# Patient Record
Sex: MALE | URBAN - METROPOLITAN AREA
[De-identification: names, ages, dates, MRNs, and addresses within clinical notes are randomized per-mention and may not be internally consistent; named-entity substitution may affect disease eponyms.]

---

## 2020-08-11 ENCOUNTER — APPOINTMENT (RX ONLY)
Dept: URBAN - METROPOLITAN AREA CLINIC 28 | Facility: CLINIC | Age: 80
Setting detail: DERMATOLOGY
End: 2020-08-11

## 2020-08-11 PROBLEM — C44.311 BASAL CELL CARCINOMA OF SKIN OF NOSE: Status: ACTIVE | Noted: 2020-08-11

## 2020-08-11 PROCEDURE — ? COUNSELING

## 2020-08-11 PROCEDURE — ? CURETTAGE AND DESTRUCTION WITH PATHOLOGY

## 2020-08-11 PROCEDURE — 17282 DSTR MAL LS F/E/E/N/L/M1.1-2: CPT

## 2020-08-11 PROCEDURE — ? PHOTO-DOCUMENTATION

## 2020-08-11 NOTE — PROCEDURE: CURETTAGE AND DESTRUCTION WITH PATHOLOGY
Detail Level: Detailed
Size Of Lesion In Cm: 0.8
Size Of Lesion After Curettage: 1.2
Anesthesia Type: 1% lidocaine with epinephrine
Cautery Type: electrodesiccation
Number Of Curettages: 3
What Was Performed First?: Curettage
Additional Information: (Optional): The wound was cleaned, and a pressure dressing was applied.  The patient received detailed post-op instructions.
Lab: -[id number]
Histology Text: Following the procedure a portion of the curetted material was sent for histologic evaluation.
Biopsy Type: H and E
Render Path Notes In Note?: No
Consent was obtained from the patient. The risks, benefits and alternatives to therapy were discussed in detail. Specifically, the risks of infection, scarring, bleeding, prolonged wound healing, nerve injury, incomplete removal, allergy to anesthesia and recurrence were addressed. Alternatives to ED&C, such as: surgical removal and XRT were also discussed.  Prior to the procedure, the treatment site was clearly identified and confirmed by the patient. All components of Universal Protocol/PAUSE Rule completed.
Post-Care Instructions: I reviewed with the patient in detail post-care instructions. Patient is to keep the area dry for 48 hours, and not to engage in any swimming until the area is healed. Should the patient develop any fevers, chills, bleeding, severe pain patient will contact the office immediately.
Billing Type: Third-Party Bill
Bill As A Line Item Or As Units: Line Item

## 2020-09-15 ENCOUNTER — APPOINTMENT (RX ONLY)
Dept: URBAN - METROPOLITAN AREA CLINIC 28 | Facility: CLINIC | Age: 80
Setting detail: DERMATOLOGY
End: 2020-09-15

## 2020-09-15 DIAGNOSIS — L57.0 ACTINIC KERATOSIS: ICD-10-CM

## 2020-09-15 PROBLEM — C44.311 BASAL CELL CARCINOMA OF SKIN OF NOSE: Status: ACTIVE | Noted: 2020-09-15

## 2020-09-15 PROCEDURE — 17003 DESTRUCT PREMALG LES 2-14: CPT

## 2020-09-15 PROCEDURE — ? COUNSELING

## 2020-09-15 PROCEDURE — 99212 OFFICE O/P EST SF 10 MIN: CPT | Mod: 25

## 2020-09-15 PROCEDURE — 17000 DESTRUCT PREMALG LESION: CPT

## 2020-09-15 PROCEDURE — ? LIQUID NITROGEN

## 2020-09-15 ASSESSMENT — LOCATION SIMPLE DESCRIPTION DERM
LOCATION SIMPLE: LEFT TEMPLE
LOCATION SIMPLE: LEFT EAR
LOCATION SIMPLE: LEFT ZYGOMA

## 2020-09-15 ASSESSMENT — LOCATION DETAILED DESCRIPTION DERM
LOCATION DETAILED: LEFT SUPERIOR HELIX
LOCATION DETAILED: LEFT MEDIAL ZYGOMA
LOCATION DETAILED: LEFT CENTRAL TEMPLE

## 2020-09-15 ASSESSMENT — LOCATION ZONE DERM
LOCATION ZONE: FACE
LOCATION ZONE: EAR

## 2020-09-15 NOTE — PROCEDURE: LIQUID NITROGEN
Consent: The patient's consent was obtained including but not limited to risks of crusting, scabbing, blistering, scarring, darker or lighter pigmentary change, recurrence, incomplete removal and infection.
Duration Of Freeze Thaw-Cycle (Seconds): 0
Render Note In Bullet Format When Appropriate: No
Post-Care Instructions: I reviewed with the patient in detail post-care instructions. Patient is to wear sunprotection, and avoid picking at any of the treated lesions. Pt may apply Vaseline to crusted or scabbing areas.
Number Of Freeze-Thaw Cycles: 1 freeze-thaw cycle
Detail Level: Simple

## 2021-01-01 ENCOUNTER — APPOINTMENT (INPATIENT)
Dept: RADIOLOGY | Facility: HOSPITAL | Age: 81
DRG: 853 | End: 2021-01-01
Attending: SURGERY
Payer: COMMERCIAL

## 2021-01-01 ENCOUNTER — HOSPITAL ENCOUNTER (INPATIENT)
Facility: HOSPITAL | Age: 81
LOS: 17 days | Discharge: HOME HEALTH | DRG: 438 | End: 2021-08-07
Attending: EMERGENCY MEDICINE | Admitting: HOSPITALIST
Payer: COMMERCIAL

## 2021-01-01 ENCOUNTER — APPOINTMENT (EMERGENCY)
Dept: RADIOLOGY | Facility: HOSPITAL | Age: 81
DRG: 853 | End: 2021-01-01
Attending: EMERGENCY MEDICINE
Payer: COMMERCIAL

## 2021-01-01 ENCOUNTER — APPOINTMENT (INPATIENT)
Dept: RADIOLOGY | Facility: HOSPITAL | Age: 81
DRG: 438 | End: 2021-01-01
Attending: HOSPITALIST
Payer: COMMERCIAL

## 2021-01-01 ENCOUNTER — APPOINTMENT (EMERGENCY)
Dept: RADIOLOGY | Facility: HOSPITAL | Age: 81
DRG: 438 | End: 2021-01-01
Attending: EMERGENCY MEDICINE
Payer: COMMERCIAL

## 2021-01-01 ENCOUNTER — ANESTHESIA (INPATIENT)
Dept: OPERATING ROOM | Facility: HOSPITAL | Age: 81
DRG: 853 | End: 2021-01-01
Payer: COMMERCIAL

## 2021-01-01 ENCOUNTER — APPOINTMENT (INPATIENT)
Dept: RADIOLOGY | Facility: HOSPITAL | Age: 81
DRG: 853 | End: 2021-01-01
Attending: STUDENT IN AN ORGANIZED HEALTH CARE EDUCATION/TRAINING PROGRAM
Payer: COMMERCIAL

## 2021-01-01 ENCOUNTER — APPOINTMENT (INPATIENT)
Dept: CARDIOLOGY | Facility: HOSPITAL | Age: 81
DRG: 438 | End: 2021-01-01
Attending: HOSPITALIST
Payer: COMMERCIAL

## 2021-01-01 ENCOUNTER — APPOINTMENT (INPATIENT)
Dept: RADIOLOGY | Facility: HOSPITAL | Age: 81
DRG: 438 | End: 2021-01-01
Attending: PHYSICIAN ASSISTANT
Payer: COMMERCIAL

## 2021-01-01 ENCOUNTER — ANESTHESIA EVENT (INPATIENT)
Dept: OPERATING ROOM | Facility: HOSPITAL | Age: 81
DRG: 853 | End: 2021-01-01
Payer: COMMERCIAL

## 2021-01-01 ENCOUNTER — APPOINTMENT (INPATIENT)
Dept: RADIOLOGY | Facility: HOSPITAL | Age: 81
DRG: 438 | End: 2021-01-01
Attending: STUDENT IN AN ORGANIZED HEALTH CARE EDUCATION/TRAINING PROGRAM
Payer: COMMERCIAL

## 2021-01-01 ENCOUNTER — HOSPITAL ENCOUNTER (INPATIENT)
Facility: HOSPITAL | Age: 81
LOS: 3 days | DRG: 853 | End: 2021-08-18
Attending: EMERGENCY MEDICINE | Admitting: SURGERY
Payer: COMMERCIAL

## 2021-01-01 ENCOUNTER — APPOINTMENT (INPATIENT)
Dept: CARDIOLOGY | Facility: HOSPITAL | Age: 81
DRG: 853 | End: 2021-01-01
Payer: COMMERCIAL

## 2021-01-01 VITALS
SYSTOLIC BLOOD PRESSURE: 105 MMHG | TEMPERATURE: 97.7 F | HEIGHT: 73 IN | BODY MASS INDEX: 25.42 KG/M2 | DIASTOLIC BLOOD PRESSURE: 59 MMHG | RESPIRATION RATE: 10 BRPM | OXYGEN SATURATION: 67 % | WEIGHT: 191.8 LBS

## 2021-01-01 VITALS
TEMPERATURE: 97.5 F | HEART RATE: 57 BPM | BODY MASS INDEX: 25.45 KG/M2 | OXYGEN SATURATION: 95 % | DIASTOLIC BLOOD PRESSURE: 67 MMHG | HEIGHT: 73 IN | WEIGHT: 192 LBS | RESPIRATION RATE: 18 BRPM | SYSTOLIC BLOOD PRESSURE: 150 MMHG

## 2021-01-01 DIAGNOSIS — D72.825 BANDEMIA: ICD-10-CM

## 2021-01-01 DIAGNOSIS — K63.1 DUODENAL PERFORATION (CMS/HCC): ICD-10-CM

## 2021-01-01 DIAGNOSIS — N17.9 ACUTE KIDNEY INJURY SUPERIMPOSED ON CHRONIC KIDNEY DISEASE (CMS/HCC)  (CMS/HCC): Primary | ICD-10-CM

## 2021-01-01 DIAGNOSIS — I48.0 AF (PAROXYSMAL ATRIAL FIBRILLATION) (CMS/HCC): ICD-10-CM

## 2021-01-01 DIAGNOSIS — R79.89 ELEVATED TROPONIN: ICD-10-CM

## 2021-01-01 DIAGNOSIS — R57.9 SHOCK (CMS/HCC): ICD-10-CM

## 2021-01-01 DIAGNOSIS — N18.9 ACUTE KIDNEY INJURY SUPERIMPOSED ON CHRONIC KIDNEY DISEASE (CMS/HCC)  (CMS/HCC): Primary | ICD-10-CM

## 2021-01-01 DIAGNOSIS — K85.90 ACUTE PANCREATITIS, UNSPECIFIED COMPLICATION STATUS, UNSPECIFIED PANCREATITIS TYPE: Primary | ICD-10-CM

## 2021-01-01 DIAGNOSIS — K85.80 OTHER ACUTE PANCREATITIS, UNSPECIFIED COMPLICATION STATUS: ICD-10-CM

## 2021-01-01 DIAGNOSIS — R79.89 HIGH SERUM LACTATE: ICD-10-CM

## 2021-01-01 DIAGNOSIS — R06.02 SHORTNESS OF BREATH: ICD-10-CM

## 2021-01-01 DIAGNOSIS — R00.1 SINUS BRADYCARDIA: ICD-10-CM

## 2021-01-01 DIAGNOSIS — R22.42 LOCALIZED SWELLING OF LEFT LOWER EXTREMITY: ICD-10-CM

## 2021-01-01 DIAGNOSIS — Z01.818 PREPROCEDURAL EXAMINATION: Primary | ICD-10-CM

## 2021-01-01 DIAGNOSIS — D72.829 LEUKOCYTOSIS, UNSPECIFIED TYPE: ICD-10-CM

## 2021-01-01 DIAGNOSIS — I48.0 PAROXYSMAL ATRIAL FIBRILLATION (CMS/HCC): ICD-10-CM

## 2021-01-01 DIAGNOSIS — R10.84 GENERALIZED ABDOMINAL PAIN: ICD-10-CM

## 2021-01-01 LAB
25(OH)D3 SERPL-MCNC: 31 NG/ML (ref 30–100)
ABO + RH BLD: NORMAL
ALBUMIN SERPL-MCNC: 1.2 G/DL (ref 3.4–5)
ALBUMIN SERPL-MCNC: 1.3 G/DL (ref 3.4–5)
ALBUMIN SERPL-MCNC: 1.3 G/DL (ref 3.4–5)
ALBUMIN SERPL-MCNC: 1.4 G/DL (ref 3.4–5)
ALBUMIN SERPL-MCNC: 1.5 G/DL (ref 3.4–5)
ALBUMIN SERPL-MCNC: 1.6 G/DL (ref 3.4–5)
ALBUMIN SERPL-MCNC: 1.9 G/DL (ref 3.4–5)
ALBUMIN SERPL-MCNC: 1.9 G/DL (ref 3.4–5)
ALBUMIN SERPL-MCNC: 2 G/DL (ref 3.4–5)
ALBUMIN SERPL-MCNC: 2.1 G/DL (ref 3.4–5)
ALBUMIN SERPL-MCNC: 2.2 G/DL (ref 3.4–5)
ALBUMIN SERPL-MCNC: 2.2 G/DL (ref 3.4–5)
ALBUMIN SERPL-MCNC: 2.6 G/DL (ref 3.4–5)
ALBUMIN SERPL-MCNC: 2.9 G/DL (ref 3.4–5)
ALBUMIN SERPL-MCNC: 3.5 G/DL (ref 3.4–5)
ALBUMIN SERPL-MCNC: 3.6 G/DL (ref 3.4–5)
ALP SERPL-CCNC: 107 IU/L (ref 35–126)
ALP SERPL-CCNC: 111 IU/L (ref 35–126)
ALP SERPL-CCNC: 151 IU/L (ref 35–126)
ALP SERPL-CCNC: 47 IU/L (ref 35–126)
ALP SERPL-CCNC: 48 IU/L (ref 35–126)
ALP SERPL-CCNC: 50 IU/L (ref 35–126)
ALP SERPL-CCNC: 53 IU/L (ref 35–126)
ALP SERPL-CCNC: 53 IU/L (ref 35–126)
ALP SERPL-CCNC: 54 IU/L (ref 35–126)
ALP SERPL-CCNC: 57 IU/L (ref 35–126)
ALP SERPL-CCNC: 62 IU/L (ref 35–126)
ALP SERPL-CCNC: 62 IU/L (ref 35–126)
ALP SERPL-CCNC: 66 IU/L (ref 35–126)
ALP SERPL-CCNC: 83 IU/L (ref 35–126)
ALP SERPL-CCNC: 84 IU/L (ref 35–126)
ALP SERPL-CCNC: 87 IU/L (ref 35–126)
ALP SERPL-CCNC: 89 IU/L (ref 35–126)
ALP SERPL-CCNC: 96 IU/L (ref 35–126)
ALP SERPL-CCNC: 98 IU/L (ref 35–126)
ALT SERPL-CCNC: 14 IU/L (ref 16–63)
ALT SERPL-CCNC: 15 IU/L (ref 16–63)
ALT SERPL-CCNC: 151 IU/L (ref 16–63)
ALT SERPL-CCNC: 179 IU/L (ref 16–63)
ALT SERPL-CCNC: 220 IU/L (ref 16–63)
ALT SERPL-CCNC: 227 IU/L (ref 16–63)
ALT SERPL-CCNC: 25 IU/L (ref 16–63)
ALT SERPL-CCNC: 271 IU/L (ref 16–63)
ALT SERPL-CCNC: 28 IU/L (ref 16–63)
ALT SERPL-CCNC: 29 IU/L (ref 16–63)
ALT SERPL-CCNC: 29 IU/L (ref 16–63)
ALT SERPL-CCNC: 33 IU/L (ref 16–63)
ALT SERPL-CCNC: 37 IU/L (ref 16–63)
ALT SERPL-CCNC: 41 IU/L (ref 16–63)
ALT SERPL-CCNC: 429 IU/L (ref 16–63)
ALT SERPL-CCNC: 513 IU/L (ref 16–63)
ALT SERPL-CCNC: 542 IU/L (ref 16–63)
ALT SERPL-CCNC: 555 IU/L (ref 16–63)
ALT SERPL-CCNC: 645 IU/L (ref 16–63)
ANION GAP SERPL CALC-SCNC: 10 MEQ/L (ref 3–15)
ANION GAP SERPL CALC-SCNC: 11 MEQ/L (ref 3–15)
ANION GAP SERPL CALC-SCNC: 12 MEQ/L (ref 3–15)
ANION GAP SERPL CALC-SCNC: 13 MEQ/L (ref 3–15)
ANION GAP SERPL CALC-SCNC: 14 MEQ/L (ref 3–15)
ANION GAP SERPL CALC-SCNC: 14 MEQ/L (ref 3–15)
ANION GAP SERPL CALC-SCNC: 15 MEQ/L (ref 3–15)
ANION GAP SERPL CALC-SCNC: 16 MEQ/L (ref 3–15)
ANION GAP SERPL CALC-SCNC: 17 MEQ/L (ref 3–15)
ANION GAP SERPL CALC-SCNC: 20 MEQ/L (ref 3–15)
ANION GAP SERPL CALC-SCNC: 20 MEQ/L (ref 3–15)
ANION GAP SERPL CALC-SCNC: 22 MEQ/L (ref 3–15)
ANION GAP SERPL CALC-SCNC: 8 MEQ/L (ref 3–15)
ANION GAP SERPL CALC-SCNC: 9 MEQ/L (ref 3–15)
ANION GAP SERPL CALC-SCNC: 9 MEQ/L (ref 3–15)
ANISOCYTOSIS BLD QL SMEAR: ABNORMAL
AORTIC ROOT ANNULUS: 3.9 CM
AORTIC VALVE MEAN VELOCITY: 1.19 M/S
AORTIC VALVE VELOCITY TIME INTEGRAL: 34.1 CM
APTT PPP: 23 SEC (ref 23–35)
APTT PPP: 29 SEC (ref 23–35)
APTT PPP: 29 SEC (ref 23–35)
APTT PPP: 34 SEC (ref 23–35)
APTT PPP: 35 SEC (ref 23–35)
APTT PPP: 36 SEC (ref 23–35)
APTT PPP: 40 SEC (ref 23–35)
AST SERPL-CCNC: 1122 IU/L (ref 15–41)
AST SERPL-CCNC: 1421 IU/L (ref 15–41)
AST SERPL-CCNC: 1702 IU/L (ref 15–41)
AST SERPL-CCNC: 185 IU/L (ref 15–41)
AST SERPL-CCNC: 20 IU/L (ref 15–41)
AST SERPL-CCNC: 2027 IU/L (ref 15–41)
AST SERPL-CCNC: 21 IU/L (ref 15–41)
AST SERPL-CCNC: 248 IU/L (ref 15–41)
AST SERPL-CCNC: 26 IU/L (ref 15–41)
AST SERPL-CCNC: 27 IU/L (ref 15–41)
AST SERPL-CCNC: 29 IU/L (ref 15–41)
AST SERPL-CCNC: 31 IU/L (ref 15–41)
AST SERPL-CCNC: 339 IU/L (ref 15–41)
AST SERPL-CCNC: 38 IU/L (ref 15–41)
AST SERPL-CCNC: 40 IU/L (ref 15–41)
AST SERPL-CCNC: 400 IU/L (ref 15–41)
AST SERPL-CCNC: 60 IU/L (ref 15–41)
AST SERPL-CCNC: 688 IU/L (ref 15–41)
AST SERPL-CCNC: 735 IU/L (ref 15–41)
ATRIAL RATE: 122
ATRIAL RATE: 38
ATRIAL RATE: 62
ATRIAL RATE: 64
ATRIAL RATE: 64
ATRIAL RATE: 81
ATRIAL RATE: 91
ATRIAL RATE: 96
AV MEAN GRADIENT: 7 MMHG
AV PEAK GRADIENT: 12 MMHG
AV PEAK VELOCITY-S: 1.72 M/S
AV VALVE AREA: 3.08 CM2
BACTERIA UR CULT: NORMAL
BACTERIA UR CULT: NORMAL
BACTERIA URNS QL MICRO: ABNORMAL /HPF
BACTERIA URNS QL MICRO: ABNORMAL /HPF
BASE EXCESS BLDA CALC-SCNC: -1.1 MEQ/L
BASE EXCESS BLDA CALC-SCNC: -1.8 MEQ/L
BASE EXCESS BLDA CALC-SCNC: -10.4 MEQ/L
BASE EXCESS BLDA CALC-SCNC: -11.3 MEQ/L
BASE EXCESS BLDA CALC-SCNC: -11.5 MEQ/L
BASE EXCESS BLDA CALC-SCNC: -11.8 MEQ/L
BASE EXCESS BLDA CALC-SCNC: -12.2 MEQ/L
BASE EXCESS BLDA CALC-SCNC: -16.9 MEQ/L
BASE EXCESS BLDA CALC-SCNC: -3.3 MEQ/L
BASE EXCESS BLDA CALC-SCNC: -3.7 MEQ/L
BASE EXCESS BLDA CALC-SCNC: -5.5 MEQ/L
BASE EXCESS BLDA CALC-SCNC: -9.9 MEQ/L
BASE EXCESS BLDA CALC-SCNC: 0.8 MEQ/L
BASE EXCESS BLDA CALC-SCNC: 1.2 MEQ/L
BASE EXCESS BLDA CALC-SCNC: 2 MEQ/L
BASE EXCESS BLDA CALC-SCNC: 2 MEQ/L
BASE EXCESS BLDA CALC-SCNC: 2.1 MEQ/L
BASE EXCESS BLDA CALC-SCNC: 3.9 MEQ/L
BASE EXCESS BLDV CALC-SCNC: -14.1 MEQ/L
BASOPHILS # BLD: 0 K/UL (ref 0.01–0.1)
BASOPHILS # BLD: 0.04 K/UL (ref 0.01–0.1)
BASOPHILS NFR BLD: 0 %
BASOPHILS NFR BLD: 0.2 %
BILIRUB DIRECT SERPL-MCNC: 0.1 MG/DL
BILIRUB DIRECT SERPL-MCNC: 0.1 MG/DL
BILIRUB DIRECT SERPL-MCNC: 0.4 MG/DL
BILIRUB DIRECT SERPL-MCNC: 0.5 MG/DL
BILIRUB DIRECT SERPL-MCNC: 0.5 MG/DL
BILIRUB DIRECT SERPL-MCNC: 0.6 MG/DL
BILIRUB DIRECT SERPL-MCNC: 0.7 MG/DL
BILIRUB DIRECT SERPL-MCNC: 0.7 MG/DL
BILIRUB DIRECT SERPL-MCNC: 0.8 MG/DL
BILIRUB DIRECT SERPL-MCNC: 0.9 MG/DL
BILIRUB DIRECT SERPL-MCNC: 0.9 MG/DL
BILIRUB DIRECT SERPL-MCNC: 1 MG/DL
BILIRUB SERPL-MCNC: 0.4 MG/DL (ref 0.3–1.2)
BILIRUB SERPL-MCNC: 0.5 MG/DL (ref 0.3–1.2)
BILIRUB SERPL-MCNC: 0.5 MG/DL (ref 0.3–1.2)
BILIRUB SERPL-MCNC: 0.7 MG/DL (ref 0.3–1.2)
BILIRUB SERPL-MCNC: 0.8 MG/DL (ref 0.3–1.2)
BILIRUB SERPL-MCNC: 0.9 MG/DL (ref 0.3–1.2)
BILIRUB SERPL-MCNC: 1 MG/DL (ref 0.3–1.2)
BILIRUB SERPL-MCNC: 1.1 MG/DL (ref 0.3–1.2)
BILIRUB SERPL-MCNC: 1.2 MG/DL (ref 0.3–1.2)
BILIRUB SERPL-MCNC: 1.2 MG/DL (ref 0.3–1.2)
BILIRUB SERPL-MCNC: 1.3 MG/DL (ref 0.3–1.2)
BILIRUB SERPL-MCNC: 1.5 MG/DL (ref 0.3–1.2)
BILIRUB SERPL-MCNC: 1.6 MG/DL (ref 0.3–1.2)
BILIRUB SERPL-MCNC: 1.6 MG/DL (ref 0.3–1.2)
BILIRUB SERPL-MCNC: 1.7 MG/DL (ref 0.3–1.2)
BILIRUB UR QL STRIP.AUTO: NEGATIVE MG/DL
BILIRUB UR QL STRIP.AUTO: NEGATIVE MG/DL
BLD GP AB SCN SERPL QL: NEGATIVE
BNP SERPL-MCNC: 253 PG/ML
BSA FOR ECHO PROCEDURE: 2.12 M2
BSA FOR ECHO PROCEDURE: 2.2 M2
BUN SERPL-MCNC: 32 MG/DL (ref 8–20)
BUN SERPL-MCNC: 33 MG/DL (ref 8–20)
BUN SERPL-MCNC: 34 MG/DL (ref 8–20)
BUN SERPL-MCNC: 35 MG/DL (ref 8–20)
BUN SERPL-MCNC: 36 MG/DL (ref 8–20)
BUN SERPL-MCNC: 37 MG/DL (ref 8–20)
BUN SERPL-MCNC: 37 MG/DL (ref 8–20)
BUN SERPL-MCNC: 38 MG/DL (ref 8–20)
BUN SERPL-MCNC: 39 MG/DL (ref 8–20)
BUN SERPL-MCNC: 41 MG/DL (ref 8–20)
BUN SERPL-MCNC: 42 MG/DL (ref 8–20)
BUN SERPL-MCNC: 43 MG/DL (ref 8–20)
BUN SERPL-MCNC: 44 MG/DL (ref 8–20)
BUN SERPL-MCNC: 45 MG/DL (ref 8–20)
BUN SERPL-MCNC: 46 MG/DL (ref 8–20)
BUN SERPL-MCNC: 46 MG/DL (ref 8–20)
BUN SERPL-MCNC: 47 MG/DL (ref 8–20)
BUN SERPL-MCNC: 48 MG/DL (ref 8–20)
BUN SERPL-MCNC: 48 MG/DL (ref 8–20)
BUN SERPL-MCNC: 49 MG/DL (ref 8–20)
BUN SERPL-MCNC: 50 MG/DL (ref 8–20)
BUN SERPL-MCNC: 50 MG/DL (ref 8–20)
BUN SERPL-MCNC: 59 MG/DL (ref 8–20)
BUN SERPL-MCNC: 60 MG/DL (ref 8–20)
BUN SERPL-MCNC: 61 MG/DL (ref 8–20)
BUN SERPL-MCNC: 63 MG/DL (ref 8–20)
BUN SERPL-MCNC: 65 MG/DL (ref 8–20)
BUN SERPL-MCNC: 66 MG/DL (ref 8–20)
BUN SERPL-MCNC: 67 MG/DL (ref 8–20)
BUN SERPL-MCNC: 67 MG/DL (ref 8–20)
BUN SERPL-MCNC: 68 MG/DL (ref 8–20)
BUN SERPL-MCNC: 68 MG/DL (ref 8–20)
BURR CELLS BLD QL SMEAR: ABNORMAL
C DIFF STL QL: NEGATIVE
CA-I BLD-SCNC: 1.02 MMOL/L (ref 1.15–1.27)
CA-I BLD-SCNC: 1.04 MMOL/L (ref 1.15–1.27)
CA-I BLD-SCNC: 1.08 MMOL/L (ref 1.15–1.27)
CA-I BLD-SCNC: 1.08 MMOL/L (ref 1.15–1.27)
CA-I BLD-SCNC: 1.11 MMOL/L (ref 1.15–1.27)
CA-I BLD-SCNC: 1.11 MMOL/L (ref 1.15–1.27)
CA-I BLD-SCNC: 1.12 MMOL/L (ref 1.15–1.27)
CA-I BLD-SCNC: 1.13 MMOL/L (ref 1.15–1.27)
CA-I BLD-SCNC: 1.15 MMOL/L (ref 1.15–1.27)
CA-I BLD-SCNC: 1.17 MMOL/L (ref 1.15–1.27)
CA-I BLD-SCNC: 1.17 MMOL/L (ref 1.15–1.27)
CA-I BLD-SCNC: 1.19 MMOL/L (ref 1.15–1.27)
CA-I BLD-SCNC: 1.2 MMOL/L (ref 1.15–1.27)
CA-I BLD-SCNC: 1.21 MMOL/L (ref 1.15–1.27)
CA-I BLD-SCNC: 1.21 MMOL/L (ref 1.15–1.27)
CA-I BLD-SCNC: 1.22 MMOL/L (ref 1.15–1.27)
CA-I BLD-SCNC: 1.26 MMOL/L (ref 1.15–1.27)
CA-I BLD-SCNC: 1.28 MMOL/L (ref 1.15–1.27)
CA-I BLD-SCNC: 1.29 MMOL/L (ref 1.15–1.27)
CA-I BLD-SCNC: 1.34 MMOL/L (ref 1.15–1.27)
CALCIUM SERPL-MCNC: 7.7 MG/DL (ref 8.9–10.3)
CALCIUM SERPL-MCNC: 7.8 MG/DL (ref 8.9–10.3)
CALCIUM SERPL-MCNC: 7.9 MG/DL (ref 8.9–10.3)
CALCIUM SERPL-MCNC: 8.1 MG/DL (ref 8.9–10.3)
CALCIUM SERPL-MCNC: 8.2 MG/DL (ref 8.9–10.3)
CALCIUM SERPL-MCNC: 8.2 MG/DL (ref 8.9–10.3)
CALCIUM SERPL-MCNC: 8.4 MG/DL (ref 8.9–10.3)
CALCIUM SERPL-MCNC: 8.5 MG/DL (ref 8.9–10.3)
CALCIUM SERPL-MCNC: 8.6 MG/DL (ref 8.9–10.3)
CALCIUM SERPL-MCNC: 8.7 MG/DL (ref 8.9–10.3)
CALCIUM SERPL-MCNC: 8.8 MG/DL (ref 8.9–10.3)
CALCIUM SERPL-MCNC: 8.9 MG/DL (ref 8.9–10.3)
CALCIUM SERPL-MCNC: 8.9 MG/DL (ref 8.9–10.3)
CALCIUM SERPL-MCNC: 9 MG/DL (ref 8.9–10.3)
CALCIUM SERPL-MCNC: 9.1 MG/DL (ref 8.9–10.3)
CALCIUM SERPL-MCNC: 9.1 MG/DL (ref 8.9–10.3)
CALCIUM SERPL-MCNC: 9.7 MG/DL (ref 8.9–10.3)
CALCIUM SERPL-MCNC: 9.7 MG/DL (ref 8.9–10.3)
CALCIUM SERPL-MCNC: 9.9 MG/DL (ref 8.9–10.3)
CASE RPRT: NORMAL
CHLORIDE BLDA-SCNC: 102 MEQ/L (ref 98–109)
CHLORIDE BLDA-SCNC: 102 MEQ/L (ref 98–109)
CHLORIDE BLDA-SCNC: 103 MEQ/L (ref 98–109)
CHLORIDE BLDA-SCNC: 103 MEQ/L (ref 98–109)
CHLORIDE BLDA-SCNC: 104 MEQ/L (ref 98–109)
CHLORIDE BLDA-SCNC: 104 MEQ/L (ref 98–109)
CHLORIDE BLDA-SCNC: 105 MEQ/L (ref 98–109)
CHLORIDE BLDA-SCNC: 106 MEQ/L (ref 98–109)
CHLORIDE BLDA-SCNC: 108 MEQ/L (ref 98–109)
CHLORIDE BLDA-SCNC: 109 MEQ/L (ref 98–109)
CHLORIDE BLDA-SCNC: 109 MEQ/L (ref 98–109)
CHLORIDE BLDA-SCNC: 110 MEQ/L (ref 98–109)
CHLORIDE BLDA-SCNC: 112 MEQ/L (ref 98–109)
CHLORIDE SERPL-SCNC: 100 MEQ/L (ref 98–109)
CHLORIDE SERPL-SCNC: 101 MEQ/L (ref 98–109)
CHLORIDE SERPL-SCNC: 102 MEQ/L (ref 98–109)
CHLORIDE SERPL-SCNC: 103 MEQ/L (ref 98–109)
CHLORIDE SERPL-SCNC: 104 MEQ/L (ref 98–109)
CHLORIDE SERPL-SCNC: 104 MEQ/L (ref 98–109)
CHLORIDE SERPL-SCNC: 105 MEQ/L (ref 98–109)
CHLORIDE SERPL-SCNC: 107 MEQ/L (ref 98–109)
CHLORIDE SERPL-SCNC: 107 MEQ/L (ref 98–109)
CHLORIDE SERPL-SCNC: 108 MEQ/L (ref 98–109)
CHLORIDE SERPL-SCNC: 109 MEQ/L (ref 98–109)
CHLORIDE SERPL-SCNC: 109 MEQ/L (ref 98–109)
CHLORIDE SERPL-SCNC: 110 MEQ/L (ref 98–109)
CHLORIDE SERPL-SCNC: 111 MEQ/L (ref 98–109)
CHLORIDE SERPL-SCNC: 112 MEQ/L (ref 98–109)
CHLORIDE UR-SCNC: 22 MEQ/L
CHOLEST SERPL-MCNC: 140 MG/DL
CLARITY UR REFRACT.AUTO: ABNORMAL
CLARITY UR REFRACT.AUTO: ABNORMAL
CLINICAL INFO: NORMAL
CO2 BLDA-SCNC: 15.6 MEQ/L (ref 22–32)
CO2 BLDA-SCNC: 16.2 MEQ/L (ref 22–32)
CO2 BLDA-SCNC: 16.6 MEQ/L (ref 22–32)
CO2 BLDA-SCNC: 17 MEQ/L (ref 22–32)
CO2 BLDA-SCNC: 17.2 MEQ/L (ref 22–32)
CO2 BLDA-SCNC: 17.3 MEQ/L (ref 22–32)
CO2 BLDA-SCNC: 18.1 MEQ/L (ref 22–32)
CO2 BLDA-SCNC: 22 MEQ/L (ref 22–32)
CO2 BLDA-SCNC: 24.6 MEQ/L (ref 22–32)
CO2 BLDA-SCNC: 25 MEQ/L (ref 22–32)
CO2 BLDA-SCNC: 26.2 MEQ/L (ref 22–32)
CO2 BLDA-SCNC: 26.8 MEQ/L (ref 22–32)
CO2 BLDA-SCNC: 28 MEQ/L (ref 22–32)
CO2 BLDA-SCNC: 29.1 MEQ/L (ref 22–32)
CO2 BLDA-SCNC: 29.2 MEQ/L (ref 22–32)
CO2 BLDA-SCNC: 29.2 MEQ/L (ref 22–32)
CO2 BLDA-SCNC: 30.3 MEQ/L (ref 22–32)
CO2 BLDA-SCNC: 31.8 MEQ/L (ref 22–32)
CO2 BLDV-SCNC: 17.4 MEQ/L (ref 22–32)
CO2 SERPL-SCNC: 14 MEQ/L (ref 22–32)
CO2 SERPL-SCNC: 16 MEQ/L (ref 22–32)
CO2 SERPL-SCNC: 18 MEQ/L (ref 22–32)
CO2 SERPL-SCNC: 18 MEQ/L (ref 22–32)
CO2 SERPL-SCNC: 19 MEQ/L (ref 22–32)
CO2 SERPL-SCNC: 20 MEQ/L (ref 22–32)
CO2 SERPL-SCNC: 21 MEQ/L (ref 22–32)
CO2 SERPL-SCNC: 22 MEQ/L (ref 22–32)
CO2 SERPL-SCNC: 22 MEQ/L (ref 22–32)
CO2 SERPL-SCNC: 23 MEQ/L (ref 22–32)
CO2 SERPL-SCNC: 23 MEQ/L (ref 22–32)
CO2 SERPL-SCNC: 24 MEQ/L (ref 22–32)
CO2 SERPL-SCNC: 24 MEQ/L (ref 22–32)
CO2 SERPL-SCNC: 25 MEQ/L (ref 22–32)
CO2 SERPL-SCNC: 26 MEQ/L (ref 22–32)
CO2 SERPL-SCNC: 27 MEQ/L (ref 22–32)
CO2 SERPL-SCNC: 27 MEQ/L (ref 22–32)
COHGB MFR BLD: 0.5 %
COHGB MFR BLD: 0.7 %
COHGB MFR BLD: 0.8 %
COHGB MFR BLD: 0.9 %
COHGB MFR BLD: 0.9 %
COHGB MFR BLD: 1 %
COHGB MFR BLD: 1.1 %
COHGB MFR BLD: 1.2 %
COHGB MFR BLD: 1.3 %
COHGB MFR BLD: 1.3 %
COHGB MFR BLD: 1.7 %
COHGB MFR BLD: 1.9 %
COHGB MFR BLD: 2 %
COLOR UR AUTO: YELLOW
COLOR UR AUTO: YELLOW
CREAT SERPL-MCNC: 2.4 MG/DL (ref 0.8–1.3)
CREAT SERPL-MCNC: 2.4 MG/DL (ref 0.8–1.3)
CREAT SERPL-MCNC: 2.6 MG/DL (ref 0.8–1.3)
CREAT SERPL-MCNC: 2.6 MG/DL (ref 0.8–1.3)
CREAT SERPL-MCNC: 2.7 MG/DL (ref 0.8–1.3)
CREAT SERPL-MCNC: 2.8 MG/DL (ref 0.8–1.3)
CREAT SERPL-MCNC: 3 MG/DL (ref 0.8–1.3)
CREAT SERPL-MCNC: 3.1 MG/DL (ref 0.8–1.3)
CREAT SERPL-MCNC: 3.2 MG/DL (ref 0.8–1.3)
CREAT SERPL-MCNC: 3.5 MG/DL (ref 0.8–1.3)
CREAT SERPL-MCNC: 3.5 MG/DL (ref 0.8–1.3)
CREAT SERPL-MCNC: 3.6 MG/DL (ref 0.8–1.3)
CREAT SERPL-MCNC: 3.6 MG/DL (ref 0.8–1.3)
CREAT SERPL-MCNC: 3.7 MG/DL (ref 0.8–1.3)
CREAT SERPL-MCNC: 3.7 MG/DL (ref 0.8–1.3)
CREAT SERPL-MCNC: 3.8 MG/DL (ref 0.8–1.3)
CREAT SERPL-MCNC: 3.9 MG/DL (ref 0.8–1.3)
CREAT SERPL-MCNC: 4.2 MG/DL (ref 0.8–1.3)
CREAT SERPL-MCNC: 4.3 MG/DL (ref 0.8–1.3)
CREAT SERPL-MCNC: 4.3 MG/DL (ref 0.8–1.3)
CREAT SERPL-MCNC: 4.9 MG/DL (ref 0.8–1.3)
CREAT SERPL-MCNC: 5.1 MG/DL (ref 0.8–1.3)
CREAT SERPL-MCNC: 5.1 MG/DL (ref 0.8–1.3)
CREAT SERPL-MCNC: 5.2 MG/DL (ref 0.8–1.3)
CREAT SERPL-MCNC: 5.2 MG/DL (ref 0.8–1.3)
CREAT UR-MCNC: 126.2 MG/DL
CROSSMATCH: NORMAL
D AG BLD QL: POSITIVE
DIFFERENTIAL METHOD BLD: ABNORMAL
DOHLE BOD BLD QL SMEAR: ABNORMAL
DOP CALC LVOT STROKE VOLUME: 105.19 ML
E WAVE DECELERATION TIME: 372 MS
E/A RATIO: 0.6
E/E' RATIO: 11.2
E/LAT E' RATIO: 10.6
EDV (BP): 56.6 CM3
EF (A4C): 67.6 %
EF A2C: 68.3 %
EJECTION FRACTION: 68.9 %
EOSINOPHIL # BLD: 0 K/UL (ref 0.04–0.54)
EOSINOPHIL # BLD: 0.17 K/UL (ref 0.04–0.54)
EOSINOPHIL NFR BLD: 0 %
EOSINOPHIL NFR BLD: 1 %
EOSINOPHIL URNS QL MICRO: ABNORMAL
ERYTHROCYTE [DISTWIDTH] IN BLOOD BY AUTOMATED COUNT: 14.4 % (ref 11.6–14.4)
ERYTHROCYTE [DISTWIDTH] IN BLOOD BY AUTOMATED COUNT: 14.5 % (ref 11.6–14.4)
ERYTHROCYTE [DISTWIDTH] IN BLOOD BY AUTOMATED COUNT: 14.5 % (ref 11.6–14.4)
ERYTHROCYTE [DISTWIDTH] IN BLOOD BY AUTOMATED COUNT: 14.6 % (ref 11.6–14.4)
ERYTHROCYTE [DISTWIDTH] IN BLOOD BY AUTOMATED COUNT: 14.8 % (ref 11.6–14.4)
ERYTHROCYTE [DISTWIDTH] IN BLOOD BY AUTOMATED COUNT: 14.9 % (ref 11.6–14.4)
ERYTHROCYTE [DISTWIDTH] IN BLOOD BY AUTOMATED COUNT: 15 % (ref 11.6–14.4)
ERYTHROCYTE [DISTWIDTH] IN BLOOD BY AUTOMATED COUNT: 15.1 % (ref 11.6–14.4)
ERYTHROCYTE [DISTWIDTH] IN BLOOD BY AUTOMATED COUNT: 15.2 % (ref 11.6–14.4)
ERYTHROCYTE [DISTWIDTH] IN BLOOD BY AUTOMATED COUNT: 15.3 % (ref 11.6–14.4)
ERYTHROCYTE [DISTWIDTH] IN BLOOD BY AUTOMATED COUNT: 15.4 % (ref 11.6–14.4)
ERYTHROCYTE [DISTWIDTH] IN BLOOD BY AUTOMATED COUNT: 15.5 % (ref 11.6–14.4)
ERYTHROCYTE [DISTWIDTH] IN BLOOD BY AUTOMATED COUNT: 15.8 % (ref 11.6–14.4)
ERYTHROCYTE [DISTWIDTH] IN BLOOD BY AUTOMATED COUNT: 15.9 % (ref 11.6–14.4)
ERYTHROCYTE [DISTWIDTH] IN BLOOD BY AUTOMATED COUNT: 16.2 % (ref 11.6–14.4)
ERYTHROCYTE [DISTWIDTH] IN BLOOD BY AUTOMATED COUNT: 16.3 % (ref 11.6–14.4)
ERYTHROCYTE [DISTWIDTH] IN BLOOD BY AUTOMATED COUNT: 16.3 % (ref 11.6–14.4)
ERYTHROCYTE [DISTWIDTH] IN BLOOD BY AUTOMATED COUNT: 16.4 % (ref 11.6–14.4)
ERYTHROCYTE [DISTWIDTH] IN BLOOD BY AUTOMATED COUNT: 16.5 % (ref 11.6–14.4)
ERYTHROCYTE [DISTWIDTH] IN BLOOD BY AUTOMATED COUNT: 16.5 % (ref 11.6–14.4)
ERYTHROCYTE [DISTWIDTH] IN BLOOD BY AUTOMATED COUNT: 16.6 % (ref 11.6–14.4)
ERYTHROCYTE [DISTWIDTH] IN BLOOD BY AUTOMATED COUNT: 17 % (ref 11.6–14.4)
ESV (BP): 17.6 CM3
FERRITIN SERPL-MCNC: 678 NG/ML (ref 24–250)
FIO2 ON VENT: 100 %
FIO2 ON VENT: 100 %
FIO2 ON VENT: 40 %
FIO2 ON VENT: 50 %
FIO2 ON VENT: 60 %
FIO2 ON VENT: 65 %
FIO2 ON VENT: 65 %
FIO2 ON VENT: ABNORMAL %
FLUAV RNA SPEC QL NAA+PROBE: NEGATIVE
FLUBV RNA SPEC QL NAA+PROBE: NEGATIVE
FRACTIONAL SHORTENING: 41.8 %
GFR SERPL CREATININE-BSD FRML MDRD: 10.7 ML/MIN/1.73M*2
GFR SERPL CREATININE-BSD FRML MDRD: 10.7 ML/MIN/1.73M*2
GFR SERPL CREATININE-BSD FRML MDRD: 10.9 ML/MIN/1.73M*2
GFR SERPL CREATININE-BSD FRML MDRD: 10.9 ML/MIN/1.73M*2
GFR SERPL CREATININE-BSD FRML MDRD: 11.4 ML/MIN/1.73M*2
GFR SERPL CREATININE-BSD FRML MDRD: 13.3 ML/MIN/1.73M*2
GFR SERPL CREATININE-BSD FRML MDRD: 13.3 ML/MIN/1.73M*2
GFR SERPL CREATININE-BSD FRML MDRD: 13.7 ML/MIN/1.73M*2
GFR SERPL CREATININE-BSD FRML MDRD: 14.9 ML/MIN/1.73M*2
GFR SERPL CREATININE-BSD FRML MDRD: 15.3 ML/MIN/1.73M*2
GFR SERPL CREATININE-BSD FRML MDRD: 15.8 ML/MIN/1.73M*2
GFR SERPL CREATININE-BSD FRML MDRD: 15.8 ML/MIN/1.73M*2
GFR SERPL CREATININE-BSD FRML MDRD: 16.3 ML/MIN/1.73M*2
GFR SERPL CREATININE-BSD FRML MDRD: 16.3 ML/MIN/1.73M*2
GFR SERPL CREATININE-BSD FRML MDRD: 16.9 ML/MIN/1.73M*2
GFR SERPL CREATININE-BSD FRML MDRD: 16.9 ML/MIN/1.73M*2
GFR SERPL CREATININE-BSD FRML MDRD: 18.7 ML/MIN/1.73M*2
GFR SERPL CREATININE-BSD FRML MDRD: 19.4 ML/MIN/1.73M*2
GFR SERPL CREATININE-BSD FRML MDRD: 20.1 ML/MIN/1.73M*2
GFR SERPL CREATININE-BSD FRML MDRD: 21.8 ML/MIN/1.73M*2
GFR SERPL CREATININE-BSD FRML MDRD: 22.7 ML/MIN/1.73M*2
GFR SERPL CREATININE-BSD FRML MDRD: 23.7 ML/MIN/1.73M*2
GFR SERPL CREATININE-BSD FRML MDRD: 23.7 ML/MIN/1.73M*2
GFR SERPL CREATININE-BSD FRML MDRD: 26 ML/MIN/1.73M*2
GFR SERPL CREATININE-BSD FRML MDRD: 26 ML/MIN/1.73M*2
GIANT PLATELETS BLD QL SMEAR: ABNORMAL
GLUCOSE BLD-MCNC: 108 MG/DL (ref 70–99)
GLUCOSE BLD-MCNC: 118 MG/DL (ref 70–99)
GLUCOSE BLD-MCNC: 119 MG/DL (ref 70–99)
GLUCOSE BLD-MCNC: 122 MG/DL (ref 70–99)
GLUCOSE BLD-MCNC: 124 MG/DL (ref 70–99)
GLUCOSE BLD-MCNC: 133 MG/DL (ref 70–99)
GLUCOSE BLD-MCNC: 149 MG/DL (ref 70–99)
GLUCOSE BLD-MCNC: 152 MG/DL (ref 70–99)
GLUCOSE BLD-MCNC: 157 MG/DL (ref 70–99)
GLUCOSE BLD-MCNC: 169 MG/DL (ref 70–99)
GLUCOSE BLD-MCNC: 176 MG/DL (ref 70–99)
GLUCOSE BLD-MCNC: 188 MG/DL (ref 70–99)
GLUCOSE BLD-MCNC: 190 MG/DL (ref 70–99)
GLUCOSE BLD-MCNC: 200 MG/DL (ref 70–99)
GLUCOSE BLD-MCNC: 212 MG/DL (ref 70–99)
GLUCOSE BLD-MCNC: 222 MG/DL (ref 70–99)
GLUCOSE BLD-MCNC: 272 MG/DL (ref 70–99)
GLUCOSE BLD-MCNC: 282 MG/DL (ref 70–99)
GLUCOSE BLD-MCNC: 309 MG/DL (ref 70–99)
GLUCOSE BLD-MCNC: 312 MG/DL (ref 70–99)
GLUCOSE BLD-MCNC: 323 MG/DL (ref 70–99)
GLUCOSE BLD-MCNC: 334 MG/DL (ref 70–99)
GLUCOSE BLD-MCNC: 81 MG/DL (ref 70–99)
GLUCOSE BLDA-MCNC: 101 MG/DL (ref 70–99)
GLUCOSE BLDA-MCNC: 106 MG/DL (ref 70–99)
GLUCOSE BLDA-MCNC: 117 MG/DL (ref 70–99)
GLUCOSE BLDA-MCNC: 117 MG/DL (ref 70–99)
GLUCOSE BLDA-MCNC: 118 MG/DL (ref 70–99)
GLUCOSE BLDA-MCNC: 118 MG/DL (ref 70–99)
GLUCOSE BLDA-MCNC: 130 MG/DL (ref 70–99)
GLUCOSE BLDA-MCNC: 141 MG/DL (ref 70–99)
GLUCOSE BLDA-MCNC: 141 MG/DL (ref 70–99)
GLUCOSE BLDA-MCNC: 143 MG/DL (ref 70–99)
GLUCOSE BLDA-MCNC: 153 MG/DL (ref 70–99)
GLUCOSE BLDA-MCNC: 206 MG/DL (ref 70–99)
GLUCOSE BLDA-MCNC: 206 MG/DL (ref 70–99)
GLUCOSE BLDA-MCNC: 210 MG/DL (ref 70–99)
GLUCOSE BLDA-MCNC: 272 MG/DL (ref 70–99)
GLUCOSE BLDA-MCNC: 305 MG/DL (ref 70–99)
GLUCOSE BLDA-MCNC: 332 MG/DL (ref 70–99)
GLUCOSE BLDA-MCNC: 378 MG/DL (ref 70–99)
GLUCOSE SERPL-MCNC: 123 MG/DL (ref 70–99)
GLUCOSE SERPL-MCNC: 125 MG/DL (ref 70–99)
GLUCOSE SERPL-MCNC: 128 MG/DL (ref 70–99)
GLUCOSE SERPL-MCNC: 129 MG/DL (ref 70–99)
GLUCOSE SERPL-MCNC: 129 MG/DL (ref 70–99)
GLUCOSE SERPL-MCNC: 130 MG/DL (ref 70–99)
GLUCOSE SERPL-MCNC: 131 MG/DL (ref 70–99)
GLUCOSE SERPL-MCNC: 133 MG/DL (ref 70–99)
GLUCOSE SERPL-MCNC: 133 MG/DL (ref 70–99)
GLUCOSE SERPL-MCNC: 138 MG/DL (ref 70–99)
GLUCOSE SERPL-MCNC: 138 MG/DL (ref 70–99)
GLUCOSE SERPL-MCNC: 139 MG/DL (ref 70–99)
GLUCOSE SERPL-MCNC: 141 MG/DL (ref 70–99)
GLUCOSE SERPL-MCNC: 144 MG/DL (ref 70–99)
GLUCOSE SERPL-MCNC: 145 MG/DL (ref 70–99)
GLUCOSE SERPL-MCNC: 151 MG/DL (ref 70–99)
GLUCOSE SERPL-MCNC: 153 MG/DL (ref 70–99)
GLUCOSE SERPL-MCNC: 153 MG/DL (ref 70–99)
GLUCOSE SERPL-MCNC: 156 MG/DL (ref 70–99)
GLUCOSE SERPL-MCNC: 157 MG/DL (ref 70–99)
GLUCOSE SERPL-MCNC: 159 MG/DL (ref 70–99)
GLUCOSE SERPL-MCNC: 163 MG/DL (ref 70–99)
GLUCOSE SERPL-MCNC: 165 MG/DL (ref 70–99)
GLUCOSE SERPL-MCNC: 167 MG/DL (ref 70–99)
GLUCOSE SERPL-MCNC: 201 MG/DL (ref 70–99)
GLUCOSE SERPL-MCNC: 202 MG/DL (ref 70–99)
GLUCOSE SERPL-MCNC: 205 MG/DL (ref 70–99)
GLUCOSE SERPL-MCNC: 217 MG/DL (ref 70–99)
GLUCOSE SERPL-MCNC: 280 MG/DL (ref 70–99)
GLUCOSE SERPL-MCNC: 307 MG/DL (ref 70–99)
GLUCOSE SERPL-MCNC: 340 MG/DL (ref 70–99)
GLUCOSE SERPL-MCNC: 356 MG/DL (ref 70–99)
GLUCOSE UR STRIP.AUTO-MCNC: NEGATIVE MG/DL
GLUCOSE UR STRIP.AUTO-MCNC: NEGATIVE MG/DL
HBV SURFACE AG SER QL: NONREACTIVE
HCO3 BLDA-SCNC: 11.8 MEQ/L (ref 21–28)
HCO3 BLDA-SCNC: 15.4 MEQ/L (ref 21–28)
HCO3 BLDA-SCNC: 15.8 MEQ/L (ref 21–28)
HCO3 BLDA-SCNC: 16 MEQ/L (ref 21–28)
HCO3 BLDA-SCNC: 16.1 MEQ/L (ref 21–28)
HCO3 BLDA-SCNC: 16.8 MEQ/L (ref 21–28)
HCO3 BLDA-SCNC: 17.2 MEQ/L (ref 21–28)
HCO3 BLDA-SCNC: 20.6 MEQ/L (ref 21–28)
HCO3 BLDA-SCNC: 22.1 MEQ/L (ref 21–28)
HCO3 BLDA-SCNC: 22.4 MEQ/L (ref 21–28)
HCO3 BLDA-SCNC: 23.5 MEQ/L (ref 21–28)
HCO3 BLDA-SCNC: 24.1 MEQ/L (ref 21–28)
HCO3 BLDA-SCNC: 25.6 MEQ/L (ref 21–28)
HCO3 BLDA-SCNC: 25.9 MEQ/L (ref 21–28)
HCO3 BLDA-SCNC: 26.5 MEQ/L (ref 21–28)
HCO3 BLDA-SCNC: 26.5 MEQ/L (ref 21–28)
HCO3 BLDA-SCNC: 26.6 MEQ/L (ref 21–28)
HCO3 BLDA-SCNC: 28 MEQ/L (ref 21–28)
HCO3 BLDV-SCNC: 13.5 MEQ/L (ref 21–28)
HCT VFR BLDCO AUTO: 18.7 % (ref 40.1–51)
HCT VFR BLDCO AUTO: 22 % (ref 40.1–51)
HCT VFR BLDCO AUTO: 24.9 % (ref 40.1–51)
HCT VFR BLDCO AUTO: 25.5 % (ref 40.1–51)
HCT VFR BLDCO AUTO: 26.5 % (ref 40.1–51)
HCT VFR BLDCO AUTO: 27.1 % (ref 40.1–51)
HCT VFR BLDCO AUTO: 27.1 % (ref 40.1–51)
HCT VFR BLDCO AUTO: 27.7 % (ref 40.1–51)
HCT VFR BLDCO AUTO: 28.1 % (ref 40.1–51)
HCT VFR BLDCO AUTO: 28.1 % (ref 40.1–51)
HCT VFR BLDCO AUTO: 28.5 % (ref 40.1–51)
HCT VFR BLDCO AUTO: 28.9 % (ref 40.1–51)
HCT VFR BLDCO AUTO: 29.2 % (ref 40.1–51)
HCT VFR BLDCO AUTO: 29.5 % (ref 40.1–51)
HCT VFR BLDCO AUTO: 29.7 % (ref 40.1–51)
HCT VFR BLDCO AUTO: 30.2 % (ref 40.1–51)
HCT VFR BLDCO AUTO: 30.9 % (ref 40.1–51)
HCT VFR BLDCO AUTO: 31.3 % (ref 40.1–51)
HCT VFR BLDCO AUTO: 31.7 % (ref 40.1–51)
HCT VFR BLDCO AUTO: 31.9 % (ref 40.1–51)
HCT VFR BLDCO AUTO: 32.7 % (ref 40.1–51)
HCT VFR BLDCO AUTO: 34.3 % (ref 40.1–51)
HCT VFR BLDCO AUTO: 34.5 % (ref 40.1–51)
HCT VFR BLDCO AUTO: 35.2 % (ref 40.1–51)
HCT VFR BLDCO AUTO: 35.3 % (ref 40.1–51)
HCT VFR BLDCO AUTO: 35.6 % (ref 40.1–51)
HCT VFR BLDCO AUTO: 35.9 % (ref 40.1–51)
HCT VFR BLDCO AUTO: 36.5 % (ref 40.1–51)
HCT VFR BLDCO AUTO: 37.4 % (ref 40.1–51)
HCT VFR BLDCO AUTO: 37.7 % (ref 40.1–51)
HCT VFR BLDCO AUTO: 38.9 % (ref 40.1–51)
HCT VFR BLDCO AUTO: 39 % (ref 40.1–51)
HCT VFR BLDCO AUTO: 39.5 % (ref 40.1–51)
HCV AB SER QL: NONREACTIVE
HDLC SERPL-MCNC: 27 MG/DL
HDLC SERPL: 5.2 {RATIO}
HGB BLD-MCNC: 10 G/DL (ref 13.7–17.5)
HGB BLD-MCNC: 10.1 G/DL (ref 13.7–17.5)
HGB BLD-MCNC: 10.2 G/DL (ref 13.7–17.5)
HGB BLD-MCNC: 10.3 G/DL (ref 13.7–17.5)
HGB BLD-MCNC: 10.5 G/DL (ref 13.7–17.5)
HGB BLD-MCNC: 10.8 G/DL (ref 13.7–17.5)
HGB BLD-MCNC: 11.4 G/DL (ref 13.7–17.5)
HGB BLD-MCNC: 11.5 G/DL (ref 13.7–17.5)
HGB BLD-MCNC: 11.5 G/DL (ref 13.7–17.5)
HGB BLD-MCNC: 11.6 G/DL (ref 13.7–17.5)
HGB BLD-MCNC: 11.7 G/DL (ref 13.7–17.5)
HGB BLD-MCNC: 11.9 G/DL (ref 13.7–17.5)
HGB BLD-MCNC: 12 G/DL (ref 13.7–17.5)
HGB BLD-MCNC: 12.5 G/DL (ref 13.7–17.5)
HGB BLD-MCNC: 12.5 G/DL (ref 13.7–17.5)
HGB BLD-MCNC: 12.6 G/DL (ref 13.7–17.5)
HGB BLD-MCNC: 13.3 G/DL (ref 13.7–17.5)
HGB BLD-MCNC: 6.2 G/DL (ref 13.7–17.5)
HGB BLD-MCNC: 7.5 G/DL (ref 13.7–17.5)
HGB BLD-MCNC: 8.4 G/DL (ref 13.7–17.5)
HGB BLD-MCNC: 8.7 G/DL (ref 13.7–17.5)
HGB BLD-MCNC: 8.9 G/DL (ref 13.7–17.5)
HGB BLD-MCNC: 8.9 G/DL (ref 13.7–17.5)
HGB BLD-MCNC: 9 G/DL (ref 13.7–17.5)
HGB BLD-MCNC: 9 G/DL (ref 13.7–17.5)
HGB BLD-MCNC: 9.2 G/DL (ref 13.7–17.5)
HGB BLD-MCNC: 9.4 G/DL (ref 13.7–17.5)
HGB BLD-MCNC: 9.5 G/DL (ref 13.7–17.5)
HGB BLD-MCNC: 9.5 G/DL (ref 13.7–17.5)
HGB BLD-MCNC: 9.6 G/DL (ref 13.7–17.5)
HGB BLD-MCNC: 9.9 G/DL (ref 13.7–17.5)
HGB BLDA OXIMETRY-MCNC: 10 G/DL (ref 14–17.5)
HGB BLDA OXIMETRY-MCNC: 10.2 G/DL (ref 14–17.5)
HGB BLDA OXIMETRY-MCNC: 10.4 G/DL (ref 14–17.5)
HGB BLDA OXIMETRY-MCNC: 10.8 G/DL (ref 14–17.5)
HGB BLDA OXIMETRY-MCNC: 11.2 G/DL (ref 14–17.5)
HGB BLDA OXIMETRY-MCNC: 6.3 G/DL (ref 14–17.5)
HGB BLDA OXIMETRY-MCNC: 6.4 G/DL (ref 14–17.5)
HGB BLDA OXIMETRY-MCNC: 7.3 G/DL (ref 14–17.5)
HGB BLDA OXIMETRY-MCNC: 8.3 G/DL (ref 14–17.5)
HGB BLDA OXIMETRY-MCNC: 8.6 G/DL (ref 14–17.5)
HGB BLDA OXIMETRY-MCNC: 8.7 G/DL (ref 14–17.5)
HGB BLDA OXIMETRY-MCNC: 9 G/DL (ref 14–17.5)
HGB BLDA OXIMETRY-MCNC: 9.5 G/DL (ref 14–17.5)
HGB BLDA OXIMETRY-MCNC: 9.5 G/DL (ref 14–17.5)
HGB BLDA OXIMETRY-MCNC: 9.6 G/DL (ref 14–17.5)
HGB BLDA OXIMETRY-MCNC: 9.8 G/DL (ref 14–17.5)
HGB BLDA OXIMETRY-MCNC: 9.9 G/DL (ref 14–17.5)
HGB BLDA OXIMETRY-MCNC: 9.9 G/DL (ref 14–17.5)
HGB UR QL STRIP.AUTO: 3
HGB UR QL STRIP.AUTO: NEGATIVE
HIV 1+2 AB+HIV1 P24 AG SERPL QL IA: NONREACTIVE
HYALINE CASTS #/AREA URNS LPF: ABNORMAL /LPF
HYALINE CASTS #/AREA URNS LPF: ABNORMAL /LPF
HYPOCHROMIA BLD QL SMEAR: ABNORMAL
IMM GRANULOCYTES # BLD AUTO: 0.25 K/UL (ref 0–0.08)
IMM GRANULOCYTES NFR BLD AUTO: 1 %
INHALED O2 CONCENTRATION: ABNORMAL %
INR PPP: 1
INR PPP: 1.3
INR PPP: 1.6
INR PPP: 1.8
INR PPP: 1.8
INR PPP: 2.1
INTERVENTRICULAR SEPTUM: 1.05 CM
IRON SATN MFR SERPL: 38 % (ref 15–45)
IRON SERPL-MCNC: 63 UG/DL (ref 35–150)
ISBT CODE: 600
ISBT CODE: 600
ISBT CODE: 6200
ISBT CODE: 6200
KETONES UR STRIP.AUTO-MCNC: NEGATIVE MG/DL
KETONES UR STRIP.AUTO-MCNC: NEGATIVE MG/DL
LA ESV INDEX (A2C): 22.68 CM3/M2
LA/AORTA RATIO: 1.03
LAAS-AP2: 19.2 CM2
LAAS-AP4: 15.3 CM2
LABORATORY COMMENT REPORT: NORMAL
LACTATE BLDA-SCNC: 1.7 MMOL/L (ref 0.4–1.6)
LACTATE BLDA-SCNC: 1.9 MMOL/L (ref 0.4–1.6)
LACTATE BLDA-SCNC: 2 MMOL/L (ref 0.4–1.6)
LACTATE BLDA-SCNC: 2.5 MMOL/L (ref 0.4–1.6)
LACTATE BLDA-SCNC: 2.6 MMOL/L (ref 0.4–1.6)
LACTATE BLDA-SCNC: 2.7 MMOL/L (ref 0.4–1.6)
LACTATE BLDA-SCNC: 2.8 MMOL/L (ref 0.4–1.6)
LACTATE BLDA-SCNC: 2.9 MMOL/L (ref 0.4–1.6)
LACTATE BLDA-SCNC: 3.4 MMOL/L (ref 0.4–1.6)
LACTATE BLDA-SCNC: 3.5 MMOL/L (ref 0.4–1.6)
LACTATE BLDA-SCNC: 3.5 MMOL/L (ref 0.4–1.6)
LACTATE BLDA-SCNC: 4.9 MMOL/L (ref 0.4–1.6)
LACTATE BLDA-SCNC: 5.3 MMOL/L (ref 0.4–1.6)
LACTATE BLDA-SCNC: 5.7 MMOL/L (ref 0.4–1.6)
LACTATE BLDA-SCNC: 7.1 MMOL/L (ref 0.4–1.6)
LACTATE BLDA-SCNC: 9.3 MMOL/L (ref 0.4–1.6)
LACTATE BLDA-SCNC: 9.4 MMOL/L (ref 0.4–1.6)
LACTATE BLDA-SCNC: 9.6 MMOL/L (ref 0.4–1.6)
LACTATE SERPL-SCNC: 0.9 MMOL/L (ref 0.4–2)
LACTATE SERPL-SCNC: 2 MMOL/L (ref 0.4–2)
LACTATE SERPL-SCNC: 2.9 MMOL/L (ref 0.4–2)
LACTATE SERPL-SCNC: 2.9 MMOL/L (ref 0.4–2)
LACTATE SERPL-SCNC: 3.1 MMOL/L (ref 0.4–2)
LACTATE SERPL-SCNC: 3.4 MMOL/L (ref 0.4–2)
LACTATE SERPL-SCNC: 3.5 MMOL/L (ref 0.4–2)
LACTATE SERPL-SCNC: 4 MMOL/L (ref 0.4–2)
LACTATE SERPL-SCNC: 4.6 MMOL/L (ref 0.4–2)
LACTATE SERPL-SCNC: 4.8 MMOL/L (ref 0.4–2)
LACTATE SERPL-SCNC: 5.9 MMOL/L (ref 0.4–2)
LACTATE SERPL-SCNC: 6.3 MMOL/L (ref 0.4–2)
LACTATE SERPL-SCNC: 6.6 MMOL/L (ref 0.4–2)
LACTATE SERPL-SCNC: 7.8 MMOL/L (ref 0.4–2)
LACTATE SERPL-SCNC: 9.2 MMOL/L (ref 0.4–2)
LAD 2D: 4 CM
LALD A4C: 4.21 CM
LALD A4C: 5.91 CM
LAV-S: 49.9 CM3
LDLC SERPL CALC-MCNC: 66 MG/DL
LEFT ATRIUM VOLUME INDEX: 20.27 CM3/M2
LEFT ATRIUM VOLUME: 44.6 CM3
LEFT INTERNAL DIMENSION IN SYSTOLE: 2.99 CM (ref 3.6–5.46)
LEFT VENTRICLE DIASTOLIC VOLUME INDEX: 32.64 CM3/M2
LEFT VENTRICLE DIASTOLIC VOLUME: 71.8 CM3
LEFT VENTRICLE SYSTOLIC VOLUME INDEX: 10.55 CM3/M2
LEFT VENTRICLE SYSTOLIC VOLUME: 23.2 CM3
LEFT VENTRICULAR INTERNAL DIMENSION IN DIASTOLE: 5.14 CM (ref 6.18–8.59)
LEFT VENTRICULAR POSTERIOR WALL IN END DIASTOLE: 1.07 CM (ref 0.76–1.43)
LEUKOCYTE ESTERASE UR QL STRIP.AUTO: 2
LEUKOCYTE ESTERASE UR QL STRIP.AUTO: NEGATIVE
LIPASE SERPL-CCNC: 19 U/L (ref 20–51)
LIPASE SERPL-CCNC: 25 U/L (ref 20–51)
LIPASE SERPL-CCNC: 41 U/L (ref 20–51)
LIPASE SERPL-CCNC: 533 U/L (ref 20–51)
LIPASE SERPL-CCNC: >4000 U/L (ref 20–51)
LV DIASTOLIC VOLUME: 39.7 CM3
LV ESV (APICAL 2 CHAMBER): 12.6 CM3
LVAD-AP2: 18.3 CM2
LVAD-AP4: 26.1 CM2
LVAS-AP2: 8.22 CM2
LVAS-AP4: 11.9 CM2
LVEDVI(A2C): 18.05 CM3/M2
LVEDVI(BP): 25.73 CM3/M2
LVESVI(A2C): 5.73 CM3/M2
LVESVI(BP): 8 CM3/M2
LVLD-AP2: 6.98 CM
LVLD-AP4: 7.88 CM
LVLS-AP2: 4.68 CM
LVLS-AP4: 5.09 CM
LVOT 2D: 2 CM
LVOT A: 3.14 CM2
LVOT MG: 4 MMHG
LVOT MV: 0.92 M/S
LVOT PEAK VELOCITY: 1.43 M/S
LVOT VTI: 33.5 CM
LYMPHOCYTES # BLD: 0.48 K/UL (ref 1.2–3.5)
LYMPHOCYTES # BLD: 0.49 K/UL (ref 1.2–3.5)
LYMPHOCYTES # BLD: 0.51 K/UL (ref 1.2–3.5)
LYMPHOCYTES # BLD: 0.52 K/UL (ref 1.2–3.5)
LYMPHOCYTES # BLD: 0.63 K/UL (ref 1.2–3.5)
LYMPHOCYTES # BLD: 0.68 K/UL (ref 1.2–3.5)
LYMPHOCYTES # BLD: 0.69 K/UL (ref 1.2–3.5)
LYMPHOCYTES # BLD: 0.78 K/UL (ref 1.2–3.5)
LYMPHOCYTES # BLD: 0.84 K/UL (ref 1.2–3.5)
LYMPHOCYTES # BLD: 0.87 K/UL (ref 1.2–3.5)
LYMPHOCYTES # BLD: 0.87 K/UL (ref 1.2–3.5)
LYMPHOCYTES # BLD: 0.9 K/UL (ref 1.2–3.5)
LYMPHOCYTES # BLD: 0.92 K/UL (ref 1.2–3.5)
LYMPHOCYTES # BLD: 1.16 K/UL (ref 1.2–3.5)
LYMPHOCYTES # BLD: 1.39 K/UL (ref 1.2–3.5)
LYMPHOCYTES NFR BLD: 1 %
LYMPHOCYTES NFR BLD: 1 %
LYMPHOCYTES NFR BLD: 1.9 %
LYMPHOCYTES NFR BLD: 2 %
LYMPHOCYTES NFR BLD: 3 %
LYMPHOCYTES NFR BLD: 7 %
MAGNESIUM SERPL-MCNC: 1.6 MG/DL (ref 1.8–2.5)
MAGNESIUM SERPL-MCNC: 1.7 MG/DL (ref 1.8–2.5)
MAGNESIUM SERPL-MCNC: 1.8 MG/DL (ref 1.8–2.5)
MAGNESIUM SERPL-MCNC: 1.9 MG/DL (ref 1.8–2.5)
MAGNESIUM SERPL-MCNC: 2 MG/DL (ref 1.8–2.5)
MAGNESIUM SERPL-MCNC: 2.1 MG/DL (ref 1.8–2.5)
MAGNESIUM SERPL-MCNC: 2.1 MG/DL (ref 1.8–2.5)
MAGNESIUM SERPL-MCNC: 2.2 MG/DL (ref 1.8–2.5)
MAGNESIUM SERPL-MCNC: 2.2 MG/DL (ref 1.8–2.5)
MAGNESIUM SERPL-MCNC: 2.3 MG/DL (ref 1.8–2.5)
MAGNESIUM SERPL-MCNC: 2.3 MG/DL (ref 1.8–2.5)
MCH RBC QN AUTO: 28.6 PG (ref 28–33.2)
MCH RBC QN AUTO: 28.7 PG (ref 28–33.2)
MCH RBC QN AUTO: 28.7 PG (ref 28–33.2)
MCH RBC QN AUTO: 28.8 PG (ref 28–33.2)
MCH RBC QN AUTO: 29 PG (ref 28–33.2)
MCH RBC QN AUTO: 29.2 PG (ref 28–33.2)
MCH RBC QN AUTO: 29.3 PG (ref 28–33.2)
MCH RBC QN AUTO: 29.4 PG (ref 28–33.2)
MCH RBC QN AUTO: 29.4 PG (ref 28–33.2)
MCH RBC QN AUTO: 29.5 PG (ref 28–33.2)
MCH RBC QN AUTO: 29.6 PG (ref 28–33.2)
MCH RBC QN AUTO: 29.7 PG (ref 28–33.2)
MCH RBC QN AUTO: 29.8 PG (ref 28–33.2)
MCH RBC QN AUTO: 29.9 PG (ref 28–33.2)
MCH RBC QN AUTO: 29.9 PG (ref 28–33.2)
MCH RBC QN AUTO: 30 PG (ref 28–33.2)
MCH RBC QN AUTO: 30 PG (ref 28–33.2)
MCH RBC QN AUTO: 30.1 PG (ref 28–33.2)
MCH RBC QN AUTO: 30.2 PG (ref 28–33.2)
MCH RBC QN AUTO: 31 PG (ref 28–33.2)
MCHC RBC AUTO-ENTMCNC: 30.7 G/DL (ref 32.2–36.5)
MCHC RBC AUTO-ENTMCNC: 31 G/DL (ref 32.2–36.5)
MCHC RBC AUTO-ENTMCNC: 31.3 G/DL (ref 32.2–36.5)
MCHC RBC AUTO-ENTMCNC: 31.6 G/DL (ref 32.2–36.5)
MCHC RBC AUTO-ENTMCNC: 32.1 G/DL (ref 32.2–36.5)
MCHC RBC AUTO-ENTMCNC: 32.1 G/DL (ref 32.2–36.5)
MCHC RBC AUTO-ENTMCNC: 32.2 G/DL (ref 32.2–36.5)
MCHC RBC AUTO-ENTMCNC: 32.3 G/DL (ref 32.2–36.5)
MCHC RBC AUTO-ENTMCNC: 32.3 G/DL (ref 32.2–36.5)
MCHC RBC AUTO-ENTMCNC: 32.4 G/DL (ref 32.2–36.5)
MCHC RBC AUTO-ENTMCNC: 32.5 G/DL (ref 32.2–36.5)
MCHC RBC AUTO-ENTMCNC: 32.5 G/DL (ref 32.2–36.5)
MCHC RBC AUTO-ENTMCNC: 32.7 G/DL (ref 32.2–36.5)
MCHC RBC AUTO-ENTMCNC: 32.8 G/DL (ref 32.2–36.5)
MCHC RBC AUTO-ENTMCNC: 32.8 G/DL (ref 32.2–36.5)
MCHC RBC AUTO-ENTMCNC: 32.9 G/DL (ref 32.2–36.5)
MCHC RBC AUTO-ENTMCNC: 32.9 G/DL (ref 32.2–36.5)
MCHC RBC AUTO-ENTMCNC: 33 G/DL (ref 32.2–36.5)
MCHC RBC AUTO-ENTMCNC: 33.1 G/DL (ref 32.2–36.5)
MCHC RBC AUTO-ENTMCNC: 33.2 G/DL (ref 32.2–36.5)
MCHC RBC AUTO-ENTMCNC: 33.3 G/DL (ref 32.2–36.5)
MCHC RBC AUTO-ENTMCNC: 33.4 G/DL (ref 32.2–36.5)
MCHC RBC AUTO-ENTMCNC: 33.7 G/DL (ref 32.2–36.5)
MCHC RBC AUTO-ENTMCNC: 33.7 G/DL (ref 32.2–36.5)
MCHC RBC AUTO-ENTMCNC: 33.8 G/DL (ref 32.2–36.5)
MCHC RBC AUTO-ENTMCNC: 34 G/DL (ref 32.2–36.5)
MCHC RBC AUTO-ENTMCNC: 34.1 G/DL (ref 32.2–36.5)
MCHC RBC AUTO-ENTMCNC: 34.1 G/DL (ref 32.2–36.5)
MCHC RBC AUTO-ENTMCNC: 34.2 G/DL (ref 32.2–36.5)
MCV RBC AUTO: 85.9 FL (ref 83–98)
MCV RBC AUTO: 85.9 FL (ref 83–98)
MCV RBC AUTO: 86 FL (ref 83–98)
MCV RBC AUTO: 86.5 FL (ref 83–98)
MCV RBC AUTO: 86.5 FL (ref 83–98)
MCV RBC AUTO: 86.7 FL (ref 83–98)
MCV RBC AUTO: 87.4 FL (ref 83–98)
MCV RBC AUTO: 87.8 FL (ref 83–98)
MCV RBC AUTO: 88.2 FL (ref 83–98)
MCV RBC AUTO: 89.1 FL (ref 83–98)
MCV RBC AUTO: 89.7 FL (ref 83–98)
MCV RBC AUTO: 90 FL (ref 83–98)
MCV RBC AUTO: 90.1 FL (ref 83–98)
MCV RBC AUTO: 90.2 FL (ref 83–98)
MCV RBC AUTO: 90.4 FL (ref 83–98)
MCV RBC AUTO: 90.6 FL (ref 83–98)
MCV RBC AUTO: 90.7 FL (ref 83–98)
MCV RBC AUTO: 90.8 FL (ref 83–98)
MCV RBC AUTO: 91.1 FL (ref 83–98)
MCV RBC AUTO: 91.2 FL (ref 83–98)
MCV RBC AUTO: 91.2 FL (ref 83–98)
MCV RBC AUTO: 91.5 FL (ref 83–98)
MCV RBC AUTO: 91.9 FL (ref 83–98)
MCV RBC AUTO: 92.1 FL (ref 83–98)
MCV RBC AUTO: 92.2 FL (ref 83–98)
MCV RBC AUTO: 92.2 FL (ref 83–98)
MCV RBC AUTO: 93.6 FL (ref 83–98)
METAMYELOCYTES # BLD MANUAL: 0.21 K/UL
METAMYELOCYTES # BLD MANUAL: 0.24 K/UL
METAMYELOCYTES # BLD MANUAL: 0.39 K/UL
METAMYELOCYTES # BLD MANUAL: 0.46 K/UL
METAMYELOCYTES # BLD MANUAL: 0.51 K/UL
METAMYELOCYTES # BLD MANUAL: 0.52 K/UL
METAMYELOCYTES # BLD MANUAL: 3.05 K/UL
METAMYELOCYTES # BLD MANUAL: 4.07 K/UL
METAMYELOCYTES NFR BLD MANUAL: 1 %
METAMYELOCYTES NFR BLD MANUAL: 2 %
METAMYELOCYTES NFR BLD MANUAL: 3 %
METAMYELOCYTES NFR BLD MANUAL: 7 %
METAMYELOCYTES NFR BLD MANUAL: 9 %
METHGB BLD-SCNC: 0.1 % (ref 0.4–1.5)
METHGB BLD-SCNC: 0.2 % (ref 0.4–1.5)
METHGB BLD-SCNC: 0.3 % (ref 0.4–1.5)
METHGB BLD-SCNC: 0.3 % (ref 0.4–1.5)
METHGB BLD-SCNC: 0.4 % (ref 0.4–1.5)
METHGB BLD-SCNC: 0.4 % (ref 0.4–1.5)
METHGB BLD-SCNC: 0.5 % (ref 0.4–1.5)
METHGB BLD-SCNC: 0.6 % (ref 0.4–1.5)
METHGB BLD-SCNC: 0.6 % (ref 0.4–1.5)
METHGB BLD-SCNC: 0.8 % (ref 0.4–1.5)
METHGB BLD-SCNC: 0.8 % (ref 0.4–1.5)
METHGB BLD-SCNC: 0.9 % (ref 0.4–1.5)
MONOCYTES # BLD: 0 K/UL (ref 0.3–1)
MONOCYTES # BLD: 0 K/UL (ref 0.3–1)
MONOCYTES # BLD: 0.48 K/UL (ref 0.3–1)
MONOCYTES # BLD: 0.78 K/UL (ref 0.3–1)
MONOCYTES # BLD: 0.9 K/UL (ref 0.3–1)
MONOCYTES # BLD: 0.92 K/UL (ref 0.3–1)
MONOCYTES # BLD: 0.93 K/UL (ref 0.3–1)
MONOCYTES # BLD: 0.95 K/UL (ref 0.3–1)
MONOCYTES # BLD: 1.16 K/UL (ref 0.3–1)
MONOCYTES # BLD: 1.52 K/UL (ref 0.3–1)
MONOCYTES # BLD: 1.61 K/UL (ref 0.3–1)
MONOCYTES # BLD: 1.9 K/UL (ref 0.3–1)
MONOCYTES # BLD: 2.03 K/UL (ref 0.3–1)
MONOCYTES # BLD: 2.1 K/UL (ref 0.3–1)
MONOCYTES # BLD: 2.18 K/UL (ref 0.3–1)
MONOCYTES NFR BLD: 0 %
MONOCYTES NFR BLD: 0 %
MONOCYTES NFR BLD: 12 %
MONOCYTES NFR BLD: 2 %
MONOCYTES NFR BLD: 3 %
MONOCYTES NFR BLD: 3 %
MONOCYTES NFR BLD: 4 %
MONOCYTES NFR BLD: 5 %
MONOCYTES NFR BLD: 6.3 %
MONOCYTES NFR BLD: 7 %
MONOCYTES NFR BLD: 9 %
MRSA DNA SPEC QL NAA+PROBE: NEGATIVE
MV E'TISSUE VEL-LAT: 0.07 M/S
MV E'TISSUE VEL-MED: 0.06 M/S
MV PEAK A VEL: 1.18 M/S
MV PEAK E VEL: 0.72 M/S
MV VALVE AREA P 1/2 METHOD: 2.02 CM2
MYELOCYTES # BLD MANUAL: 0.34 K/UL
MYELOCYTES # BLD MANUAL: 0.35 K/UL
MYELOCYTES # BLD MANUAL: 0.42 K/UL
MYELOCYTES # BLD MANUAL: 0.48 K/UL
MYELOCYTES # BLD MANUAL: 0.77 K/UL
MYELOCYTES # BLD MANUAL: 1.31 K/UL
MYELOCYTES # BLD MANUAL: 1.36 K/UL
MYELOCYTES # BLD MANUAL: 1.38 K/UL
MYELOCYTES NFR BLD MANUAL: 1 %
MYELOCYTES NFR BLD MANUAL: 1 %
MYELOCYTES NFR BLD MANUAL: 2 %
MYELOCYTES NFR BLD MANUAL: 3 %
NEUTROPHILS # BLD: 23.05 K/UL (ref 1.7–7)
NEUTS BAND # BLD: 0.17 K/UL (ref 0–0.53)
NEUTS BAND # BLD: 0.21 K/UL (ref 0–0.53)
NEUTS BAND # BLD: 0.34 K/UL (ref 0–0.53)
NEUTS BAND # BLD: 0.58 K/UL (ref 0–0.53)
NEUTS BAND # BLD: 1.31 K/UL (ref 0–0.53)
NEUTS BAND # BLD: 10.18 K/UL (ref 0–0.53)
NEUTS BAND # BLD: 10.97 K/UL (ref 0–0.53)
NEUTS BAND # BLD: 11.65 K/UL (ref 0–0.53)
NEUTS BAND # BLD: 13.63 K/UL (ref 1.7–7)
NEUTS BAND # BLD: 17.72 K/UL (ref 1.7–7)
NEUTS BAND # BLD: 25.46 K/UL (ref 1.7–7)
NEUTS BAND # BLD: 3.1 K/UL (ref 0–0.53)
NEUTS BAND # BLD: 3.62 K/UL (ref 0–0.53)
NEUTS BAND # BLD: 3.92 K/UL (ref 0–0.53)
NEUTS BAND # BLD: 30.15 K/UL (ref 1.7–7)
NEUTS BAND # BLD: 32.26 K/UL (ref 1.7–7)
NEUTS BAND # BLD: 32.38 K/UL (ref 1.7–7)
NEUTS BAND # BLD: 32.41 K/UL (ref 1.7–7)
NEUTS BAND # BLD: 33.64 K/UL (ref 1.7–7)
NEUTS BAND # BLD: 34.03 K/UL (ref 1.7–7)
NEUTS BAND # BLD: 34.13 K/UL (ref 1.7–7)
NEUTS BAND # BLD: 34.36 K/UL (ref 1.7–7)
NEUTS BAND # BLD: 34.81 K/UL (ref 1.7–7)
NEUTS BAND # BLD: 36.46 K/UL (ref 1.7–7)
NEUTS BAND # BLD: 5.03 K/UL (ref 0–0.53)
NEUTS BAND # BLD: 8.74 K/UL (ref 0–0.53)
NEUTS BAND # BLD: 9.08 K/UL (ref 1.7–7)
NEUTS BAND NFR BLD: 1 %
NEUTS BAND NFR BLD: 11 %
NEUTS BAND NFR BLD: 13 %
NEUTS BAND NFR BLD: 19 %
NEUTS BAND NFR BLD: 2 %
NEUTS BAND NFR BLD: 22 %
NEUTS BAND NFR BLD: 23 %
NEUTS BAND NFR BLD: 23 %
NEUTS BAND NFR BLD: 8 %
NEUTS BAND NFR BLD: 8 %
NEUTS BAND NFR BLD: 9 %
NEUTS SEG NFR BLD: 70 %
NEUTS SEG NFR BLD: 72 %
NEUTS SEG NFR BLD: 73 %
NEUTS SEG NFR BLD: 74 %
NEUTS SEG NFR BLD: 74 %
NEUTS SEG NFR BLD: 76 %
NEUTS SEG NFR BLD: 76 %
NEUTS SEG NFR BLD: 78 %
NEUTS SEG NFR BLD: 78 %
NEUTS SEG NFR BLD: 84 %
NEUTS SEG NFR BLD: 88 %
NEUTS SEG NFR BLD: 88 %
NEUTS SEG NFR BLD: 90.6 %
NEUTS SEG NFR BLD: 96 %
NEUTS SEG NFR BLD: 98 %
NEUTS VAC BLD QL SMEAR: ABNORMAL
NITRITE UR QL STRIP.AUTO: NEGATIVE
NITRITE UR QL STRIP.AUTO: NEGATIVE
NONHDLC SERPL-MCNC: 113 MG/DL
NRBC BLD MANUAL-RTO: 1 /100 WBC
NRBC BLD MANUAL-RTO: 1 /100 WBC
NRBC BLD MANUAL-RTO: 2 /100 WBC
NRBC BLD MANUAL-RTO: 2 /100 WBC
NRBC BLD MANUAL-RTO: 3 /100 WBC
NRBC BLD-RTO: 0 %
OVALOCYTES BLD QL SMEAR: ABNORMAL
OVALOCYTES BLD QL SMEAR: ABNORMAL
P AXIS: 15
P AXIS: 25
P AXIS: 29
P AXIS: 33
P AXIS: 34
P AXIS: 47
PATH REPORT.FINAL DX SPEC: NORMAL
PATH REPORT.FINAL DX SPEC: NORMAL
PATH REPORT.GROSS SPEC: NORMAL
PATH REV BLD -IMP: NORMAL
PCO2 BLDA: 35 MM HG (ref 35–48)
PCO2 BLDA: 37 MM HG (ref 35–48)
PCO2 BLDA: 37 MM HG (ref 35–48)
PCO2 BLDA: 41 MM HG (ref 35–48)
PCO2 BLDA: 41 MM HG (ref 35–48)
PCO2 BLDA: 42 MM HG (ref 35–48)
PCO2 BLDA: 42 MM HG (ref 35–48)
PCO2 BLDA: 46 MM HG (ref 35–48)
PCO2 BLDA: 46 MM HG (ref 35–48)
PCO2 BLDA: 47 MM HG (ref 35–48)
PCO2 BLDA: 47 MM HG (ref 35–48)
PCO2 BLDA: 48 MM HG (ref 35–48)
PCO2 BLDA: 48 MM HG (ref 35–48)
PCO2 BLDA: 50 MM HG (ref 35–48)
PCO2 BLDA: 50 MM HG (ref 35–48)
PCO2 BLDA: 51 MM HG (ref 35–48)
PCO2 BLDA: 52 MM HG (ref 35–48)
PCO2 BLDA: 53 MM HG (ref 35–48)
PCO2 BLDV: 52 MM HG (ref 41–51)
PDW BLD AUTO: 10.4 FL (ref 9.4–12.4)
PDW BLD AUTO: 10.6 FL (ref 9.4–12.4)
PDW BLD AUTO: 10.7 FL (ref 9.4–12.4)
PDW BLD AUTO: 10.8 FL (ref 9.4–12.4)
PDW BLD AUTO: 11 FL (ref 9.4–12.4)
PDW BLD AUTO: 11.1 FL (ref 9.4–12.4)
PDW BLD AUTO: 11.3 FL (ref 9.4–12.4)
PDW BLD AUTO: 11.4 FL (ref 9.4–12.4)
PDW BLD AUTO: 11.4 FL (ref 9.4–12.4)
PDW BLD AUTO: 11.5 FL (ref 9.4–12.4)
PDW BLD AUTO: 11.6 FL (ref 9.4–12.4)
PDW BLD AUTO: 11.7 FL (ref 9.4–12.4)
PDW BLD AUTO: 11.8 FL (ref 9.4–12.4)
PDW BLD AUTO: 11.9 FL (ref 9.4–12.4)
PDW BLD AUTO: 12.1 FL (ref 9.4–12.4)
PH BLDA: 7.03 [PH] (ref 7.35–7.45)
PH BLDA: 7.18 [PH] (ref 7.35–7.45)
PH BLDA: 7.2 [PH] (ref 7.35–7.45)
PH BLDA: 7.22 [PH] (ref 7.35–7.45)
PH BLDA: 7.22 [PH] (ref 7.35–7.45)
PH BLDA: 7.23 [PH] (ref 7.35–7.45)
PH BLDA: 7.27 [PH] (ref 7.35–7.45)
PH BLDA: 7.28 [PH] (ref 7.35–7.45)
PH BLDA: 7.3 [PH] (ref 7.35–7.45)
PH BLDA: 7.31 [PH] (ref 7.35–7.45)
PH BLDA: 7.32 [PH] (ref 7.35–7.45)
PH BLDA: 7.33 [PH] (ref 7.35–7.45)
PH BLDA: 7.35 [PH] (ref 7.35–7.45)
PH BLDA: 7.35 [PH] (ref 7.35–7.45)
PH BLDA: 7.37 [PH] (ref 7.35–7.45)
PH BLDA: 7.38 [PH] (ref 7.35–7.45)
PH BLDV: 7.09 [PH] (ref 7.32–7.42)
PH UR STRIP.AUTO: 6 [PH]
PH UR STRIP.AUTO: 6 [PH]
PHOSPHATE SERPL-MCNC: 2.6 MG/DL (ref 2.4–4.7)
PHOSPHATE SERPL-MCNC: 3 MG/DL (ref 2.4–4.7)
PHOSPHATE SERPL-MCNC: 3.6 MG/DL (ref 2.4–4.7)
PHOSPHATE SERPL-MCNC: 4.9 MG/DL (ref 2.4–4.7)
PHOSPHATE SERPL-MCNC: 5.6 MG/DL (ref 2.4–4.7)
PHOSPHATE SERPL-MCNC: 6.2 MG/DL (ref 2.4–4.7)
PHOSPHATE SERPL-MCNC: 6.4 MG/DL (ref 2.4–4.7)
PHOSPHATE SERPL-MCNC: 7.2 MG/DL (ref 2.4–4.7)
PHOSPHATE SERPL-MCNC: 7.6 MG/DL (ref 2.4–4.7)
PHOSPHATE SERPL-MCNC: 8.2 MG/DL (ref 2.4–4.7)
PHOSPHATE SERPL-MCNC: 8.5 MG/DL (ref 2.4–4.7)
PHOSPHATE SERPL-MCNC: 8.5 MG/DL (ref 2.4–4.7)
PHOSPHATE SERPL-MCNC: 8.7 MG/DL (ref 2.4–4.7)
PHOSPHATE SERPL-MCNC: 8.9 MG/DL (ref 2.4–4.7)
PLAT MORPH BLD: NORMAL
PLATELET # BLD AUTO: 143 K/UL (ref 150–350)
PLATELET # BLD AUTO: 154 K/UL (ref 150–350)
PLATELET # BLD AUTO: 169 K/UL (ref 150–350)
PLATELET # BLD AUTO: 170 K/UL (ref 150–350)
PLATELET # BLD AUTO: 171 K/UL (ref 150–350)
PLATELET # BLD AUTO: 200 K/UL (ref 150–350)
PLATELET # BLD AUTO: 206 K/UL (ref 150–350)
PLATELET # BLD AUTO: 223 K/UL (ref 150–350)
PLATELET # BLD AUTO: 230 K/UL (ref 150–350)
PLATELET # BLD AUTO: 238 K/UL (ref 150–350)
PLATELET # BLD AUTO: 240 K/UL (ref 150–350)
PLATELET # BLD AUTO: 246 K/UL (ref 150–350)
PLATELET # BLD AUTO: 254 K/UL (ref 150–350)
PLATELET # BLD AUTO: 286 K/UL (ref 150–350)
PLATELET # BLD AUTO: 286 K/UL (ref 150–350)
PLATELET # BLD AUTO: 321 K/UL (ref 150–350)
PLATELET # BLD AUTO: 322 K/UL (ref 150–350)
PLATELET # BLD AUTO: 338 K/UL (ref 150–350)
PLATELET # BLD AUTO: 39 K/UL (ref 150–350)
PLATELET # BLD AUTO: 402 K/UL (ref 150–350)
PLATELET # BLD AUTO: 413 K/UL (ref 150–350)
PLATELET # BLD AUTO: 422 K/UL (ref 150–350)
PLATELET # BLD AUTO: 434 K/UL (ref 150–350)
PLATELET # BLD AUTO: 448 K/UL (ref 150–350)
PLATELET # BLD AUTO: 455 K/UL (ref 150–350)
PLATELET # BLD AUTO: 47 K/UL (ref 150–350)
PLATELET # BLD AUTO: 49 K/UL (ref 150–350)
PLATELET # BLD AUTO: 501 K/UL (ref 150–350)
PLATELET # BLD AUTO: 517 K/UL (ref 150–350)
PLATELET # BLD AUTO: 57 K/UL (ref 150–350)
PLATELET # BLD AUTO: 74 K/UL (ref 150–350)
PLATELET # BLD AUTO: 90 K/UL (ref 150–350)
PLATELET # BLD EST: ABNORMAL 10*3/UL
PLATELET CLUMP BLD QL SMEAR: PRESENT
PLATELET CLUMP BLD QL SMEAR: PRESENT
PO2 BLDA: 101 MM HG (ref 83–100)
PO2 BLDA: 110 MM HG (ref 83–100)
PO2 BLDA: 117 MM HG (ref 83–100)
PO2 BLDA: 117 MM HG (ref 83–100)
PO2 BLDA: 131 MM HG (ref 83–100)
PO2 BLDA: 137 MM HG (ref 83–100)
PO2 BLDA: 145 MM HG (ref 83–100)
PO2 BLDA: 146 MM HG (ref 83–100)
PO2 BLDA: 148 MM HG (ref 83–100)
PO2 BLDA: 155 MM HG (ref 83–100)
PO2 BLDA: 158 MM HG (ref 83–100)
PO2 BLDA: 159 MM HG (ref 83–100)
PO2 BLDA: 207 MM HG (ref 83–100)
PO2 BLDA: 213 MM HG (ref 83–100)
PO2 BLDA: 214 MM HG (ref 83–100)
PO2 BLDA: 295 MM HG (ref 83–100)
PO2 BLDA: 70 MM HG (ref 83–100)
PO2 BLDA: 92 MM HG (ref 83–100)
PO2 BLDV: 49 MM HG (ref 25–40)
POCT TEST: ABNORMAL
POCT TEST: NORMAL
POIKILOCYTOSIS BLD QL SMEAR: ABNORMAL
POLYCHROMASIA BLD QL SMEAR: ABNORMAL
POSTERIOR WALL: 1.07 CM
POTASSIUM BLDA-SCNC: 3.5 MEQ/L (ref 3.4–4.5)
POTASSIUM BLDA-SCNC: 3.6 MEQ/L (ref 3.4–4.5)
POTASSIUM BLDA-SCNC: 3.9 MEQ/L (ref 3.4–4.5)
POTASSIUM BLDA-SCNC: 4 MEQ/L (ref 3.4–4.5)
POTASSIUM BLDA-SCNC: 4.1 MEQ/L (ref 3.4–4.5)
POTASSIUM BLDA-SCNC: 4.2 MEQ/L (ref 3.4–4.5)
POTASSIUM BLDA-SCNC: 4.3 MEQ/L (ref 3.4–4.5)
POTASSIUM BLDA-SCNC: 4.5 MEQ/L (ref 3.4–4.5)
POTASSIUM BLDA-SCNC: 4.6 MEQ/L (ref 3.4–4.5)
POTASSIUM BLDA-SCNC: 4.8 MEQ/L (ref 3.4–4.5)
POTASSIUM BLDA-SCNC: 5.2 MEQ/L (ref 3.4–4.5)
POTASSIUM SERPL-SCNC: 3.1 MEQ/L (ref 3.6–5.1)
POTASSIUM SERPL-SCNC: 3.3 MEQ/L (ref 3.6–5.1)
POTASSIUM SERPL-SCNC: 3.5 MEQ/L (ref 3.6–5.1)
POTASSIUM SERPL-SCNC: 3.6 MEQ/L (ref 3.6–5.1)
POTASSIUM SERPL-SCNC: 3.7 MEQ/L (ref 3.6–5.1)
POTASSIUM SERPL-SCNC: 3.8 MEQ/L (ref 3.6–5.1)
POTASSIUM SERPL-SCNC: 3.8 MEQ/L (ref 3.6–5.1)
POTASSIUM SERPL-SCNC: 3.9 MEQ/L (ref 3.6–5.1)
POTASSIUM SERPL-SCNC: 4 MEQ/L (ref 3.6–5.1)
POTASSIUM SERPL-SCNC: 4 MEQ/L (ref 3.6–5.1)
POTASSIUM SERPL-SCNC: 4.1 MEQ/L (ref 3.6–5.1)
POTASSIUM SERPL-SCNC: 4.1 MEQ/L (ref 3.6–5.1)
POTASSIUM SERPL-SCNC: 4.2 MEQ/L (ref 3.6–5.1)
POTASSIUM SERPL-SCNC: 4.3 MEQ/L (ref 3.6–5.1)
POTASSIUM SERPL-SCNC: 4.4 MEQ/L (ref 3.6–5.1)
POTASSIUM SERPL-SCNC: 4.4 MEQ/L (ref 3.6–5.1)
POTASSIUM SERPL-SCNC: 4.6 MEQ/L (ref 3.6–5.1)
POTASSIUM SERPL-SCNC: 4.6 MEQ/L (ref 3.6–5.1)
POTASSIUM SERPL-SCNC: 4.7 MEQ/L (ref 3.6–5.1)
POTASSIUM SERPL-SCNC: 4.8 MEQ/L (ref 3.6–5.1)
POTASSIUM SERPL-SCNC: 4.8 MEQ/L (ref 3.6–5.1)
POTASSIUM SERPL-SCNC: 5.6 MEQ/L (ref 3.6–5.1)
POTASSIUM UR-SCNC: 25.4 MEQ/L
PR INTERVAL: 136
PR INTERVAL: 138
PR INTERVAL: 142
PR INTERVAL: 166
PREALB SERPL-MCNC: <7 MG/DL (ref 18–38)
PRODUCT CODE: NORMAL
PRODUCT STATUS: NORMAL
PROT SERPL-MCNC: 3 G/DL (ref 6–8.2)
PROT SERPL-MCNC: 3 G/DL (ref 6–8.2)
PROT SERPL-MCNC: 3.1 G/DL (ref 6–8.2)
PROT SERPL-MCNC: 3.3 G/DL (ref 6–8.2)
PROT SERPL-MCNC: 3.5 G/DL (ref 6–8.2)
PROT SERPL-MCNC: 3.5 G/DL (ref 6–8.2)
PROT SERPL-MCNC: 3.7 G/DL (ref 6–8.2)
PROT SERPL-MCNC: 3.7 G/DL (ref 6–8.2)
PROT SERPL-MCNC: 4.5 G/DL (ref 6–8.2)
PROT SERPL-MCNC: 4.5 G/DL (ref 6–8.2)
PROT SERPL-MCNC: 4.7 G/DL (ref 6–8.2)
PROT SERPL-MCNC: 4.9 G/DL (ref 6–8.2)
PROT SERPL-MCNC: 5.4 G/DL (ref 6–8.2)
PROT SERPL-MCNC: 5.5 G/DL (ref 6–8.2)
PROT SERPL-MCNC: 5.8 G/DL (ref 6–8.2)
PROT SERPL-MCNC: 6.5 G/DL (ref 6–8.2)
PROT SERPL-MCNC: 6.6 G/DL (ref 6–8.2)
PROT UR QL STRIP.AUTO: 1
PROT UR QL STRIP.AUTO: NEGATIVE
PROTHROMBIN TIME: 13.2 SEC (ref 12.2–14.5)
PROTHROMBIN TIME: 15.6 SEC (ref 12.2–14.5)
PROTHROMBIN TIME: 18.4 SEC (ref 12.2–14.5)
PROTHROMBIN TIME: 19.6 SEC (ref 12.2–14.5)
PROTHROMBIN TIME: 19.8 SEC (ref 12.2–14.5)
PROTHROMBIN TIME: 22.2 SEC (ref 12.2–14.5)
QRS DURATION: 86
QRS DURATION: 86
QRS DURATION: 88
QRS DURATION: 90
QRS DURATION: 94
QRS DURATION: 94
QRS DURATION: 96
QT INTERVAL: 324
QT INTERVAL: 344
QT INTERVAL: 348
QT INTERVAL: 354
QT INTERVAL: 374
QT INTERVAL: 412
QT INTERVAL: 438
QT INTERVAL: 472
QT INTERVAL: 558
QTC CALCULATION(BAZETT): 423
QTC CALCULATION(BAZETT): 425
QTC CALCULATION(BAZETT): 434
QTC CALCULATION(BAZETT): 442
QTC CALCULATION(BAZETT): 443
QTC CALCULATION(BAZETT): 451
QTC CALCULATION(BAZETT): 469
QTC CALCULATION(BAZETT): 479
QTC CALCULATION(BAZETT): 483
R AXIS: -12
R AXIS: -20
R AXIS: -24
R AXIS: -28
R AXIS: -32
R AXIS: -35
R AXIS: -39
R AXIS: -48
R AXIS: -52
RBC # BLD AUTO: 2.14 M/UL (ref 4.5–5.8)
RBC # BLD AUTO: 2.56 M/UL (ref 4.5–5.8)
RBC # BLD AUTO: 2.9 M/UL (ref 4.5–5.8)
RBC # BLD AUTO: 2.94 M/UL (ref 4.5–5.8)
RBC # BLD AUTO: 2.97 M/UL (ref 4.5–5.8)
RBC # BLD AUTO: 3.02 M/UL (ref 4.5–5.8)
RBC # BLD AUTO: 3.04 M/UL (ref 4.5–5.8)
RBC # BLD AUTO: 3.08 M/UL (ref 4.5–5.8)
RBC # BLD AUTO: 3.13 M/UL (ref 4.5–5.8)
RBC # BLD AUTO: 3.22 M/UL (ref 4.5–5.8)
RBC # BLD AUTO: 3.23 M/UL (ref 4.5–5.8)
RBC # BLD AUTO: 3.25 M/UL (ref 4.5–5.8)
RBC # BLD AUTO: 3.25 M/UL (ref 4.5–5.8)
RBC # BLD AUTO: 3.27 M/UL (ref 4.5–5.8)
RBC # BLD AUTO: 3.29 M/UL (ref 4.5–5.8)
RBC # BLD AUTO: 3.35 M/UL (ref 4.5–5.8)
RBC # BLD AUTO: 3.41 M/UL (ref 4.5–5.8)
RBC # BLD AUTO: 3.47 M/UL (ref 4.5–5.8)
RBC # BLD AUTO: 3.48 M/UL (ref 4.5–5.8)
RBC # BLD AUTO: 3.55 M/UL (ref 4.5–5.8)
RBC # BLD AUTO: 3.59 M/UL (ref 4.5–5.8)
RBC # BLD AUTO: 3.76 M/UL (ref 4.5–5.8)
RBC # BLD AUTO: 3.81 M/UL (ref 4.5–5.8)
RBC # BLD AUTO: 3.87 M/UL (ref 4.5–5.8)
RBC # BLD AUTO: 3.87 M/UL (ref 4.5–5.8)
RBC # BLD AUTO: 3.89 M/UL (ref 4.5–5.8)
RBC # BLD AUTO: 3.96 M/UL (ref 4.5–5.8)
RBC # BLD AUTO: 4.05 M/UL (ref 4.5–5.8)
RBC # BLD AUTO: 4.15 M/UL (ref 4.5–5.8)
RBC # BLD AUTO: 4.15 M/UL (ref 4.5–5.8)
RBC # BLD AUTO: 4.22 M/UL (ref 4.5–5.8)
RBC # BLD AUTO: 4.29 M/UL (ref 4.5–5.8)
RBC #/AREA URNS HPF: ABNORMAL /HPF
RBC #/AREA URNS HPF: ABNORMAL /HPF
RBC MORPH BLD: NORMAL
RENAL EPI CELLS URNS QL MICRO: ABNORMAL /HPF
RSV RNA SPEC QL NAA+PROBE: NORMAL
SAO2 % BLDA: 95 % (ref 93–98)
SAO2 % BLDA: 97 % (ref 93–98)
SAO2 % BLDA: 98 % (ref 93–98)
SAO2 % BLDA: 99 % (ref 93–98)
SAO2 % BLDV: 76 % (ref 30–60)
SAO2 % BLDV: 77 % (ref 30–60)
SAO2 % BLDV: 77 % (ref 30–60)
SAO2 % BLDV: 78 % (ref 30–60)
SAO2 % BLDV: 79 % (ref 30–60)
SAO2 % BLDV: 81 % (ref 30–60)
SAO2 % BLDV: 81 % (ref 30–60)
SAO2 % BLDV: 82 % (ref 30–60)
SAO2 % BLDV: 82 % (ref 30–60)
SAO2 % BLDV: 86 % (ref 30–60)
SAO2 % BLDV: 91 % (ref 30–60)
SARS-COV-2 RNA RESP QL NAA+PROBE: NEGATIVE
SARS-COV-2 RNA RESP QL NAA+PROBE: NEGATIVE
SCHISTOCYTES BLD QL SMEAR: ABNORMAL
SCHISTOCYTES BLD QL SMEAR: ABNORMAL
SODIUM BLDA-SCNC: 135 MEQ/L (ref 136–145)
SODIUM BLDA-SCNC: 136 MEQ/L (ref 136–145)
SODIUM BLDA-SCNC: 136 MEQ/L (ref 136–145)
SODIUM BLDA-SCNC: 137 MEQ/L (ref 136–145)
SODIUM BLDA-SCNC: 138 MEQ/L (ref 136–145)
SODIUM BLDA-SCNC: 139 MEQ/L (ref 136–145)
SODIUM BLDA-SCNC: 139 MEQ/L (ref 136–145)
SODIUM BLDA-SCNC: 140 MEQ/L (ref 136–145)
SODIUM BLDA-SCNC: 141 MEQ/L (ref 136–145)
SODIUM SERPL-SCNC: 136 MEQ/L (ref 136–144)
SODIUM SERPL-SCNC: 137 MEQ/L (ref 136–144)
SODIUM SERPL-SCNC: 138 MEQ/L (ref 136–144)
SODIUM SERPL-SCNC: 139 MEQ/L (ref 136–144)
SODIUM SERPL-SCNC: 140 MEQ/L (ref 136–144)
SODIUM SERPL-SCNC: 141 MEQ/L (ref 136–144)
SODIUM SERPL-SCNC: 142 MEQ/L (ref 136–144)
SODIUM SERPL-SCNC: 142 MEQ/L (ref 136–144)
SODIUM SERPL-SCNC: 143 MEQ/L (ref 136–144)
SODIUM SERPL-SCNC: 144 MEQ/L (ref 136–144)
SODIUM SERPL-SCNC: 144 MEQ/L (ref 136–144)
SODIUM SERPL-SCNC: 146 MEQ/L (ref 136–144)
SODIUM UR-SCNC: 17 MEQ/L
SP GR UR REFRACT.AUTO: 1.02
SP GR UR REFRACT.AUTO: <=1.005
SPECIMEN EXP DATE BLD: NORMAL
SQUAMOUS URNS QL MICRO: 1 /HPF
SQUAMOUS URNS QL MICRO: 1 /HPF
T WAVE AXIS: 19
T WAVE AXIS: 31
T WAVE AXIS: 34
T WAVE AXIS: 49
T WAVE AXIS: 52
T WAVE AXIS: 53
T WAVE AXIS: 56
T WAVE AXIS: 70
T WAVE AXIS: 88
TAPSE: 2.09 CM
TARGETS BLD QL SMEAR: ABNORMAL
TIBC SERPL-MCNC: 167 UG/DL (ref 270–460)
TOXIC GRANULES BLD QL SMEAR: ABNORMAL
TOXIC GRANULES BLD QL SMEAR: SLIGHT
TRANS CELLS URNS QL MICRO: ABNORMAL /HPF
TRIGL SERPL-MCNC: 219 MG/DL (ref 30–149)
TRIGL SERPL-MCNC: 236 MG/DL (ref 30–149)
TROPONIN I SERPL-MCNC: 0.05 NG/ML
TROPONIN I SERPL-MCNC: 0.05 NG/ML
TROPONIN I SERPL-MCNC: 0.07 NG/ML
TROPONIN I SERPL-MCNC: 0.09 NG/ML
TROPONIN I SERPL-MCNC: 0.09 NG/ML
TROPONIN I SERPL-MCNC: 0.11 NG/ML
TROPONIN I SERPL-MCNC: 0.15 NG/ML
TROPONIN I SERPL-MCNC: 0.15 NG/ML
TROPONIN I SERPL-MCNC: 0.17 NG/ML
TROPONIN I SERPL-MCNC: 0.17 NG/ML
TROPONIN I SERPL-MCNC: 0.18 NG/ML
TROPONIN I SERPL-MCNC: 0.18 NG/ML
TROPONIN I SERPL-MCNC: 0.21 NG/ML
TROPONIN I SERPL-MCNC: 0.22 NG/ML
TROPONIN I SERPL-MCNC: 0.6 NG/ML
TROPONIN I SERPL-MCNC: 0.61 NG/ML
TROPONIN I SERPL-MCNC: 0.8 NG/ML
UIBC SERPL-MCNC: 104 UG/DL (ref 180–360)
UNIT ABO: NORMAL
UNIT ID: NORMAL
UNIT RH: NEGATIVE
UNIT RH: NEGATIVE
UNIT RH: POSITIVE
UNIT RH: POSITIVE
UROBILINOGEN UR STRIP-ACNC: 0.2 EU/DL
UROBILINOGEN UR STRIP-ACNC: 0.2 EU/DL
VARIANT LYMPHS # BLD MANUAL: 0.93 K/UL
VARIANT LYMPHS NFR BLD: 2 %
VENTRICULAR RATE: 116
VENTRICULAR RATE: 126
VENTRICULAR RATE: 38
VENTRICULAR RATE: 62
VENTRICULAR RATE: 64
VENTRICULAR RATE: 64
VENTRICULAR RATE: 81
VENTRICULAR RATE: 91
VENTRICULAR RATE: 94
WBC # BLD AUTO: 11.95 K/UL (ref 3.8–10.5)
WBC # BLD AUTO: 14.47 K/UL (ref 3.8–10.5)
WBC # BLD AUTO: 15.89 K/UL (ref 3.8–10.5)
WBC # BLD AUTO: 17.48 K/UL (ref 3.8–10.5)
WBC # BLD AUTO: 18.32 K/UL (ref 3.8–10.5)
WBC # BLD AUTO: 18.68 K/UL (ref 3.8–10.5)
WBC # BLD AUTO: 18.78 K/UL (ref 3.8–10.5)
WBC # BLD AUTO: 18.86 K/UL (ref 3.8–10.5)
WBC # BLD AUTO: 19.53 K/UL (ref 3.8–10.5)
WBC # BLD AUTO: 19.76 K/UL (ref 3.8–10.5)
WBC # BLD AUTO: 20.06 K/UL (ref 3.8–10.5)
WBC # BLD AUTO: 20.11 K/UL (ref 3.8–10.5)
WBC # BLD AUTO: 20.33 K/UL (ref 3.8–10.5)
WBC # BLD AUTO: 20.97 K/UL (ref 3.8–10.5)
WBC # BLD AUTO: 21.1 K/UL (ref 3.8–10.5)
WBC # BLD AUTO: 22.09 K/UL (ref 3.8–10.5)
WBC # BLD AUTO: 24.34 K/UL (ref 3.8–10.5)
WBC # BLD AUTO: 25.44 K/UL (ref 3.8–10.5)
WBC # BLD AUTO: 28.4 K/UL (ref 3.8–10.5)
WBC # BLD AUTO: 28.93 K/UL (ref 3.8–10.5)
WBC # BLD AUTO: 33.76 K/UL (ref 3.8–10.5)
WBC # BLD AUTO: 34.33 K/UL (ref 3.8–10.5)
WBC # BLD AUTO: 36.03 K/UL (ref 3.8–10.5)
WBC # BLD AUTO: 38.66 K/UL (ref 3.8–10.5)
WBC # BLD AUTO: 38.78 K/UL (ref 3.8–10.5)
WBC # BLD AUTO: 43.59 K/UL (ref 3.8–10.5)
WBC # BLD AUTO: 45.21 K/UL (ref 3.8–10.5)
WBC # BLD AUTO: 45.99 K/UL (ref 3.8–10.5)
WBC # BLD AUTO: 46.25 K/UL (ref 3.8–10.5)
WBC # BLD AUTO: 47.39 K/UL (ref 3.8–10.5)
WBC # BLD AUTO: 47.69 K/UL (ref 3.8–10.5)
WBC # BLD AUTO: 50.64 K/UL (ref 3.8–10.5)
WBC #/AREA URNS HPF: ABNORMAL /HPF
WBC #/AREA URNS HPF: ABNORMAL /HPF
Z-SCORE OF LEFT VENTRICULAR DIMENSION IN END DIASTOLE: -3.49
Z-SCORE OF LEFT VENTRICULAR DIMENSION IN END SYSTOLE: -3.11
Z-SCORE OF LEFT VENTRICULAR POSTERIOR WALL IN END DIASTOLE: 0.13

## 2021-01-01 PROCEDURE — 83880 ASSAY OF NATRIURETIC PEPTIDE: CPT | Performed by: HOSPITALIST

## 2021-01-01 PROCEDURE — 63600000 HC DRUGS/DETAIL CODE: Performed by: STUDENT IN AN ORGANIZED HEALTH CARE EDUCATION/TRAINING PROGRAM

## 2021-01-01 PROCEDURE — 63600000 HC DRUGS/DETAIL CODE: Performed by: FAMILY MEDICINE

## 2021-01-01 PROCEDURE — 87389 HIV-1 AG W/HIV-1&-2 AB AG IA: CPT | Performed by: STUDENT IN AN ORGANIZED HEALTH CARE EDUCATION/TRAINING PROGRAM

## 2021-01-01 PROCEDURE — 63700000 HC SELF-ADMINISTRABLE DRUG: Performed by: HOSPITALIST

## 2021-01-01 PROCEDURE — 99233 SBSQ HOSP IP/OBS HIGH 50: CPT | Performed by: HOSPITALIST

## 2021-01-01 PROCEDURE — 63600000 HC DRUGS/DETAIL CODE: Performed by: HOSPITALIST

## 2021-01-01 PROCEDURE — 25000000 HC PHARMACY GENERAL: Performed by: PHYSICIAN ASSISTANT

## 2021-01-01 PROCEDURE — P9045 ALBUMIN (HUMAN), 5%, 250 ML: HCPCS | Performed by: STUDENT IN AN ORGANIZED HEALTH CARE EDUCATION/TRAINING PROGRAM

## 2021-01-01 PROCEDURE — 71045 X-RAY EXAM CHEST 1 VIEW: CPT

## 2021-01-01 PROCEDURE — 36430 TRANSFUSION BLD/BLD COMPNT: CPT

## 2021-01-01 PROCEDURE — 63700000 HC SELF-ADMINISTRABLE DRUG: Performed by: PHYSICIAN ASSISTANT

## 2021-01-01 PROCEDURE — 81003 URINALYSIS AUTO W/O SCOPE: CPT | Performed by: HOSPITALIST

## 2021-01-01 PROCEDURE — 97161 PT EVAL LOW COMPLEX 20 MIN: CPT | Mod: GP | Performed by: PHYSICAL THERAPIST

## 2021-01-01 PROCEDURE — 99233 SBSQ HOSP IP/OBS HIGH 50: CPT | Performed by: STUDENT IN AN ORGANIZED HEALTH CARE EDUCATION/TRAINING PROGRAM

## 2021-01-01 PROCEDURE — 85014 HEMATOCRIT: CPT | Performed by: HOSPITALIST

## 2021-01-01 PROCEDURE — 74018 RADEX ABDOMEN 1 VIEW: CPT

## 2021-01-01 PROCEDURE — 84100 ASSAY OF PHOSPHORUS: CPT | Performed by: STUDENT IN AN ORGANIZED HEALTH CARE EDUCATION/TRAINING PROGRAM

## 2021-01-01 PROCEDURE — 85018 HEMOGLOBIN: CPT | Performed by: STUDENT IN AN ORGANIZED HEALTH CARE EDUCATION/TRAINING PROGRAM

## 2021-01-01 PROCEDURE — 63700000 HC SELF-ADMINISTRABLE DRUG: Performed by: INTERNAL MEDICINE

## 2021-01-01 PROCEDURE — 83050 HGB METHEMOGLOBIN QUAN: CPT | Performed by: SURGERY

## 2021-01-01 PROCEDURE — 85025 COMPLETE CBC W/AUTO DIFF WBC: CPT | Performed by: STUDENT IN AN ORGANIZED HEALTH CARE EDUCATION/TRAINING PROGRAM

## 2021-01-01 PROCEDURE — 93005 ELECTROCARDIOGRAM TRACING: CPT | Performed by: STUDENT IN AN ORGANIZED HEALTH CARE EDUCATION/TRAINING PROGRAM

## 2021-01-01 PROCEDURE — 99223 1ST HOSP IP/OBS HIGH 75: CPT | Performed by: HOSPITALIST

## 2021-01-01 PROCEDURE — 84100 ASSAY OF PHOSPHORUS: CPT | Performed by: HOSPITALIST

## 2021-01-01 PROCEDURE — 83605 ASSAY OF LACTIC ACID: CPT | Performed by: STUDENT IN AN ORGANIZED HEALTH CARE EDUCATION/TRAINING PROGRAM

## 2021-01-01 PROCEDURE — 27200000 HC STERILE SUPPLY: Performed by: SURGERY

## 2021-01-01 PROCEDURE — 80053 COMPREHEN METABOLIC PANEL: CPT | Performed by: EMERGENCY MEDICINE

## 2021-01-01 PROCEDURE — 63700000 HC SELF-ADMINISTRABLE DRUG: Performed by: STUDENT IN AN ORGANIZED HEALTH CARE EDUCATION/TRAINING PROGRAM

## 2021-01-01 PROCEDURE — 37000001 HC ANESTHESIA GENERAL: Performed by: SURGERY

## 2021-01-01 PROCEDURE — 80076 HEPATIC FUNCTION PANEL: CPT | Performed by: STUDENT IN AN ORGANIZED HEALTH CARE EDUCATION/TRAINING PROGRAM

## 2021-01-01 PROCEDURE — 74176 CT ABD & PELVIS W/O CONTRAST: CPT

## 2021-01-01 PROCEDURE — 83605 ASSAY OF LACTIC ACID: CPT | Performed by: SURGERY

## 2021-01-01 PROCEDURE — 47600 CHOLECYSTECTOMY: CPT | Performed by: SURGERY

## 2021-01-01 PROCEDURE — 81001 URINALYSIS AUTO W/SCOPE: CPT | Performed by: SURGERY

## 2021-01-01 PROCEDURE — 85027 COMPLETE CBC AUTOMATED: CPT | Performed by: PHYSICIAN ASSISTANT

## 2021-01-01 PROCEDURE — 63600000 HC DRUGS/DETAIL CODE: Performed by: SURGERY

## 2021-01-01 PROCEDURE — 84484 ASSAY OF TROPONIN QUANT: CPT | Performed by: STUDENT IN AN ORGANIZED HEALTH CARE EDUCATION/TRAINING PROGRAM

## 2021-01-01 PROCEDURE — P9016 RBC LEUKOCYTES REDUCED: HCPCS

## 2021-01-01 PROCEDURE — 36415 COLL VENOUS BLD VENIPUNCTURE: CPT | Performed by: HOSPITALIST

## 2021-01-01 PROCEDURE — 87340 HEPATITIS B SURFACE AG IA: CPT | Performed by: STUDENT IN AN ORGANIZED HEALTH CARE EDUCATION/TRAINING PROGRAM

## 2021-01-01 PROCEDURE — 83605 ASSAY OF LACTIC ACID: CPT | Performed by: HOSPITALIST

## 2021-01-01 PROCEDURE — 25000000 HC PHARMACY GENERAL: Performed by: SURGERY

## 2021-01-01 PROCEDURE — 25800000 HC PHARMACY IV SOLUTIONS: Performed by: HOSPITALIST

## 2021-01-01 PROCEDURE — 93010 ELECTROCARDIOGRAM REPORT: CPT | Mod: 76,XU | Performed by: INTERNAL MEDICINE

## 2021-01-01 PROCEDURE — 36415 COLL VENOUS BLD VENIPUNCTURE: CPT | Performed by: STUDENT IN AN ORGANIZED HEALTH CARE EDUCATION/TRAINING PROGRAM

## 2021-01-01 PROCEDURE — 20000000 HC ROOM AND CARE ICU

## 2021-01-01 PROCEDURE — 20600000 HC ROOM AND CARE INTERMEDIATE/TELEMETRY

## 2021-01-01 PROCEDURE — 86920 COMPATIBILITY TEST SPIN: CPT

## 2021-01-01 PROCEDURE — 36415 COLL VENOUS BLD VENIPUNCTURE: CPT

## 2021-01-01 PROCEDURE — 84478 ASSAY OF TRIGLYCERIDES: CPT | Performed by: PHYSICIAN ASSISTANT

## 2021-01-01 PROCEDURE — 25800000 HC PHARMACY IV SOLUTIONS: Performed by: SURGERY

## 2021-01-01 PROCEDURE — 93010 ELECTROCARDIOGRAM REPORT: CPT | Mod: 77,XU | Performed by: INTERNAL MEDICINE

## 2021-01-01 PROCEDURE — 96374 THER/PROPH/DIAG INJ IV PUSH: CPT | Mod: 59

## 2021-01-01 PROCEDURE — 83690 ASSAY OF LIPASE: CPT | Performed by: EMERGENCY MEDICINE

## 2021-01-01 PROCEDURE — 96361 HYDRATE IV INFUSION ADD-ON: CPT

## 2021-01-01 PROCEDURE — 93010 ELECTROCARDIOGRAM REPORT: CPT | Performed by: INTERNAL MEDICINE

## 2021-01-01 PROCEDURE — 80053 COMPREHEN METABOLIC PANEL: CPT | Performed by: HOSPITALIST

## 2021-01-01 PROCEDURE — 85610 PROTHROMBIN TIME: CPT | Performed by: STUDENT IN AN ORGANIZED HEALTH CARE EDUCATION/TRAINING PROGRAM

## 2021-01-01 PROCEDURE — 36000003 HC OR LEVEL 3 INITIAL 30MIN: Performed by: SURGERY

## 2021-01-01 PROCEDURE — 25800000 HC PHARMACY IV SOLUTIONS: Performed by: NURSE ANESTHETIST, CERTIFIED REGISTERED

## 2021-01-01 PROCEDURE — 25800000 HC PHARMACY IV SOLUTIONS: Performed by: STUDENT IN AN ORGANIZED HEALTH CARE EDUCATION/TRAINING PROGRAM

## 2021-01-01 PROCEDURE — 83735 ASSAY OF MAGNESIUM: CPT | Performed by: INTERNAL MEDICINE

## 2021-01-01 PROCEDURE — 25800000 HC PHARMACY IV SOLUTIONS: Performed by: PHYSICIAN ASSISTANT

## 2021-01-01 PROCEDURE — 88309 TISSUE EXAM BY PATHOLOGIST: CPT | Performed by: SURGERY

## 2021-01-01 PROCEDURE — G1004 CDSM NDSC: HCPCS

## 2021-01-01 PROCEDURE — 94003 VENT MGMT INPAT SUBQ DAY: CPT

## 2021-01-01 PROCEDURE — 99291 CRITICAL CARE FIRST HOUR: CPT | Mod: 25

## 2021-01-01 PROCEDURE — 80048 BASIC METABOLIC PNL TOTAL CA: CPT | Performed by: STUDENT IN AN ORGANIZED HEALTH CARE EDUCATION/TRAINING PROGRAM

## 2021-01-01 PROCEDURE — 0FBG0ZZ EXCISION OF PANCREAS, OPEN APPROACH: ICD-10-PCS | Performed by: SURGERY

## 2021-01-01 PROCEDURE — 82375 ASSAY CARBOXYHB QUANT: CPT | Performed by: STUDENT IN AN ORGANIZED HEALTH CARE EDUCATION/TRAINING PROGRAM

## 2021-01-01 PROCEDURE — 82330 ASSAY OF CALCIUM: CPT | Performed by: SURGERY

## 2021-01-01 PROCEDURE — 93308 TTE F-UP OR LMTD: CPT | Mod: 26 | Performed by: INTERNAL MEDICINE

## 2021-01-01 PROCEDURE — 83050 HGB METHEMOGLOBIN QUAN: CPT | Performed by: STUDENT IN AN ORGANIZED HEALTH CARE EDUCATION/TRAINING PROGRAM

## 2021-01-01 PROCEDURE — 85730 THROMBOPLASTIN TIME PARTIAL: CPT | Performed by: EMERGENCY MEDICINE

## 2021-01-01 PROCEDURE — 85027 COMPLETE CBC AUTOMATED: CPT | Performed by: SURGERY

## 2021-01-01 PROCEDURE — 25000000 HC PHARMACY GENERAL: Performed by: STUDENT IN AN ORGANIZED HEALTH CARE EDUCATION/TRAINING PROGRAM

## 2021-01-01 PROCEDURE — 85027 COMPLETE CBC AUTOMATED: CPT | Performed by: HOSPITALIST

## 2021-01-01 PROCEDURE — 83735 ASSAY OF MAGNESIUM: CPT | Performed by: HOSPITALIST

## 2021-01-01 PROCEDURE — 82330 ASSAY OF CALCIUM: CPT | Performed by: STUDENT IN AN ORGANIZED HEALTH CARE EDUCATION/TRAINING PROGRAM

## 2021-01-01 PROCEDURE — 82805 BLOOD GASES W/O2 SATURATION: CPT | Performed by: STUDENT IN AN ORGANIZED HEALTH CARE EDUCATION/TRAINING PROGRAM

## 2021-01-01 PROCEDURE — 85730 THROMBOPLASTIN TIME PARTIAL: CPT | Performed by: STUDENT IN AN ORGANIZED HEALTH CARE EDUCATION/TRAINING PROGRAM

## 2021-01-01 PROCEDURE — 80100002 HC HEMODIALYSIS CONT VENO-VENOUS

## 2021-01-01 PROCEDURE — 96365 THER/PROPH/DIAG IV INF INIT: CPT

## 2021-01-01 PROCEDURE — 93321 DOPPLER ECHO F-UP/LMTD STD: CPT | Mod: 26 | Performed by: INTERNAL MEDICINE

## 2021-01-01 PROCEDURE — 36430 TRANSFUSION BLD/BLD COMPNT: CPT | Performed by: ANESTHESIOLOGY

## 2021-01-01 PROCEDURE — 63600000 HC DRUGS/DETAIL CODE: Performed by: PHYSICIAN ASSISTANT

## 2021-01-01 PROCEDURE — 83735 ASSAY OF MAGNESIUM: CPT | Performed by: STUDENT IN AN ORGANIZED HEALTH CARE EDUCATION/TRAINING PROGRAM

## 2021-01-01 PROCEDURE — 84295 ASSAY OF SERUM SODIUM: CPT | Performed by: SURGERY

## 2021-01-01 PROCEDURE — 85018 HEMOGLOBIN: CPT | Performed by: HOSPITALIST

## 2021-01-01 PROCEDURE — 83690 ASSAY OF LIPASE: CPT | Performed by: HOSPITALIST

## 2021-01-01 PROCEDURE — 93971 EXTREMITY STUDY: CPT | Mod: LT

## 2021-01-01 PROCEDURE — 0FT40ZZ RESECTION OF GALLBLADDER, OPEN APPROACH: ICD-10-PCS | Performed by: SURGERY

## 2021-01-01 PROCEDURE — 84484 ASSAY OF TROPONIN QUANT: CPT | Performed by: PHYSICIAN ASSISTANT

## 2021-01-01 PROCEDURE — 99233 SBSQ HOSP IP/OBS HIGH 50: CPT | Performed by: INTERNAL MEDICINE

## 2021-01-01 PROCEDURE — 25000000 HC PHARMACY GENERAL: Performed by: HOSPITALIST

## 2021-01-01 PROCEDURE — 0DTF0ZZ RESECTION OF RIGHT LARGE INTESTINE, OPEN APPROACH: ICD-10-PCS | Performed by: SURGERY

## 2021-01-01 PROCEDURE — 25800000 HC PHARMACY IV SOLUTIONS: Performed by: ANESTHESIOLOGY

## 2021-01-01 PROCEDURE — 80048 BASIC METABOLIC PNL TOTAL CA: CPT | Performed by: INTERNAL MEDICINE

## 2021-01-01 PROCEDURE — 93005 ELECTROCARDIOGRAM TRACING: CPT | Performed by: EMERGENCY MEDICINE

## 2021-01-01 PROCEDURE — 82810 BLOOD GASES O2 SAT ONLY: CPT | Performed by: STUDENT IN AN ORGANIZED HEALTH CARE EDUCATION/TRAINING PROGRAM

## 2021-01-01 PROCEDURE — 85610 PROTHROMBIN TIME: CPT | Performed by: EMERGENCY MEDICINE

## 2021-01-01 PROCEDURE — 86900 BLOOD TYPING SEROLOGIC ABO: CPT

## 2021-01-01 PROCEDURE — 85025 COMPLETE CBC W/AUTO DIFF WBC: CPT | Performed by: EMERGENCY MEDICINE

## 2021-01-01 PROCEDURE — 96361 HYDRATE IV INFUSION ADD-ON: CPT | Mod: 59

## 2021-01-01 PROCEDURE — 25500000 HC DRUGS/INCIDENT RAD: Performed by: EMERGENCY MEDICINE

## 2021-01-01 PROCEDURE — 63600000 HC DRUGS/DETAIL CODE: Performed by: INTERNAL MEDICINE

## 2021-01-01 PROCEDURE — 84484 ASSAY OF TROPONIN QUANT: CPT

## 2021-01-01 PROCEDURE — 83605 ASSAY OF LACTIC ACID: CPT | Performed by: EMERGENCY MEDICINE

## 2021-01-01 PROCEDURE — 80048 BASIC METABOLIC PNL TOTAL CA: CPT | Performed by: HOSPITALIST

## 2021-01-01 PROCEDURE — 85025 COMPLETE CBC W/AUTO DIFF WBC: CPT | Performed by: HOSPITALIST

## 2021-01-01 PROCEDURE — 93325 DOPPLER ECHO COLOR FLOW MAPG: CPT | Mod: 26 | Performed by: INTERNAL MEDICINE

## 2021-01-01 PROCEDURE — 85027 COMPLETE CBC AUTOMATED: CPT | Performed by: STUDENT IN AN ORGANIZED HEALTH CARE EDUCATION/TRAINING PROGRAM

## 2021-01-01 PROCEDURE — 93321 DOPPLER ECHO F-UP/LMTD STD: CPT

## 2021-01-01 PROCEDURE — 36430 TRANSFUSION BLD/BLD COMPNT: CPT | Performed by: STUDENT IN AN ORGANIZED HEALTH CARE EDUCATION/TRAINING PROGRAM

## 2021-01-01 PROCEDURE — 87449 NOS EACH ORGANISM AG IA: CPT | Performed by: HOSPITALIST

## 2021-01-01 PROCEDURE — 92960 CARDIOVERSION ELECTRIC EXT: CPT | Performed by: INTERNAL MEDICINE

## 2021-01-01 PROCEDURE — 83605 ASSAY OF LACTIC ACID: CPT

## 2021-01-01 PROCEDURE — 36415 COLL VENOUS BLD VENIPUNCTURE: CPT | Performed by: INTERNAL MEDICINE

## 2021-01-01 PROCEDURE — 87075 CULTR BACTERIA EXCEPT BLOOD: CPT | Performed by: SURGERY

## 2021-01-01 PROCEDURE — 74176 CT ABD & PELVIS W/O CONTRAST: CPT | Mod: MG

## 2021-01-01 PROCEDURE — 25000000 HC PHARMACY GENERAL

## 2021-01-01 PROCEDURE — 06Q70ZZ REPAIR COLIC VEIN, OPEN APPROACH: ICD-10-PCS | Performed by: SURGERY

## 2021-01-01 PROCEDURE — 93005 ELECTROCARDIOGRAM TRACING: CPT | Performed by: HOSPITALIST

## 2021-01-01 PROCEDURE — 92526 ORAL FUNCTION THERAPY: CPT | Mod: GN

## 2021-01-01 PROCEDURE — 87324 CLOSTRIDIUM AG IA: CPT | Performed by: HOSPITALIST

## 2021-01-01 PROCEDURE — 85610 PROTHROMBIN TIME: CPT | Performed by: SURGERY

## 2021-01-01 PROCEDURE — 80076 HEPATIC FUNCTION PANEL: CPT | Performed by: HOSPITALIST

## 2021-01-01 PROCEDURE — 74181 MRI ABDOMEN W/O CONTRAST: CPT | Mod: ME

## 2021-01-01 PROCEDURE — P9059 PLASMA, FRZ BETWEEN 8-24HOUR: HCPCS

## 2021-01-01 PROCEDURE — 63600000 HC DRUGS/DETAIL CODE: Performed by: NURSE ANESTHETIST, CERTIFIED REGISTERED

## 2021-01-01 PROCEDURE — 63600000 HC DRUGS/DETAIL CODE: Performed by: EMERGENCY MEDICINE

## 2021-01-01 PROCEDURE — 85018 HEMOGLOBIN: CPT | Performed by: SURGERY

## 2021-01-01 PROCEDURE — 82040 ASSAY OF SERUM ALBUMIN: CPT | Performed by: HOSPITALIST

## 2021-01-01 PROCEDURE — 80048 BASIC METABOLIC PNL TOTAL CA: CPT | Performed by: PHYSICIAN ASSISTANT

## 2021-01-01 PROCEDURE — 0T9B80Z DRAINAGE OF BLADDER WITH DRAINAGE DEVICE, VIA NATURAL OR ARTIFICIAL OPENING ENDOSCOPIC: ICD-10-PCS | Performed by: UROLOGY

## 2021-01-01 PROCEDURE — 5A2204Z RESTORATION OF CARDIAC RHYTHM, SINGLE: ICD-10-PCS | Performed by: INTERNAL MEDICINE

## 2021-01-01 PROCEDURE — 84484 ASSAY OF TROPONIN QUANT: CPT | Performed by: HOSPITALIST

## 2021-01-01 PROCEDURE — 87077 CULTURE AEROBIC IDENTIFY: CPT | Performed by: SURGERY

## 2021-01-01 PROCEDURE — 36415 COLL VENOUS BLD VENIPUNCTURE: CPT | Performed by: PHYSICIAN ASSISTANT

## 2021-01-01 PROCEDURE — 25000000 HC PHARMACY GENERAL: Performed by: ANESTHESIOLOGY

## 2021-01-01 PROCEDURE — 93005 ELECTROCARDIOGRAM TRACING: CPT | Performed by: PHYSICIAN ASSISTANT

## 2021-01-01 PROCEDURE — 97530 THERAPEUTIC ACTIVITIES: CPT | Mod: GP | Performed by: PHYSICAL THERAPIST

## 2021-01-01 PROCEDURE — 87641 MR-STAPH DNA AMP PROBE: CPT | Performed by: SURGERY

## 2021-01-01 PROCEDURE — 85610 PROTHROMBIN TIME: CPT | Performed by: PHYSICIAN ASSISTANT

## 2021-01-01 PROCEDURE — 82803 BLOOD GASES ANY COMBINATION: CPT | Performed by: SURGERY

## 2021-01-01 PROCEDURE — 63600000 HC DRUGS/DETAIL CODE: Performed by: ANESTHESIOLOGY

## 2021-01-01 PROCEDURE — 04HY32Z INSERTION OF MONITORING DEVICE INTO LOWER ARTERY, PERCUTANEOUS APPROACH: ICD-10-PCS | Performed by: SURGERY

## 2021-01-01 PROCEDURE — 84484 ASSAY OF TROPONIN QUANT: CPT | Performed by: EMERGENCY MEDICINE

## 2021-01-01 PROCEDURE — A9698 NON-RAD CONTRAST MATERIALNOC: HCPCS | Performed by: EMERGENCY MEDICINE

## 2021-01-01 PROCEDURE — 74176 CT ABD & PELVIS W/O CONTRAST: CPT | Mod: ME

## 2021-01-01 PROCEDURE — 63700000 HC SELF-ADMINISTRABLE DRUG: Performed by: SURGERY

## 2021-01-01 PROCEDURE — 83690 ASSAY OF LIPASE: CPT

## 2021-01-01 PROCEDURE — 93306 TTE W/DOPPLER COMPLETE: CPT

## 2021-01-01 PROCEDURE — 36000013 HC OR LEVEL 3 EA ADDL MIN: Performed by: SURGERY

## 2021-01-01 PROCEDURE — 25800000 HC PHARMACY IV SOLUTIONS: Performed by: INTERNAL MEDICINE

## 2021-01-01 PROCEDURE — P9045 ALBUMIN (HUMAN), 5%, 250 ML: HCPCS | Mod: JW | Performed by: ANESTHESIOLOGY

## 2021-01-01 PROCEDURE — 88305 TISSUE EXAM BY PATHOLOGIST: CPT | Performed by: SURGERY

## 2021-01-01 PROCEDURE — 84100 ASSAY OF PHOSPHORUS: CPT | Performed by: SURGERY

## 2021-01-01 PROCEDURE — 92610 EVALUATE SWALLOWING FUNCTION: CPT | Mod: GN

## 2021-01-01 PROCEDURE — 82728 ASSAY OF FERRITIN: CPT | Performed by: INTERNAL MEDICINE

## 2021-01-01 PROCEDURE — 84132 ASSAY OF SERUM POTASSIUM: CPT | Performed by: SURGERY

## 2021-01-01 PROCEDURE — 93010 ELECTROCARDIOGRAM REPORT: CPT | Mod: XU | Performed by: INTERNAL MEDICINE

## 2021-01-01 PROCEDURE — 99223 1ST HOSP IP/OBS HIGH 75: CPT | Mod: 25 | Performed by: INTERNAL MEDICINE

## 2021-01-01 PROCEDURE — 83540 ASSAY OF IRON: CPT | Performed by: INTERNAL MEDICINE

## 2021-01-01 PROCEDURE — 97116 GAIT TRAINING THERAPY: CPT | Mod: GP

## 2021-01-01 PROCEDURE — 97116 GAIT TRAINING THERAPY: CPT | Mod: GP,CQ

## 2021-01-01 PROCEDURE — 87205 SMEAR GRAM STAIN: CPT | Performed by: INTERNAL MEDICINE

## 2021-01-01 PROCEDURE — 06HY33Z INSERTION OF INFUSION DEVICE INTO LOWER VEIN, PERCUTANEOUS APPROACH: ICD-10-PCS | Performed by: SURGERY

## 2021-01-01 PROCEDURE — 82306 VITAMIN D 25 HYDROXY: CPT | Performed by: HOSPITALIST

## 2021-01-01 PROCEDURE — 87637 SARSCOV2&INF A&B&RSV AMP PRB: CPT | Performed by: EMERGENCY MEDICINE

## 2021-01-01 PROCEDURE — 93005 ELECTROCARDIOGRAM TRACING: CPT | Performed by: SURGERY

## 2021-01-01 PROCEDURE — 97605 NEG PRS WND THER DME<=50SQCM: CPT | Performed by: SURGERY

## 2021-01-01 PROCEDURE — 63600000 HC DRUGS/DETAIL CODE: Mod: JG | Performed by: STUDENT IN AN ORGANIZED HEALTH CARE EDUCATION/TRAINING PROGRAM

## 2021-01-01 PROCEDURE — 36415 COLL VENOUS BLD VENIPUNCTURE: CPT | Performed by: EMERGENCY MEDICINE

## 2021-01-01 PROCEDURE — A4648 IMPLANTABLE TISSUE MARKER: HCPCS | Performed by: SURGERY

## 2021-01-01 PROCEDURE — 86901 BLOOD TYPING SEROLOGIC RH(D): CPT

## 2021-01-01 PROCEDURE — 44160 REMOVAL OF COLON: CPT | Mod: 52 | Performed by: SURGERY

## 2021-01-01 PROCEDURE — 81001 URINALYSIS AUTO W/SCOPE: CPT | Performed by: HOSPITALIST

## 2021-01-01 PROCEDURE — 0DJ08ZZ INSPECTION OF UPPER INTESTINAL TRACT, VIA NATURAL OR ARTIFICIAL OPENING ENDOSCOPIC: ICD-10-PCS | Performed by: SURGERY

## 2021-01-01 PROCEDURE — 82330 ASSAY OF CALCIUM: CPT | Performed by: HOSPITALIST

## 2021-01-01 PROCEDURE — 63600000 HC DRUGS/DETAIL CODE: Mod: TB | Performed by: INTERNAL MEDICINE

## 2021-01-01 PROCEDURE — 80053 COMPREHEN METABOLIC PANEL: CPT | Performed by: PHYSICIAN ASSISTANT

## 2021-01-01 PROCEDURE — U0002 COVID-19 LAB TEST NON-CDC: HCPCS | Performed by: EMERGENCY MEDICINE

## 2021-01-01 PROCEDURE — 93931 UPPER EXTREMITY STUDY: CPT

## 2021-01-01 PROCEDURE — 25000000 HC PHARMACY GENERAL: Performed by: EMERGENCY MEDICINE

## 2021-01-01 PROCEDURE — 99232 SBSQ HOSP IP/OBS MODERATE 35: CPT | Performed by: STUDENT IN AN ORGANIZED HEALTH CARE EDUCATION/TRAINING PROGRAM

## 2021-01-01 PROCEDURE — 85730 THROMBOPLASTIN TIME PARTIAL: CPT | Performed by: SURGERY

## 2021-01-01 PROCEDURE — 99285 EMERGENCY DEPT VISIT HI MDM: CPT | Mod: 25

## 2021-01-01 PROCEDURE — 25000000 HC PHARMACY GENERAL: Performed by: NURSE ANESTHETIST, CERTIFIED REGISTERED

## 2021-01-01 PROCEDURE — 99024 POSTOP FOLLOW-UP VISIT: CPT | Performed by: SURGERY

## 2021-01-01 PROCEDURE — 80053 COMPREHEN METABOLIC PANEL: CPT

## 2021-01-01 PROCEDURE — 5A1D90Z PERFORMANCE OF URINARY FILTRATION, CONTINUOUS, GREATER THAN 18 HOURS PER DAY: ICD-10-PCS | Performed by: STUDENT IN AN ORGANIZED HEALTH CARE EDUCATION/TRAINING PROGRAM

## 2021-01-01 PROCEDURE — 83690 ASSAY OF LIPASE: CPT | Performed by: STUDENT IN AN ORGANIZED HEALTH CARE EDUCATION/TRAINING PROGRAM

## 2021-01-01 PROCEDURE — 25800000 HC PHARMACY IV SOLUTIONS: Performed by: FAMILY MEDICINE

## 2021-01-01 PROCEDURE — 84484 ASSAY OF TROPONIN QUANT: CPT | Performed by: SURGERY

## 2021-01-01 PROCEDURE — 36415 COLL VENOUS BLD VENIPUNCTURE: CPT | Performed by: SURGERY

## 2021-01-01 PROCEDURE — 63600105 HC IODINE BASED CONTRAST: Performed by: EMERGENCY MEDICINE

## 2021-01-01 PROCEDURE — 87205 SMEAR GRAM STAIN: CPT | Performed by: SURGERY

## 2021-01-01 PROCEDURE — 85027 COMPLETE CBC AUTOMATED: CPT | Performed by: EMERGENCY MEDICINE

## 2021-01-01 PROCEDURE — 85730 THROMBOPLASTIN TIME PARTIAL: CPT | Performed by: PHYSICIAN ASSISTANT

## 2021-01-01 PROCEDURE — 80061 LIPID PANEL: CPT | Performed by: HOSPITALIST

## 2021-01-01 PROCEDURE — 36556 INSERT NON-TUNNEL CV CATH: CPT | Performed by: SURGERY

## 2021-01-01 PROCEDURE — 76775 US EXAM ABDO BACK WALL LIM: CPT

## 2021-01-01 PROCEDURE — 85025 COMPLETE CBC W/AUTO DIFF WBC: CPT

## 2021-01-01 PROCEDURE — 82438 ASSAY OTHER FLUID CHLORIDES: CPT | Performed by: HOSPITALIST

## 2021-01-01 PROCEDURE — 82810 BLOOD GASES O2 SAT ONLY: CPT | Performed by: SURGERY

## 2021-01-01 PROCEDURE — 82570 ASSAY OF URINE CREATININE: CPT | Performed by: HOSPITALIST

## 2021-01-01 PROCEDURE — 02HV33Z INSERTION OF INFUSION DEVICE INTO SUPERIOR VENA CAVA, PERCUTANEOUS APPROACH: ICD-10-PCS | Performed by: SURGERY

## 2021-01-01 PROCEDURE — 87040 BLOOD CULTURE FOR BACTERIA: CPT | Performed by: PHYSICIAN ASSISTANT

## 2021-01-01 PROCEDURE — 99222 1ST HOSP IP/OBS MODERATE 55: CPT | Performed by: SURGERY

## 2021-01-01 PROCEDURE — 94002 VENT MGMT INPAT INIT DAY: CPT

## 2021-01-01 PROCEDURE — 84134 ASSAY OF PREALBUMIN: CPT | Performed by: STUDENT IN AN ORGANIZED HEALTH CARE EDUCATION/TRAINING PROGRAM

## 2021-01-01 PROCEDURE — 96375 TX/PRO/DX INJ NEW DRUG ADDON: CPT

## 2021-01-01 PROCEDURE — P9073 PLATELETS PHERESIS PATH REDU: HCPCS

## 2021-01-01 PROCEDURE — P9045 ALBUMIN (HUMAN), 5%, 250 ML: HCPCS | Performed by: NURSE ANESTHETIST, CERTIFIED REGISTERED

## 2021-01-01 PROCEDURE — 63600000 HC DRUGS/DETAIL CODE

## 2021-01-01 PROCEDURE — 5A1955Z RESPIRATORY VENTILATION, GREATER THAN 96 CONSECUTIVE HOURS: ICD-10-PCS | Performed by: SURGERY

## 2021-01-01 PROCEDURE — 25800000 HC PHARMACY IV SOLUTIONS: Performed by: EMERGENCY MEDICINE

## 2021-01-01 PROCEDURE — 74174 CTA ABD&PLVS W/CONTRAST: CPT | Mod: MG

## 2021-01-01 PROCEDURE — 96375 TX/PRO/DX INJ NEW DRUG ADDON: CPT | Mod: 59

## 2021-01-01 PROCEDURE — 99239 HOSP IP/OBS DSCHRG MGMT >30: CPT | Performed by: HOSPITALIST

## 2021-01-01 PROCEDURE — 83735 ASSAY OF MAGNESIUM: CPT | Performed by: SURGERY

## 2021-01-01 PROCEDURE — 80048 BASIC METABOLIC PNL TOTAL CA: CPT | Performed by: SURGERY

## 2021-01-01 PROCEDURE — 86803 HEPATITIS C AB TEST: CPT | Performed by: STUDENT IN AN ORGANIZED HEALTH CARE EDUCATION/TRAINING PROGRAM

## 2021-01-01 PROCEDURE — 99232 SBSQ HOSP IP/OBS MODERATE 35: CPT | Mod: 25 | Performed by: SURGERY

## 2021-01-01 PROCEDURE — 87086 URINE CULTURE/COLONY COUNT: CPT | Performed by: HOSPITALIST

## 2021-01-01 PROCEDURE — 71046 X-RAY EXAM CHEST 2 VIEWS: CPT

## 2021-01-01 RX ORDER — SODIUM CITRATE AND CITRIC ACID MONOHYDRATE 334; 500 MG/5ML; MG/5ML
15 SOLUTION ORAL 2 TIMES DAILY
Status: DISCONTINUED | OUTPATIENT
Start: 2021-01-01 | End: 2021-01-01

## 2021-01-01 RX ORDER — ALBUMIN HUMAN 50 G/1000ML
SOLUTION INTRAVENOUS AS NEEDED
Status: DISCONTINUED | OUTPATIENT
Start: 2021-01-01 | End: 2021-01-01 | Stop reason: SURG

## 2021-01-01 RX ORDER — NIFEDIPINE 30 MG/1
30 TABLET, FILM COATED, EXTENDED RELEASE ORAL NIGHTLY
Status: DISCONTINUED | OUTPATIENT
Start: 2021-01-01 | End: 2021-01-01

## 2021-01-01 RX ORDER — FUROSEMIDE 10 MG/ML
40 INJECTION INTRAMUSCULAR; INTRAVENOUS ONCE
Status: COMPLETED | OUTPATIENT
Start: 2021-01-01 | End: 2021-01-01

## 2021-01-01 RX ORDER — LOSARTAN POTASSIUM 50 MG/1
TABLET ORAL
COMMUNITY
End: 2021-01-01 | Stop reason: ENTERED-IN-ERROR

## 2021-01-01 RX ORDER — AMLODIPINE BESYLATE 10 MG/1
10 TABLET ORAL DAILY
Status: DISCONTINUED | OUTPATIENT
Start: 2021-01-01 | End: 2021-01-01

## 2021-01-01 RX ORDER — ATROPINE SULFATE 0.1 MG/ML
0.5 INJECTION INTRAVENOUS EVERY 5 MIN PRN
Status: DISCONTINUED | OUTPATIENT
Start: 2021-01-01 | End: 2021-01-01 | Stop reason: HOSPADM

## 2021-01-01 RX ORDER — SODIUM CHLORIDE 9 MG/ML
INJECTION, SOLUTION INTRAVENOUS CONTINUOUS PRN
Status: DISCONTINUED | OUTPATIENT
Start: 2021-01-01 | End: 2021-01-01 | Stop reason: SURG

## 2021-01-01 RX ORDER — DEXTROSE 50 % IN WATER (D50W) INTRAVENOUS SYRINGE
25 AS NEEDED
Status: DISCONTINUED | OUTPATIENT
Start: 2021-01-01 | End: 2021-01-01

## 2021-01-01 RX ORDER — LIDOCAINE 560 MG/1
1 PATCH PERCUTANEOUS; TOPICAL; TRANSDERMAL DAILY
Status: DISCONTINUED | OUTPATIENT
Start: 2021-01-01 | End: 2021-01-01 | Stop reason: HOSPADM

## 2021-01-01 RX ORDER — SODIUM CHLORIDE 9 MG/ML
5 INJECTION, SOLUTION INTRAVENOUS AS NEEDED
Status: DISCONTINUED | OUTPATIENT
Start: 2021-01-01 | End: 2021-01-01

## 2021-01-01 RX ORDER — ALBUTEROL SULFATE 0.83 MG/ML
2.5 SOLUTION RESPIRATORY (INHALATION) EVERY 6 HOURS PRN
Status: DISCONTINUED | OUTPATIENT
Start: 2021-01-01 | End: 2021-01-01 | Stop reason: HOSPADM

## 2021-01-01 RX ORDER — SODIUM CHLORIDE 9 MG/ML
5 INJECTION, SOLUTION INTRAVENOUS AS NEEDED
Status: ACTIVE | OUTPATIENT
Start: 2021-01-01 | End: 2021-01-01

## 2021-01-01 RX ORDER — PREDNISONE 20 MG/1
40 TABLET ORAL DAILY
Status: DISCONTINUED | OUTPATIENT
Start: 2021-01-01 | End: 2021-01-01

## 2021-01-01 RX ORDER — ONDANSETRON HYDROCHLORIDE 2 MG/ML
INJECTION, SOLUTION INTRAVENOUS AS NEEDED
Status: DISCONTINUED | OUTPATIENT
Start: 2021-01-01 | End: 2021-01-01 | Stop reason: SURG

## 2021-01-01 RX ORDER — ROCURONIUM BROMIDE 10 MG/ML
INJECTION, SOLUTION INTRAVENOUS AS NEEDED
Status: DISCONTINUED | OUTPATIENT
Start: 2021-01-01 | End: 2021-01-01 | Stop reason: SURG

## 2021-01-01 RX ORDER — LEUPROLIDE ACETATE 22.5 MG
22.5 KIT INTRAMUSCULAR
COMMUNITY

## 2021-01-01 RX ORDER — UBIDECARENONE 100 MG
100 CAPSULE ORAL DAILY
COMMUNITY

## 2021-01-01 RX ORDER — HEPARIN SODIUM 5000 [USP'U]/ML
5000 INJECTION, SOLUTION INTRAVENOUS; SUBCUTANEOUS EVERY 8 HOURS
Status: DISCONTINUED | OUTPATIENT
Start: 2021-01-01 | End: 2021-01-01 | Stop reason: HOSPADM

## 2021-01-01 RX ORDER — SERTRALINE HYDROCHLORIDE 25 MG/1
25 TABLET, FILM COATED ORAL EVERY EVENING
Status: DISCONTINUED | OUTPATIENT
Start: 2021-01-01 | End: 2021-01-01 | Stop reason: HOSPADM

## 2021-01-01 RX ORDER — IBUPROFEN 200 MG
16-32 TABLET ORAL AS NEEDED
Status: DISCONTINUED | OUTPATIENT
Start: 2021-01-01 | End: 2021-01-01

## 2021-01-01 RX ORDER — CHOLECALCIFEROL (VITAMIN D3) 25 MCG
2000 TABLET ORAL DAILY
Status: DISCONTINUED | OUTPATIENT
Start: 2021-01-01 | End: 2021-01-01 | Stop reason: HOSPADM

## 2021-01-01 RX ORDER — FAMOTIDINE 20 MG/1
20 TABLET, FILM COATED ORAL DAILY
Status: DISCONTINUED | OUTPATIENT
Start: 2021-01-01 | End: 2021-01-01

## 2021-01-01 RX ORDER — HYDROMORPHONE HYDROCHLORIDE 1 MG/ML
0.5 INJECTION, SOLUTION INTRAMUSCULAR; INTRAVENOUS; SUBCUTANEOUS ONCE
Status: COMPLETED | OUTPATIENT
Start: 2021-01-01 | End: 2021-01-01

## 2021-01-01 RX ORDER — HYDROMORPHONE HYDROCHLORIDE 1 MG/ML
INJECTION, SOLUTION INTRAMUSCULAR; INTRAVENOUS; SUBCUTANEOUS AS NEEDED
Status: DISCONTINUED | OUTPATIENT
Start: 2021-01-01 | End: 2021-01-01 | Stop reason: SURG

## 2021-01-01 RX ORDER — HYDROMORPHONE HYDROCHLORIDE 1 MG/ML
0.5 INJECTION, SOLUTION INTRAMUSCULAR; INTRAVENOUS; SUBCUTANEOUS
Status: DISCONTINUED | OUTPATIENT
Start: 2021-01-01 | End: 2021-01-01

## 2021-01-01 RX ORDER — GLYCOPYRROLATE 0.6MG/3ML
0.2 SYRINGE (ML) INTRAVENOUS EVERY 4 HOURS PRN
Status: DISCONTINUED | OUTPATIENT
Start: 2021-01-01 | End: 2021-08-19 | Stop reason: HOSPADM

## 2021-01-01 RX ORDER — SODIUM CHLORIDE 9 MG/ML
5 INJECTION, SOLUTION INTRAVENOUS AS NEEDED
Status: DISCONTINUED | OUTPATIENT
Start: 2021-01-01 | End: 2021-08-19 | Stop reason: HOSPADM

## 2021-01-01 RX ORDER — LANOLIN ALCOHOL/MO/W.PET/CERES
400 CREAM (GRAM) TOPICAL ONCE
Status: COMPLETED | OUTPATIENT
Start: 2021-01-01 | End: 2021-01-01

## 2021-01-01 RX ORDER — POTASSIUM CHLORIDE 750 MG/1
40 TABLET, FILM COATED, EXTENDED RELEASE ORAL AS NEEDED
Status: DISCONTINUED | OUTPATIENT
Start: 2021-01-01 | End: 2021-01-01

## 2021-01-01 RX ORDER — PROPOFOL 10 MG/ML
INJECTION, EMULSION INTRAVENOUS AS NEEDED
Status: DISCONTINUED | OUTPATIENT
Start: 2021-01-01 | End: 2021-01-01

## 2021-01-01 RX ORDER — ALBUMIN HUMAN 50 G/1000ML
25 SOLUTION INTRAVENOUS ONCE
Status: COMPLETED | OUTPATIENT
Start: 2021-01-01 | End: 2021-01-01

## 2021-01-01 RX ORDER — FLUCONAZOLE 2 MG/ML
200 INJECTION, SOLUTION INTRAVENOUS
Status: DISCONTINUED | OUTPATIENT
Start: 2021-01-01 | End: 2021-01-01

## 2021-01-01 RX ORDER — HYDRALAZINE HYDROCHLORIDE 25 MG/1
25 TABLET, FILM COATED ORAL EVERY 8 HOURS PRN
Status: DISCONTINUED | OUTPATIENT
Start: 2021-01-01 | End: 2021-01-01

## 2021-01-01 RX ORDER — ONDANSETRON 4 MG/1
4 TABLET, FILM COATED ORAL EVERY 6 HOURS PRN
COMMUNITY
Start: 2021-01-01

## 2021-01-01 RX ORDER — HYDROMORPHONE HYDROCHLORIDE 1 MG/ML
0.5 INJECTION, SOLUTION INTRAMUSCULAR; INTRAVENOUS; SUBCUTANEOUS ONCE
Status: DISCONTINUED | OUTPATIENT
Start: 2021-01-01 | End: 2021-01-01

## 2021-01-01 RX ORDER — DEXTROSE 40 %
15-30 GEL (GRAM) ORAL AS NEEDED
Status: DISCONTINUED | OUTPATIENT
Start: 2021-01-01 | End: 2021-01-01

## 2021-01-01 RX ORDER — SODIUM CHLORIDE, SODIUM GLUCONATE, SODIUM ACETATE, POTASSIUM CHLORIDE AND MAGNESIUM CHLORIDE 30; 37; 368; 526; 502 MG/100ML; MG/100ML; MG/100ML; MG/100ML; MG/100ML
INJECTION, SOLUTION INTRAVENOUS CONTINUOUS PRN
Status: DISCONTINUED | OUTPATIENT
Start: 2021-01-01 | End: 2021-01-01 | Stop reason: SURG

## 2021-01-01 RX ORDER — MIDAZOLAM HYDROCHLORIDE 2 MG/2ML
2 INJECTION, SOLUTION INTRAMUSCULAR; INTRAVENOUS EVERY 4 HOURS PRN
Status: DISCONTINUED | OUTPATIENT
Start: 2021-01-01 | End: 2021-08-19 | Stop reason: HOSPADM

## 2021-01-01 RX ORDER — VANCOMYCIN HYDROCHLORIDE
15 ONCE
Status: COMPLETED | OUTPATIENT
Start: 2021-01-01 | End: 2021-01-01

## 2021-01-01 RX ORDER — LIDOCAINE HYDROCHLORIDE 10 MG/ML
INJECTION, SOLUTION INFILTRATION; PERINEURAL AS NEEDED
Status: DISCONTINUED | OUTPATIENT
Start: 2021-01-01 | End: 2021-01-01 | Stop reason: SURG

## 2021-01-01 RX ORDER — VASOPRESSIN 20 [USP'U]/ML
INJECTION, SOLUTION INTRAVENOUS AS NEEDED
Status: DISCONTINUED | OUTPATIENT
Start: 2021-01-01 | End: 2021-01-01 | Stop reason: SURG

## 2021-01-01 RX ORDER — FENTANYL CITRATE 50 UG/ML
50 INJECTION, SOLUTION INTRAMUSCULAR; INTRAVENOUS
Status: DISCONTINUED | OUTPATIENT
Start: 2021-01-01 | End: 2021-01-01

## 2021-01-01 RX ORDER — DEXTROSE 40 %
15-30 GEL (GRAM) ORAL AS NEEDED
Status: DISCONTINUED | OUTPATIENT
Start: 2021-01-01 | End: 2021-01-01 | Stop reason: HOSPADM

## 2021-01-01 RX ORDER — MIDAZOLAM HYDROCHLORIDE 2 MG/2ML
INJECTION, SOLUTION INTRAMUSCULAR; INTRAVENOUS AS NEEDED
Status: DISCONTINUED | OUTPATIENT
Start: 2021-01-01 | End: 2021-01-01 | Stop reason: SURG

## 2021-01-01 RX ORDER — LIDOCAINE 560 MG/1
1 PATCH PERCUTANEOUS; TOPICAL; TRANSDERMAL DAILY
Qty: 30 PATCH | Refills: 0 | Status: CANCELLED | OUTPATIENT
Start: 2021-01-01 | End: 2021-09-03

## 2021-01-01 RX ORDER — HYDROMORPHONE HYDROCHLORIDE 1 MG/ML
0.4 INJECTION, SOLUTION INTRAMUSCULAR; INTRAVENOUS; SUBCUTANEOUS EVERY 4 HOURS PRN
Status: DISCONTINUED | OUTPATIENT
Start: 2021-01-01 | End: 2021-01-01 | Stop reason: HOSPADM

## 2021-01-01 RX ORDER — POTASSIUM CHLORIDE 750 MG/1
40 TABLET, FILM COATED, EXTENDED RELEASE ORAL ONCE
Status: COMPLETED | OUTPATIENT
Start: 2021-01-01 | End: 2021-01-01

## 2021-01-01 RX ORDER — ERGOCALCIFEROL 1.25 MG/1
50000 CAPSULE ORAL ONCE
Status: COMPLETED | OUTPATIENT
Start: 2021-01-01 | End: 2021-01-01

## 2021-01-01 RX ORDER — NITROGLYCERIN 0.4 MG/1
0.4 TABLET SUBLINGUAL EVERY 5 MIN PRN
Status: DISCONTINUED | OUTPATIENT
Start: 2021-01-01 | End: 2021-01-01 | Stop reason: HOSPADM

## 2021-01-01 RX ORDER — PANTOPRAZOLE SODIUM 40 MG/10ML
40 INJECTION, POWDER, LYOPHILIZED, FOR SOLUTION INTRAVENOUS ONCE
Status: COMPLETED | OUTPATIENT
Start: 2021-01-01 | End: 2021-01-01

## 2021-01-01 RX ORDER — GLUCOSAMINE/CHONDRO SU A 500-400 MG
1 TABLET ORAL DAILY
COMMUNITY

## 2021-01-01 RX ORDER — PANTOPRAZOLE SODIUM 40 MG/10ML
40 INJECTION, POWDER, LYOPHILIZED, FOR SOLUTION INTRAVENOUS EVERY 12 HOURS
Status: DISCONTINUED | OUTPATIENT
Start: 2021-01-01 | End: 2021-01-01 | Stop reason: HOSPADM

## 2021-01-01 RX ORDER — ONDANSETRON HYDROCHLORIDE 2 MG/ML
INJECTION, SOLUTION INTRAVENOUS
Status: DISPENSED
Start: 2021-01-01 | End: 2021-01-01

## 2021-01-01 RX ORDER — ACETAMINOPHEN 325 MG/1
975 TABLET ORAL EVERY 8 HOURS PRN
Status: DISCONTINUED | OUTPATIENT
Start: 2021-01-01 | End: 2021-01-01 | Stop reason: HOSPADM

## 2021-01-01 RX ORDER — SODIUM CHLORIDE, SODIUM LACTATE, POTASSIUM CHLORIDE, CALCIUM CHLORIDE 600; 310; 30; 20 MG/100ML; MG/100ML; MG/100ML; MG/100ML
INJECTION, SOLUTION INTRAVENOUS CONTINUOUS
Status: DISCONTINUED | OUTPATIENT
Start: 2021-01-01 | End: 2021-01-01

## 2021-01-01 RX ORDER — SODIUM CHLORIDE 9 MG/ML
INJECTION, SOLUTION INTRAVENOUS ONCE
Status: COMPLETED | OUTPATIENT
Start: 2021-01-01 | End: 2021-01-01

## 2021-01-01 RX ORDER — SODIUM BICARBONATE 650 MG/1
650 TABLET ORAL 3 TIMES DAILY
Status: DISCONTINUED | OUTPATIENT
Start: 2021-01-01 | End: 2021-01-01

## 2021-01-01 RX ORDER — HYDROMORPHONE HYDROCHLORIDE 1 MG/ML
0.25 INJECTION, SOLUTION INTRAMUSCULAR; INTRAVENOUS; SUBCUTANEOUS
Status: DISCONTINUED | OUTPATIENT
Start: 2021-01-01 | End: 2021-01-01

## 2021-01-01 RX ORDER — SODIUM CHLORIDE 9 MG/ML
INJECTION, SOLUTION INTRAVENOUS CONTINUOUS
Status: DISCONTINUED | OUTPATIENT
Start: 2021-01-01 | End: 2021-01-01

## 2021-01-01 RX ORDER — POTASSIUM CHLORIDE 14.9 MG/ML
20 INJECTION INTRAVENOUS ONCE
Status: COMPLETED | OUTPATIENT
Start: 2021-01-01 | End: 2021-01-01

## 2021-01-01 RX ORDER — LORAZEPAM 2 MG/ML
1 INJECTION INTRAMUSCULAR
Status: DISCONTINUED | OUTPATIENT
Start: 2021-01-01 | End: 2021-08-19 | Stop reason: HOSPADM

## 2021-01-01 RX ORDER — SODIUM BICARBONATE 650 MG/1
650 TABLET ORAL DAILY
Qty: 30 TABLET | Refills: 0 | Status: SHIPPED | OUTPATIENT
Start: 2021-01-01 | End: 2021-09-06

## 2021-01-01 RX ORDER — AMIODARONE HYDROCHLORIDE 150 MG/3ML
INJECTION, SOLUTION INTRAVENOUS
Status: DISPENSED
Start: 2021-01-01 | End: 2021-01-01

## 2021-01-01 RX ORDER — DEXTROSE 50 % IN WATER (D50W) INTRAVENOUS SYRINGE
25 AS NEEDED
Status: DISCONTINUED | OUTPATIENT
Start: 2021-01-01 | End: 2021-01-01 | Stop reason: HOSPADM

## 2021-01-01 RX ORDER — PANTOPRAZOLE SODIUM 40 MG/10ML
40 INJECTION, POWDER, LYOPHILIZED, FOR SOLUTION INTRAVENOUS EVERY 12 HOURS
Status: DISCONTINUED | OUTPATIENT
Start: 2021-01-01 | End: 2021-01-01

## 2021-01-01 RX ORDER — SODIUM CHLORIDE 9 MG/ML
INJECTION, SOLUTION INTRAVENOUS CONTINUOUS
Status: ACTIVE | OUTPATIENT
Start: 2021-01-01 | End: 2021-01-01

## 2021-01-01 RX ORDER — ONDANSETRON 4 MG/1
4 TABLET, ORALLY DISINTEGRATING ORAL EVERY 8 HOURS PRN
Status: DISCONTINUED | OUTPATIENT
Start: 2021-01-01 | End: 2021-08-19 | Stop reason: HOSPADM

## 2021-01-01 RX ORDER — CHOLECALCIFEROL (VITAMIN D3) 25 MCG
1000 TABLET ORAL DAILY
Qty: 30 TABLET | Refills: 0
Start: 2021-01-01 | End: 2021-09-07

## 2021-01-01 RX ORDER — POTASSIUM CHLORIDE 14.9 MG/ML
20 INJECTION INTRAVENOUS AS NEEDED
Status: DISCONTINUED | OUTPATIENT
Start: 2021-01-01 | End: 2021-01-01

## 2021-01-01 RX ORDER — PHENYLEPHRINE HCL IN 0.9% NACL 50MG/250ML
PLASTIC BAG, INJECTION (ML) INTRAVENOUS CONTINUOUS PRN
Status: DISCONTINUED | OUTPATIENT
Start: 2021-01-01 | End: 2021-01-01 | Stop reason: SURG

## 2021-01-01 RX ORDER — LIDOCAINE 560 MG/1
1 PATCH PERCUTANEOUS; TOPICAL; TRANSDERMAL DAILY
Qty: 30 PATCH | Refills: 0 | Status: SHIPPED | OUTPATIENT
Start: 2021-01-01 | End: 2021-09-07

## 2021-01-01 RX ORDER — ETOMIDATE 2 MG/ML
INJECTION INTRAVENOUS AS NEEDED
Status: DISCONTINUED | OUTPATIENT
Start: 2021-01-01 | End: 2021-01-01 | Stop reason: SURG

## 2021-01-01 RX ORDER — ATORVASTATIN CALCIUM 40 MG/1
40 TABLET, FILM COATED ORAL
Qty: 30 TABLET | Refills: 0 | Status: CANCELLED | OUTPATIENT
Start: 2021-01-01 | End: 2021-09-02

## 2021-01-01 RX ORDER — ATROPINE SULFATE 0.1 MG/ML
0.5 INJECTION INTRAVENOUS ONCE
Status: COMPLETED | OUTPATIENT
Start: 2021-01-01 | End: 2021-01-01

## 2021-01-01 RX ORDER — ALUMINUM HYDROXIDE, MAGNESIUM HYDROXIDE, AND SIMETHICONE 1200; 120; 1200 MG/30ML; MG/30ML; MG/30ML
30 SUSPENSION ORAL EVERY 4 HOURS PRN
Status: DISCONTINUED | OUTPATIENT
Start: 2021-01-01 | End: 2021-01-01 | Stop reason: HOSPADM

## 2021-01-01 RX ORDER — FENTANYL CITRATE 50 UG/ML
25 INJECTION, SOLUTION INTRAMUSCULAR; INTRAVENOUS ONCE
Status: COMPLETED | OUTPATIENT
Start: 2021-01-01 | End: 2021-01-01

## 2021-01-01 RX ORDER — FAMOTIDINE 10 MG/ML
20 INJECTION INTRAVENOUS ONCE
Status: COMPLETED | OUTPATIENT
Start: 2021-01-01 | End: 2021-01-01

## 2021-01-01 RX ORDER — SODIUM BICARBONATE 1 MEQ/ML
100 SYRINGE (ML) INTRAVENOUS ONCE
Status: COMPLETED | OUTPATIENT
Start: 2021-01-01 | End: 2021-01-01

## 2021-01-01 RX ORDER — ONDANSETRON HYDROCHLORIDE 2 MG/ML
4 INJECTION, SOLUTION INTRAVENOUS ONCE
Status: COMPLETED | OUTPATIENT
Start: 2021-01-01 | End: 2021-01-01

## 2021-01-01 RX ORDER — SODIUM BICARBONATE 650 MG/1
650 TABLET ORAL DAILY
Status: DISCONTINUED | OUTPATIENT
Start: 2021-01-01 | End: 2021-01-01 | Stop reason: HOSPADM

## 2021-01-01 RX ORDER — MIDAZOLAM HYDROCHLORIDE 2 MG/2ML
2 INJECTION, SOLUTION INTRAMUSCULAR; INTRAVENOUS ONCE
Status: COMPLETED | OUTPATIENT
Start: 2021-01-01 | End: 2021-01-01

## 2021-01-01 RX ORDER — HEPARIN SODIUM 10000 [USP'U]/100ML
500 INJECTION, SOLUTION INTRAVENOUS
Status: DISCONTINUED | OUTPATIENT
Start: 2021-01-01 | End: 2021-01-01

## 2021-01-01 RX ORDER — ACETAMINOPHEN 325 MG/1
975 TABLET ORAL EVERY 8 HOURS
Status: DISCONTINUED | OUTPATIENT
Start: 2021-01-01 | End: 2021-01-01

## 2021-01-01 RX ORDER — PHENYLEPHRINE HYDROCHLORIDE 10 MG/ML
INJECTION INTRAVENOUS AS NEEDED
Status: DISCONTINUED | OUTPATIENT
Start: 2021-01-01 | End: 2021-01-01 | Stop reason: SURG

## 2021-01-01 RX ORDER — FLUCONAZOLE 2 MG/ML
200 INJECTION, SOLUTION INTRAVENOUS
Status: DISCONTINUED | OUTPATIENT
Start: 2021-01-01 | End: 2021-08-19 | Stop reason: HOSPADM

## 2021-01-01 RX ORDER — ATORVASTATIN CALCIUM 40 MG/1
40 TABLET, FILM COATED ORAL
Status: DISCONTINUED | OUTPATIENT
Start: 2021-01-01 | End: 2021-01-01 | Stop reason: HOSPADM

## 2021-01-01 RX ORDER — PANTOPRAZOLE SODIUM 40 MG/10ML
40 INJECTION, POWDER, LYOPHILIZED, FOR SOLUTION INTRAVENOUS EVERY 24 HOURS
Status: DISCONTINUED | OUTPATIENT
Start: 2021-01-01 | End: 2021-01-01

## 2021-01-01 RX ORDER — FLAXSEED OIL 1000 MG
10 CAPSULE ORAL DAILY
COMMUNITY

## 2021-01-01 RX ORDER — IBUPROFEN 200 MG
16-32 TABLET ORAL AS NEEDED
Status: DISCONTINUED | OUTPATIENT
Start: 2021-01-01 | End: 2021-01-01 | Stop reason: HOSPADM

## 2021-01-01 RX ORDER — FENTANYL CITRATE 50 UG/ML
50 INJECTION, SOLUTION INTRAMUSCULAR; INTRAVENOUS ONCE
Status: COMPLETED | OUTPATIENT
Start: 2021-01-01 | End: 2021-01-01

## 2021-01-01 RX ORDER — NIFEDIPINE 30 MG/1
30 TABLET, FILM COATED, EXTENDED RELEASE ORAL DAILY
Status: DISCONTINUED | OUTPATIENT
Start: 2021-01-01 | End: 2021-01-01

## 2021-01-01 RX ORDER — CHOLECALCIFEROL (VITAMIN D3) 25 MCG
2000 TABLET ORAL DAILY
Qty: 60 TABLET | Refills: 0 | Status: CANCELLED | OUTPATIENT
Start: 2021-01-01 | End: 2021-09-03

## 2021-01-01 RX ORDER — DIPHENHYDRAMINE HCL 50 MG/ML
25 VIAL (ML) INJECTION EVERY 6 HOURS PRN
Status: DISCONTINUED | OUTPATIENT
Start: 2021-01-01 | End: 2021-08-19 | Stop reason: HOSPADM

## 2021-01-01 RX ORDER — DEXTROSE 50 % IN WATER (D50W) INTRAVENOUS SYRINGE
10-30 AS NEEDED
Status: DISCONTINUED | OUTPATIENT
Start: 2021-01-01 | End: 2021-08-19 | Stop reason: HOSPADM

## 2021-01-01 RX ORDER — ATROPINE SULFATE 0.1 MG/ML
INJECTION INTRAVENOUS
Status: COMPLETED
Start: 2021-01-01 | End: 2021-01-01

## 2021-01-01 RX ORDER — ALBUTEROL SULFATE 90 UG/1
2 INHALANT RESPIRATORY (INHALATION) EVERY 6 HOURS PRN
Status: DISCONTINUED | OUTPATIENT
Start: 2021-01-01 | End: 2021-01-01

## 2021-01-01 RX ORDER — AMIODARONE HYDROCHLORIDE 150 MG/3ML
150 INJECTION, SOLUTION INTRAVENOUS ONCE
Status: DISCONTINUED | OUTPATIENT
Start: 2021-01-01 | End: 2021-01-01 | Stop reason: CLARIF

## 2021-01-01 RX ORDER — NIFEDIPINE 30 MG/1
30 TABLET, FILM COATED, EXTENDED RELEASE ORAL 2 TIMES DAILY
Status: DISCONTINUED | OUTPATIENT
Start: 2021-01-01 | End: 2021-01-01 | Stop reason: HOSPADM

## 2021-01-01 RX ORDER — SODIUM CHLORIDE 0.9 % (FLUSH) 0.9 %
3 SYRINGE (ML) INJECTION AS NEEDED
Status: DISCONTINUED | OUTPATIENT
Start: 2021-01-01 | End: 2021-08-19 | Stop reason: HOSPADM

## 2021-01-01 RX ORDER — SODIUM BICARBONATE 1 MEQ/ML
50 SYRINGE (ML) INTRAVENOUS ONCE
Status: COMPLETED | OUTPATIENT
Start: 2021-01-01 | End: 2021-01-01

## 2021-01-01 RX ORDER — OXYCODONE HYDROCHLORIDE 5 MG/1
TABLET ORAL
COMMUNITY
Start: 2021-01-01

## 2021-01-01 RX ORDER — PROCHLORPERAZINE EDISYLATE 5 MG/ML
10 INJECTION INTRAMUSCULAR; INTRAVENOUS EVERY 6 HOURS PRN
Status: DISCONTINUED | OUTPATIENT
Start: 2021-01-01 | End: 2021-01-01

## 2021-01-01 RX ORDER — MORPHINE SULFATE 2 MG/ML
4 INJECTION, SOLUTION INTRAMUSCULAR; INTRAVENOUS EVERY 30 MIN PRN
Status: DISCONTINUED | OUTPATIENT
Start: 2021-01-01 | End: 2021-01-01

## 2021-01-01 RX ORDER — FENTANYL CITRATE 50 UG/ML
INJECTION, SOLUTION INTRAMUSCULAR; INTRAVENOUS AS NEEDED
Status: DISCONTINUED | OUTPATIENT
Start: 2021-01-01 | End: 2021-01-01 | Stop reason: SURG

## 2021-01-01 RX ORDER — NIFEDIPINE 30 MG/1
30 TABLET, EXTENDED RELEASE ORAL DAILY
Qty: 30 TABLET | Refills: 0 | Status: CANCELLED | OUTPATIENT
Start: 2021-01-01 | End: 2021-09-06

## 2021-01-01 RX ORDER — FLAXSEED OIL 1000 MG
1000 CAPSULE ORAL DAILY
COMMUNITY
End: 2021-01-01 | Stop reason: ENTERED-IN-ERROR

## 2021-01-01 RX ORDER — PANTOPRAZOLE SODIUM 40 MG/1
40 TABLET, DELAYED RELEASE ORAL DAILY
Qty: 30 TABLET | Refills: 0 | Status: SHIPPED | OUTPATIENT
Start: 2021-01-01 | End: 2021-09-06

## 2021-01-01 RX ORDER — SODIUM BICARBONATE 1 MEQ/ML
SYRINGE (ML) INTRAVENOUS AS NEEDED
Status: DISCONTINUED | OUTPATIENT
Start: 2021-01-01 | End: 2021-01-01 | Stop reason: SURG

## 2021-01-01 RX ORDER — SODIUM BICARBONATE IN D5W 150/1000ML
PLASTIC BAG, INJECTION (ML) INTRAVENOUS CONTINUOUS
Status: DISCONTINUED | OUTPATIENT
Start: 2021-01-01 | End: 2021-01-01

## 2021-01-01 RX ORDER — SODIUM BICARBONATE 1 MEQ/ML
SYRINGE (ML) INTRAVENOUS
Status: COMPLETED
Start: 2021-01-01 | End: 2021-01-01

## 2021-01-01 RX ORDER — PANTOPRAZOLE SODIUM 40 MG/1
40 TABLET, DELAYED RELEASE ORAL DAILY
Status: DISCONTINUED | OUTPATIENT
Start: 2021-01-01 | End: 2021-01-01

## 2021-01-01 RX ORDER — NOREPINEPHRINE BITARTRATE 0.02 MG/ML
.5-3 INJECTION, SOLUTION INTRAVENOUS
Status: DISCONTINUED | OUTPATIENT
Start: 2021-01-01 | End: 2021-01-01

## 2021-01-01 RX ORDER — POTASSIUM CHLORIDE 29.8 MG/ML
40 INJECTION INTRAVENOUS ONCE
Status: COMPLETED | OUTPATIENT
Start: 2021-01-01 | End: 2021-01-01

## 2021-01-01 RX ORDER — PANTOPRAZOLE SODIUM 40 MG/10ML
40 INJECTION, POWDER, LYOPHILIZED, FOR SOLUTION INTRAVENOUS DAILY
Status: DISCONTINUED | OUTPATIENT
Start: 2021-01-01 | End: 2021-01-01

## 2021-01-01 RX ORDER — NIFEDIPINE 30 MG/1
60 TABLET, FILM COATED, EXTENDED RELEASE ORAL DAILY
Status: DISCONTINUED | OUTPATIENT
Start: 2021-01-01 | End: 2021-01-01

## 2021-01-01 RX ORDER — CHLORHEXIDINE GLUCONATE ORAL RINSE 1.2 MG/ML
15 SOLUTION DENTAL
Status: DISCONTINUED | OUTPATIENT
Start: 2021-01-01 | End: 2021-01-01

## 2021-01-01 RX ORDER — FAMOTIDINE 20 MG/1
20 TABLET, FILM COATED ORAL DAILY
Qty: 30 TABLET | Refills: 0 | Status: CANCELLED | OUTPATIENT
Start: 2021-01-01 | End: 2021-09-02

## 2021-01-01 RX ORDER — COD LIVER OIL
1 OIL (ML) ORAL DAILY
COMMUNITY

## 2021-01-01 RX ORDER — SERTRALINE HYDROCHLORIDE 50 MG/1
25 TABLET, FILM COATED ORAL EVERY EVENING
COMMUNITY
Start: 2021-01-01

## 2021-01-01 RX ORDER — ONDANSETRON HYDROCHLORIDE 2 MG/ML
4 INJECTION, SOLUTION INTRAVENOUS EVERY 8 HOURS PRN
Status: DISCONTINUED | OUTPATIENT
Start: 2021-01-01 | End: 2021-08-19 | Stop reason: HOSPADM

## 2021-01-01 RX ORDER — MORPHINE SULFATE 2 MG/ML
2-4 INJECTION, SOLUTION INTRAMUSCULAR; INTRAVENOUS EVERY 2 HOUR PRN
Status: DISCONTINUED | OUTPATIENT
Start: 2021-01-01 | End: 2021-08-19 | Stop reason: HOSPADM

## 2021-01-01 RX ORDER — FENTANYL CITRATE/PF 100MCG/2ML
0-200 SYRINGE (ML) INTRAVENOUS
Status: DISCONTINUED | OUTPATIENT
Start: 2021-01-01 | End: 2021-08-19 | Stop reason: HOSPADM

## 2021-01-01 RX ORDER — HEPARIN SODIUM 5000 [USP'U]/ML
5000 INJECTION, SOLUTION INTRAVENOUS; SUBCUTANEOUS EVERY 8 HOURS
Status: DISCONTINUED | OUTPATIENT
Start: 2021-01-01 | End: 2021-01-01 | Stop reason: ALTCHOICE

## 2021-01-01 RX ORDER — AMLODIPINE BESYLATE 10 MG/1
10 TABLET ORAL DAILY
COMMUNITY

## 2021-01-01 RX ORDER — POTASSIUM CHLORIDE 750 MG/1
20 TABLET, FILM COATED, EXTENDED RELEASE ORAL AS NEEDED
Status: DISCONTINUED | OUTPATIENT
Start: 2021-01-01 | End: 2021-01-01

## 2021-01-01 RX ADMIN — PANCRELIPASE 12000 UNITS OF LIPASE: 30000; 6000; 19000 CAPSULE, DELAYED RELEASE PELLETS ORAL at 08:47

## 2021-01-01 RX ADMIN — METOPROLOL SUCCINATE 12.5 MG: 25 TABLET, EXTENDED RELEASE ORAL at 17:54

## 2021-01-01 RX ADMIN — PANCRELIPASE 12000 UNITS OF LIPASE: 30000; 6000; 19000 CAPSULE, DELAYED RELEASE PELLETS ORAL at 09:14

## 2021-01-01 RX ADMIN — SODIUM CITRATE AND CITRIC ACID MONOHYDRATE 15 ML: 500; 334 SOLUTION ORAL at 08:47

## 2021-01-01 RX ADMIN — PROCHLORPERAZINE EDISYLATE 10 MG: 5 INJECTION INTRAMUSCULAR; INTRAVENOUS at 17:52

## 2021-01-01 RX ADMIN — ACETAMINOPHEN 975 MG: 325 TABLET, FILM COATED ORAL at 14:30

## 2021-01-01 RX ADMIN — PREDNISONE 40 MG: 20 TABLET ORAL at 08:39

## 2021-01-01 RX ADMIN — SODIUM CHLORIDE: 9 INJECTION, SOLUTION INTRAVENOUS at 16:24

## 2021-01-01 RX ADMIN — HEPARIN SODIUM 5000 UNITS: 5000 INJECTION, SOLUTION INTRAVENOUS; SUBCUTANEOUS at 22:20

## 2021-01-01 RX ADMIN — PANCRELIPASE 12000 UNITS OF LIPASE: 30000; 6000; 19000 CAPSULE, DELAYED RELEASE PELLETS ORAL at 16:14

## 2021-01-01 RX ADMIN — HYDRALAZINE HYDROCHLORIDE 25 MG: 25 TABLET, FILM COATED ORAL at 14:30

## 2021-01-01 RX ADMIN — SODIUM CHLORIDE 1000 ML: 900 INJECTION, SOLUTION INTRAVENOUS at 13:53

## 2021-01-01 RX ADMIN — PIPERACILLIN SODIUM AND TAZOBACTAM SODIUM 2.25 G: 2; .25 INJECTION, POWDER, LYOPHILIZED, FOR SOLUTION INTRAVENOUS at 00:33

## 2021-01-01 RX ADMIN — GLYCOPYRROLATE 0.2 MG: 0.2 INJECTION INTRAMUSCULAR; INTRAVENOUS at 22:47

## 2021-01-01 RX ADMIN — NITROGLYCERIN 0.5 INCH: 20 OINTMENT TOPICAL at 08:29

## 2021-01-01 RX ADMIN — LORAZEPAM 1 MG: 2 INJECTION INTRAMUSCULAR; INTRAVENOUS at 17:56

## 2021-01-01 RX ADMIN — VASOPRESSIN 0.01 UNITS/MIN: 20 INJECTION INTRAVENOUS at 19:37

## 2021-01-01 RX ADMIN — PHENYLEPHRINE HYDROCHLORIDE 100 MCG: 10 INJECTION INTRAVENOUS at 14:58

## 2021-01-01 RX ADMIN — HYDROMORPHONE HYDROCHLORIDE 0.5 MG: 1 INJECTION, SOLUTION INTRAMUSCULAR; INTRAVENOUS; SUBCUTANEOUS at 14:39

## 2021-01-01 RX ADMIN — SODIUM CHLORIDE, POTASSIUM CHLORIDE, SODIUM LACTATE AND CALCIUM CHLORIDE: 600; 310; 30; 20 INJECTION, SOLUTION INTRAVENOUS at 00:11

## 2021-01-01 RX ADMIN — PANCRELIPASE 12000 UNITS OF LIPASE: 30000; 6000; 19000 CAPSULE, DELAYED RELEASE PELLETS ORAL at 11:59

## 2021-01-01 RX ADMIN — POTASSIUM CHLORIDE 5 L: 2 INJECTION, SOLUTION, CONCENTRATE INTRAVENOUS at 17:41

## 2021-01-01 RX ADMIN — MAGNESIUM SULFATE 2 G: 2 INJECTION INTRAVENOUS at 16:00

## 2021-01-01 RX ADMIN — Medication 150 MCG/HR: at 17:03

## 2021-01-01 RX ADMIN — SERTRALINE HYDROCHLORIDE 25 MG: 25 TABLET ORAL at 17:02

## 2021-01-01 RX ADMIN — Medication 50 MEQ: at 23:54

## 2021-01-01 RX ADMIN — HEPARIN SODIUM 5000 UNITS: 5000 INJECTION, SOLUTION INTRAVENOUS; SUBCUTANEOUS at 21:14

## 2021-01-01 RX ADMIN — POTASSIUM BICARBONATE 40 MEQ: 782 TABLET, EFFERVESCENT ORAL at 11:52

## 2021-01-01 RX ADMIN — SODIUM CHLORIDE, POTASSIUM CHLORIDE, SODIUM LACTATE AND CALCIUM CHLORIDE 500 ML: 600; 310; 30; 20 INJECTION, SOLUTION INTRAVENOUS at 23:31

## 2021-01-01 RX ADMIN — HEPARIN SODIUM 5000 UNITS: 5000 INJECTION INTRAVENOUS; SUBCUTANEOUS at 00:48

## 2021-01-01 RX ADMIN — HEPARIN SODIUM 5000 UNITS: 5000 INJECTION, SOLUTION INTRAVENOUS; SUBCUTANEOUS at 13:29

## 2021-01-01 RX ADMIN — HEPARIN SODIUM 5000 UNITS: 5000 INJECTION, SOLUTION INTRAVENOUS; SUBCUTANEOUS at 21:13

## 2021-01-01 RX ADMIN — SODIUM BICARBONATE 650 MG: 650 TABLET ORAL at 09:57

## 2021-01-01 RX ADMIN — SODIUM CHLORIDE: 9 INJECTION, SOLUTION INTRAVENOUS at 04:51

## 2021-01-01 RX ADMIN — ACETAMINOPHEN 975 MG: 325 TABLET, FILM COATED ORAL at 06:53

## 2021-01-01 RX ADMIN — FAMOTIDINE 20 MG: 20 TABLET ORAL at 08:29

## 2021-01-01 RX ADMIN — Medication 200 MCG/HR: at 18:21

## 2021-01-01 RX ADMIN — LORAZEPAM 1 MG: 2 INJECTION INTRAMUSCULAR; INTRAVENOUS at 23:00

## 2021-01-01 RX ADMIN — ATORVASTATIN CALCIUM 40 MG: 40 TABLET, FILM COATED ORAL at 17:13

## 2021-01-01 RX ADMIN — CHLORHEXIDINE GLUCONATE ORAL RINSE 15 ML: 1.2 SOLUTION DENTAL at 22:38

## 2021-01-01 RX ADMIN — FAMOTIDINE 20 MG: 20 TABLET ORAL at 09:57

## 2021-01-01 RX ADMIN — METOPROLOL SUCCINATE 12.5 MG: 25 TABLET, EXTENDED RELEASE ORAL at 17:14

## 2021-01-01 RX ADMIN — ATORVASTATIN CALCIUM 40 MG: 40 TABLET, FILM COATED ORAL at 18:06

## 2021-01-01 RX ADMIN — ALTEPLASE 2 MG: 2.2 INJECTION, POWDER, LYOPHILIZED, FOR SOLUTION INTRAVENOUS at 17:35

## 2021-01-01 RX ADMIN — PANTOPRAZOLE SODIUM 40 MG: 40 INJECTION, POWDER, LYOPHILIZED, FOR SOLUTION INTRAVENOUS at 08:46

## 2021-01-01 RX ADMIN — PANCRELIPASE 12000 UNITS OF LIPASE: 30000; 6000; 19000 CAPSULE, DELAYED RELEASE PELLETS ORAL at 16:50

## 2021-01-01 RX ADMIN — ACETAMINOPHEN 975 MG: 325 TABLET, FILM COATED ORAL at 17:02

## 2021-01-01 RX ADMIN — NIFEDIPINE 30 MG: 30 TABLET, EXTENDED RELEASE ORAL at 18:54

## 2021-01-01 RX ADMIN — SODIUM CHLORIDE: 9 INJECTION, SOLUTION INTRAVENOUS at 04:01

## 2021-01-01 RX ADMIN — Medication 2000 UNITS: at 09:57

## 2021-01-01 RX ADMIN — MORPHINE SULFATE 2 MG: 2 INJECTION, SOLUTION INTRAMUSCULAR; INTRAVENOUS at 20:53

## 2021-01-01 RX ADMIN — LIDOCAINE 1 PATCH: 246 PATCH TOPICAL at 21:08

## 2021-01-01 RX ADMIN — METOPROLOL SUCCINATE 12.5 MG: 25 TABLET, EXTENDED RELEASE ORAL at 16:51

## 2021-01-01 RX ADMIN — ALBUMIN (HUMAN) 500 ML: 12.5 SOLUTION INTRAVENOUS at 16:22

## 2021-01-01 RX ADMIN — PANCRELIPASE 12000 UNITS OF LIPASE: 30000; 6000; 19000 CAPSULE, DELAYED RELEASE PELLETS ORAL at 17:12

## 2021-01-01 RX ADMIN — HEPARIN SODIUM 5000 UNITS: 5000 INJECTION, SOLUTION INTRAVENOUS; SUBCUTANEOUS at 21:17

## 2021-01-01 RX ADMIN — PHENYLEPHRINE HYDROCHLORIDE 100 MCG: 10 INJECTION INTRAVENOUS at 14:50

## 2021-01-01 RX ADMIN — VASOPRESSIN 0.03 UNITS/MIN: 20 INJECTION INTRAVENOUS at 12:18

## 2021-01-01 RX ADMIN — PANTOPRAZOLE SODIUM 40 MG: 40 INJECTION, POWDER, LYOPHILIZED, FOR SOLUTION INTRAVENOUS at 17:43

## 2021-01-01 RX ADMIN — MIDAZOLAM HYDROCHLORIDE 2 MG: 1 INJECTION, SOLUTION INTRAMUSCULAR; INTRAVENOUS at 15:20

## 2021-01-01 RX ADMIN — MAGNESIUM SULFATE 1 G: 1 INJECTION INTRAVENOUS at 08:29

## 2021-01-01 RX ADMIN — POTASSIUM CHLORIDE 5 L: 2 INJECTION, SOLUTION, CONCENTRATE INTRAVENOUS at 13:57

## 2021-01-01 RX ADMIN — CEFTRIAXONE SODIUM 1 G: 1 INJECTION, POWDER, FOR SOLUTION INTRAMUSCULAR; INTRAVENOUS at 11:37

## 2021-01-01 RX ADMIN — HEPARIN SODIUM 5000 UNITS: 5000 INJECTION, SOLUTION INTRAVENOUS; SUBCUTANEOUS at 21:16

## 2021-01-01 RX ADMIN — PANTOPRAZOLE SODIUM 40 MG: 40 INJECTION, POWDER, LYOPHILIZED, FOR SOLUTION INTRAVENOUS at 21:40

## 2021-01-01 RX ADMIN — MAGNESIUM SULFATE HEPTAHYDRATE 1 G: 500 INJECTION, SOLUTION INTRAMUSCULAR; INTRAVENOUS at 01:42

## 2021-01-01 RX ADMIN — ATORVASTATIN CALCIUM 40 MG: 40 TABLET, FILM COATED ORAL at 17:54

## 2021-01-01 RX ADMIN — NIFEDIPINE 30 MG: 30 TABLET, EXTENDED RELEASE ORAL at 09:12

## 2021-01-01 RX ADMIN — HEPARIN SODIUM 5000 UNITS: 5000 INJECTION, SOLUTION INTRAVENOUS; SUBCUTANEOUS at 21:28

## 2021-01-01 RX ADMIN — POTASSIUM CHLORIDE 5 L: 2 INJECTION, SOLUTION, CONCENTRATE INTRAVENOUS at 13:56

## 2021-01-01 RX ADMIN — AMLODIPINE BESYLATE 10 MG: 10 TABLET ORAL at 08:44

## 2021-01-01 RX ADMIN — SODIUM CHLORIDE, POTASSIUM CHLORIDE, SODIUM LACTATE AND CALCIUM CHLORIDE: 600; 310; 30; 20 INJECTION, SOLUTION INTRAVENOUS at 06:37

## 2021-01-01 RX ADMIN — HEPARIN SODIUM 5000 UNITS: 5000 INJECTION, SOLUTION INTRAVENOUS; SUBCUTANEOUS at 06:06

## 2021-01-01 RX ADMIN — PANTOPRAZOLE SODIUM 40 MG: 40 INJECTION, POWDER, LYOPHILIZED, FOR SOLUTION INTRAVENOUS at 08:42

## 2021-01-01 RX ADMIN — METOPROLOL SUCCINATE 12.5 MG: 25 TABLET, EXTENDED RELEASE ORAL at 17:25

## 2021-01-01 RX ADMIN — IOHEXOL 150 ML: 300 INJECTION, SOLUTION INTRAVENOUS at 12:34

## 2021-01-01 RX ADMIN — SODIUM BICARBONATE 650 MG: 650 TABLET ORAL at 10:02

## 2021-01-01 RX ADMIN — HYDRALAZINE HYDROCHLORIDE 25 MG: 25 TABLET, FILM COATED ORAL at 22:09

## 2021-01-01 RX ADMIN — NITROGLYCERIN 0.5 INCH: 20 OINTMENT TOPICAL at 05:40

## 2021-01-01 RX ADMIN — FUROSEMIDE 40 MG: 10 INJECTION, SOLUTION INTRAMUSCULAR; INTRAVENOUS at 14:21

## 2021-01-01 RX ADMIN — SODIUM CHLORIDE: 9 INJECTION, SOLUTION INTRAVENOUS at 12:50

## 2021-01-01 RX ADMIN — NIFEDIPINE 30 MG: 30 TABLET, EXTENDED RELEASE ORAL at 08:50

## 2021-01-01 RX ADMIN — HYDROMORPHONE HYDROCHLORIDE 0.25 MG: 1 INJECTION, SOLUTION INTRAMUSCULAR; INTRAVENOUS; SUBCUTANEOUS at 08:40

## 2021-01-01 RX ADMIN — GLYCOPYRROLATE 0.2 MG: 0.2 INJECTION INTRAMUSCULAR; INTRAVENOUS at 18:13

## 2021-01-01 RX ADMIN — VASOPRESSIN 4 UNITS: 20 INJECTION INTRAVENOUS at 22:36

## 2021-01-01 RX ADMIN — MIDAZOLAM HYDROCHLORIDE 2 MG: 1 INJECTION, SOLUTION INTRAMUSCULAR; INTRAVENOUS at 05:56

## 2021-01-01 RX ADMIN — HEPARIN SODIUM 5000 UNITS: 5000 INJECTION, SOLUTION INTRAVENOUS; SUBCUTANEOUS at 14:19

## 2021-01-01 RX ADMIN — HEPARIN SODIUM 5000 UNITS: 5000 INJECTION, SOLUTION INTRAVENOUS; SUBCUTANEOUS at 14:13

## 2021-01-01 RX ADMIN — POTASSIUM CHLORIDE 5 L: 2 INJECTION, SOLUTION, CONCENTRATE INTRAVENOUS at 00:01

## 2021-01-01 RX ADMIN — PANTOPRAZOLE SODIUM 40 MG: 40 INJECTION, POWDER, LYOPHILIZED, FOR SOLUTION INTRAVENOUS at 22:38

## 2021-01-01 RX ADMIN — FAMOTIDINE 20 MG: 20 TABLET ORAL at 10:02

## 2021-01-01 RX ADMIN — AMLODIPINE BESYLATE 10 MG: 10 TABLET ORAL at 08:54

## 2021-01-01 RX ADMIN — HEPARIN SODIUM 5000 UNITS: 5000 INJECTION, SOLUTION INTRAVENOUS; SUBCUTANEOUS at 22:49

## 2021-01-01 RX ADMIN — PANCRELIPASE 12000 UNITS OF LIPASE: 30000; 6000; 19000 CAPSULE, DELAYED RELEASE PELLETS ORAL at 17:32

## 2021-01-01 RX ADMIN — SODIUM BICARBONATE 650 MG: 650 TABLET ORAL at 21:43

## 2021-01-01 RX ADMIN — METOPROLOL SUCCINATE 12.5 MG: 25 TABLET, EXTENDED RELEASE ORAL at 17:35

## 2021-01-01 RX ADMIN — PANCRELIPASE 12000 UNITS OF LIPASE: 30000; 6000; 19000 CAPSULE, DELAYED RELEASE PELLETS ORAL at 09:11

## 2021-01-01 RX ADMIN — FAMOTIDINE 20 MG: 20 TABLET ORAL at 09:15

## 2021-01-01 RX ADMIN — PANTOPRAZOLE SODIUM 40 MG: 40 INJECTION, POWDER, LYOPHILIZED, FOR SOLUTION INTRAVENOUS at 08:45

## 2021-01-01 RX ADMIN — PHENYLEPHRINE HYDROCHLORIDE 100 MCG: 10 INJECTION INTRAVENOUS at 15:01

## 2021-01-01 RX ADMIN — PANCRELIPASE 12000 UNITS OF LIPASE: 30000; 6000; 19000 CAPSULE, DELAYED RELEASE PELLETS ORAL at 13:09

## 2021-01-01 RX ADMIN — PHENYLEPHRINE HYDROCHLORIDE 600 MCG: 10 INJECTION INTRAVENOUS at 19:11

## 2021-01-01 RX ADMIN — VASOPRESSIN 0.03 UNITS/MIN: 20 INJECTION INTRAVENOUS at 23:05

## 2021-01-01 RX ADMIN — METOPROLOL SUCCINATE 12.5 MG: 25 TABLET, EXTENDED RELEASE ORAL at 17:02

## 2021-01-01 RX ADMIN — HEPARIN SODIUM 5000 UNITS: 5000 INJECTION, SOLUTION INTRAVENOUS; SUBCUTANEOUS at 05:54

## 2021-01-01 RX ADMIN — VASOPRESSIN 0.03 UNITS/MIN: 20 INJECTION INTRAVENOUS at 23:24

## 2021-01-01 RX ADMIN — POTASSIUM CHLORIDE 40 MEQ: 400 INJECTION, SOLUTION INTRAVENOUS at 05:52

## 2021-01-01 RX ADMIN — FLUCONAZOLE 200 MG: 2 INJECTION, SOLUTION INTRAVENOUS at 18:04

## 2021-01-01 RX ADMIN — HYDROMORPHONE HYDROCHLORIDE 0.5 MG: 1 INJECTION, SOLUTION INTRAMUSCULAR; INTRAVENOUS; SUBCUTANEOUS at 23:50

## 2021-01-01 RX ADMIN — POTASSIUM CHLORIDE 5 L: 2 INJECTION, SOLUTION, CONCENTRATE INTRAVENOUS at 06:21

## 2021-01-01 RX ADMIN — PANTOPRAZOLE SODIUM 40 MG: 40 INJECTION, POWDER, LYOPHILIZED, FOR SOLUTION INTRAVENOUS at 08:29

## 2021-01-01 RX ADMIN — VASOPRESSIN 1 UNITS: 20 INJECTION INTRAVENOUS at 19:26

## 2021-01-01 RX ADMIN — MORPHINE SULFATE 4 MG: 2 INJECTION, SOLUTION INTRAMUSCULAR; INTRAVENOUS at 13:36

## 2021-01-01 RX ADMIN — HEPARIN SODIUM 5000 UNITS: 5000 INJECTION, SOLUTION INTRAVENOUS; SUBCUTANEOUS at 05:16

## 2021-01-01 RX ADMIN — ERGOCALCIFEROL 50000 UNITS: 1.25 CAPSULE ORAL at 12:27

## 2021-01-01 RX ADMIN — PANCRELIPASE 12000 UNITS OF LIPASE: 30000; 6000; 19000 CAPSULE, DELAYED RELEASE PELLETS ORAL at 12:24

## 2021-01-01 RX ADMIN — PHENYLEPHRINE HYDROCHLORIDE 100 MCG: 10 INJECTION INTRAVENOUS at 15:48

## 2021-01-01 RX ADMIN — CALCIUM CHLORIDE 1 G: 100 INJECTION, SOLUTION INTRAVENOUS at 19:10

## 2021-01-01 RX ADMIN — PANTOPRAZOLE SODIUM 40 MG: 40 INJECTION, POWDER, LYOPHILIZED, FOR SOLUTION INTRAVENOUS at 09:20

## 2021-01-01 RX ADMIN — LIDOCAINE 1 PATCH: 246 PATCH TOPICAL at 09:57

## 2021-01-01 RX ADMIN — AMIODARONE HYDROCHLORIDE 0.5 MG/MIN: 50 INJECTION, SOLUTION INTRAVENOUS at 10:53

## 2021-01-01 RX ADMIN — PANCRELIPASE 12000 UNITS OF LIPASE: 30000; 6000; 19000 CAPSULE, DELAYED RELEASE PELLETS ORAL at 12:16

## 2021-01-01 RX ADMIN — HEPARIN SODIUM 5000 UNITS: 5000 INJECTION, SOLUTION INTRAVENOUS; SUBCUTANEOUS at 06:36

## 2021-01-01 RX ADMIN — PANTOPRAZOLE SODIUM 40 MG: 40 INJECTION, POWDER, LYOPHILIZED, FOR SOLUTION INTRAVENOUS at 09:09

## 2021-01-01 RX ADMIN — SODIUM CHLORIDE 18 MCG/MIN: 9 INJECTION, SOLUTION INTRAVENOUS at 02:54

## 2021-01-01 RX ADMIN — SERTRALINE HYDROCHLORIDE 25 MG: 25 TABLET ORAL at 16:14

## 2021-01-01 RX ADMIN — PHENYLEPHRINE HYDROCHLORIDE 100 MCG: 10 INJECTION INTRAVENOUS at 15:25

## 2021-01-01 RX ADMIN — ALTEPLASE 2 MG: 2.2 INJECTION, POWDER, LYOPHILIZED, FOR SOLUTION INTRAVENOUS at 22:45

## 2021-01-01 RX ADMIN — PANTOPRAZOLE SODIUM 40 MG: 40 INJECTION, POWDER, LYOPHILIZED, FOR SOLUTION INTRAVENOUS at 20:03

## 2021-01-01 RX ADMIN — SERTRALINE HYDROCHLORIDE 25 MG: 25 TABLET ORAL at 17:13

## 2021-01-01 RX ADMIN — HEPARIN SODIUM 5000 UNITS: 5000 INJECTION, SOLUTION INTRAVENOUS; SUBCUTANEOUS at 15:01

## 2021-01-01 RX ADMIN — FAMOTIDINE 20 MG: 20 TABLET ORAL at 08:48

## 2021-01-01 RX ADMIN — PANCRELIPASE 12000 UNITS OF LIPASE: 30000; 6000; 19000 CAPSULE, DELAYED RELEASE PELLETS ORAL at 17:29

## 2021-01-01 RX ADMIN — SERTRALINE HYDROCHLORIDE 25 MG: 25 TABLET ORAL at 18:53

## 2021-01-01 RX ADMIN — HEPARIN SODIUM 5000 UNITS: 5000 INJECTION, SOLUTION INTRAVENOUS; SUBCUTANEOUS at 06:14

## 2021-01-01 RX ADMIN — PIPERACILLIN SODIUM AND TAZOBACTAM SODIUM 2.25 G: 2; .25 INJECTION, POWDER, LYOPHILIZED, FOR SOLUTION INTRAVENOUS at 18:11

## 2021-01-01 RX ADMIN — SERTRALINE HYDROCHLORIDE 25 MG: 25 TABLET ORAL at 17:54

## 2021-01-01 RX ADMIN — HYDROMORPHONE HYDROCHLORIDE 0.5 MG: 1 INJECTION, SOLUTION INTRAMUSCULAR; INTRAVENOUS; SUBCUTANEOUS at 12:59

## 2021-01-01 RX ADMIN — FUROSEMIDE 40 MG: 10 INJECTION, SOLUTION INTRAMUSCULAR; INTRAVENOUS at 12:15

## 2021-01-01 RX ADMIN — ONDANSETRON 4 MG: 2 INJECTION INTRAMUSCULAR; INTRAVENOUS at 12:03

## 2021-01-01 RX ADMIN — SERTRALINE HYDROCHLORIDE 25 MG: 25 TABLET ORAL at 18:06

## 2021-01-01 RX ADMIN — ACETAMINOPHEN 975 MG: 325 TABLET, FILM COATED ORAL at 08:52

## 2021-01-01 RX ADMIN — ROCURONIUM BROMIDE 50 MG: 10 INJECTION, SOLUTION INTRAVENOUS at 14:07

## 2021-01-01 RX ADMIN — PIPERACILLIN SODIUM AND TAZOBACTAM SODIUM 2.25 G: 2; .25 INJECTION, POWDER, LYOPHILIZED, FOR SOLUTION INTRAVENOUS at 05:13

## 2021-01-01 RX ADMIN — SODIUM CHLORIDE, POTASSIUM CHLORIDE, SODIUM LACTATE AND CALCIUM CHLORIDE 1000 ML: 600; 310; 30; 20 INJECTION, SOLUTION INTRAVENOUS at 00:13

## 2021-01-01 RX ADMIN — HEPARIN SODIUM 500 UNITS/HR: 10000 INJECTION, SOLUTION INTRAVENOUS at 03:10

## 2021-01-01 RX ADMIN — FAMOTIDINE 20 MG: 20 TABLET ORAL at 08:44

## 2021-01-01 RX ADMIN — EPOETIN ALFA-EPBX 4000 UNITS: 4000 INJECTION, SOLUTION INTRAVENOUS; SUBCUTANEOUS at 20:00

## 2021-01-01 RX ADMIN — ACETAMINOPHEN 975 MG: 325 TABLET, FILM COATED ORAL at 06:36

## 2021-01-01 RX ADMIN — SODIUM CHLORIDE, POTASSIUM CHLORIDE, SODIUM LACTATE AND CALCIUM CHLORIDE: 600; 310; 30; 20 INJECTION, SOLUTION INTRAVENOUS at 16:13

## 2021-01-01 RX ADMIN — VASOPRESSIN 0.03 UNITS/MIN: 20 INJECTION INTRAVENOUS at 14:01

## 2021-01-01 RX ADMIN — SODIUM CHLORIDE 16 MCG/MIN: 9 INJECTION, SOLUTION INTRAVENOUS at 17:08

## 2021-01-01 RX ADMIN — SODIUM CHLORIDE, SODIUM GLUCONATE, SODIUM ACETATE, POTASSIUM CHLORIDE AND MAGNESIUM CHLORIDE: 526; 502; 368; 37; 30 INJECTION, SOLUTION INTRAVENOUS at 14:01

## 2021-01-01 RX ADMIN — HEPARIN SODIUM 5000 UNITS: 5000 INJECTION, SOLUTION INTRAVENOUS; SUBCUTANEOUS at 05:56

## 2021-01-01 RX ADMIN — FAMOTIDINE 20 MG: 10 INJECTION INTRAVENOUS at 13:38

## 2021-01-01 RX ADMIN — NOREPINEPHRINE BITARTRATE 4 MG/250 ML (16 MCG/ML) IN 0.9 % NACL IV 16 MCG/MIN: at 23:43

## 2021-01-01 RX ADMIN — POTASSIUM CHLORIDE 5 L: 2 INJECTION, SOLUTION, CONCENTRATE INTRAVENOUS at 06:24

## 2021-01-01 RX ADMIN — SODIUM BICARBONATE 50 MEQ: 84 INJECTION, SOLUTION INTRAVENOUS at 20:20

## 2021-01-01 RX ADMIN — SERTRALINE HYDROCHLORIDE 25 MG: 25 TABLET ORAL at 18:39

## 2021-01-01 RX ADMIN — SODIUM CHLORIDE 1000 ML: 9 INJECTION, SOLUTION INTRAVENOUS at 12:46

## 2021-01-01 RX ADMIN — NIFEDIPINE 30 MG: 30 TABLET, EXTENDED RELEASE ORAL at 19:57

## 2021-01-01 RX ADMIN — SERTRALINE HYDROCHLORIDE 25 MG: 25 TABLET ORAL at 16:59

## 2021-01-01 RX ADMIN — HEPARIN SODIUM 5000 UNITS: 5000 INJECTION, SOLUTION INTRAVENOUS; SUBCUTANEOUS at 15:17

## 2021-01-01 RX ADMIN — SODIUM BICARBONATE 650 MG: 650 TABLET ORAL at 20:48

## 2021-01-01 RX ADMIN — Medication 25 MCG/HR: at 23:38

## 2021-01-01 RX ADMIN — HEPARIN SODIUM 5000 UNITS: 5000 INJECTION, SOLUTION INTRAVENOUS; SUBCUTANEOUS at 15:00

## 2021-01-01 RX ADMIN — VASOPRESSIN 1 UNITS: 20 INJECTION INTRAVENOUS at 21:14

## 2021-01-01 RX ADMIN — FAMOTIDINE 20 MG: 20 TABLET ORAL at 12:15

## 2021-01-01 RX ADMIN — HEPARIN SODIUM 5000 UNITS: 5000 INJECTION, SOLUTION INTRAVENOUS; SUBCUTANEOUS at 21:25

## 2021-01-01 RX ADMIN — ALTEPLASE 2 MG: 2.2 INJECTION, POWDER, LYOPHILIZED, FOR SOLUTION INTRAVENOUS at 07:45

## 2021-01-01 RX ADMIN — IOHEXOL 1000 ML: 9 SOLUTION ORAL at 15:16

## 2021-01-01 RX ADMIN — PANTOPRAZOLE SODIUM 40 MG: 40 INJECTION, POWDER, LYOPHILIZED, FOR SOLUTION INTRAVENOUS at 21:14

## 2021-01-01 RX ADMIN — SODIUM CHLORIDE 500 ML: 9 INJECTION, SOLUTION INTRAVENOUS at 12:55

## 2021-01-01 RX ADMIN — CALCIUM GLUCONATE: 98 INJECTION, SOLUTION INTRAVENOUS at 22:37

## 2021-01-01 RX ADMIN — ROCURONIUM BROMIDE 50 MG: 10 INJECTION, SOLUTION INTRAVENOUS at 14:11

## 2021-01-01 RX ADMIN — HEPARIN SODIUM 5000 UNITS: 5000 INJECTION, SOLUTION INTRAVENOUS; SUBCUTANEOUS at 06:05

## 2021-01-01 RX ADMIN — PHENYLEPHRINE HYDROCHLORIDE 100 MCG: 10 INJECTION INTRAVENOUS at 16:03

## 2021-01-01 RX ADMIN — PIPERACILLIN SODIUM AND TAZOBACTAM SODIUM 2.25 G: 2; .25 INJECTION, POWDER, LYOPHILIZED, FOR SOLUTION INTRAVENOUS at 06:08

## 2021-01-01 RX ADMIN — SODIUM BICARBONATE 650 MG: 650 TABLET ORAL at 20:39

## 2021-01-01 RX ADMIN — HYDROMORPHONE HYDROCHLORIDE 0.25 MG: 1 INJECTION, SOLUTION INTRAMUSCULAR; INTRAVENOUS; SUBCUTANEOUS at 17:43

## 2021-01-01 RX ADMIN — CEFTRIAXONE SODIUM 1 G: 1 INJECTION, POWDER, FOR SOLUTION INTRAMUSCULAR; INTRAVENOUS at 12:15

## 2021-01-01 RX ADMIN — PANCRELIPASE 12000 UNITS OF LIPASE: 30000; 6000; 19000 CAPSULE, DELAYED RELEASE PELLETS ORAL at 17:25

## 2021-01-01 RX ADMIN — PANCRELIPASE 12000 UNITS OF LIPASE: 30000; 6000; 19000 CAPSULE, DELAYED RELEASE PELLETS ORAL at 17:54

## 2021-01-01 RX ADMIN — ACETAMINOPHEN 975 MG: 325 TABLET, FILM COATED ORAL at 14:54

## 2021-01-01 RX ADMIN — SODIUM CHLORIDE: 9 INJECTION, SOLUTION INTRAVENOUS at 02:01

## 2021-01-01 RX ADMIN — SODIUM CHLORIDE, POTASSIUM CHLORIDE, SODIUM LACTATE AND CALCIUM CHLORIDE: 600; 310; 30; 20 INJECTION, SOLUTION INTRAVENOUS at 01:21

## 2021-01-01 RX ADMIN — ALTEPLASE 2 MG: 2.2 INJECTION, POWDER, LYOPHILIZED, FOR SOLUTION INTRAVENOUS at 23:24

## 2021-01-01 RX ADMIN — PIPERACILLIN SODIUM AND TAZOBACTAM SODIUM 2.25 G: 2; .25 INJECTION, POWDER, LYOPHILIZED, FOR SOLUTION INTRAVENOUS at 13:03

## 2021-01-01 RX ADMIN — ATORVASTATIN CALCIUM 40 MG: 40 TABLET, FILM COATED ORAL at 17:25

## 2021-01-01 RX ADMIN — SERTRALINE HYDROCHLORIDE 25 MG: 25 TABLET ORAL at 18:00

## 2021-01-01 RX ADMIN — NIFEDIPINE 30 MG: 30 TABLET, EXTENDED RELEASE ORAL at 19:55

## 2021-01-01 RX ADMIN — ACETAMINOPHEN 975 MG: 325 TABLET, FILM COATED ORAL at 16:35

## 2021-01-01 RX ADMIN — PANCRELIPASE 12000 UNITS OF LIPASE: 30000; 6000; 19000 CAPSULE, DELAYED RELEASE PELLETS ORAL at 08:28

## 2021-01-01 RX ADMIN — AMLODIPINE BESYLATE 10 MG: 10 TABLET ORAL at 12:36

## 2021-01-01 RX ADMIN — SODIUM CITRATE AND CITRIC ACID MONOHYDRATE 15 ML: 500; 334 SOLUTION ORAL at 08:59

## 2021-01-01 RX ADMIN — POTASSIUM CHLORIDE 5 L: 2 INJECTION, SOLUTION, CONCENTRATE INTRAVENOUS at 00:00

## 2021-01-01 RX ADMIN — PANTOPRAZOLE SODIUM 40 MG: 40 INJECTION, POWDER, LYOPHILIZED, FOR SOLUTION INTRAVENOUS at 08:44

## 2021-01-01 RX ADMIN — SERTRALINE HYDROCHLORIDE 25 MG: 25 TABLET ORAL at 16:51

## 2021-01-01 RX ADMIN — HEPARIN SODIUM 5000 UNITS: 5000 INJECTION, SOLUTION INTRAVENOUS; SUBCUTANEOUS at 05:58

## 2021-01-01 RX ADMIN — MAGNESIUM SULFATE HEPTAHYDRATE 2 G: 40 INJECTION, SOLUTION INTRAVENOUS at 11:37

## 2021-01-01 RX ADMIN — SODIUM CHLORIDE 1000 ML: 900 INJECTION, SOLUTION INTRAVENOUS at 10:33

## 2021-01-01 RX ADMIN — SODIUM BICARBONATE 650 MG: 650 TABLET ORAL at 08:44

## 2021-01-01 RX ADMIN — FENTANYL CITRATE 100 MCG: 50 INJECTION, SOLUTION INTRAMUSCULAR; INTRAVENOUS at 21:21

## 2021-01-01 RX ADMIN — SERTRALINE HYDROCHLORIDE 25 MG: 25 TABLET ORAL at 17:44

## 2021-01-01 RX ADMIN — CHLORHEXIDINE GLUCONATE ORAL RINSE 15 ML: 1.2 SOLUTION DENTAL at 09:29

## 2021-01-01 RX ADMIN — NIFEDIPINE 30 MG: 30 TABLET, EXTENDED RELEASE ORAL at 20:01

## 2021-01-01 RX ADMIN — SERTRALINE HYDROCHLORIDE 25 MG: 25 TABLET ORAL at 17:10

## 2021-01-01 RX ADMIN — PANCRELIPASE 12000 UNITS OF LIPASE: 30000; 6000; 19000 CAPSULE, DELAYED RELEASE PELLETS ORAL at 18:06

## 2021-01-01 RX ADMIN — ACETAMINOPHEN 975 MG: 325 TABLET, FILM COATED ORAL at 09:14

## 2021-01-01 RX ADMIN — ACETAMINOPHEN 975 MG: 325 TABLET, FILM COATED ORAL at 17:18

## 2021-01-01 RX ADMIN — Medication 2000 UNITS: at 08:50

## 2021-01-01 RX ADMIN — ACETAMINOPHEN 975 MG: 325 TABLET, FILM COATED ORAL at 17:01

## 2021-01-01 RX ADMIN — CHLORHEXIDINE GLUCONATE ORAL RINSE 15 ML: 1.2 SOLUTION DENTAL at 09:20

## 2021-01-01 RX ADMIN — PANCRELIPASE 12000 UNITS OF LIPASE: 30000; 6000; 19000 CAPSULE, DELAYED RELEASE PELLETS ORAL at 09:57

## 2021-01-01 RX ADMIN — ALBUMIN (HUMAN) 25 G: 12.5 SOLUTION INTRAVENOUS at 06:06

## 2021-01-01 RX ADMIN — PROCHLORPERAZINE EDISYLATE 10 MG: 5 INJECTION INTRAMUSCULAR; INTRAVENOUS at 14:19

## 2021-01-01 RX ADMIN — VANCOMYCIN HYDROCHLORIDE 1250 MG: 10 INJECTION, POWDER, LYOPHILIZED, FOR SOLUTION INTRAVENOUS at 01:33

## 2021-01-01 RX ADMIN — PREDNISONE 40 MG: 20 TABLET ORAL at 08:50

## 2021-01-01 RX ADMIN — Medication 2000 UNITS: at 09:11

## 2021-01-01 RX ADMIN — PREDNISONE 40 MG: 20 TABLET ORAL at 14:28

## 2021-01-01 RX ADMIN — POTASSIUM CHLORIDE 20 MEQ: 14.9 INJECTION, SOLUTION INTRAVENOUS at 23:08

## 2021-01-01 RX ADMIN — PANCRELIPASE 12000 UNITS OF LIPASE: 30000; 6000; 19000 CAPSULE, DELAYED RELEASE PELLETS ORAL at 08:50

## 2021-01-01 RX ADMIN — PREDNISONE 40 MG: 20 TABLET ORAL at 09:11

## 2021-01-01 RX ADMIN — ACETAMINOPHEN 975 MG: 325 TABLET, FILM COATED ORAL at 18:07

## 2021-01-01 RX ADMIN — SODIUM CHLORIDE 1000 ML: 9 INJECTION, SOLUTION INTRAVENOUS at 15:16

## 2021-01-01 RX ADMIN — ACETAMINOPHEN 975 MG: 325 TABLET, FILM COATED ORAL at 04:01

## 2021-01-01 RX ADMIN — SODIUM BICARBONATE 650 MG: 650 TABLET ORAL at 11:59

## 2021-01-01 RX ADMIN — HEPARIN SODIUM 5000 UNITS: 5000 INJECTION, SOLUTION INTRAVENOUS; SUBCUTANEOUS at 21:41

## 2021-01-01 RX ADMIN — ATORVASTATIN CALCIUM 40 MG: 40 TABLET, FILM COATED ORAL at 16:53

## 2021-01-01 RX ADMIN — POTASSIUM CHLORIDE 5 L: 2 INJECTION, SOLUTION, CONCENTRATE INTRAVENOUS at 13:55

## 2021-01-01 RX ADMIN — SODIUM CHLORIDE, POTASSIUM CHLORIDE, SODIUM LACTATE AND CALCIUM CHLORIDE: 600; 310; 30; 20 INJECTION, SOLUTION INTRAVENOUS at 03:21

## 2021-01-01 RX ADMIN — Medication 2000 UNITS: at 08:45

## 2021-01-01 RX ADMIN — PANCRELIPASE 12000 UNITS OF LIPASE: 30000; 6000; 19000 CAPSULE, DELAYED RELEASE PELLETS ORAL at 14:21

## 2021-01-01 RX ADMIN — PANCRELIPASE 12000 UNITS OF LIPASE: 30000; 6000; 19000 CAPSULE, DELAYED RELEASE PELLETS ORAL at 08:36

## 2021-01-01 RX ADMIN — CEFTRIAXONE SODIUM 1 G: 1 INJECTION, POWDER, FOR SOLUTION INTRAMUSCULAR; INTRAVENOUS at 12:14

## 2021-01-01 RX ADMIN — ATROPINE SULFATE 0.5 MG: 0.1 INJECTION PARENTERAL at 12:06

## 2021-01-01 RX ADMIN — SODIUM CITRATE AND CITRIC ACID MONOHYDRATE 15 ML: 500; 334 SOLUTION ORAL at 20:40

## 2021-01-01 RX ADMIN — MIDAZOLAM HYDROCHLORIDE 1 MG: 1 INJECTION, SOLUTION INTRAMUSCULAR; INTRAVENOUS at 16:49

## 2021-01-01 RX ADMIN — SODIUM CHLORIDE, POTASSIUM CHLORIDE, SODIUM LACTATE AND CALCIUM CHLORIDE 1000 ML: 600; 310; 30; 20 INJECTION, SOLUTION INTRAVENOUS at 10:53

## 2021-01-01 RX ADMIN — NOREPINEPHRINE BITARTRATE 4 MG/250 ML (16 MCG/ML) IN 0.9 % NACL IV 8 MCG/MIN: at 18:55

## 2021-01-01 RX ADMIN — PANTOPRAZOLE SODIUM 40 MG: 40 INJECTION, POWDER, LYOPHILIZED, FOR SOLUTION INTRAVENOUS at 22:00

## 2021-01-01 RX ADMIN — NIFEDIPINE 30 MG: 30 TABLET, EXTENDED RELEASE ORAL at 08:41

## 2021-01-01 RX ADMIN — METOPROLOL SUCCINATE 12.5 MG: 25 TABLET, EXTENDED RELEASE ORAL at 16:59

## 2021-01-01 RX ADMIN — HEPARIN SODIUM 5000 UNITS: 5000 INJECTION, SOLUTION INTRAVENOUS; SUBCUTANEOUS at 22:09

## 2021-01-01 RX ADMIN — SODIUM BICARBONATE: 84 INJECTION, SOLUTION INTRAVENOUS at 01:27

## 2021-01-01 RX ADMIN — SODIUM BICARBONATE 650 MG: 650 TABLET ORAL at 15:03

## 2021-01-01 RX ADMIN — MAGNESIUM SULFATE 1 G: 1 INJECTION INTRAVENOUS at 13:56

## 2021-01-01 RX ADMIN — HEPARIN SODIUM 5000 UNITS: 5000 INJECTION, SOLUTION INTRAVENOUS; SUBCUTANEOUS at 06:10

## 2021-01-01 RX ADMIN — ALBUMIN (HUMAN) 250 ML: 12.5 SOLUTION INTRAVENOUS at 20:19

## 2021-01-01 RX ADMIN — SODIUM BICARBONATE 650 MG: 650 TABLET ORAL at 14:40

## 2021-01-01 RX ADMIN — PANCRELIPASE 12000 UNITS OF LIPASE: 30000; 6000; 19000 CAPSULE, DELAYED RELEASE PELLETS ORAL at 08:44

## 2021-01-01 RX ADMIN — HYDROMORPHONE HYDROCHLORIDE 0.25 MG: 1 INJECTION, SOLUTION INTRAMUSCULAR; INTRAVENOUS; SUBCUTANEOUS at 20:35

## 2021-01-01 RX ADMIN — SODIUM CHLORIDE, POTASSIUM CHLORIDE, SODIUM LACTATE AND CALCIUM CHLORIDE: 600; 310; 30; 20 INJECTION, SOLUTION INTRAVENOUS at 02:25

## 2021-01-01 RX ADMIN — METOPROLOL SUCCINATE 12.5 MG: 25 TABLET, EXTENDED RELEASE ORAL at 16:53

## 2021-01-01 RX ADMIN — SODIUM CITRATE AND CITRIC ACID MONOHYDRATE 15 ML: 500; 334 SOLUTION ORAL at 08:48

## 2021-01-01 RX ADMIN — SERTRALINE HYDROCHLORIDE 25 MG: 25 TABLET ORAL at 17:37

## 2021-01-01 RX ADMIN — SODIUM BICARBONATE 50 MEQ: 84 INJECTION, SOLUTION INTRAVENOUS at 21:30

## 2021-01-01 RX ADMIN — ROCURONIUM BROMIDE 20 MG: 10 INJECTION INTRAVENOUS at 21:20

## 2021-01-01 RX ADMIN — SODIUM BICARBONATE 650 MG: 650 TABLET ORAL at 21:11

## 2021-01-01 RX ADMIN — LORAZEPAM 1 MG: 2 INJECTION INTRAMUSCULAR; INTRAVENOUS at 20:52

## 2021-01-01 RX ADMIN — HEPARIN SODIUM 5000 UNITS: 5000 INJECTION, SOLUTION INTRAVENOUS; SUBCUTANEOUS at 14:40

## 2021-01-01 RX ADMIN — ACETAMINOPHEN 975 MG: 325 TABLET, FILM COATED ORAL at 17:39

## 2021-01-01 RX ADMIN — POTASSIUM CHLORIDE 40 MEQ: 750 TABLET, FILM COATED, EXTENDED RELEASE ORAL at 16:25

## 2021-01-01 RX ADMIN — ATORVASTATIN CALCIUM 40 MG: 40 TABLET, FILM COATED ORAL at 17:29

## 2021-01-01 RX ADMIN — PANTOPRAZOLE SODIUM 40 MG: 40 INJECTION, POWDER, LYOPHILIZED, FOR SOLUTION INTRAVENOUS at 22:09

## 2021-01-01 RX ADMIN — PANTOPRAZOLE SODIUM 40 MG: 40 INJECTION, POWDER, LYOPHILIZED, FOR SOLUTION INTRAVENOUS at 20:36

## 2021-01-01 RX ADMIN — ALBUMIN (HUMAN) 250 ML: 12.5 SOLUTION INTRAVENOUS at 15:42

## 2021-01-01 RX ADMIN — SODIUM CHLORIDE: 9 INJECTION, SOLUTION INTRAVENOUS at 11:57

## 2021-01-01 RX ADMIN — HEPARIN SODIUM 5000 UNITS: 5000 INJECTION, SOLUTION INTRAVENOUS; SUBCUTANEOUS at 22:10

## 2021-01-01 RX ADMIN — CALCIUM GLUCONATE 1000 MG: 98 INJECTION, SOLUTION INTRAVENOUS at 03:15

## 2021-01-01 RX ADMIN — SODIUM BICARBONATE 650 MG: 650 TABLET ORAL at 15:00

## 2021-01-01 RX ADMIN — HEPARIN SODIUM 5000 UNITS: 5000 INJECTION, SOLUTION INTRAVENOUS; SUBCUTANEOUS at 16:16

## 2021-01-01 RX ADMIN — SODIUM CHLORIDE 5.48 UNITS/HR: 9 INJECTION, SOLUTION INTRAVENOUS at 19:32

## 2021-01-01 RX ADMIN — SODIUM BICARBONATE 650 MG: 650 TABLET ORAL at 08:50

## 2021-01-01 RX ADMIN — SODIUM CHLORIDE, POTASSIUM CHLORIDE, SODIUM LACTATE AND CALCIUM CHLORIDE: 600; 310; 30; 20 INJECTION, SOLUTION INTRAVENOUS at 15:31

## 2021-01-01 RX ADMIN — ONDANSETRON HYDROCHLORIDE 4 MG: 2 SOLUTION INTRAMUSCULAR; INTRAVENOUS at 22:19

## 2021-01-01 RX ADMIN — SODIUM BICARBONATE 100 MEQ: 84 INJECTION, SOLUTION INTRAVENOUS at 01:41

## 2021-01-01 RX ADMIN — PANCRELIPASE 12000 UNITS OF LIPASE: 30000; 6000; 19000 CAPSULE, DELAYED RELEASE PELLETS ORAL at 10:02

## 2021-01-01 RX ADMIN — MIDAZOLAM HYDROCHLORIDE 2 MG: 1 INJECTION, SOLUTION INTRAMUSCULAR; INTRAVENOUS at 23:17

## 2021-01-01 RX ADMIN — Medication: at 09:36

## 2021-01-01 RX ADMIN — ACETAMINOPHEN 975 MG: 325 TABLET, FILM COATED ORAL at 15:14

## 2021-01-01 RX ADMIN — ATORVASTATIN CALCIUM 40 MG: 40 TABLET, FILM COATED ORAL at 17:02

## 2021-01-01 RX ADMIN — SERTRALINE HYDROCHLORIDE 25 MG: 25 TABLET ORAL at 17:30

## 2021-01-01 RX ADMIN — HYDROMORPHONE HYDROCHLORIDE 0.5 MG: 1 INJECTION, SOLUTION INTRAMUSCULAR; INTRAVENOUS; SUBCUTANEOUS at 12:42

## 2021-01-01 RX ADMIN — CHLORHEXIDINE GLUCONATE ORAL RINSE 15 ML: 1.2 SOLUTION DENTAL at 08:29

## 2021-01-01 RX ADMIN — NIFEDIPINE 30 MG: 30 TABLET, EXTENDED RELEASE ORAL at 21:14

## 2021-01-01 RX ADMIN — HEPARIN SODIUM 5000 UNITS: 5000 INJECTION, SOLUTION INTRAVENOUS; SUBCUTANEOUS at 21:31

## 2021-01-01 RX ADMIN — PANTOPRAZOLE SODIUM 40 MG: 40 INJECTION, POWDER, LYOPHILIZED, FOR SOLUTION INTRAVENOUS at 08:50

## 2021-01-01 RX ADMIN — Medication 2000 UNITS: at 08:28

## 2021-01-01 RX ADMIN — HEPARIN SODIUM 5000 UNITS: 5000 INJECTION, SOLUTION INTRAVENOUS; SUBCUTANEOUS at 15:14

## 2021-01-01 RX ADMIN — PIPERACILLIN SODIUM AND TAZOBACTAM SODIUM 2.25 G: 2; .25 INJECTION, POWDER, LYOPHILIZED, FOR SOLUTION INTRAVENOUS at 12:37

## 2021-01-01 RX ADMIN — FLUCONAZOLE 200 MG: 2 INJECTION, SOLUTION INTRAVENOUS at 17:11

## 2021-01-01 RX ADMIN — ATROPINE SULFATE 0.5 MG: 0.1 INJECTION INTRAVENOUS at 12:06

## 2021-01-01 RX ADMIN — ATORVASTATIN CALCIUM 40 MG: 40 TABLET, FILM COATED ORAL at 16:59

## 2021-01-01 RX ADMIN — PANTOPRAZOLE SODIUM 40 MG: 40 INJECTION, POWDER, LYOPHILIZED, FOR SOLUTION INTRAVENOUS at 21:16

## 2021-01-01 RX ADMIN — HEPARIN SODIUM 5000 UNITS: 5000 INJECTION, SOLUTION INTRAVENOUS; SUBCUTANEOUS at 05:31

## 2021-01-01 RX ADMIN — ACETAMINOPHEN 975 MG: 325 TABLET, FILM COATED ORAL at 21:26

## 2021-01-01 RX ADMIN — HEPARIN SODIUM 5000 UNITS: 5000 INJECTION, SOLUTION INTRAVENOUS; SUBCUTANEOUS at 13:49

## 2021-01-01 RX ADMIN — SODIUM CHLORIDE, POTASSIUM CHLORIDE, SODIUM LACTATE AND CALCIUM CHLORIDE: 600; 310; 30; 20 INJECTION, SOLUTION INTRAVENOUS at 11:39

## 2021-01-01 RX ADMIN — AMLODIPINE BESYLATE 10 MG: 10 TABLET ORAL at 09:57

## 2021-01-01 RX ADMIN — PANCRELIPASE 12000 UNITS OF LIPASE: 30000; 6000; 19000 CAPSULE, DELAYED RELEASE PELLETS ORAL at 12:41

## 2021-01-01 RX ADMIN — NITROGLYCERIN 0.5 INCH: 20 OINTMENT TOPICAL at 18:00

## 2021-01-01 RX ADMIN — HEPARIN SODIUM 5000 UNITS: 5000 INJECTION, SOLUTION INTRAVENOUS; SUBCUTANEOUS at 14:21

## 2021-01-01 RX ADMIN — VASOPRESSIN 0.03 UNITS/MIN: 20 INJECTION INTRAVENOUS at 11:43

## 2021-01-01 RX ADMIN — Medication 50 MCG/MIN: at 18:55

## 2021-01-01 RX ADMIN — VASOPRESSIN 2 UNITS: 20 INJECTION INTRAVENOUS at 20:40

## 2021-01-01 RX ADMIN — HEPARIN SODIUM 5000 UNITS: 5000 INJECTION, SOLUTION INTRAVENOUS; SUBCUTANEOUS at 06:54

## 2021-01-01 RX ADMIN — HEPARIN SODIUM 5000 UNITS: 5000 INJECTION, SOLUTION INTRAVENOUS; SUBCUTANEOUS at 06:41

## 2021-01-01 RX ADMIN — SODIUM CHLORIDE: 9 INJECTION, SOLUTION INTRAVENOUS at 16:00

## 2021-01-01 RX ADMIN — PANTOPRAZOLE SODIUM 40 MG: 40 INJECTION, POWDER, LYOPHILIZED, FOR SOLUTION INTRAVENOUS at 08:54

## 2021-01-01 RX ADMIN — SODIUM BICARBONATE 650 MG: 650 TABLET ORAL at 08:28

## 2021-01-01 RX ADMIN — SODIUM CHLORIDE 500 ML: 9 INJECTION, SOLUTION INTRAVENOUS at 12:04

## 2021-01-01 RX ADMIN — ROCURONIUM BROMIDE 50 MG: 10 INJECTION INTRAVENOUS at 19:28

## 2021-01-01 RX ADMIN — POTASSIUM CHLORIDE 5 L: 2 INJECTION, SOLUTION, CONCENTRATE INTRAVENOUS at 00:02

## 2021-01-01 RX ADMIN — SODIUM CHLORIDE: 9 INJECTION, SOLUTION INTRAVENOUS at 14:28

## 2021-01-01 RX ADMIN — ALBUMIN (HUMAN) 250 ML: 12.5 SOLUTION INTRAVENOUS at 14:10

## 2021-01-01 RX ADMIN — SODIUM CHLORIDE, POTASSIUM CHLORIDE, SODIUM LACTATE AND CALCIUM CHLORIDE: 600; 310; 30; 20 INJECTION, SOLUTION INTRAVENOUS at 00:42

## 2021-01-01 RX ADMIN — Medication 150 MCG/HR: at 02:12

## 2021-01-01 RX ADMIN — NIFEDIPINE 30 MG: 30 TABLET, EXTENDED RELEASE ORAL at 08:38

## 2021-01-01 RX ADMIN — SERTRALINE HYDROCHLORIDE 25 MG: 25 TABLET ORAL at 17:25

## 2021-01-01 RX ADMIN — HEPARIN SODIUM 5000 UNITS: 5000 INJECTION, SOLUTION INTRAVENOUS; SUBCUTANEOUS at 14:28

## 2021-01-01 RX ADMIN — POTASSIUM CHLORIDE 5 L: 2 INJECTION, SOLUTION, CONCENTRATE INTRAVENOUS at 17:40

## 2021-01-01 RX ADMIN — PANTOPRAZOLE SODIUM 40 MG: 40 INJECTION, POWDER, LYOPHILIZED, FOR SOLUTION INTRAVENOUS at 08:58

## 2021-01-01 RX ADMIN — ACETAMINOPHEN 975 MG: 325 TABLET, FILM COATED ORAL at 03:31

## 2021-01-01 RX ADMIN — METOPROLOL SUCCINATE 12.5 MG: 25 TABLET, EXTENDED RELEASE ORAL at 17:30

## 2021-01-01 RX ADMIN — POTASSIUM CHLORIDE 5 L: 2 INJECTION, SOLUTION, CONCENTRATE INTRAVENOUS at 06:22

## 2021-01-01 RX ADMIN — PANTOPRAZOLE SODIUM 40 MG: 40 INJECTION, POWDER, LYOPHILIZED, FOR SOLUTION INTRAVENOUS at 20:02

## 2021-01-01 RX ADMIN — VASOPRESSIN 2 UNITS: 20 INJECTION INTRAVENOUS at 19:14

## 2021-01-01 RX ADMIN — HEPARIN SODIUM 5000 UNITS: 5000 INJECTION, SOLUTION INTRAVENOUS; SUBCUTANEOUS at 21:43

## 2021-01-01 RX ADMIN — PHENYLEPHRINE HYDROCHLORIDE 100 MCG: 10 INJECTION INTRAVENOUS at 14:54

## 2021-01-01 RX ADMIN — SODIUM CHLORIDE, SODIUM GLUCONATE, SODIUM ACETATE, POTASSIUM CHLORIDE AND MAGNESIUM CHLORIDE: 526; 502; 368; 37; 30 INJECTION, SOLUTION INTRAVENOUS at 19:41

## 2021-01-01 RX ADMIN — MORPHINE SULFATE 2 MG: 2 INJECTION, SOLUTION INTRAMUSCULAR; INTRAVENOUS at 22:47

## 2021-01-01 RX ADMIN — SODIUM CHLORIDE: 9 INJECTION, SOLUTION INTRAVENOUS at 01:13

## 2021-01-01 RX ADMIN — FENTANYL CITRATE 25 MCG: 50 INJECTION, SOLUTION INTRAMUSCULAR; INTRAVENOUS at 15:20

## 2021-01-01 RX ADMIN — SODIUM BICARBONATE 650 MG: 650 TABLET ORAL at 20:47

## 2021-01-01 RX ADMIN — ATORVASTATIN CALCIUM 40 MG: 40 TABLET, FILM COATED ORAL at 18:19

## 2021-01-01 RX ADMIN — PANCRELIPASE 12000 UNITS OF LIPASE: 30000; 6000; 19000 CAPSULE, DELAYED RELEASE PELLETS ORAL at 17:14

## 2021-01-01 RX ADMIN — SODIUM BICARBONATE 650 MG: 650 TABLET ORAL at 08:38

## 2021-01-01 RX ADMIN — PANCRELIPASE 12000 UNITS OF LIPASE: 30000; 6000; 19000 CAPSULE, DELAYED RELEASE PELLETS ORAL at 08:39

## 2021-01-01 RX ADMIN — PANCRELIPASE 12000 UNITS OF LIPASE: 30000; 6000; 19000 CAPSULE, DELAYED RELEASE PELLETS ORAL at 13:00

## 2021-01-01 RX ADMIN — ETOMIDATE INJECTION 6 MG: 2 SOLUTION INTRAVENOUS at 19:11

## 2021-01-01 RX ADMIN — PANCRELIPASE 12000 UNITS OF LIPASE: 30000; 6000; 19000 CAPSULE, DELAYED RELEASE PELLETS ORAL at 17:02

## 2021-01-01 RX ADMIN — PANTOPRAZOLE SODIUM 40 MG: 40 INJECTION, POWDER, LYOPHILIZED, FOR SOLUTION INTRAVENOUS at 22:16

## 2021-01-01 RX ADMIN — PIPERACILLIN SODIUM AND TAZOBACTAM SODIUM 2.25 G: 2; .25 INJECTION, POWDER, LYOPHILIZED, FOR SOLUTION INTRAVENOUS at 18:00

## 2021-01-01 RX ADMIN — NITROGLYCERIN 0.5 INCH: 20 OINTMENT TOPICAL at 13:03

## 2021-01-01 RX ADMIN — SODIUM CITRATE AND CITRIC ACID MONOHYDRATE 15 ML: 500; 334 SOLUTION ORAL at 21:31

## 2021-01-01 RX ADMIN — Medication 400 MG: at 11:52

## 2021-01-01 RX ADMIN — PHENYLEPHRINE HYDROCHLORIDE 150 MCG: 10 INJECTION INTRAVENOUS at 16:34

## 2021-01-01 RX ADMIN — Medication: at 05:13

## 2021-01-01 RX ADMIN — SODIUM CHLORIDE, POTASSIUM CHLORIDE, SODIUM LACTATE AND CALCIUM CHLORIDE 1000 ML: 600; 310; 30; 20 INJECTION, SOLUTION INTRAVENOUS at 18:09

## 2021-01-01 RX ADMIN — SODIUM CHLORIDE: 9 INJECTION, SOLUTION INTRAVENOUS at 14:01

## 2021-01-01 RX ADMIN — FAMOTIDINE 20 MG: 20 TABLET ORAL at 08:47

## 2021-01-01 RX ADMIN — VASOPRESSIN 1 UNITS: 20 INJECTION INTRAVENOUS at 20:07

## 2021-01-01 RX ADMIN — HEPARIN SODIUM 5000 UNITS: 5000 INJECTION, SOLUTION INTRAVENOUS; SUBCUTANEOUS at 13:07

## 2021-01-01 RX ADMIN — Medication 120 MG: at 19:11

## 2021-01-01 RX ADMIN — HEPARIN SODIUM 5000 UNITS: 5000 INJECTION, SOLUTION INTRAVENOUS; SUBCUTANEOUS at 15:05

## 2021-01-01 RX ADMIN — ALBUMIN (HUMAN) 25 G: 12.5 SOLUTION INTRAVENOUS at 03:39

## 2021-01-01 RX ADMIN — ROCURONIUM BROMIDE 20 MG: 10 INJECTION, SOLUTION INTRAVENOUS at 15:20

## 2021-01-01 RX ADMIN — SODIUM CHLORIDE, POTASSIUM CHLORIDE, SODIUM LACTATE AND CALCIUM CHLORIDE: 600; 310; 30; 20 INJECTION, SOLUTION INTRAVENOUS at 18:47

## 2021-01-01 RX ADMIN — Medication: at 17:14

## 2021-01-01 RX ADMIN — SODIUM CHLORIDE: 9 INJECTION, SOLUTION INTRAVENOUS at 08:49

## 2021-01-01 RX ADMIN — LIDOCAINE HYDROCHLORIDE 5 ML: 10 INJECTION, SOLUTION INFILTRATION; PERINEURAL at 19:11

## 2021-01-01 RX ADMIN — PANTOPRAZOLE SODIUM 40 MG: 40 INJECTION, POWDER, LYOPHILIZED, FOR SOLUTION INTRAVENOUS at 08:38

## 2021-01-01 RX ADMIN — SODIUM CHLORIDE 5.8 UNITS/HR: 9 INJECTION, SOLUTION INTRAVENOUS at 05:13

## 2021-01-01 RX ADMIN — MEROPENEM 500 MG: 500 INJECTION, POWDER, FOR SOLUTION INTRAVENOUS at 02:29

## 2021-01-01 RX ADMIN — ACETAMINOPHEN 975 MG: 325 TABLET, FILM COATED ORAL at 22:09

## 2021-01-01 RX ADMIN — SODIUM CHLORIDE, POTASSIUM CHLORIDE, SODIUM LACTATE AND CALCIUM CHLORIDE: 600; 310; 30; 20 INJECTION, SOLUTION INTRAVENOUS at 09:05

## 2021-01-01 RX ADMIN — AMLODIPINE BESYLATE 10 MG: 10 TABLET ORAL at 08:36

## 2021-01-01 RX ADMIN — ATORVASTATIN CALCIUM 40 MG: 40 TABLET, FILM COATED ORAL at 16:51

## 2021-01-01 RX ADMIN — CHLORHEXIDINE GLUCONATE ORAL RINSE 15 ML: 1.2 SOLUTION DENTAL at 22:01

## 2021-01-01 RX ADMIN — PANCRELIPASE 12000 UNITS OF LIPASE: 30000; 6000; 19000 CAPSULE, DELAYED RELEASE PELLETS ORAL at 16:58

## 2021-01-01 RX ADMIN — SODIUM CHLORIDE 16 MCG/MIN: 9 INJECTION, SOLUTION INTRAVENOUS at 14:02

## 2021-01-01 RX ADMIN — INSULIN HUMAN 3 UNITS: 100 INJECTION, SOLUTION PARENTERAL at 14:14

## 2021-01-01 RX ADMIN — SODIUM BICARBONATE 650 MG: 650 TABLET ORAL at 11:37

## 2021-01-01 RX ADMIN — SERTRALINE HYDROCHLORIDE 25 MG: 25 TABLET ORAL at 16:53

## 2021-01-01 RX ADMIN — HEPARIN SODIUM 5000 UNITS: 5000 INJECTION, SOLUTION INTRAVENOUS; SUBCUTANEOUS at 15:02

## 2021-01-01 RX ADMIN — POTASSIUM CHLORIDE 5 L: 2 INJECTION, SOLUTION, CONCENTRATE INTRAVENOUS at 17:38

## 2021-01-01 RX ADMIN — Medication 2000 UNITS: at 08:36

## 2021-01-01 RX ADMIN — PANCRELIPASE 12000 UNITS OF LIPASE: 30000; 6000; 19000 CAPSULE, DELAYED RELEASE PELLETS ORAL at 16:53

## 2021-01-01 RX ADMIN — SODIUM BICARBONATE 650 MG: 650 TABLET ORAL at 09:15

## 2021-01-01 RX ADMIN — AMIODARONE HYDROCHLORIDE 0.5 MG/MIN: 50 INJECTION, SOLUTION INTRAVENOUS at 00:48

## 2021-01-01 RX ADMIN — Medication 2000 UNITS: at 08:39

## 2021-01-01 RX ADMIN — SODIUM CITRATE AND CITRIC ACID MONOHYDRATE 15 ML: 500; 334 SOLUTION ORAL at 21:13

## 2021-01-01 RX ADMIN — HYDRALAZINE HYDROCHLORIDE 25 MG: 25 TABLET, FILM COATED ORAL at 06:49

## 2021-01-01 RX ADMIN — ATORVASTATIN CALCIUM 40 MG: 40 TABLET, FILM COATED ORAL at 17:32

## 2021-01-01 RX ADMIN — SODIUM BICARBONATE 50 MEQ: 84 INJECTION, SOLUTION INTRAVENOUS at 23:54

## 2021-01-01 RX ADMIN — SODIUM BICARBONATE 650 MG: 650 TABLET ORAL at 09:12

## 2021-01-01 RX ADMIN — HEPARIN SODIUM 5000 UNITS: 5000 INJECTION, SOLUTION INTRAVENOUS; SUBCUTANEOUS at 21:26

## 2021-01-01 RX ADMIN — METOPROLOL SUCCINATE 12.5 MG: 25 TABLET, EXTENDED RELEASE ORAL at 18:19

## 2021-01-01 RX ADMIN — Medication: at 22:10

## 2021-01-01 RX ADMIN — NIFEDIPINE 30 MG: 30 TABLET, EXTENDED RELEASE ORAL at 08:57

## 2021-01-01 RX ADMIN — HEPARIN SODIUM 5000 UNITS: 5000 INJECTION, SOLUTION INTRAVENOUS; SUBCUTANEOUS at 21:19

## 2021-01-01 RX ADMIN — FAMOTIDINE 20 MG: 20 TABLET ORAL at 08:36

## 2021-01-01 RX ADMIN — SODIUM CHLORIDE, POTASSIUM CHLORIDE, SODIUM LACTATE AND CALCIUM CHLORIDE: 600; 310; 30; 20 INJECTION, SOLUTION INTRAVENOUS at 19:05

## 2021-01-01 RX ADMIN — MIDAZOLAM HYDROCHLORIDE 1 MG: 1 INJECTION, SOLUTION INTRAMUSCULAR; INTRAVENOUS at 14:42

## 2021-01-01 RX ADMIN — POTASSIUM CHLORIDE 20 MEQ: 14.9 INJECTION, SOLUTION INTRAVENOUS at 11:55

## 2021-01-01 RX ADMIN — SODIUM CHLORIDE 1000 ML: 9 INJECTION, SOLUTION INTRAVENOUS at 17:11

## 2021-01-01 RX ADMIN — MORPHINE SULFATE 2 MG: 2 INJECTION, SOLUTION INTRAMUSCULAR; INTRAVENOUS at 17:57

## 2021-01-01 RX ADMIN — SODIUM BICARBONATE 650 MG: 650 TABLET ORAL at 14:29

## 2021-01-01 RX ADMIN — PANTOPRAZOLE SODIUM 40 MG: 40 INJECTION, POWDER, LYOPHILIZED, FOR SOLUTION INTRAVENOUS at 12:45

## 2021-01-01 RX ADMIN — HEPARIN SODIUM 5000 UNITS: 5000 INJECTION, SOLUTION INTRAVENOUS; SUBCUTANEOUS at 06:30

## 2021-01-01 RX ADMIN — HYDRALAZINE HYDROCHLORIDE 25 MG: 25 TABLET, FILM COATED ORAL at 13:36

## 2021-01-01 RX ADMIN — HEPARIN SODIUM 5000 UNITS: 5000 INJECTION, SOLUTION INTRAVENOUS; SUBCUTANEOUS at 05:55

## 2021-01-01 RX ADMIN — PIPERACILLIN SODIUM AND TAZOBACTAM SODIUM 3.38 G: 3; .375 INJECTION, POWDER, LYOPHILIZED, FOR SOLUTION INTRAVENOUS at 19:30

## 2021-01-01 RX ADMIN — FENTANYL CITRATE 50 MCG: 50 INJECTION INTRAMUSCULAR; INTRAVENOUS at 18:30

## 2021-01-01 RX ADMIN — PANTOPRAZOLE SODIUM 40 MG: 40 INJECTION, POWDER, LYOPHILIZED, FOR SOLUTION INTRAVENOUS at 21:26

## 2021-01-01 RX ADMIN — HEPARIN SODIUM 5000 UNITS: 5000 INJECTION, SOLUTION INTRAVENOUS; SUBCUTANEOUS at 14:29

## 2021-01-01 RX ADMIN — PHENYLEPHRINE HYDROCHLORIDE 100 MCG: 10 INJECTION INTRAVENOUS at 15:14

## 2021-01-01 RX ADMIN — LIDOCAINE 1 PATCH: 246 PATCH TOPICAL at 08:36

## 2021-01-01 RX ADMIN — PIPERACILLIN SODIUM AND TAZOBACTAM SODIUM 3.38 G: 3; .375 INJECTION, POWDER, LYOPHILIZED, FOR SOLUTION INTRAVENOUS at 13:40

## 2021-01-01 RX ADMIN — MIDAZOLAM HYDROCHLORIDE 2 MG: 1 INJECTION, SOLUTION INTRAMUSCULAR; INTRAVENOUS at 09:24

## 2021-01-01 ASSESSMENT — ENCOUNTER SYMPTOMS
FREQUENCY: 0
DYSURIA: 0
DYSRHYTHMIAS: 1
ABDOMINAL PAIN: 1
CHILLS: 1
CONSTIPATION: 0
APPETITE CHANGE: 1
CONSTIPATION: 1
COUGH: 0
DIARRHEA: 0
BLOOD IN STOOL: 0
EYE PAIN: 0
DYSRHYTHMIAS: 1
NAUSEA: 1
SHORTNESS OF BREATH: 0
DIFFICULTY URINATING: 0
SHORTNESS OF BREATH: 0
CHILLS: 0
DECREASED CONCENTRATION: 0
DYSURIA: 0
NAUSEA: 1
EYE REDNESS: 0
ABDOMINAL DISTENTION: 1
VOMITING: 1
RHINORRHEA: 0
ANOREXIA: 1
ARTHRALGIAS: 0
FATIGUE: 1
SORE THROAT: 0
FEVER: 0
ABDOMINAL PAIN: 1
MYALGIAS: 0
BACK PAIN: 0
FLANK PAIN: 0
PALPITATIONS: 0
DIAPHORESIS: 1
APPETITE CHANGE: 0
CONFUSION: 0
SPEECH DIFFICULTY: 0
DIAPHORESIS: 1
SEIZURES: 0
ABDOMINAL PAIN: 1
SHORTNESS OF BREATH: 0
HEMATURIA: 0
CHEST TIGHTNESS: 0
ABDOMINAL DISTENTION: 0
SHORTNESS OF BREATH: 0

## 2021-01-01 ASSESSMENT — COGNITIVE AND FUNCTIONAL STATUS - GENERAL
MOVING TO AND FROM BED TO CHAIR: 3 - A LITTLE
STANDING UP FROM CHAIR USING ARMS: 4 - NONE
STANDING UP FROM CHAIR USING ARMS: 3 - A LITTLE
UNDERSTANDING 10 TO 15 MIN SPEECH: 4 - NONE
CLIMB 3 TO 5 STEPS WITH RAILING: 4 - NONE
REMEMBERING WHERE THINGS ARE: 4 - NONE
REMEMBERING 5 ERRANDS WITH NO LIST: 4 - NONE
HELP NEEDED FOR BATHING: 4 - NONE
MOVING TO AND FROM BED TO CHAIR: 3 - A LITTLE
STANDING UP FROM CHAIR USING ARMS: 4 - NONE
TOILETING: 3 - A LITTLE
EATING MEALS: 4 - NONE
DRESSING REGULAR UPPER BODY CLOTHING: 4 - NONE
FOLLOWS FAMILIAR CONVERSATION: 4 - NONE
REMEMBERING TO TAKE MEDICATION: 4 - NONE
DRESSING REGULAR LOWER BODY CLOTHING: 4 - NONE
REMEMBERING WHERE THINGS ARE: 4 - NONE
TOILETING: 4 - NONE
REMEMBERING TO TAKE MEDICATION: 3 - A LITTLE
AFFECT: WFL
TAKING CARE OF COMPLICATED TASKS: 3 - A LITTLE
WALKING IN HOSPITAL ROOM: 4 - NONE
WALKING IN HOSPITAL ROOM: 3 - A LITTLE
MOVING TO AND FROM BED TO CHAIR: 4 - NONE
HELP NEEDED FOR BATHING: 3 - A LITTLE
CLIMB 3 TO 5 STEPS WITH RAILING: 3 - A LITTLE
WALKING IN HOSPITAL ROOM: 3 - A LITTLE
HELP NEEDED FOR PERSONAL GROOMING: 4 - NONE
UNDERSTANDING 10 TO 15 MIN SPEECH: 4 - NONE
REMEMBERING 5 ERRANDS WITH NO LIST: 3 - A LITTLE
HELP NEEDED FOR PERSONAL GROOMING: 4 - NONE
MOVING TO AND FROM BED TO CHAIR: 3 - A LITTLE
WALKING IN HOSPITAL ROOM: 3 - A LITTLE
AFFECT: LOW AROUSAL/LETHARGIC
CLIMB 3 TO 5 STEPS WITH RAILING: 3 - A LITTLE
REMEMBERING TO TAKE MEDICATION: 3 - A LITTLE
AFFECT: WFL
REMEMBERING 5 ERRANDS WITH NO LIST: 4 - NONE
FOLLOWS FAMILIAR CONVERSATION: 4 - NONE
AFFECT: WFL
STANDING UP FROM CHAIR USING ARMS: 3 - A LITTLE
FOLLOWS FAMILIAR CONVERSATION: 4 - NONE
MOVING TO AND FROM BED TO CHAIR: 4 - NONE
TAKING CARE OF COMPLICATED TASKS: 3 - A LITTLE
CLIMB 3 TO 5 STEPS WITH RAILING: 3 - A LITTLE
WALKING IN HOSPITAL ROOM: 4 - NONE
EATING MEALS: 4 - NONE
TAKING CARE OF COMPLICATED TASKS: 3 - A LITTLE
AFFECT: WFL
DRESSING REGULAR UPPER BODY CLOTHING: 4 - NONE
AFFECT: WFL
DRESSING REGULAR LOWER BODY CLOTHING: 3 - A LITTLE
CLIMB 3 TO 5 STEPS WITH RAILING: 3 - A LITTLE
UNDERSTANDING 10 TO 15 MIN SPEECH: 4 - NONE
STANDING UP FROM CHAIR USING ARMS: 3 - A LITTLE
REMEMBERING WHERE THINGS ARE: 3 - A LITTLE

## 2021-01-01 ASSESSMENT — PATIENT HEALTH QUESTIONNAIRE - PHQ9
SUM OF ALL RESPONSES TO PHQ9 QUESTIONS 1 & 2: 0
SUM OF ALL RESPONSES TO PHQ9 QUESTIONS 1 & 2: 0

## 2021-03-16 ENCOUNTER — APPOINTMENT (RX ONLY)
Dept: URBAN - METROPOLITAN AREA CLINIC 28 | Facility: CLINIC | Age: 81
Setting detail: DERMATOLOGY
End: 2021-03-16

## 2021-03-16 DIAGNOSIS — L57.0 ACTINIC KERATOSIS: ICD-10-CM

## 2021-03-16 DIAGNOSIS — Z85.828 PERSONAL HISTORY OF OTHER MALIGNANT NEOPLASM OF SKIN: ICD-10-CM | Status: RESOLVED

## 2021-03-16 DIAGNOSIS — Z85.820 PERSONAL HISTORY OF MALIGNANT MELANOMA OF SKIN: ICD-10-CM

## 2021-03-16 DIAGNOSIS — D22 MELANOCYTIC NEVI: ICD-10-CM

## 2021-03-16 DIAGNOSIS — D485 NEOPLASM OF UNCERTAIN BEHAVIOR OF SKIN: ICD-10-CM

## 2021-03-16 DIAGNOSIS — L81.4 OTHER MELANIN HYPERPIGMENTATION: ICD-10-CM

## 2021-03-16 DIAGNOSIS — L82.1 OTHER SEBORRHEIC KERATOSIS: ICD-10-CM

## 2021-03-16 PROBLEM — D22.0 MELANOCYTIC NEVI OF LIP: Status: ACTIVE | Noted: 2021-03-16

## 2021-03-16 PROBLEM — D48.5 NEOPLASM OF UNCERTAIN BEHAVIOR OF SKIN: Status: ACTIVE | Noted: 2021-03-16

## 2021-03-16 PROCEDURE — ? FULL BODY SKIN EXAM

## 2021-03-16 PROCEDURE — 99213 OFFICE O/P EST LOW 20 MIN: CPT | Mod: 25

## 2021-03-16 PROCEDURE — ? SUNSCREEN RECOMMENDATIONS

## 2021-03-16 PROCEDURE — ? SHAVE REMOVAL

## 2021-03-16 PROCEDURE — 11300 SHAVE SKIN LESION 0.5 CM/<: CPT

## 2021-03-16 PROCEDURE — ? COUNSELING

## 2021-03-16 PROCEDURE — 17003 DESTRUCT PREMALG LES 2-14: CPT

## 2021-03-16 PROCEDURE — 17000 DESTRUCT PREMALG LESION: CPT | Mod: 59

## 2021-03-16 PROCEDURE — ? LIQUID NITROGEN

## 2021-03-16 ASSESSMENT — LOCATION ZONE DERM
LOCATION ZONE: ARM
LOCATION ZONE: TRUNK
LOCATION ZONE: LIP
LOCATION ZONE: FACE
LOCATION ZONE: NOSE

## 2021-03-16 ASSESSMENT — LOCATION SIMPLE DESCRIPTION DERM
LOCATION SIMPLE: RIGHT UPPER ARM
LOCATION SIMPLE: RIGHT UPPER BACK
LOCATION SIMPLE: NOSE
LOCATION SIMPLE: RIGHT LIP
LOCATION SIMPLE: RIGHT FOREHEAD

## 2021-03-16 ASSESSMENT — LOCATION DETAILED DESCRIPTION DERM
LOCATION DETAILED: NASAL DORSUM
LOCATION DETAILED: RIGHT SUPERIOR MEDIAL UPPER BACK
LOCATION DETAILED: RIGHT INFERIOR FOREHEAD
LOCATION DETAILED: RIGHT ANTERIOR DISTAL UPPER ARM
LOCATION DETAILED: RIGHT LATERAL FOREHEAD
LOCATION DETAILED: RIGHT UPPER CUTANEOUS LIP

## 2021-03-16 NOTE — PROCEDURE: SHAVE REMOVAL
Medical Necessity Information: It is in your best interest to select a reason for this procedure from the list below. All of these items fulfill various CMS LCD requirements except the new and changing color options.
Medical Necessity Clause: This procedure was medically necessary because the lesion that was treated was:  dark  black in color
Lab: -[id number]
Detail Level: Detailed
Was A Bandage Applied: Yes
Size Of Lesion In Cm (Required): 0.4
X Size Of Lesion In Cm (Optional): 0
Biopsy Method: Dermablade
Anesthesia Type: 1% lidocaine without epinephrine
Hemostasis: Electrocautery and Aluminum Chloride
Wound Care: Vaseline
Path Notes (To The Dermatopathologist): Please check margins.
Render Path Notes In Note?: No
Consent was obtained from the patient. The risks and benefits to therapy were discussed in detail. Specifically, the risks of infection, scarring, bleeding, prolonged wound healing, incomplete removal, allergy to anesthesia, nerve injury and recurrence were addressed. Prior to the procedure, the treatment site was clearly identified and confirmed by the patient. All components of Universal Protocol/PAUSE Rule completed.
Post-Care Instructions: I reviewed with the patient in detail post-care instructions. Patient is to keep the biopsy site dry overnight, and then apply bacitracin twice daily until healed. Patient may apply hydrogen peroxide soaks to remove any crusting.
Notification Instructions: Patient will be notified of biopsy results. However, patient instructed to call the office if not contacted within 2 weeks.
Billing Type: Third-Party Bill

## 2021-07-21 PROBLEM — R65.10 SIRS (SYSTEMIC INFLAMMATORY RESPONSE SYNDROME) (CMS/HCC): Status: ACTIVE | Noted: 2021-01-01

## 2021-07-21 PROBLEM — R00.1 SINUS BRADYCARDIA: Status: ACTIVE | Noted: 2021-01-01

## 2021-07-21 PROBLEM — D64.9 ANEMIA: Status: ACTIVE | Noted: 2021-01-01

## 2021-07-21 PROBLEM — R10.9 ABDOMINAL PAIN: Status: ACTIVE | Noted: 2021-01-01

## 2021-07-21 PROBLEM — K85.90 ACUTE PANCREATITIS: Status: ACTIVE | Noted: 2021-01-01

## 2021-07-21 NOTE — ASSESSMENT & PLAN NOTE
ASSESSMENT:  Ruled out for dissection  The marginally elevated troponin is likely due to his renal failure rather than cardiac in origin, but for completeness, will trend  The emesis was more likely to be red due to cherry juice, but will trend Hgb and place on PPI given possibility of hematemesis  D/w GI: working dx is pancreatitis; lipase is pending; no additional imaging requested at this time  Ddx: would also include duodenitis, duodenal ulcer, pancreatobiliary malignancy, etc.    PLAN  Aggressive IVF  PPI  Pain control  Antiemetics  Early po intake as soon as tolerates

## 2021-07-21 NOTE — ED PROVIDER NOTES
"Emergency Medicine Note  HPI   HISTORY OF PRESENT ILLNESS     80-year-old male history of prostate CA, HTN presents to the ER for vomiting.  Patient reports he was at his PCP office today to receive his Lupron shot.  Reports while he was in the waiting room of the office he began to feel nauseous and had 1 episode of vomiting.  In the vomit they noticed some \"flecks of red blood\" per EMS.  EMS was then called.  Patient then became bradycardic, hypotensive and diaphoretic.  He was given Zofran and had another episode of emesis with clots of bright red blood.  At this time patient reports 10 out of 10 abdominal pain and feeling nauseous.  Denies any recent illness, fevers, chills, diarrhea or urinary symptoms.            Patient History   PAST HISTORY     Reviewed from Nursing Triage:      Past Medical History:   Diagnosis Date   • DJD (degenerative joint disease)    • Hypertension    • Nocturia    • Prostate CA (CMS/HCC)    • Prostate CA (CMS/HCC)        History reviewed. No pertinent surgical history.    History reviewed. No pertinent family history.    Social History     Tobacco Use   • Smoking status: Former Smoker   • Smokeless tobacco: Never Used   Substance Use Topics   • Alcohol use: Yes   • Drug use: Never         Review of Systems   REVIEW OF SYSTEMS     Review of Systems   Constitutional: Positive for diaphoresis. Negative for appetite change, chills and fever.   Respiratory: Negative for chest tightness and shortness of breath.    Cardiovascular: Negative for chest pain.   Gastrointestinal: Positive for abdominal pain, nausea and vomiting. Negative for abdominal distention, blood in stool, constipation and diarrhea.   Genitourinary: Negative for difficulty urinating, dysuria, flank pain, frequency, hematuria and urgency.   Musculoskeletal: Negative for back pain.   Skin: Negative for rash.         VITALS     ED Vitals    Date/Time Temp Pulse Resp BP SpO2 [facility]   07/21/21 1232 -- 57 26 158/72 97 % PMB "   07/21/21 1158 35.3 °C (95.6 °F) 38 30 111/53 91 % MS        Pulse Ox %: 97 % (07/21/21 1322)  Pulse Ox Interpretation: Normal (07/21/21 1322)  Heart Rate: 40 (07/21/21 1322)  Rhythm Strip Interpretation: Sinus Bradycardia (07/21/21 1322)     Physical Exam   PHYSICAL EXAM     Physical Exam  Vitals and nursing note reviewed.   Constitutional:       General: He is not in acute distress.     Appearance: He is well-developed. He is ill-appearing and diaphoretic.      Comments: Patient arrived in Trendelenburg, pale, diaphoretic and bradycardic.  Very ill-appearing.  Was alert and oriented able to converse and provide history.  No further vomiting during ER stay.   Neck:      Vascular: No JVD.   Cardiovascular:      Rate and Rhythm: Regular rhythm. Bradycardia present.      Heart sounds: Normal heart sounds. No murmur heard.     Pulmonary:      Effort: Pulmonary effort is normal. No respiratory distress.      Breath sounds: No stridor.   Chest:      Chest wall: No tenderness.   Abdominal:      General: Bowel sounds are normal. There is no distension.      Palpations: Abdomen is soft.      Tenderness: There is no abdominal tenderness. There is no guarding or rebound.   Musculoskeletal:      Cervical back: Normal range of motion and neck supple.   Skin:     General: Skin is warm.      Coloration: Skin is pale.   Neurological:      Mental Status: He is alert and oriented to person, place, and time.           PROCEDURES     Procedures     DATA     Results     Procedure Component Value Units Date/Time    Type and Screen [facility] [[id number]] Collected: 07/21/21 1202    Specimen: Blood, Venous Updated: 07/21/21 1254     Specimen Expiration 07/24/2021     Antibody Screen Negative     ABO A     Rh Factor Positive     History Check No type on file    Troponin I [[id number]]  (Abnormal) Collected: 07/21/21 1202    Specimen: Blood, Venous Updated: 07/21/21 1241     Troponin I 0.05 ng/mL     Comprehensive metabolic panel [[id number]]   (Abnormal) Collected: 07/21/21 1202    Specimen: Blood, Venous Updated: 07/21/21 1239     Sodium 141 mEQ/L      Potassium 4.3 mEQ/L      Comment: Results obtained on plasma. Plasma Potassium values may be up to 0.4 mEQ/L less than serum values. The differences may be greater for patients with high platelet or white cell counts.        Chloride 111 mEQ/L      CO2 21 mEQ/L      BUN 44 mg/dL      Creatinine 3.0 mg/dL      Glucose 138 mg/dL      Calcium 9.7 mg/dL      AST (SGOT) 20 IU/L      ALT (SGPT) 15 IU/L      Alkaline Phosphatase 47 IU/L      Total Protein 6.6 g/dL      Comment: Test performed on plasma which typically contains approximately 0.4 g/dL more protein than serum.        Albumin 3.6 g/dL      Bilirubin, Total 0.5 mg/dL      eGFR 20.1 mL/min/1.73m*2      Anion Gap 9 mEQ/L     Protime-INR [[id number]]  (Normal) Collected: 07/21/21 1202    Specimen: Blood, Venous Updated: 07/21/21 1223     PT 13.2 sec      INR 1.0     Comment: INR has no defined significance when PT is within Reference Range.       PTT [[id number]]  (Normal) Collected: 07/21/21 1202    Specimen: Blood, Venous Updated: 07/21/21 1223     PTT 23 sec     CBC [[id number]]  (Abnormal) Collected: 07/21/21 1202    Specimen: Blood, Venous Updated: 07/21/21 1216     WBC 14.47 K/uL      RBC 4.22 M/uL      Hemoglobin 12.6 g/dL      Hematocrit 38.9 %      MCV 92.2 fL      MCH 29.9 pg      MCHC 32.4 g/dL      RDW 14.5 %      Platelets 254 K/uL      MPV 11.0 fL     SARS-CoV-2 (COVID-19), PCR Nasopharynx [[id number]] Collected: 07/21/21 1211    Specimen: Nasopharyngeal Swab from Nasopharynx Updated: 07/21/21 1216    Narrative:      The following orders were created for panel order SARS-CoV-2 (COVID-19), PCR Nasopharynx.  Procedure                               Abnormality         Status                     ---------                               -----------         ------                     SARS-CoV-2 (COVID-19), P...[[id number]]                      In  process                   Please view results for these tests on the individual orders.    SARS-CoV-2 (COVID-19), PCR Nasopharynx [[id number]] Collected: 07/21/21 1211    Specimen: Nasopharyngeal Swab from Nasopharynx Updated: 07/21/21 1216    Lipase [[id number]] Collected: 07/21/21 1202    Specimen: Blood, Venous Updated: 07/21/21 1212          Imaging Results          X-RAY CHEST 1 VIEW (Preliminary result)  Result time 07/21/21 13:26:00    ED Interpretation    No intra-abd free air (increased distention since CT)  No active disease as interpreted by Jabari cooley M.D.  Please refer to final result as interpreted by radiologist for complete detailed description/findings of the study.                               CT ANGIOGRAPHY CHEST WITH AND WITHOUT IV CONTRAST (Final result)  Result time 07/21/21 13:07:41    Final result                 Impression:    IMPRESSION:  1.  There is fluid and stranding throughout the peripancreatic fat predominantly  the region of the pancreatic head and neck.  Clinical correlation for acute  pancreatitis is advised.  There is dilatation of pancreatic duct throughout the  body and tail.  No obvious pancreatic mass is demonstrated.  An underlying  intraductal lesion is not excluded on the basis of this exam.  2.  Normal caliber thoracoabdominal aorta without evidence of dissection.  Repeat.  Additional incidental findings as described above.                 Narrative:    CLINICAL HISTORY:Abrupt onset of severe lower abdominal pain with vomiting.  Possible hematemesis.    COMMENT:Helical CT angiography of the chest, abdomen and pelvis was performed on  7/21/2021 both pre- and post-administration of 150 cc of Omnipaque 350 contrast  intravenously.  Thin section axial images were acquired during the arterial  phase of contrast bolus administration.  Postprocessing was performed and  maximum intensity projection reformations were created and reviewed.    There are no previous studies for  comparison    CT DOSE:  One or more dose reduction techniques (e.g. automated exposure  control, adjustment of the mA and/or kV according to patient size, use of  iterative reconstruction technique) utilized for this examination.    Noncontrast images demonstrate no evidence of mural hematoma throughout the  thoracoabdominal aorta.  There is atheromatous calcification scattered  throughout the thoracoabdominal aorta.    The thoracoabdominal aorta is patent, normal in caliber and without dissection.  The celiac artery, superior mesenteric artery and inferior mesenteric artery are  patent and normal in caliber.  Bilateral common iliac, external iliac and  proximal common femoral arteries are patent and normal in caliber.    The visualized lower neck is unremarkable.  No axillary, mediastinal or hilar  adenopathy is demonstrated.  Her is normal in size.  There is no pericardial  effusion and there are no pleural effusions.  There are mild dependent  hypoventilatory changes present within both lungs.  No focal consolidation or  pleural effusion is demonstrated and there is no pneumothorax.  Linear  parenchymal density within the right upper lobe is consistent with scarring or  platelike atelectasis.  Patchy parenchymal density within the lingula and left  lower lobe is nonspecific or probably reflects subsegmental atelectasis.  No  discrete parenchymal nodule or mass lesion is demonstrated within either lung.    The liver and spleen are normal in size, contour and attenuation.  There is no  intrahepatic biliary ductal dilatation and no focal hepatic mass lesions are  demonstrated.  No radiopaque gallstones are demonstrated.  There is no  pericholecystic inflammation.    There is fluid and inflammatory stranding surrounding the pancreatic head and  neck region as well as the duodenal sweep.  The pancreatic duct is quite  dilated.  There are a few coarse calcifications contained throughout the  pancreas.  Findings may be  on the basis of chronic/prior pancreatitis.  An  underlying pancreatic intraductal lesion is not excluded on the basis of this  exam.  No obvious pancreatic mass is demonstrated.  Findings are likely on the  basis of pancreatitis and clinical correlation is advised.  Underlying water  there is a duodenal ulcer disease is not excluded.  There is no evidence of  perforation or pneumoperitoneum.    The kidneys are mildly atrophic bilaterally however demonstrate symmetric  enhancement.  There are several too small to characterize subcentimeter round  hypodense renal lesions present bilaterally which statistically likely reflect  cysts.  There is a 3.3 cm parapelvic cyst arising from left kidney.  There is no  hydronephrosis.  A 1.5 cm round hyperdense lesion arising from the mid to lower  pole region of the right kidney anteriorly does not demonstrate significant  enhancement and is most suggestive of a complex cyst.    The small and large bowel is normal in caliber.  A few diverticula are scattered  throughout the descending and sigmoid colon.  There is no diverticulitis.  The  appendix is normal.    No intra-abdominal or intrapelvic adenopathy or ascites is demonstrated.    The urinary bladder is partially distended with urine and is normal in contour.  The patient is status post prostatectomy.    There is a small fat-containing umbilical hernia.  No additional abdominal wall  or inguinal hernias are demonstrated.    Spondylotic and degenerative changes are present throughout the dorsal and  lumbar spine.  No focal lytic or blastic lesions are demonstrated throughout the  visualized skeleton.                               CT ANGIOGRAPHY ABDOMEN PELVIS WITH AND WITHOUT IV CONTRAST (Final result)  Result time 07/21/21 13:07:41    Final result                 Impression:    IMPRESSION:  1.  There is fluid and stranding throughout the peripancreatic fat predominantly  the region of the pancreatic head and neck.  Clinical  correlation for acute  pancreatitis is advised.  There is dilatation of pancreatic duct throughout the  body and tail.  No obvious pancreatic mass is demonstrated.  An underlying  intraductal lesion is not excluded on the basis of this exam.  2.  Normal caliber thoracoabdominal aorta without evidence of dissection.  Repeat.  Additional incidental findings as described above.                 Narrative:    CLINICAL HISTORY:Abrupt onset of severe lower abdominal pain with vomiting.  Possible hematemesis.    COMMENT:Helical CT angiography of the chest, abdomen and pelvis was performed on  7/21/2021 both pre- and post-administration of 150 cc of Omnipaque 350 contrast  intravenously.  Thin section axial images were acquired during the arterial  phase of contrast bolus administration.  Postprocessing was performed and  maximum intensity projection reformations were created and reviewed.    There are no previous studies for comparison    CT DOSE:  One or more dose reduction techniques (e.g. automated exposure  control, adjustment of the mA and/or kV according to patient size, use of  iterative reconstruction technique) utilized for this examination.    Noncontrast images demonstrate no evidence of mural hematoma throughout the  thoracoabdominal aorta.  There is atheromatous calcification scattered  throughout the thoracoabdominal aorta.    The thoracoabdominal aorta is patent, normal in caliber and without dissection.  The celiac artery, superior mesenteric artery and inferior mesenteric artery are  patent and normal in caliber.  Bilateral common iliac, external iliac and  proximal common femoral arteries are patent and normal in caliber.    The visualized lower neck is unremarkable.  No axillary, mediastinal or hilar  adenopathy is demonstrated.  Her is normal in size.  There is no pericardial  effusion and there are no pleural effusions.  There are mild dependent  hypoventilatory changes present within both lungs.  No  focal consolidation or  pleural effusion is demonstrated and there is no pneumothorax.  Linear  parenchymal density within the right upper lobe is consistent with scarring or  platelike atelectasis.  Patchy parenchymal density within the lingula and left  lower lobe is nonspecific or probably reflects subsegmental atelectasis.  No  discrete parenchymal nodule or mass lesion is demonstrated within either lung.    The liver and spleen are normal in size, contour and attenuation.  There is no  intrahepatic biliary ductal dilatation and no focal hepatic mass lesions are  demonstrated.  No radiopaque gallstones are demonstrated.  There is no  pericholecystic inflammation.    There is fluid and inflammatory stranding surrounding the pancreatic head and  neck region as well as the duodenal sweep.  The pancreatic duct is quite  dilated.  There are a few coarse calcifications contained throughout the  pancreas.  Findings may be on the basis of chronic/prior pancreatitis.  An  underlying pancreatic intraductal lesion is not excluded on the basis of this  exam.  No obvious pancreatic mass is demonstrated.  Findings are likely on the  basis of pancreatitis and clinical correlation is advised.  Underlying water  there is a duodenal ulcer disease is not excluded.  There is no evidence of  perforation or pneumoperitoneum.    The kidneys are mildly atrophic bilaterally however demonstrate symmetric  enhancement.  There are several too small to characterize subcentimeter round  hypodense renal lesions present bilaterally which statistically likely reflect  cysts.  There is a 3.3 cm parapelvic cyst arising from left kidney.  There is no  hydronephrosis.  A 1.5 cm round hyperdense lesion arising from the mid to lower  pole region of the right kidney anteriorly does not demonstrate significant  enhancement and is most suggestive of a complex cyst.    The small and large bowel is normal in caliber.  A few diverticula are  scattered  throughout the descending and sigmoid colon.  There is no diverticulitis.  The  appendix is normal.    No intra-abdominal or intrapelvic adenopathy or ascites is demonstrated.    The urinary bladder is partially distended with urine and is normal in contour.  The patient is status post prostatectomy.    There is a small fat-containing umbilical hernia.  No additional abdominal wall  or inguinal hernias are demonstrated.    Spondylotic and degenerative changes are present throughout the dorsal and  lumbar spine.  No focal lytic or blastic lesions are demonstrated throughout the  visualized skeleton.                                ECG 12 lead    (Results Pending)       Scoring tools                                 ED Course & MDM   MDM / ED COURSE and CLINICAL IMPRESSIONS     [facility]    ED Course as of Jul 22 0612 Wed Jul 21, 2021   1203 STAT CTA ordered and will obtain without labs    [HL]   1215 EMR showed baseline EKG with sinus bradycardia in the 40s    [HL]   1218 WBC(!): 14.47 [EB]   1218 Hemoglobin(!): 12.6 [EB]   1242 Patient is much more stable with normal blood pressure.  Continues have significant tenderness to palpation of abdomen.    [HL]   1242 Creatinine(!): 3.0 [HL]   1243 Troponin I(!): 0.05 [EB]   1322 IMPRESSION:  1.  There is fluid and stranding throughout the peripancreatic fat predominantly  the region of the pancreatic head and neck.  Clinical correlation for acute  pancreatitis is advised.  There is dilatation of pancreatic duct throughout the  body and tail.  No obvious pancreatic mass is demonstrated.  An underlying  intraductal lesion is not excluded on the basis of this exam.  2.  Normal caliber thoracoabdominal aorta without evidence of dissection.  Repeat.  Additional incidental findings as described above.              Specimen Collected: 07/21/21 12:45         CT ANGIOGRAPHY CHEST WITH AND WITHOUT IV CONTRAST [HL]   1324 Page to GI    [EB]   1325 HMS and GI will be paged     [HL]   [facility]    [HL]   1331 Dw GI who will consult    [EB]   1354 HMS and GI at bedside    [EB]   1708 Lipase(!): >4,000 [HL]      ED Course User Index  [EB] Liliana Engel PA C  [HL] Jabari Valentin MD         Clinical Impressions as of Jul 22 0612   Sinus bradycardia   Acute pancreatitis, unspecified complication status, unspecified pancreatitis type            Liliana Engel PA C  07/21/21 1554       Liliana Engel PA C  07/22/21 0612

## 2021-07-21 NOTE — ASSESSMENT & PLAN NOTE
81 yo M with PMHx of CKD St 3, HTN, prostate cancer s/p prostatectomy now on LUPRON presenting for consult for pancreatitis; pt with no prior episodes of pancreatitis; still has GB, Ca2+ normal, triglycerides pending; 1 day n/v/acute upper abd pain, no chronic GI symptoms; imaging consistent with possible chronic pancreatitis.    CTA 7/21: There is fluid and stranding throughout the peripancreatic fat predominantly the region of the pancreatic head and neck.  Clinical correlation for acute pancreatitis is advised.  There is dilatation of pancreatic duct throughout the body and tail.  No obvious pancreatic mass is demonstrated.     MRI 7/24 concerning for hemorrhagic pancreatitis and possible IPMN; pt initially responded from GI perspective; + GABY last week - continued w/ nausea / poor PO intake / abd pain so had re-CT ordered 7/31 showing re-demonstration of abnormality of pancreas - enlarged with peripancreatic inflammatory changes, possibly phlegmonous changes around pancreatic head, probably 2/2 changes of duodenum, new b/l pleural effusions, small Bowel loops which are fluid-filled + mildly prominent but no evidence for obstruction of TP. +Ascites.      MRCP 7/24:  1.  Acute pancreatitis with intrinsic T1 hyperintense material tracking along the pancreatic head, raising concern for hemorrhagic pancreatitis.   2.  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   3.  Mild extrahepatic and intrahepatic hepatic biliary ductal dilatation.      Re-CT AP 8/5 showing: Acute pancreatitis with a slight increase in peripancreatic fluid. Evaluation for walled off necrosis is difficult without contrast.      Patient with continued pancreatitis and peripancreatic fluid. He may have some necrosis that we can't seen on his CT scan. He is clinically improving and last dose of pain medication, acetaminophen was on 8/4 on chart review. Due to the degree of his  pancreatic inflammation it will take time for him to become asymptomatic. There is no concerning discrete collection.     Plan:  - self hydrating  - no GI objection to discharge  - monitor for fevers  - monitor CBD/BMP  - Serial abdominal exams  - PPI 40 mg BID IV while hospitalized  - Outpatient EUS with Dr. Hi at [facility] due to possible main duct IPMN  - Encourage ambulation / OOB

## 2021-07-21 NOTE — ASSESSMENT & PLAN NOTE
No obvious source of bacterial infection  Continue to observe closely off antibiotics  Etiology appears to be unspecified intra-abdominal process, probably pancreatitis

## 2021-07-21 NOTE — ASSESSMENT & PLAN NOTE
Baseline HR 45-55 bpm. No history of syncope. Questionable exertional lightheadedness when taking stairs but not on ground level. HR went down to the 30s while in the ED associated with mild lightheadedness. BP has been stable.    Chronic asymptomatic sinus bradycardia aggravated by increased vagal tone from abdominal pain.  No indication for pacemaker at this time. Continue to monitor on tele.  HR improved to 60-80 on monitor.

## 2021-07-21 NOTE — H&P
"   [facility] Medicine Service -  History & Physical        CHIEF COMPLAINT   abd pain, nausea, vomiting     HISTORY OF PRESENT ILLNESS      Adalberto Galaviz is a 80 y.o. male with a past medical history of prostate cancer who presents with severe abdominal pain, nausea, vomiting.    Pt planned to present to his urology office this morning for lupron injection.  He underwent his usual routine, ate toast with cottage cheese for breakfast along with cherry juice, and then went to his appointment.  Upon arrival, he experienced sudden onset lower abdominal pain.  He initially felt as though he might have diarrhea.  He then became very nauseated.  A nurse took him back to a room, and he vomited red liquid.  They were concerned that it was blood, so he was sent to the emergency department.    He states that he has never had abdominal pain like this in the past.  Sharp in quality, severe.  There was nothing new or unusual that he ate.  He resides with his wife, and she is well.  His last bowel movement was this morning, and it was normal in caliber and color.  He states that he does have a history of \"ulcers\" in the past.    In the emergency department, he was pale and diaphoretic.  He was noted to have bradycardia in the 30s.  He received 0.5mg atropine.  He urgently went for CT scan to rule out dissection.  This was ruled out, but there were other findings as noted below.  Gastroenterology was consulted from the emergency department.    He denies CP or SOB.  He denies having any cardiac issues and does not have a cardiologist.      PAST MEDICAL AND SURGICAL HISTORY      Past Medical History:   Diagnosis Date   • CKD (chronic kidney disease) stage 4, GFR 15-29 ml/min (CMS/HCC)    • DJD (degenerative joint disease)    • Hypertension    • Nocturia    • Prostate CA (CMS/HCC)    • Prostate CA (CMS/HCC)        Past Surgical History:   Procedure Laterality Date   • PROSTATECTOMY         PCP: Myles Longoria, DO    MEDICATIONS    "   Prior to Admission medications      No current facility-administered medications on file prior to encounter.     Current Outpatient Medications on File Prior to Encounter   Medication Sig Dispense Refill   • amLODIPine (NORVASC) 10 mg tablet Take 10 mg by mouth daily.     • APPLE CIDER VINEGAR ORAL Take 1 tablet by mouth daily.       • cod liver oil oil Take 1 tablet by mouth daily.       • coenzyme Q10 (COQ10) 100 mg capsule Take 100 mg by mouth daily.     • docosahexaenoic acid/epa (FISH OIL ORAL) Take 1 capsule by mouth daily.       • glucosamine-chondroitin 500-400 mg tablet Take 1 tablet by mouth daily.       • lutein 10 mg tablet Take 10 mg by mouth daily.     • OREGANO OIL ORAL Take 1 capsule by mouth daily.     • sertraline (ZOLOFT) 50 mg tablet Take 25 mg by mouth every evening.           ALLERGIES      Aspirin, Losartan, and Morphine    FAMILY HISTORY      History reviewed. No pertinent family history.    SOCIAL HISTORY      Social History     Socioeconomic History   • Marital status:      Spouse name: None   • Number of children: None   • Years of education: None   • Highest education level: None   Occupational History   • None   Tobacco Use   • Smoking status: Former Smoker   • Smokeless tobacco: Never Used   Substance and Sexual Activity   • Alcohol use: Yes   • Drug use: Never   • Sexual activity: None   Other Topics Concern   • None   Social History Narrative   • None     Social Determinants of Health         REVIEW OF SYSTEMS      All other systems reviewed and negative except as noted in HPI    PHYSICAL EXAMINATION      Temp:  [35.3 °C (95.6 °F)] 35.3 °C (95.6 °F)  Heart Rate:  [38-57] 48  Resp:  [19-30] 28  BP: (111-158)/(53-72) 158/72  There is no height or weight on file to calculate BMI.    Physical Exam   Non-toxic, comfortable  No pallor, cyanosis, or jaundice  MMs are moist  Regular rhythm, rate is bradycardic, no murmurs or rubs  CTAB, no w/r/r  abd is soft, no rebound or guarding,  +Tenderness in the epigastrium and RUQ, no Lerma's sign, +BS  No edema      LABS / IMAGING / EKG        Labs  Results from last 7 days   Lab Units 07/21/21  1202   WBC K/uL 14.47*   HEMOGLOBIN g/dL 12.6*   HEMATOCRIT % 38.9*   PLATELETS K/uL 254     Results from last 7 days   Lab Units 07/21/21  1202   SODIUM mEQ/L 141   POTASSIUM mEQ/L 4.3   CHLORIDE mEQ/L 111*   CO2 mEQ/L 21*   BUN mg/dL 44*   CREATININE mg/dL 3.0*   CALCIUM mg/dL 9.7   ALBUMIN g/dL 3.6   BILIRUBIN TOTAL mg/dL 0.5   ALK PHOS IU/L 47   ALT IU/L 15*   AST IU/L 20   GLUCOSE mg/dL 138*     CT ANGIOGRAPHY CHEST WITH AND WITHOUT IV CONTRAST    Result Date: 7/21/2021  CLINICAL HISTORY:Abrupt onset of severe lower abdominal pain with vomiting. Possible hematemesis. COMMENT:Helical CT angiography of the chest, abdomen and pelvis was performed on 7/21/2021 both pre- and post-administration of 150 cc of Omnipaque 350 contrast intravenously.  Thin section axial images were acquired during the arterial phase of contrast bolus administration.  Postprocessing was performed and maximum intensity projection reformations were created and reviewed. There are no previous studies for comparison CT DOSE:  One or more dose reduction techniques (e.g. automated exposure control, adjustment of the mA and/or kV according to patient size, use of iterative reconstruction technique) utilized for this examination. Noncontrast images demonstrate no evidence of mural hematoma throughout the thoracoabdominal aorta.  There is atheromatous calcification scattered throughout the thoracoabdominal aorta. The thoracoabdominal aorta is patent, normal in caliber and without dissection. The celiac artery, superior mesenteric artery and inferior mesenteric artery are patent and normal in caliber.  Bilateral common iliac, external iliac and proximal common femoral arteries are patent and normal in caliber. The visualized lower neck is unremarkable.  No axillary, mediastinal or hilar  adenopathy is demonstrated.  Her is normal in size.  There is no pericardial effusion and there are no pleural effusions.  There are mild dependent hypoventilatory changes present within both lungs.  No focal consolidation or pleural effusion is demonstrated and there is no pneumothorax.  Linear parenchymal density within the right upper lobe is consistent with scarring or platelike atelectasis.  Patchy parenchymal density within the lingula and left lower lobe is nonspecific or probably reflects subsegmental atelectasis.  No discrete parenchymal nodule or mass lesion is demonstrated within either lung. The liver and spleen are normal in size, contour and attenuation.  There is no intrahepatic biliary ductal dilatation and no focal hepatic mass lesions are demonstrated.  No radiopaque gallstones are demonstrated.  There is no pericholecystic inflammation. There is fluid and inflammatory stranding surrounding the pancreatic head and neck region as well as the duodenal sweep.  The pancreatic duct is quite dilated.  There are a few coarse calcifications contained throughout the pancreas.  Findings may be on the basis of chronic/prior pancreatitis.  An underlying pancreatic intraductal lesion is not excluded on the basis of this exam.  No obvious pancreatic mass is demonstrated.  Findings are likely on the basis of pancreatitis and clinical correlation is advised.  Underlying water there is a duodenal ulcer disease is not excluded.  There is no evidence of perforation or pneumoperitoneum. The kidneys are mildly atrophic bilaterally however demonstrate symmetric enhancement.  There are several too small to characterize subcentimeter round hypodense renal lesions present bilaterally which statistically likely reflect cysts.  There is a 3.3 cm parapelvic cyst arising from left kidney.  There is no hydronephrosis.  A 1.5 cm round hyperdense lesion arising from the mid to lower pole region of the right kidney anteriorly does  not demonstrate significant enhancement and is most suggestive of a complex cyst. The small and large bowel is normal in caliber.  A few diverticula are scattered throughout the descending and sigmoid colon.  There is no diverticulitis.  The appendix is normal. No intra-abdominal or intrapelvic adenopathy or ascites is demonstrated. The urinary bladder is partially distended with urine and is normal in contour. The patient is status post prostatectomy. There is a small fat-containing umbilical hernia.  No additional abdominal wall or inguinal hernias are demonstrated. Spondylotic and degenerative changes are present throughout the dorsal and lumbar spine.  No focal lytic or blastic lesions are demonstrated throughout the visualized skeleton.     IMPRESSION: 1.  There is fluid and stranding throughout the peripancreatic fat predominantly the region of the pancreatic head and neck.  Clinical correlation for acute pancreatitis is advised.  There is dilatation of pancreatic duct throughout the body and tail.  No obvious pancreatic mass is demonstrated.  An underlying intraductal lesion is not excluded on the basis of this exam. 2.  Normal caliber thoracoabdominal aorta without evidence of dissection. Repeat.  Additional incidental findings as described above.     CT ANGIOGRAPHY ABDOMEN PELVIS WITH AND WITHOUT IV CONTRAST    Result Date: 7/21/2021  CLINICAL HISTORY:Abrupt onset of severe lower abdominal pain with vomiting. Possible hematemesis. COMMENT:Helical CT angiography of the chest, abdomen and pelvis was performed on 7/21/2021 both pre- and post-administration of 150 cc of Omnipaque 350 contrast intravenously.  Thin section axial images were acquired during the arterial phase of contrast bolus administration.  Postprocessing was performed and maximum intensity projection reformations were created and reviewed. There are no previous studies for comparison CT DOSE:  One or more dose reduction techniques (e.g.  automated exposure control, adjustment of the mA and/or kV according to patient size, use of iterative reconstruction technique) utilized for this examination. Noncontrast images demonstrate no evidence of mural hematoma throughout the thoracoabdominal aorta.  There is atheromatous calcification scattered throughout the thoracoabdominal aorta. The thoracoabdominal aorta is patent, normal in caliber and without dissection. The celiac artery, superior mesenteric artery and inferior mesenteric artery are patent and normal in caliber.  Bilateral common iliac, external iliac and proximal common femoral arteries are patent and normal in caliber. The visualized lower neck is unremarkable.  No axillary, mediastinal or hilar adenopathy is demonstrated.  Her is normal in size.  There is no pericardial effusion and there are no pleural effusions.  There are mild dependent hypoventilatory changes present within both lungs.  No focal consolidation or pleural effusion is demonstrated and there is no pneumothorax.  Linear parenchymal density within the right upper lobe is consistent with scarring or platelike atelectasis.  Patchy parenchymal density within the lingula and left lower lobe is nonspecific or probably reflects subsegmental atelectasis.  No discrete parenchymal nodule or mass lesion is demonstrated within either lung. The liver and spleen are normal in size, contour and attenuation.  There is no intrahepatic biliary ductal dilatation and no focal hepatic mass lesions are demonstrated.  No radiopaque gallstones are demonstrated.  There is no pericholecystic inflammation. There is fluid and inflammatory stranding surrounding the pancreatic head and neck region as well as the duodenal sweep.  The pancreatic duct is quite dilated.  There are a few coarse calcifications contained throughout the pancreas.  Findings may be on the basis of chronic/prior pancreatitis.  An underlying pancreatic intraductal lesion is not excluded  on the basis of this exam.  No obvious pancreatic mass is demonstrated.  Findings are likely on the basis of pancreatitis and clinical correlation is advised.  Underlying water there is a duodenal ulcer disease is not excluded.  There is no evidence of perforation or pneumoperitoneum. The kidneys are mildly atrophic bilaterally however demonstrate symmetric enhancement.  There are several too small to characterize subcentimeter round hypodense renal lesions present bilaterally which statistically likely reflect cysts.  There is a 3.3 cm parapelvic cyst arising from left kidney.  There is no hydronephrosis.  A 1.5 cm round hyperdense lesion arising from the mid to lower pole region of the right kidney anteriorly does not demonstrate significant enhancement and is most suggestive of a complex cyst. The small and large bowel is normal in caliber.  A few diverticula are scattered throughout the descending and sigmoid colon.  There is no diverticulitis.  The appendix is normal. No intra-abdominal or intrapelvic adenopathy or ascites is demonstrated. The urinary bladder is partially distended with urine and is normal in contour. The patient is status post prostatectomy. There is a small fat-containing umbilical hernia.  No additional abdominal wall or inguinal hernias are demonstrated. Spondylotic and degenerative changes are present throughout the dorsal and lumbar spine.  No focal lytic or blastic lesions are demonstrated throughout the visualized skeleton.     IMPRESSION: 1.  There is fluid and stranding throughout the peripancreatic fat predominantly the region of the pancreatic head and neck.  Clinical correlation for acute pancreatitis is advised.  There is dilatation of pancreatic duct throughout the body and tail.  No obvious pancreatic mass is demonstrated.  An underlying intraductal lesion is not excluded on the basis of this exam. 2.  Normal caliber thoracoabdominal aorta without evidence of dissection. Repeat.   Additional incidental findings as described above.     X-RAY CHEST 1 VIEW    Result Date: 7/21/2021  CLINICAL HISTORY: Abdominal pain PRIOR STUDY:  CT chest, abdomen and pelvis dated the same day TECHNIQUE:  Erect portable image of the chest COMMENT:  The lungs are clear.  The heart size is normal.  There is no obvious free intraperitoneal air.  The osseous and soft tissues are unremarkable.     IMPRESSION: No definite active disease.        SARS-CoV-2 (COVID-19) (no units)   Date/Time Value   07/21/2021 1211 Negative       ECG/Telemetry  EKG pers revd: sinus melania 30s  More recent tele revd: sinus melania in upper 50s    ASSESSMENT AND PLAN           SIRS (systemic inflammatory response syndrome) (CMS/[facility])  Assessment & Plan  No obvious source of bacterial infection  Continue to observe closely off antibiotics  Etiology appears to be unspecified intra-abdominal process, probably pancreatitis    Abdominal pain  Assessment & Plan  ASSESSMENT:  Ruled out for dissection  The marginally elevated troponin is likely due to his renal failure rather than cardiac in origin, but for completeness, will trend  The emesis was more likely to be red due to cherry juice, but will trend Hgb and place on PPI given possibility of hematemesis  D/w GI: working dx is pancreatitis; lipase is pending; no additional imaging requested at this time  Ddx: would also include duodenitis, duodenal ulcer, pancreatobiliary malignancy, etc.    PLAN  Aggressive IVF  PPI  Pain control  Antiemetics  Early po intake as soon as tolerates        Sinus bradycardia  Assessment & Plan  He has never had this issue before  I suspect this is vasovagal due to pain/nausea/vomiting  Monitor continuously on tele  Ask cardiology to see  Trend troponin    CKD (chronic kidney disease) stage 4, GFR 15-29 ml/min (CMS/[facility])  Assessment & Plan  Reviewed records, and his creatinine seems to be at baseline, c/w CKD 4  Avoid nephrotoxins and renally dose  meds    Anemia  Assessment & Plan  Chronic and at his baseline  Due to CKD  Given question of hematemesis, recheck Hgb later tonight    Prostate CA (CMS/HCC)  Assessment & Plan  outpt f/u    Hypertension  Assessment & Plan  Plan to hold meds for now (if becomes septic, is high risk for hypotension)  Trend BPs       VTE Assessment: [place] VTE Score: 4  VTE Prophylaxis: SQ heparin      Estimated Discharge Date: 7/25/2021  Disposition Planning: anticipate at least 2-3 [place] hospital level of care     Charlene Mo DO  7/21/2021

## 2021-07-21 NOTE — ED ATTESTATION NOTE
I have personally seen, evaluated,and participated in the management of Adalberto Galaviz.  I reviewed and agree with the PA/NP's assessment and plan of care with the following exceptions: None    My examination, assessment, and plan of care for Adalberto Galaviz:  80-year-old male history of prostate cancer and is scheduled for his Lupron injection today presented with abrupt onset of severe lower abdominal pain with vomiting.  According to the patient, he drinks cherry juice every morning and witnesses stated that they thought they saw blood in his vomit.  Patient's last bowel movement was earlier today was normal and denies any melena or bloody stools.  He does drink wine every weekend.  Denies any fever back pain or extremity numbness or weakness.  Received 4 Zofran IV given by EMS prior to arrival.  Exam:   Vitals:    07/21/21 1158   BP: (!) 111/53   Pulse: (!) 38   Resp: (!) 30   Temp: (!) 35.3 °C (95.6 °F)   SpO2: (!) 91%   Diffusely diaphoretic, good eye contact pleasant cooperative.  Mildly pale complexion conjunctiva.  Regular bradycardia without murmurs.  No respiratory distress clear to auscultation anteriorly.  Soft nondistended abdomen with severe tenderness to palpation of the left lower quadrant and mild to moderate in supra pubis.    Plan:  Atropine 0.5 mg IV, IV fluid bolus 500 cc, EKG showing sinus bradycardia without ischemic ST-T wave changes.  Stat CTA     Jabari Valentin MD  07/21/21 1211

## 2021-07-21 NOTE — TREATMENT PLAN
HMS ADDENDUM      Lipase >4000  Consistent with presumptive dx of SIRS due to acute pancreatitis  Continue treatment plan as previously documented

## 2021-07-21 NOTE — ASSESSMENT & PLAN NOTE
Reviewed records, and his creatinine seems to be at baseline, c/w CKD 4  Avoid nephrotoxins and renally dose meds

## 2021-07-21 NOTE — ASSESSMENT & PLAN NOTE
He has never had this issue before  I suspect this is vasovagal due to pain/nausea/vomiting  Monitor continuously on tele  Ask cardiology to see  Trend troponin

## 2021-07-21 NOTE — CONSULTS
GI Consult       SUBJECTIVE     Adalberto Galaviz is a 80 y.o. male with a history of CKD St 3, HTN, prostate cancer s/p prostatectomy now on LUPRON presenting for consult for pancreatitis. Patient admitted for Acute pancreatitis, unspecified complication status, unspecified pancreatitis type [K85.90]. Adalberto was seen in consultation at the request of Jabari Valentin MD;Chepe,* for recommendations related to Abdominal pain. Adalberto presented to [facility] today for c/o abd pain / chest pain. Pt was at oncology office when he began not feeling well. He felt like he was going to be sick - he vomited twice while at the oncologists' office. He did have some cherry juice with apple cider vinegar this am - when he vomited it was like red / pink in color. He reports also having chest and upper abdominal pain. When he points to where he is feeling the sharp pain and it is in his epigastrium. He denies radiation of pain. He denies f/c/melena/hematochezia/constipation/diarrhea. He reports feeling poorly at present. Has some wine on the weekends - usually only 1-2 glasses. Years ago he would drink more heavily on the weekends - no hx of alcoholism or alcohol abuse. No hx of pancreatitis previously. No new medications, recent ABX, or significant etoh use recently. No recent anorexia/early satiety/weight loss. Other than mentioned above GI ROS negative.     In ED, pt HD stable, afebrile. Labs showing WBC 14.47, hgb 12.6, MCV normal, Platelets normal, INR 1.0. BUN/Creat: 44/3.0, LFTs normal, lipase pending, trop 0.05. INR 1.0. CTA performed to eval for CP showing fluid + stranding throughout peripancreatic fat in pancreatic head and neck; acute pancreatitis correlation advised -- dilation of PD throughout body and tail, no pancreatic mass demonstrated, underlying intraductal lesion not excluded on basis of this exam, normal thoraco-abd aorta without evidence of dissection.     Outside records reviewed..  All other systems were reviewed and  are negative other than as stated in the HPI.    Past Medical History:   Diagnosis Date    CKD (chronic kidney disease) stage 4, GFR 15-29 ml/min (CMS/[facility])     DJD (degenerative joint disease)     Hypertension     Nocturia     Prostate CA (CMS/[facility])     Prostate CA (CMS/[facility])      Past Surgical History:   Procedure Laterality Date    PROSTATECTOMY       Social History     Socioeconomic History    Marital status:      Spouse name: Not on file    Number of children: Not on file    Years of education: Not on file    Highest education level: Not on file   Occupational History    Not on file   Tobacco Use    Smoking status: Former Smoker    Smokeless tobacco: Never Used   Substance and Sexual Activity    Alcohol use: Yes    Drug use: Never    Sexual activity: Not on file   Other Topics Concern    Not on file   Social History Narrative    Not on file     Social Determinants of Health     Financial Resource Strain:     Difficulty of Paying Living Expenses:    Food Insecurity:     Worried About Running Out of Food in the Last Year:     Ran Out of Food in the Last Year:    Transportation Needs:     Lack of Transportation (Medical):     Lack of Transportation (Non-Medical):    Physical Activity:     Days of Exercise per Week:     Minutes of Exercise per Session:    Stress:     Feeling of Stress :    Social Connections:     Frequency of Communication with Friends and Family:     Frequency of Social Gatherings with Friends and Family:     Attends Restorationist Services:     Active Member of Clubs or Organizations:     Attends Club or Organization Meetings:     Marital Status:    Intimate Partner Violence:     Fear of Current or Ex-Partner:     Emotionally Abused:     Physically Abused:     Sexually Abused:      History reviewed. No pertinent family history.  Allergies   Allergen Reactions    Aspirin Nausea And Vomiting and GI intolerance           Losartan Other (see comments)     Loss of balance    Morphine GI intolerance               Home Medications:  Not in a hospital admission.    Current Medications:    HYDROmorphone, 0.25 mg, intravenous, q3h PRN    lactated ringer's, , intravenous, Continuous    pantoprazole, 40 mg, intravenous, q12h ROBERT    amLODIPine    APPLE CIDER VINEGAR ORAL    cod liver oil    coenzyme Q10    docosahexaenoic acid/epa (FISH OIL ORAL)    glucosamine-chondroitin    lutein    OREGANO OIL ORAL    sertraline     OBJECTIVE     Physical Exam  Visit Vitals  BP (!) 158/72   Pulse (!) 48   Temp (!) 35.3 °C (95.6 °F) (Tympanic)   Resp (!) 28   Wt 86.2 kg (190 lb)   SpO2 95%     General appearance: alert, appears stated age and cooperative; appearing pale  HEENT: normocephalic, without obvious abnormality, atraumatic  Lungs: clear to auscultation bilaterally  Heart: regular rate and rhythm, S1, S2 normal, no murmur, click, rub or gallop  Abdomen: soft, moderate epigastrium tenderness, uncomfortable with even minimal palpation; bowel sounds normal; no masses, no organomegaly  Rectal: N/A  Extremities: extremities normal, warm and well-perfused; no cyanosis, clubbing, or edema  Skin: Skin color, texture, turgor normal. No rashes or lesions  Neurologic: Grossly normal     LABS & IMAGING     Labs:  I have reviewed the patient's labs.  Significant abnormals are t bili normal, other LFTs normal, WBC 14.47, hgb 12.6, Platelets normal, INR normal, BUN/Creat: 44/3.0.    Imaging: I have reviewed the Imaging from the last 24 hrs.  X-RAY CHEST 1 VIEW   ED Interpretation   No intra-abd free air (increased distention since CT)  No active disease as interpreted by me, Jabari Valentin M.D.  Please refer to final result as interpreted by radiologist for complete detailed description/findings of the study.        Final Result   IMPRESSION: No definite active disease.      CT ANGIOGRAPHY CHEST WITH AND WITHOUT IV CONTRAST   Final Result   IMPRESSION:   1.  There is fluid and stranding throughout the peripancreatic fat predominantly   the region of  the pancreatic head and neck.  Clinical correlation for acute   pancreatitis is advised.  There is dilatation of pancreatic duct throughout the   body and tail.  No obvious pancreatic mass is demonstrated.  An underlying   intraductal lesion is not excluded on the basis of this exam.   2.  Normal caliber thoracoabdominal aorta without evidence of dissection.   Repeat.  Additional incidental findings as described above.            CT ANGIOGRAPHY ABDOMEN PELVIS WITH AND WITHOUT IV CONTRAST   Final Result   IMPRESSION:   1.  There is fluid and stranding throughout the peripancreatic fat predominantly   the region of the pancreatic head and neck.  Clinical correlation for acute   pancreatitis is advised.  There is dilatation of pancreatic duct throughout the   body and tail.  No obvious pancreatic mass is demonstrated.  An underlying   intraductal lesion is not excluded on the basis of this exam.   2.  Normal caliber thoracoabdominal aorta without evidence of dissection.   Repeat.  Additional incidental findings as described above.            ECG 12 lead            ASSESSMENT & PLAN     Abdominal pain  Assessment & Plan  See pancreatitis section for details.     Acute pancreatitis  Assessment & Plan  80 y M w/ PMHx of CKD St 3, HTN, prostate cancer s/p prostatectomy now on LUPRON presenting for consult for pancreatitis; pt with no prior episodes of pancreatitis; still has GB, Ca2+ normal, triglycerides pending; 1 day n/v/acute upper abd pain, no chronic GI symptoms; imaging consistent with possible chronic pancreatitis -- ? Acute on chronic flare of pancreatitis (from prior etoh use) vs other etiology -- triglycerides, IPMN, meds, idio (?)     - Await lipase   - Trend CBC + BMP daily   - Pain control prn   - LR @ 250 cc / hr   - Clear liquids okay   - Send off triglycerides   - Will need MRI either before DC or 2-4 wks s/p DC to evaluate pancreas better - cannot do this during acute inflammation   - T/c sending off IgG  subclasses for completeness   - Antiemetics prn   - No role in endoscopy - doubt what was in emesis was true hematemesis   - Supportive care   Pt seen/examined. Reviewed w PA.  Pancreatitis-? H/o heavy etoh use on weekends, yrs ago.  Now just several drinks  Per weekend per pt and family.   Pancreatic calcifications are likely secondary to chronic pancreatitis and this may   Be an acute flare which can occur without any specific trigger or etoh binge.  MRCP prior to d/c or as outpt.  Eval ducts, r/o malignancy or ductal stone.   For now, npo/aggressive ivf.  Pain and N control.  Check serum TG,  Ca nl.  Med list reviewed.   Follow lytes/renal fxn/cbc closely.   MD Jewels Lockhart PA C  7/21/2021

## 2021-07-21 NOTE — CONSULTS
CARDIOLOGY CONSULT NOTE      Reason for consult: bradycardia    SUBJECTIVE    Adalberto Galaviz is a 80 y.o. male who was admitted for Acute pancreatitis, unspecified complication status, unspecified pancreatitis type [K85.90].     PMHx significant for prostate cancer s/p prostatectomy on lupron, HTN, CKD.    HPI    Patient presented with abdominal pain, nausea and vomiting. He was found to have acute pancreatitis. While in ED he was noted to be in bradycardia in the 40s that dropped to the 30s at some point. BP has been stable. Patient reports feeling lightheaded at times in the ED. His baseline HR range from 45-55 bpm from recent outpatient visits. He never had syncope or near syncope. He is able to walk on flat level for half a mile without SOB or lightheadedness. He has lightheadedness with walking up 1 flight of stairs and has to make sure he holds on to railing. He currently denies chest pain, SOB, lightheadedness.    Review of Systems  14-point ROS was performed. Negative except HPI.    Allergies: Aspirin, Losartan, and Morphine    History  Medical History:   Past Medical History:   Diagnosis Date   • CKD (chronic kidney disease) stage 4, GFR 15-29 ml/min (CMS/[facility])    • DJD (degenerative joint disease)    • Hypertension    • Nocturia    • Prostate CA (CMS/[facility])    • Prostate CA (CMS/[facility])        Surgical History:   Past Surgical History:   Procedure Laterality Date   • PROSTATECTOMY         Social History:   Social History     Socioeconomic History   • Marital status:      Spouse name: None   • Number of children: None   • Years of education: None   • Highest education level: None   Occupational History   • None   Tobacco Use   • Smoking status: Former Smoker   • Smokeless tobacco: Never Used   Substance and Sexual Activity   • Alcohol use: Yes   • Drug use: Never   • Sexual activity: None   Other Topics Concern   • None   Social History Narrative   • None     Social Determinants of Health     Financial  Resource Strain:    • Difficulty of Paying Living Expenses:    Food Insecurity:    • Worried About Running Out of Food in the Last Year:    • Ran Out of Food in the Last Year:    Transportation Needs:    • Lack of Transportation (Medical):    • Lack of Transportation (Non-Medical):    Physical Activity:    • Days of Exercise per Week:    • Minutes of Exercise per Session:    Stress:    • Feeling of Stress :    Social Connections:    • Frequency of Communication with Friends and Family:    • Frequency of Social Gatherings with Friends and Family:    • Attends Yazdanism Services:    • Active Member of Clubs or Organizations:    • Attends Club or Organization Meetings:    • Marital Status:    Intimate Partner Violence:    • Fear of Current or Ex-Partner:    • Emotionally Abused:    • Physically Abused:    • Sexually Abused:        Family History: History reviewed. No pertinent family history.    OBJECTIVE  Visit Vitals  BP (!) 158/72   Pulse (!) 48   Temp (!) 35.3 °C (95.6 °F) (Tympanic)   Resp (!) 28   Wt 86.2 kg (190 lb)   SpO2 95%     Temp (72hrs), Av.3 °C (95.6 °F), Min:35.3 °C (95.6 °F), Max:35.3 °C (95.6 °F)    Temp:  [35.3 °C (95.6 °F)] 35.3 °C (95.6 °F)  Heart Rate:  [38-57] 48  Resp:  [19-30] 28  BP: (111-158)/(53-72) 158/72    Physical Exam  Constitutional: Not in distress.   HENT: Dry mucous membranes.  Eyes: EOM are normal.   Neck: No JVD.   Cardiovascular: Regular rate and rhythm. No murmur.  Pulmonary/Chest: Normal effort and air entry. No crackles.  Abdominal: Normal bowel sounds. Epigastric tenderness. Soft, no distension. No rebound or guarding.   Musculoskeletal: No lower extremity edema.  Neurological: Alert and oriented to person, place, and time.   Skin: Skin is warm and dry.   Psychiatric: Normal mood, affect and behavior.    Recent Labs   Lab 21  1202      K 4.3   *   CO2 21*   BUN 44*   CREATININE 3.0*   CALCIUM 9.7   ALBUMIN 3.6   BILITOT 0.5   ALKPHOS 47   ALT 15*   AST  20   GLUCOSE 138*     Recent Labs   Lab 07/21/21  1202   TROPONINI 0.05*     Recent Labs   Lab 07/21/21  1202   WBC 14.47*   HGB 12.6*           Intake/Output Summary (Last 24 hours) at 7/21/2021 1510  Last data filed at 7/21/2021 1204  Gross per 24 hour   Intake 500 ml   Output --   Net 500 ml       Cardiology results  ECG :       Telemetry : SB 40-50 bpm.       Exercise nuclear stress test 2/18/16      Imaging:   CTA chest/abdomen 7/21/21  IMPRESSION:  1.  There is fluid and stranding throughout the peripancreatic fat predominantly  the region of the pancreatic head and neck.  Clinical correlation for acute  pancreatitis is advised.  There is dilatation of pancreatic duct throughout the  body and tail.  No obvious pancreatic mass is demonstrated.  An underlying  intraductal lesion is not excluded on the basis of this exam.  2.  Normal caliber thoracoabdominal aorta without evidence of dissection.  Repeat.  Additional incidental findings as described above.       Home medications  Not in a hospital admission.    Inpatient medications  • pantoprazole  40 mg intravenous q12h ROBERT       ASSESSMENT AND PLAN  80 y.o. male who is admitted for Acute pancreatitis, unspecified complication status, unspecified pancreatitis type [K85.90].   CARDIOLOGY is being consulted for bradycardia.     Sinus bradycardia  Assessment & Plan  Baseline HR 45-55 bpm. No history of syncope. Questionable exertional lightheadedness when taking stairs but not on ground level. HR went down to the 30s while in the ED associated with mild lightheadedness. BP has been stable. HR now in the 40-50s.     Suspect underlying sinus node dysfunction aggravated by increased vagal tone from abdominal pain.  No indication for pacemaker at this time. Continue to monitor on tele.  He might need pacemaker in the future if he develops symptoms from bradycardia.      Oni Carrillo MD  7/21/2021  3:10 PM

## 2021-07-22 NOTE — ASSESSMENT & PLAN NOTE
-Acute pancreatitis.  -MRI c/w hemorrhagic pancreatitis.   -Possible intraductal mucinous cyst.   -plan for outpatient EUS per GI   -continue low residue low-fat diet  CT AP showing moderate ascites, again also demonstrating abnormalities in the pancreas - cannot exclude collection  -White count remains elevated- will monitor closley w LR infusion. Monitor for any signs of fluid overload. Gi on board.

## 2021-07-22 NOTE — PLAN OF CARE
Problem: Adult Inpatient Plan of Care  Goal: Plan of Care Review  7/22/2021 0627 by Liliane Arce, RN  Outcome: Progressing  Flowsheets (Taken 7/22/2021 0627)  Outcome Summary: HR maintained in the 50s and 60s overnight and no c/o weakness, SOB or dizziness. Complaints of abdominal pain always 10/10 despite one time increased dose of Dilaudid. Receiving LR overnight

## 2021-07-22 NOTE — PROGRESS NOTES
Hospital Medicine Service -  Daily Progress Note       SUBJECTIVE   Interval History: MARGIEEON. Patient complaied of severe abdominal pain which improved with pain medications. He was trying to shut off his IVF overnight because the sound annoyed him. He denied any CP, NVD, fevers or chills.      OBJECTIVE      Vital signs in last 24 hours:  Temp:  [35.8 °C (96.5 °F)-36.8 °C (98.2 °F)] 36.5 °C (97.7 °F)  Heart Rate:  [46-66] 65  Resp:  [16-24] 16  BP: (139-201)/(58-95) 172/84  No intake or output data in the 24 hours ending 07/22/21 1432    PHYSICAL EXAMINATION      Physical Exam  Constitutional:       Appearance: He is well-developed.   HENT:      Head: Normocephalic.   Eyes:      Conjunctiva/sclera: Conjunctivae normal.   Cardiovascular:      Rate and Rhythm: Normal rate and regular rhythm.      Heart sounds: Normal heart sounds. No murmur heard.     Pulmonary:      Effort: Pulmonary effort is normal. No respiratory distress.      Breath sounds: Normal breath sounds. No wheezing.   Abdominal:      General: There is no distension.      Palpations: Abdomen is soft.      Tenderness: There is abdominal tenderness.   Skin:     General: Skin is warm and dry.   Neurological:      Mental Status: He is alert and oriented to person, place, and time.            LINES, CATHETERS, DRAINS, AIRWAYS, AND WOUNDS   Lines, Drains, and Airways:  Wounds (agree with documentation and present on admission):  Peripheral IV (Adult) 07/22/21 Anterior;Left Forearm (Active)   Number of days: 0         Comments:      LABS / IMAGING / TELE      Labs  Results from last 7 days   Lab Units 07/22/21  0536 07/21/21  2100 07/21/21  1202   WBC K/uL 24.34*  --  14.47*   HEMOGLOBIN g/dL 13.3* 12.5* 12.6*   HEMATOCRIT % 39.5* 39.0* 38.9*   PLATELETS K/uL 238  --  254     Results from last 7 days   Lab Units 07/22/21  0536 07/21/21  1202 07/21/21  1202   SODIUM mEQ/L 140  --  141   POTASSIUM mEQ/L 4.3  --  4.3   CHLORIDE mEQ/L 107  --  111*   CO2 mEQ/L  19*  --  21*   BUN mg/dL 43*  --  44*   CREATININE mg/dL 2.6*  --  3.0*   GLUCOSE mg/dL 153*   < > 138*   CALCIUM mg/dL 9.7  --  9.7    < > = values in this interval not displayed.           SARS-CoV-2 (COVID-19) (no units)   Date/Time Value   07/21/2021 1211 Negative       Imaging  CT ANGIOGRAPHY CHEST WITH AND WITHOUT IV CONTRAST    Result Date: 7/21/2021  CLINICAL HISTORY:Abrupt onset of severe lower abdominal pain with vomiting. Possible hematemesis. COMMENT:Helical CT angiography of the chest, abdomen and pelvis was performed on 7/21/2021 both pre- and post-administration of 150 cc of Omnipaque 350 contrast intravenously.  Thin section axial images were acquired during the arterial phase of contrast bolus administration.  Postprocessing was performed and maximum intensity projection reformations were created and reviewed. There are no previous studies for comparison CT DOSE:  One or more dose reduction techniques (e.g. automated exposure control, adjustment of the mA and/or kV according to patient size, use of iterative reconstruction technique) utilized for this examination. Noncontrast images demonstrate no evidence of mural hematoma throughout the thoracoabdominal aorta.  There is atheromatous calcification scattered throughout the thoracoabdominal aorta. The thoracoabdominal aorta is patent, normal in caliber and without dissection. The celiac artery, superior mesenteric artery and inferior mesenteric artery are patent and normal in caliber.  Bilateral common iliac, external iliac and proximal common femoral arteries are patent and normal in caliber. The visualized lower neck is unremarkable.  No axillary, mediastinal or hilar adenopathy is demonstrated.  Her is normal in size.  There is no pericardial effusion and there are no pleural effusions.  There are mild dependent hypoventilatory changes present within both lungs.  No focal consolidation or pleural effusion is demonstrated and there is no  pneumothorax.  Linear parenchymal density within the right upper lobe is consistent with scarring or platelike atelectasis.  Patchy parenchymal density within the lingula and left lower lobe is nonspecific or probably reflects subsegmental atelectasis.  No discrete parenchymal nodule or mass lesion is demonstrated within either lung. The liver and spleen are normal in size, contour and attenuation.  There is no intrahepatic biliary ductal dilatation and no focal hepatic mass lesions are demonstrated.  No radiopaque gallstones are demonstrated.  There is no pericholecystic inflammation. There is fluid and inflammatory stranding surrounding the pancreatic head and neck region as well as the duodenal sweep.  The pancreatic duct is quite dilated.  There are a few coarse calcifications contained throughout the pancreas.  Findings may be on the basis of chronic/prior pancreatitis.  An underlying pancreatic intraductal lesion is not excluded on the basis of this exam.  No obvious pancreatic mass is demonstrated.  Findings are likely on the basis of pancreatitis and clinical correlation is advised.  Underlying water there is a duodenal ulcer disease is not excluded.  There is no evidence of perforation or pneumoperitoneum. The kidneys are mildly atrophic bilaterally however demonstrate symmetric enhancement.  There are several too small to characterize subcentimeter round hypodense renal lesions present bilaterally which statistically likely reflect cysts.  There is a 3.3 cm parapelvic cyst arising from left kidney.  There is no hydronephrosis.  A 1.5 cm round hyperdense lesion arising from the mid to lower pole region of the right kidney anteriorly does not demonstrate significant enhancement and is most suggestive of a complex cyst. The small and large bowel is normal in caliber.  A few diverticula are scattered throughout the descending and sigmoid colon.  There is no diverticulitis.  The appendix is normal. No  intra-abdominal or intrapelvic adenopathy or ascites is demonstrated. The urinary bladder is partially distended with urine and is normal in contour. The patient is status post prostatectomy. There is a small fat-containing umbilical hernia.  No additional abdominal wall or inguinal hernias are demonstrated. Spondylotic and degenerative changes are present throughout the dorsal and lumbar spine.  No focal lytic or blastic lesions are demonstrated throughout the visualized skeleton.     IMPRESSION: 1.  There is fluid and stranding throughout the peripancreatic fat predominantly the region of the pancreatic head and neck.  Clinical correlation for acute pancreatitis is advised.  There is dilatation of pancreatic duct throughout the body and tail.  No obvious pancreatic mass is demonstrated.  An underlying intraductal lesion is not excluded on the basis of this exam. 2.  Normal caliber thoracoabdominal aorta without evidence of dissection. Repeat.  Additional incidental findings as described above.     CT ANGIOGRAPHY ABDOMEN PELVIS WITH AND WITHOUT IV CONTRAST    Result Date: 7/21/2021  CLINICAL HISTORY:Abrupt onset of severe lower abdominal pain with vomiting. Possible hematemesis. COMMENT:Helical CT angiography of the chest, abdomen and pelvis was performed on 7/21/2021 both pre- and post-administration of 150 cc of Omnipaque 350 contrast intravenously.  Thin section axial images were acquired during the arterial phase of contrast bolus administration.  Postprocessing was performed and maximum intensity projection reformations were created and reviewed. There are no previous studies for comparison CT DOSE:  One or more dose reduction techniques (e.g. automated exposure control, adjustment of the mA and/or kV according to patient size, use of iterative reconstruction technique) utilized for this examination. Noncontrast images demonstrate no evidence of mural hematoma throughout the thoracoabdominal aorta.  There is  atheromatous calcification scattered throughout the thoracoabdominal aorta. The thoracoabdominal aorta is patent, normal in caliber and without dissection. The celiac artery, superior mesenteric artery and inferior mesenteric artery are patent and normal in caliber.  Bilateral common iliac, external iliac and proximal common femoral arteries are patent and normal in caliber. The visualized lower neck is unremarkable.  No axillary, mediastinal or hilar adenopathy is demonstrated.  Her is normal in size.  There is no pericardial effusion and there are no pleural effusions.  There are mild dependent hypoventilatory changes present within both lungs.  No focal consolidation or pleural effusion is demonstrated and there is no pneumothorax.  Linear parenchymal density within the right upper lobe is consistent with scarring or platelike atelectasis.  Patchy parenchymal density within the lingula and left lower lobe is nonspecific or probably reflects subsegmental atelectasis.  No discrete parenchymal nodule or mass lesion is demonstrated within either lung. The liver and spleen are normal in size, contour and attenuation.  There is no intrahepatic biliary ductal dilatation and no focal hepatic mass lesions are demonstrated.  No radiopaque gallstones are demonstrated.  There is no pericholecystic inflammation. There is fluid and inflammatory stranding surrounding the pancreatic head and neck region as well as the duodenal sweep.  The pancreatic duct is quite dilated.  There are a few coarse calcifications contained throughout the pancreas.  Findings may be on the basis of chronic/prior pancreatitis.  An underlying pancreatic intraductal lesion is not excluded on the basis of this exam.  No obvious pancreatic mass is demonstrated.  Findings are likely on the basis of pancreatitis and clinical correlation is advised.  Underlying water there is a duodenal ulcer disease is not excluded.  There is no evidence of perforation or  pneumoperitoneum. The kidneys are mildly atrophic bilaterally however demonstrate symmetric enhancement.  There are several too small to characterize subcentimeter round hypodense renal lesions present bilaterally which statistically likely reflect cysts.  There is a 3.3 cm parapelvic cyst arising from left kidney.  There is no hydronephrosis.  A 1.5 cm round hyperdense lesion arising from the mid to lower pole region of the right kidney anteriorly does not demonstrate significant enhancement and is most suggestive of a complex cyst. The small and large bowel is normal in caliber.  A few diverticula are scattered throughout the descending and sigmoid colon.  There is no diverticulitis.  The appendix is normal. No intra-abdominal or intrapelvic adenopathy or ascites is demonstrated. The urinary bladder is partially distended with urine and is normal in contour. The patient is status post prostatectomy. There is a small fat-containing umbilical hernia.  No additional abdominal wall or inguinal hernias are demonstrated. Spondylotic and degenerative changes are present throughout the dorsal and lumbar spine.  No focal lytic or blastic lesions are demonstrated throughout the visualized skeleton.     IMPRESSION: 1.  There is fluid and stranding throughout the peripancreatic fat predominantly the region of the pancreatic head and neck.  Clinical correlation for acute pancreatitis is advised.  There is dilatation of pancreatic duct throughout the body and tail.  No obvious pancreatic mass is demonstrated.  An underlying intraductal lesion is not excluded on the basis of this exam. 2.  Normal caliber thoracoabdominal aorta without evidence of dissection. Repeat.  Additional incidental findings as described above.     X-RAY CHEST 1 VIEW    Result Date: 7/21/2021  CLINICAL HISTORY: Abdominal pain PRIOR STUDY:  CT chest, abdomen and pelvis dated the same day TECHNIQUE:  Erect portable image of the chest COMMENT:  The lungs are  clear.  The heart size is normal.  There is no obvious free intraperitoneal air.  The osseous and soft tissues are unremarkable.     IMPRESSION: No definite active disease.        ECG/Telemetry  I have independently reviewed the telemetry. No events for the last 24 hours.    ASSESSMENT AND PLAN      Anemia  Assessment & Plan  Chronic and at his baseline  Due to CKD  Hb stable    SIRS (systemic inflammatory response syndrome) (CMS/[facility])  Assessment & Plan  Noninfectious sirs 2/2 acute pancreatitis     Acute pancreatitis  Assessment & Plan  likely 2/2 etoh  Lipase >4000  The marginally elevated troponin is likely due to his renal failure rather than cardiac in origin - flat trend  CT c/f acute pancreatitis  - Aggressive IVF  - PPI  - Pain control  - Antiemetics  - Gi consult appreciated. Will consider repeat CT scan tomorrow depending on clinical course       Sinus bradycardia  Assessment & Plan  Chronic sinus melania worsened likely by increased vagal tone from pain  Cards consult appreciated     Prostate CA (CMS/[facility])  Assessment & Plan  outpt f/u    CKD (chronic kidney disease) stage 4, GFR 15-29 ml/min (CMS/[facility])  Assessment & Plan  Reviewed records, and his creatinine seems to be at baseline, c/w CKD 4  Avoid nephrotoxins and renally dose meds    Hypertension  Assessment & Plan  Restarted home meds 2/2 htn       VTE Assessment: Pad[place] VTE Score: 4  VTE Prophylaxis:  Current anticoagulants:  heparin (porcine) 5,000 unit/mL injection 5,000 Units, subcutaneous, q8h ROBERT      Code Status: Full Code      Estimated Discharge Date: 7/25/2021   Disposition Planning: from home     Amandeep Washington MD  7/22/2021             Spent more than 35 minutes with patient evaluation, discussion of current conditions with patient, RN and Case management, counseling and planning care.

## 2021-07-22 NOTE — PATIENT CARE CONFERENCE
Care Progression Rounds Note  Date: 7/22/2021  Time: 10:35 AM     Patient Name: Adalberto Galaviz     Medical Record Number: [id number]   YOB: 1940  Sex: Male      Room/Bed: 0362    Admitting Diagnosis: Sinus bradycardia [R00.1]  Acute pancreatitis, unspecified complication status, unspecified pancreatitis type [K85.90]   Admit Date/Time: 7/21/2021 11:55 AM    Primary Diagnosis: No Principal Problem: There is no principal problem currently on the Problem List. Please update the Problem List and refresh.  Principal Problem: No Principal Problem: There is no principal problem currently on the Problem List. Please update the Problem List and refresh.    GMLOS: pending  Anticipated Discharge Date: 7/25/2021    AM-PAC:  Mobility Score: 20    Discharge Planning:       Barriers to Discharge:  Barriers to Discharge: Medical issues not resolved, Consultant recommendations pending, Test pending    Participants:  , nursing, social work/services

## 2021-07-22 NOTE — PLAN OF CARE
Problem: Adult Inpatient Plan of Care  Goal: Plan of Care Review  Outcome: Not progressing  Flowsheets (Taken 7/22/2021 1421)  Progress: no change  Plan of Care Reviewed With:   patient   spouse  Outcome Summary: Pt requested to see RD regarding diet to follow after discharge. Pt and spouse provided with low fat diet education for his pancreatitis. Pt and wife appreciative of info provided. Pt may also benefit from cardiac diet with low fat diet (high BP and cardiac meds noted) when diet resumed. Need to watch BG levels prn.   Goal: Pt will tolerate >/=50-75%of his meals when diet advanced.  Recommendations:    1. Suggest clear liquids--> low fat diet when diet advanced.    2. Remain available if further questions arise regarding MNT provided today.    3. Monitor labs (BG, lytes, BP), weights and NPO status prn.

## 2021-07-22 NOTE — PROGRESS NOTES
GI Daily Progress Note           SUBJECTIVE    LOS: 1 day     Interval History: continued upper abd pain.  occ N/no V.  No F/c.    Review of Systems  All other systems were reviewed and are negative oither than as stated in the HPI   OBJECTIVE   Vital signs in last 24 hours:  Temp:  [35.3 °C (95.6 °F)-36.8 °C (98.2 °F)] 36.7 °C (98 °F)  Heart Rate:  [38-61] 61  Resp:  [18-30] 18  BP: (111-187)/(53-84) 184/84      Intake/Output Summary (Last 24 hours) at 7/22/2021 0719  Last data filed at 7/21/2021 1204  Gross per 24 hour   Intake 500 ml   Output --   Net 500 ml       Intake/Output this shift:  No intake/output data recorded.   Current Medications:  •  alum-mag hydroxide-simeth, 30 mL, oral, q4h PRN  •  atropine, 0.5 mg, intravenous, q5 min PRN  •  glucose, 16-32 g of dextrose, oral, PRN **OR** dextrose, 15-30 g of dextrose, oral, PRN **OR** glucagon, 1 mg, intramuscular, PRN **OR** dextrose in water, 25 mL, intravenous, PRN  •  HYDROmorphone, 0.25 mg, intravenous, q3h PRN  •  lactated ringer's, , intravenous, Continuous  •  nitroglycerin, 0.4 mg, sublingual, q5 min PRN  •  pantoprazole, 40 mg, intravenous, q12h ROBERT  •  prochlorperazine, 10 mg, intravenous, q6h PRN  •  sertraline, 25 mg, oral, q PM    Physical Exam  General appearance: alert, appears stated age and cooperative    Abdomen:ABDOMEN EX.soft, moderate upper abd tend/no change. Dec bs.  No mass/fullness.   No dante/guarding.       LABS & IMAGING   Labs  I have reviewed the past 24 hour labs    Results from last 7 days   Lab Units 07/22/21  0536 07/21/21  2100 07/21/21  1202   WBC K/uL 24.34*  --  14.47*   HEMOGLOBIN g/dL 13.3* 12.5* 12.6*   HEMATOCRIT % 39.5* 39.0* 38.9*   PLATELETS K/uL 238  --  254     Results from last 7 days   Lab Units 07/22/21  0536 07/21/21  1202   SODIUM mEQ/L 140 141   POTASSIUM mEQ/L 4.3 4.3   CHLORIDE mEQ/L 107 111*   CO2 mEQ/L 19* 21*   BUN mg/dL 43* 44*   CREATININE mg/dL 2.6* 3.0*   CALCIUM mg/dL 9.7 9.7   ALBUMIN g/dL 3.5  3.6   BILIRUBIN TOTAL mg/dL 0.5 0.5   ALK PHOS IU/L 50 47   ALT IU/L 14* 15*   AST IU/L 29 20   GLUCOSE mg/dL 153* 138*     Results from last 7 days   Lab Units 07/21/21  1202   INR  1.0           Imaging  I have reviewed the Imaging from the last 24 hrs.    CT ANGIOGRAPHY CHEST WITH AND WITHOUT IV CONTRAST    Result Date: 7/21/2021  IMPRESSION: 1.  There is fluid and stranding throughout the peripancreatic fat predominantly the region of the pancreatic head and neck.  Clinical correlation for acute pancreatitis is advised.  There is dilatation of pancreatic duct throughout the body and tail.  No obvious pancreatic mass is demonstrated.  An underlying intraductal lesion is not excluded on the basis of this exam. 2.  Normal caliber thoracoabdominal aorta without evidence of dissection. Repeat.  Additional incidental findings as described above.     CT ANGIOGRAPHY ABDOMEN PELVIS WITH AND WITHOUT IV CONTRAST    Result Date: 7/21/2021  IMPRESSION: 1.  There is fluid and stranding throughout the peripancreatic fat predominantly the region of the pancreatic head and neck.  Clinical correlation for acute pancreatitis is advised.  There is dilatation of pancreatic duct throughout the body and tail.  No obvious pancreatic mass is demonstrated.  An underlying intraductal lesion is not excluded on the basis of this exam. 2.  Normal caliber thoracoabdominal aorta without evidence of dissection. Repeat.  Additional incidental findings as described above.     X-RAY CHEST 1 VIEW    Result Date: 7/21/2021  IMPRESSION: No definite active disease.         ASSESSMENT & PLAN     Active Problems:    Hypertension    CKD (chronic kidney disease) stage 4, GFR 15-29 ml/min (CMS/HCC)    Prostate CA (CMS/HCC)    Abdominal pain    Sinus bradycardia    Acute pancreatitis    SIRS (systemic inflammatory response syndrome) (CMS/HCC)    Anemia    Abdominal pain      Assessment:    ? Spontaneous acute flare of etoh chronic pancreatitis.   Still w  moderate upper abd pain/tend.  AVSS   lfts nl.   Repeat lipase pending, was >4000.   Wbc 24.  Hg 13.3 stable.  Creat dec 3 to 2.6.  Npo/aggressive ivf w lactated ringers. Pain control.  Follow lytes/enzymes closely.  Possible repeat CT in 1-2 d pending clinical course.  mrcp at a later date to eval pancreatic ductal anatomy.     Ca/Tg nl.   Med list reviewed.   MD Cyril Lockhart MD  7/22/2021  7:19 AM

## 2021-07-22 NOTE — NURSING NOTE
Made attending aware of patient high blood pressures. Orders given and competed. Patient currently asymptomatic and laying in bed. Will continue to treat and monitor.

## 2021-07-22 NOTE — PLAN OF CARE
"  Problem: Adult Inpatient Plan of Care  Goal: Readiness for Transition of Care  Outcome: Progressing  Intervention: Mutually Develop Transition Plan  Flowsheets (Taken 7/22/2021 1341)  Anticipated Discharge Disposition: home/family member's home  Equipment Needed After Discharge: none  Discharge Coordination/Progress: Met with patient who was alert/ oriented x 3, pt report he is ind in adl/amb pta,lives with his supportive wife  in a 2 story home with 1 then 1 step to enter, has a powder room on the first floor.  Denies any issues securing doctor appointments or meds. Use [facility], No prior homecare, SNF or Acute Rehab stay. Patient does not monitor his BP at home, uually follows a low sodium, low sugar diet. Pt states \" I'll be fine when I go home, I just need to know about my diet, \" Informed dietician Aruna re above.   Patient denies any needs at dc, await medical stability for dc.  Assistive Device/Animal Currently Used at Home: none  Anticipated Changes Related to Illness: none  Concerns Comments: None voiced.  Transportation Concerns: car, none  Current Discharge Risk: chronically ill  Readmission Within the Last 30 Days: no previous admission in last 30 days  Patient/Family Anticipated Services at Transition: none  Patient/Family Anticipates Transition to: home with family  Transportation Anticipated: family or friend will provide  Concerns to be Addressed: denies needs/concerns at this time  Patient's Choice of Community Agency(s): Declines need     "

## 2021-07-22 NOTE — PROGRESS NOTES
CARDIOLOGY PROGRESS NOTE    INTERVAL HISTORY  Abdominal pain mildly improved. No lightheadedness.    14-point ROS was performed. Negative except HPI.    Allergies: Aspirin, Losartan, and Morphine    History  Medical History:   Past Medical History:   Diagnosis Date   • CKD (chronic kidney disease) stage 4, GFR 15-29 ml/min (CMS/HCC)    • DJD (degenerative joint disease)    • Hypertension    • Nocturia    • Prostate CA (CMS/HCC)    • Prostate CA (CMS/HCC)        Surgical History:   Past Surgical History:   Procedure Laterality Date   • PROSTATECTOMY         Social History:   Social History     Socioeconomic History   • Marital status:      Spouse name: None   • Number of children: None   • Years of education: None   • Highest education level: None   Occupational History   • None   Tobacco Use   • Smoking status: Former Smoker   • Smokeless tobacco: Never Used   Substance and Sexual Activity   • Alcohol use: Yes   • Drug use: Never   • Sexual activity: None   Other Topics Concern   • None   Social History Narrative   • None     Social Determinants of Health     Financial Resource Strain:    • Difficulty of Paying Living Expenses:    Food Insecurity:    • Worried About Running Out of Food in the Last Year:    • Ran Out of Food in the Last Year:    Transportation Needs:    • Lack of Transportation (Medical):    • Lack of Transportation (Non-Medical):    Physical Activity:    • Days of Exercise per Week:    • Minutes of Exercise per Session:    Stress:    • Feeling of Stress :    Social Connections:    • Frequency of Communication with Friends and Family:    • Frequency of Social Gatherings with Friends and Family:    • Attends Judaism Services:    • Active Member of Clubs or Organizations:    • Attends Club or Organization Meetings:    • Marital Status:    Intimate Partner Violence:    • Fear of Current or Ex-Partner:    • Emotionally Abused:    • Physically Abused:    • Sexually Abused:        Family History:  "History reviewed. No pertinent family history.    OBJECTIVE  Visit Vitals  BP (!) 199/95   Pulse 63   Temp 36.4 °C (97.5 °F) (Oral)   Resp 16   Ht 1.854 m (6' 1\")   Wt 91.2 kg (201 lb)   SpO2 95%   BMI 26.52 kg/m²     Temp (72hrs), Av.2 °C (97.2 °F), Min:35.3 °C (95.6 °F), Max:36.8 °C (98.2 °F)    Temp:  [35.3 °C (95.6 °F)-36.8 °C (98.2 °F)] 36.4 °C (97.5 °F)  Heart Rate:  [38-63] 63  Resp:  [16-30] 16  BP: (111-199)/(53-95) 199/95    Physical Exam  Constitutional: Not in distress.   HENT: Dry mucous membranes.  Eyes: EOM are normal.   Neck: No JVD.   Cardiovascular: Regular rate and rhythm. No murmur.  Pulmonary/Chest: Normal effort and air entry. No crackles.  Abdominal: Normal bowel sounds. Epigastric tenderness. Soft, no distension. No rebound or guarding.   Musculoskeletal: No lower extremity edema.  Neurological: Alert and oriented to person, place, and time.   Skin: Skin is warm and dry.   Psychiatric: Normal mood, affect and behavior.    Recent Labs   Lab 21  0536 21  1202    141   K 4.3 4.3    111*   CO2 19* 21*   BUN 43* 44*   CREATININE 2.6* 3.0*   CALCIUM 9.7 9.7   ALBUMIN 3.5 3.6   BILITOT 0.5 0.5   ALKPHOS 50 47   ALT 14* 15*   AST 29 20   GLUCOSE 153* 138*     Recent Labs   Lab 21  2100 21  1202   TROPONINI 0.05* 0.05*     Recent Labs   Lab 21  0536 21  2100 21  1202   WBC 24.34*  --  14.47*   HGB 13.3* 12.5* 12.6*     --  254        Intake/Output Summary (Last 24 hours) at 2021 1100  Last data filed at 2021 1204  Gross per 24 hour   Intake 500 ml   Output --   Net 500 ml       Cardiology results  ECG :       Telemetry : SB 40-50 bpm.       Exercise nuclear stress test 16      Imaging:   CTA chest/abdomen 21  IMPRESSION:  1.  There is fluid and stranding throughout the peripancreatic fat predominantly  the region of the pancreatic head and neck.  Clinical correlation for acute  pancreatitis is advised.  There is " dilatation of pancreatic duct throughout the  body and tail.  No obvious pancreatic mass is demonstrated.  An underlying  intraductal lesion is not excluded on the basis of this exam.  2.  Normal caliber thoracoabdominal aorta without evidence of dissection.  Repeat.  Additional incidental findings as described above.       Home medications  •  amLODIPine, Take 10 mg by mouth daily.  •  APPLE CIDER VINEGAR ORAL, Take 1 tablet by mouth daily.    •  cod liver oil, Take 1 tablet by mouth daily.    •  coenzyme Q10, Take 100 mg by mouth daily.  •  docosahexaenoic acid/epa (FISH OIL ORAL), Take 1 capsule by mouth daily.    •  glucosamine-chondroitin, Take 1 tablet by mouth daily.    •  lutein, Take 10 mg by mouth daily.  •  OREGANO OIL ORAL, Take 1 capsule by mouth daily.  •  sertraline, Take 25 mg by mouth every evening.      Inpatient medications  • pantoprazole  40 mg intravenous q12h ROBERT   • sertraline  25 mg oral q PM       ASSESSMENT AND PLAN  80 y.o. male who is admitted for Sinus bradycardia [R00.1]  Acute pancreatitis, unspecified complication status, unspecified pancreatitis type [K85.90].   CARDIOLOGY is being consulted for bradycardia.     Sinus bradycardia  Assessment & Plan  Baseline HR 45-55 bpm. No history of syncope. Questionable exertional lightheadedness when taking stairs but not on ground level. HR went down to the 30s while in the ED associated with mild lightheadedness. BP has been stable.    Chronic asymptomatic sinus bradycardia aggravated by increased vagal tone from abdominal pain.  No indication for pacemaker at this time. Continue to monitor on tele.  HR improved to 60-80 on monitor.    Hypertension  Assessment & Plan  Markedly elevated /95. Suspect partly due to pain. NPO and not receiving amlodipine.  Continue pain control. Resume amlodipine when able.      Oni Carrillo MD  7/22/2021  11:00 AM

## 2021-07-22 NOTE — ASSESSMENT & PLAN NOTE
BP stable/mildly elevated  Continue low dose Toprol.  If bp remains elevated, can add Amlodipine in future.

## 2021-07-23 PROBLEM — R06.02 SHORTNESS OF BREATH: Status: ACTIVE | Noted: 2021-01-01

## 2021-07-23 PROBLEM — R65.10 SIRS (SYSTEMIC INFLAMMATORY RESPONSE SYNDROME) (CMS/HCC): Status: RESOLVED | Noted: 2021-01-01 | Resolved: 2021-01-01

## 2021-07-23 NOTE — PROGRESS NOTES
GI Daily Progress Note           SUBJECTIVE    LOS: 2 days     Interval History: mild decrease in upper abd pain.  occ mild N/no v. No f/c.  + diaphoresis.  + flatus.      Review of Systems  All other systems were reviewed and are negative oither than as stated in the HPI   OBJECTIVE   Vital signs in last 24 hours:  Temp:  [36.3 °C (97.3 °F)-36.7 °C (98 °F)] 36.7 °C (98 °F)  Heart Rate:  [60-73] 69  Resp:  [16] 16  BP: (172-201)/(73-95) 172/77      Intake/Output Summary (Last 24 hours) at 7/23/2021 0744  Last data filed at 7/23/2021 0600  Gross per 24 hour   Intake 2800 ml   Output --   Net 2800 ml       Intake/Output this shift:  No intake/output data recorded.   Current Medications:  •  alum-mag hydroxide-simeth, 30 mL, oral, q4h PRN  •  amLODIPine, 10 mg, oral, Daily  •  atropine, 0.5 mg, intravenous, q5 min PRN  •  glucose, 16-32 g of dextrose, oral, PRN **OR** dextrose, 15-30 g of dextrose, oral, PRN **OR** glucagon, 1 mg, intramuscular, PRN **OR** dextrose in water, 25 mL, intravenous, PRN  •  heparin (porcine), 5,000 Units, subcutaneous, q8h ROBERT  •  hydrALAZINE, 25 mg, oral, q8h PRN  •  HYDROmorphone, 0.25 mg, intravenous, q3h PRN  •  lactated ringer's, , intravenous, Continuous  •  nitroglycerin, 0.4 mg, sublingual, q5 min PRN  •  pantoprazole, 40 mg, intravenous, q12h ROBERT  •  prochlorperazine, 10 mg, intravenous, q6h PRN  •  sertraline, 25 mg, oral, q PM    Physical Exam  General appearance: alert, appears stated age and cooperative    Abdomen:ABDOMEN EXAM: Soft, mod upper abd-tender, normal bowel sounds; no bruits, organomegaly or masses.       LABS & IMAGING   Labs  I have reviewed the past 24 hour labs    Results from last 7 days   Lab Units 07/23/21  0633 07/22/21  0536 07/21/21  2100 07/21/21  1202 07/21/21  1202   WBC K/uL 28.93* 24.34*  --   --  14.47*   HEMOGLOBIN g/dL 11.7* 13.3* 12.5*   < > 12.6*   HEMATOCRIT % 35.3* 39.5* 39.0*   < > 38.9*   PLATELETS K/uL 170 238  --   --  254    < > = values  in this interval not displayed.     Results from last 7 days   Lab Units 07/22/21  0536 07/21/21  1202   SODIUM mEQ/L 140 141   POTASSIUM mEQ/L 4.3 4.3   CHLORIDE mEQ/L 107 111*   CO2 mEQ/L 19* 21*   BUN mg/dL 43* 44*   CREATININE mg/dL 2.6* 3.0*   CALCIUM mg/dL 9.7 9.7   ALBUMIN g/dL 3.5 3.6   BILIRUBIN TOTAL mg/dL 0.5 0.5   ALK PHOS IU/L 50 47   ALT IU/L 14* 15*   AST IU/L 29 20   GLUCOSE mg/dL 153* 138*     Results from last 7 days   Lab Units 07/21/21  1202   INR  1.0           Imaging  I have reviewed the Imaging from the last 24 hrs.    CT ANGIOGRAPHY CHEST WITH AND WITHOUT IV CONTRAST    Result Date: 7/21/2021  IMPRESSION: 1.  There is fluid and stranding throughout the peripancreatic fat predominantly the region of the pancreatic head and neck.  Clinical correlation for acute pancreatitis is advised.  There is dilatation of pancreatic duct throughout the body and tail.  No obvious pancreatic mass is demonstrated.  An underlying intraductal lesion is not excluded on the basis of this exam. 2.  Normal caliber thoracoabdominal aorta without evidence of dissection. Repeat.  Additional incidental findings as described above.     CT ANGIOGRAPHY ABDOMEN PELVIS WITH AND WITHOUT IV CONTRAST    Result Date: 7/21/2021  IMPRESSION: 1.  There is fluid and stranding throughout the peripancreatic fat predominantly the region of the pancreatic head and neck.  Clinical correlation for acute pancreatitis is advised.  There is dilatation of pancreatic duct throughout the body and tail.  No obvious pancreatic mass is demonstrated.  An underlying intraductal lesion is not excluded on the basis of this exam. 2.  Normal caliber thoracoabdominal aorta without evidence of dissection. Repeat.  Additional incidental findings as described above.     X-RAY CHEST 1 VIEW    Result Date: 7/21/2021  IMPRESSION: No definite active disease.         ASSESSMENT & PLAN     Active Problems:    Hypertension    CKD (chronic kidney disease) stage 4,  GFR 15-29 ml/min (CMS/HCC)    Prostate CA (CMS/HCC)    Abdominal pain    Sinus bradycardia    Acute pancreatitis    SIRS (systemic inflammatory response syndrome) (CMS/HCC)    Anemia    Abdominal pain      Assessment:     ? Acute flare of chronic etoh pancreatitis.  Some improvement in last 24 hrs.   Afebrile. Inc SBP-treatment per [facility].   Wbc 28.  lfts nl.  Repeat lipase.  Creat dec to 2.6.  Hg 11.7.   Ca and TG nl at admission. Med list reviewed.  No sig recent etoh.   Will need mri/mrcp prior to d/c or as outpt.   T/c repeat CT within the next 24hrs pending clinical course.  Clears/aggressive ivf   Pain control.   Follow lytes/renal fxn/cbc closely   MD Cyril Lockhart MD  7/23/2021  7:44 AM

## 2021-07-23 NOTE — PLAN OF CARE
Problem: Adult Inpatient Plan of Care  Goal: Readiness for Transition of Care  Outcome: Progressing  Intervention: Mutually Develop Transition Plan  Flowsheets (Taken 7/23/2021 2256)  Discharge Coordination/Progress: In patient care rounds today informed pt has intermittent confusion. Phoned pt's wife Cecilio who confirmed all the information the patient provided on admission.  Wife reports pt  only has 2 glasses of wine sometimes on the weekends, wife denies any alcohol abuse issues.  Await medical stability for dc,will continue to follow up for any DC needs,

## 2021-07-23 NOTE — ASSESSMENT & PLAN NOTE
Secondary to pancreatitis.  Improving. Tolerates po.  MRI reviewed.   Will discuss at length w GI in am.

## 2021-07-23 NOTE — PROGRESS NOTES
"Patient:  Adalberto Galaviz  Location:  [facility] 3C 0362  MRN:  [id number]  Today's date:  7/23/2021      SPEECH PATHOLOGY EVALUATION:     SLP Diagnosis: No overt ss/x of aspiration identified with thin liquids - currently on CLD.    Recommendations:  1. Continue with thin liquids/ remain on clear liquids per MD.  Anticipate once cleared, he would be able to tolerate his baseline po diet.  2. Maintain aspiration precautions.  3. If SOB worsens or CXR concerning for aspiration, may reinstate NPO orders with SLP to follow.    Summary/Impressions:  Daily Outcome Statement: SLP completed dysphagia evaluation per consult.  Pt p/w acute onset of abdominal pain, nausea and vomiting, GI suspected in possible s/o acute flare to his chronic pancreatitis?  Pt has been cleared for clear liquids per MD.  Pt's RN messaged re: concern of coughing on liquids earlier today.  Pt was received asleep however he easily woke up and reporting to have improvement in his abdominal pain since his admission, although still present.  Trials were limited as pt was tired, however also on CLD.  With trials of thin water via straw, there were no overt ss/x of aspiration identified including imm. clear VQ.  At time, will continue to recommend thin liquids however if coughing is further noted then advised to downgrade to NTL vs make NPO as needed w/ SLP to follow.     Chief Complaint   Patient presents with   • Vomiting Blood     Clinical Course: This 80 y.o. male was admitted 7/21/2021 with Sinus bradycardia [R00.1]  Acute pancreatitis, unspecified complication status, unspecified pancreatitis type [K85.90].     Patient complained of sudden onset of severe abdominal pain while @ his urology Md appointment.     Reason for Consult: To assess swallow function.    \"Able to tolerate po with no worsening of pain. Has HTN - still uncontrolled. This afternoon he developed SOB. CXR pending. SLP ordered. Fluids held. Discussed with wife\". "     Pertinent Radiology Results:   CXR:  IMPRESSION: Minimal lower lobe airspace disease which may represent atelectasis  or pneumonia.    Past Medical History:   Diagnosis Date   • CKD (chronic kidney disease) stage 4, GFR 15-29 ml/min (CMS/HCC)    • DJD (degenerative joint disease)    • Hypertension    • Nocturia    • Prostate CA (CMS/HCC)    • Prostate CA (CMS/HCC)      Past Surgical History:   Procedure Laterality Date   • PROSTATECTOMY         Allergies   Allergen Reactions   • Aspirin Nausea And Vomiting and GI intolerance          • Losartan Other (see comments)     Loss of balance   • Morphine GI intolerance                Results from last 7 days   Lab Units 07/23/21  0633 07/22/21  0536 07/21/21  2100 07/21/21  1202 07/21/21  1202   WBC K/uL 28.93* 24.34*  --   --  14.47*   HEMOGLOBIN g/dL 11.7* 13.3* 12.5*   < > 12.6*   HEMATOCRIT % 35.3* 39.5* 39.0*   < > 38.9*   PLATELETS K/uL 170 238  --   --  254    < > = values in this interval not displayed.          Baseline Diet/Method of Nutritional Intake: thin liquids (Level 0), regular solids (Level 7), no diet restrictions  Current Diet: (See below)       Dietary Orders   (From admission, onward)             Start     Ordered    07/23/21 1206  Adult Diet Clear Liquids; 2gm Sodium; RD/LDN may adjust order  Diet effective now     Question Answer Comment   Diet Texture Clear Liquids    Sodium Restriction: 2gm Sodium    Delegation of Authority. Diet orders written by PA/CRNPs may not be adjusted by RD/LDNs. RD/LDN may adjust order        07/23/21 1206                      Patient left with call bell in reach and alarms as found.   No notes on file          Prior Level of Function      Most Recent Value   Eating  independent   Communication  understands/communicates without difficulty   Swallowing  swallows foods/liquids without difficulty   Baseline Diet/Method of Nutritional Intake  thin liquids (Level 0), regular solids (Level 7), no diet restrictions   Past  History of Dysphagia  denies h/o dysphagia.   Assistive Device Currently Used at Home  none          SLP Evaluation and Treatment - 07/23/21 1420        SLP Time Calculation    Start Time  1620     Stop Time  1629     Time Calculation (min)  9 min        Session Details    Document Type  initial evaluation     Mode of Treatment  individual therapy;speech language pathology        General Information    Patient Profile Reviewed  yes     Onset of Illness/Injury or Date of Surgery  07/21/21     Referring Physician  Dr. Rodriguez     Patient/Family/Caregiver Comments/Observations  per nurse, coughing earlier with liquids.     General Observations of Patient  patient sleeping however easily rousable.     Existing Precautions/Restrictions  oxygen therapy device and L/min;fall     Limitations/Impairments  swallowing        Cognition/Psychosocial    Affect/Mental Status (Cognition)  low arousal/lethargic     Orientation Status (Cognition)  oriented x 3        Dentition (Oral Motor)    Dentition (Oral Motor)  natural dentition        Facial Symmetry (Oral Motor)    Facial Symmetry (Oral Motor)  WNL        Lip Function (Oral Motor)    Lip Range of Motion (Oral Motor)  WNL        Tongue Function (Oral Motor)    Tongue ROM (Oral Motor)  WNL        Vocal Quality/Secretion Management (Oral Motor)    Vocal Quality (Oral Motor)  WFL     Secretion Management (Oral Motor)  WNL        Functional Communication Measures    FCM: Swallowing  6-->Level 6        General Swallowing Observations    Current Diet/Method of Nutritional Intake  thin liquids (Level 0)    CLEAR LIQUIDS.    Signs/Symptoms of Aspiration (Current Diet)  none     Respiratory Support (General Swallowing Observations)  supplemental oxygen;nasal cannula     Comment, Secretions/Suctioning  unremarkable        Food and Liquid Trials (NIS)    Patient Positioning  HOB elevated (specify degrees)     Oral Intake/Feeding Performance  independent/appropriate self-feeding skills      Liquid Consistencies Evaluated  thin liquids (Level 0)     Thin Liquids (Level 0)  intact;straw sips     Comment, Thin Liquids (Level 0)  no overt ss/x of aspiration via straw sips however somewhat limited d/t lethargy.     Food Consistencies Evaluated  other (see comments)    deferred d/t GI restrictions.    Oral Preparatory Phase of Swallow  WFL     Pharyngeal Phase of Swallow  no clinical symptoms     Esophageal Phase of Swallow  no clinical symptoms        Swallowing Recommendations    Diet Consistency Recommendations  thin liquids (Level 0)     Medication Administration  whole with liquid     Supervision Level for Intake  distant supervision needed        AM-PAC (TM) - Cognition (Current Function)    Following/understanding a 10-15 minute speech or presentation?  4 - None     Understanding familiar people during ordinary conversations?  4 - None     Remembering to take medications at the appropriate time?  3 - A little     Remembering where things were placed or put away?  3 - A little     Remembering a list of 3 or 4 errands without writing it down?  3 - A little     Taking care of complicated tasks?  3 - A little     AM-PAC (TM) Cognition Score  20        SLP Goals    Swallowing Goal Selection  oral nutrition/hydration, SLP goal (free text)                  Education Documentation  No documentation found.        SLP Assessment/Plan      Most Recent Value   Daily Outcome Statement  SLP completed dysphagia evaluation per consult.  Pt p/w acute onset of abdominal pain, nausea and vomiting, GI suspects in s/o acute pancreatitis.  Pt has been cleared for clear liquids per MD.  Pt's RN messaged re: concern of coughing on liquids earlier today.  Pt received sleeping however he easily woke up, reporting improvement in his pain since admission.  Trials were limited as pt was tired however also on CLD.  With trials of thin water via straw, there were no overt ss/x of aspiration identified including imm. clear VQ.  At  time, will continue to recommend thin liquids however if coughing noted then RN advised to downgrade to NTL as needed w/ SLP to follow.  at 07/23/2021 1420   SLP Diagnosis  No overt ss/x of aspiration identified with thin liquids - currently on CLD. at 07/23/2021 1420   Rehab Potential  good, to achieve stated therapy goals at 07/23/2021 1420   Therapy Frequency  2-3 times/wk at 07/23/2021 1420   Planned Therapy Interventions  dysphagia therapy at 07/23/2021 1420   Patient/Family Therapy Goal Statement  to take his nap at 07/23/2021 1420          SLP Goals      Most Recent Value   Oral Nutrition/Hydration Goal   Oral Nutrition/Hydration Goal  Pt will consume LRD w/o overt ss/x of aspiration. at 07/23/2021 1420   Time Frame  long-term goal (LTG), by discharge at 07/23/2021 1420

## 2021-07-23 NOTE — ASSESSMENT & PLAN NOTE
-Multifactorial: volume overload + abdominal distension may contribute +/-atelectasis or pneumonia  -Doubt PE as already on DVT ppx and not hypoxic, no chest pain. Not easy to screen for PE considering Cr ~ 2.7 with CT scan in case indicated.    PLAN:  - Incentive spirometry.  - continue ceftriaxone for short course abx * day 2/5  - lasix 40 iv and follow-up UOP/ may need to give more.

## 2021-07-23 NOTE — PATIENT CARE CONFERENCE
Care Progression Rounds Note  Date: 7/23/2021  Time: 11:03 AM     Patient Name: Adalberto Galaviz     Medical Record Number: [id number]   YOB: 1940  Sex: Male      Room/Bed: 0362    Admitting Diagnosis: Sinus bradycardia [R00.1]  Acute pancreatitis, unspecified complication status, unspecified pancreatitis type [K85.90]   Admit Date/Time: 7/21/2021 11:55 AM    Primary Diagnosis: No Principal Problem: There is no principal problem currently on the Problem List. Please update the Problem List and refresh.  Principal Problem: No Principal Problem: There is no principal problem currently on the Problem List. Please update the Problem List and refresh.    GMLOS: pending  Anticipated Discharge Date: 7/26/2021    AM-PAC:  Mobility Score: 20    Discharge Planning:  Anticipated Discharge Disposition: home/family member's home    Barriers to Discharge:  Barriers to Discharge: Medical issues not resolved    Participants:  , nursing, social work/services

## 2021-07-23 NOTE — PLAN OF CARE
Problem: Adult Inpatient Plan of Care  Goal: Plan of Care Review  Outcome: Progressing  Flowsheets (Taken 7/23/2021 2530)  Progress: improving  Outcome Summary: Dysphagia evaluation completed- Refer to progress note.  SLP to follow.

## 2021-07-23 NOTE — PROGRESS NOTES
"   Hospital Medicine Service -  Daily Progress Note       SUBJECTIVE   Interval History: patient with complaint of mid abdominal pain. Improved. Able to tolerate po with no worsening of pain. Has HTN - still uncontrolled. This afternoon he developed SOB. CXR pending. SLP ordered. Fluids held. Discussed with wife.      OBJECTIVE      Vital signs in last 24 hours:  Temp:  [36.4 °C (97.5 °F)-37.3 °C (99.2 °F)] 36.8 °C (98.3 °F)  Heart Rate:  [63-92] 81  Resp:  [16] 16  BP: (172-189)/(73-90) 189/90    Intake/Output Summary (Last 24 hours) at 7/23/2021 1539  Last data filed at 7/23/2021 1300  Gross per 24 hour   Intake 3400 ml   Output --   Net 3400 ml       PHYSICAL EXAMINATION      Physical Exam examined patient at 10 am.   Visit Vitals  BP (!) 189/90 (BP Location: Left upper arm, Patient Position: Lying)   Pulse 81   Temp 36.8 °C (98.3 °F) (Oral)   Resp 16   Ht 1.854 m (6' 1\")   Wt 91.2 kg (201 lb)   SpO2 93%   BMI 26.52 kg/m²     General:  Awake, alert.  No acute distress.  HEENT:  Sclerae anicteric.  Moist mucus membranes.   Lungs:  Clear to ascultation bilaterally.  No wheezes, rales, or ronchi.  Cardiovascular:  Regular rate and rhythm.  No murmurs.  Abdomen:  Soft, mildly tender on palpation, non distended.  Positive bowel sounds.  :NA  Extremities:  No edema bilaterally.  Neuro: Non-focal.  Walking and moving all ext.  Skin: pale  Psych: A/Ox3; full range affect     LABS / IMAGING / TELE      Labs  CBC   CBC Results       07/23/21 07/22/21 07/21/21                    0633 0536 2100         WBC 28.93 24.34 --         RBC 3.87 4.29 --         HGB 11.7 13.3 12.5         HCT 35.3 39.5 39.0         MCV 91.2 92.1 --         MCH 30.2 31.0 --         MCHC 33.1 33.7 --          238 --                      BMP or CMP   BMP Results       07/23/21 07/22/21 07/21/21                    0903 0536 1202          140 141         K 3.7 4.3 4.3         Cl 107 107 111         CO2 21 19 21         Glucose 125 153 138   "       BUN 35 43 44         Creatinine 2.4 2.6 3.0         Calcium 9.1 9.7 9.7         Anion Gap 10 14 9         EGFR 26.0 23.7 20.1         Comment for K at 1202 on 07/21/21: Results obtained on plasma. Plasma Potassium values may be up to 0.4 mEQ/L less than serum values. The differences may be greater for patients with high platelet or white cell counts.        CMP Results       07/23/21 07/22/21 07/21/21                    0903 0536 1202          140 141         K 3.7 4.3 4.3         Cl 107 107 111         CO2 21 19 21         Glucose 125 153 138         BUN 35 43 44         Creatinine 2.4 2.6 3.0         Calcium 9.1 9.7 9.7         Anion Gap 10 14 9         AST -- 29 20         ALT -- 14 15         Albumin -- 3.5 3.6         EGFR 26.0 23.7 20.1         Comment for K at 1202 on 07/21/21: Results obtained on plasma. Plasma Potassium values may be up to 0.4 mEQ/L less than serum values. The differences may be greater for patients with high platelet or white cell counts.           INR Results from last 7 days   Lab Units 07/21/21  1202   INR  1.0          Imaging  No results found.      ECG/Telemetry  I have independently reviewed the telemetry. No events for the last 24 hours.    ASSESSMENT AND PLAN      Shortness of breath  Assessment & Plan  Due to pulmonary edema or aspiration  Check CXR  Check SLP eval  Hold IVF  Reassess  As needed albuterol.       Anemia  Assessment & Plan  Chronic and at his baseline  Due to CKD  Hb stable    Acute pancreatitis  Assessment & Plan  cute on chronic pancreatitis.  Per family not drinking much.   Plan to check MRCP/ MRI prior to DC.   If not better clinically by tomorrow will discuss with Gi.  For now lipase trending down nicely.     Sinus bradycardia  Assessment & Plan  Chronic sinus melania worsened likely by increased vagal tone from pain  Cards consult appreciated     Abdominal pain  Assessment & Plan  Secondary to pancreatitis.  Improving. Tolerates po.  Consider MRI/  MRCP prior to DC or outpatient.         Prostate CA (CMS/HCC)  Assessment & Plan  outpt f/u    CKD (chronic kidney disease) stage 4, GFR 15-29 ml/min (CMS/HCC)  Assessment & Plan  Reviewed records, and his creatinine seems to be at baseline, c/w CKD 4  Avoid nephrotoxins and renally dose meds    Hypertension  Assessment & Plan  Restarted home meds 2/2 htn  Hold IVF (NSS @250/ hr) especially pending CXR for evaluation of SOB. ? Pulmonary edema.   Control pain  As needed hydral.        VTE Assessment: Padua VTE Score: 4  VTE Prophylaxis Plan: heparin  Code Status: Full Code  Estimated Discharge Date: 7/26/2021  Disposition Planning: pending improvement in pain/ tele     Keyla Rodriguez MD  7/23/2021

## 2021-07-24 PROBLEM — R06.02 SHORTNESS OF BREATH: Status: RESOLVED | Noted: 2021-01-01 | Resolved: 2021-01-01

## 2021-07-24 NOTE — PROGRESS NOTES
"   Hospital Medicine Service -  Daily Progress Note       SUBJECTIVE   Interval History:Abdominal pain improved. MRI reviewed. Hemorrhagic pancreatitis. Discussed w GI. Plan to watch for now. Resumed clears.       OBJECTIVE      Vital signs in last 24 hours:  Temp:  [36.5 °C (97.7 °F)-37.1 °C (98.7 °F)] 36.7 °C (98.1 °F)  Heart Rate:  [71-89] 89  Resp:  [16-20] 20  BP: (132-207)/() 182/84    Intake/Output Summary (Last 24 hours) at 7/24/2021 1715  Last data filed at 7/24/2021 1000  Gross per 24 hour   Intake 600 ml   Output --   Net 600 ml       PHYSICAL EXAMINATION      Physical Exam examined patient at 10 am.   Visit Vitals  BP (!) 182/84 (BP Location: Left upper arm, Patient Position: Lying)   Pulse 89   Temp 36.7 °C (98.1 °F) (Oral)   Resp 20   Ht 1.854 m (6' 1\")   Wt 91.4 kg (201 lb 9.6 oz)   SpO2 93%   BMI 26.60 kg/m²     General:  Awake, alert.  No acute distress.  HEENT:  Sclerae anicteric.  Moist mucus membranes.   Lungs:  Clear to ascultation bilaterally.  No wheezes, rales, or ronchi.  Cardiovascular:  Regular rate and rhythm.  No murmurs.  Abdomen:  Soft, less tender on palpation, non distended.  Positive bowel sounds.  :NA  Extremities:  No edema bilaterally.  Neuro: Non-focal.  Walking and moving all ext.  Skin: pale  Psych: A/Ox3; full range affect     LABS / IMAGING / TELE      Labs  CBC   CBC Results       07/23/21 07/22/21 07/21/21                    0633 0536 2100         WBC 28.93 24.34 --         RBC 3.87 4.29 --         HGB 11.7 13.3 12.5         HCT 35.3 39.5 39.0         MCV 91.2 92.1 --         MCH 30.2 31.0 --         MCHC 33.1 33.7 --          238 --                      BMP or CMP   BMP Results       07/23/21 07/22/21 07/21/21                    0903 0536 1202          140 141         K 3.7 4.3 4.3         Cl 107 107 111         CO2 21 19 21         Glucose 125 153 138         BUN 35 43 44         Creatinine 2.4 2.6 3.0         Calcium 9.1 9.7 9.7         Anion Gap 10 " 14 9         EGFR 26.0 23.7 20.1         Comment for K at 1202 on 07/21/21: Results obtained on plasma. Plasma Potassium values may be up to 0.4 mEQ/L less than serum values. The differences may be greater for patients with high platelet or white cell counts.        CMP Results       07/23/21 07/22/21 07/21/21                    0903 0536 1202          140 141         K 3.7 4.3 4.3         Cl 107 107 111         CO2 21 19 21         Glucose 125 153 138         BUN 35 43 44         Creatinine 2.4 2.6 3.0         Calcium 9.1 9.7 9.7         Anion Gap 10 14 9         AST -- 29 20         ALT -- 14 15         Albumin -- 3.5 3.6         EGFR 26.0 23.7 20.1         Comment for K at 1202 on 07/21/21: Results obtained on plasma. Plasma Potassium values may be up to 0.4 mEQ/L less than serum values. The differences may be greater for patients with high platelet or white cell counts.           INR Results from last 7 days   Lab Units 07/21/21  1202   INR  1.0          Imaging  X-RAY CHEST 1 VIEW    Result Date: 7/23/2021  CLINICAL HISTORY: Shortness of breath PRIOR STUDY:  Chest x-ray dated 7/21/2021 TECHNIQUE:  Frontal image of the chest COMMENT:  There is minimal lower lobe airspace disease perhaps slightly more prominent on the right compared to previous..  No significant effusion or pneumothorax.  There is mild stable cardiomegaly.  The osseous and soft tissues are grossly unremarkable.     IMPRESSION: Minimal lower lobe airspace disease which may represent atelectasis or pneumonia.        ECG/Telemetry  I have independently reviewed the telemetry. No events for the last 24 hours.    ASSESSMENT AND PLAN      Anemia  Assessment & Plan  Chronic and at his baseline  Due to CKD  Hb stable    Acute pancreatitis  Assessment & Plan  Acute on chronic pancreatitis.  Per family not drinking much.   MRI c/w hemorrhagic pancreatitis.   Etiology still unclear. Plan to discuss tomorrow w GI.     Sinus bradycardia  Assessment &  Plan  Chronic sinus melania worsened likely by increased vagal tone from pain      Abdominal pain  Assessment & Plan  Secondary to pancreatitis.  Improving. Tolerates po.  MRI reviewed.   Will discuss at length w GI in am.       Prostate CA (CMS/HCC)  Assessment & Plan  outpt f/u    CKD (chronic kidney disease) stage 4, GFR 15-29 ml/min (CMS/HCC)  Assessment & Plan  Reviewed records, and his creatinine seems to be at baseline 2.5-3, c/w CKD 4  Avoid nephrotoxins and renally dose meds    Hypertension  Assessment & Plan  Control pain  Nifedipine ER 30 hs and 60 am  Assess tomorrow if possible to dc ivf       VTE Assessment: Padua VTE Score: 4  VTE Prophylaxis Plan: heparin  Code Status: Full Code  Estimated Discharge Date: 7/26/2021  Disposition Planning: tele     Keyla Rodriguez MD  7/24/2021

## 2021-07-24 NOTE — PROGRESS NOTES
GI Daily Progress Note           SUBJECTIVE    LOS: 3 days     Interval History: Patient reporting worsening pain (but appears to be resting comfortably in bed)     Review of Systems  All other systems were reviewed and are negative oither than as stated in the HPI   OBJECTIVE   Vital signs in last 24 hours:  Temp:  [36.5 °C (97.7 °F)-37.3 °C (99.2 °F)] 37.1 °C (98.7 °F)  Heart Rate:  [63-92] 80  Resp:  [16] 16  BP: (160-207)/() 187/89      Intake/Output Summary (Last 24 hours) at 7/24/2021 0658  Last data filed at 7/23/2021 1300  Gross per 24 hour   Intake 600 ml   Output --   Net 600 ml       Intake/Output this shift:  No intake/output data recorded.   Current Medications:  •  acetaminophen, 975 mg, oral, q8h ROBERT  •  albuterol, 2.5 mg, nebulization, q6h PRN  •  alum-mag hydroxide-simeth, 30 mL, oral, q4h PRN  •  amLODIPine, 10 mg, oral, Daily  •  atropine, 0.5 mg, intravenous, q5 min PRN  •  glucose, 16-32 g of dextrose, oral, PRN **OR** dextrose, 15-30 g of dextrose, oral, PRN **OR** glucagon, 1 mg, intramuscular, PRN **OR** dextrose in water, 25 mL, intravenous, PRN  •  heparin (porcine), 5,000 Units, subcutaneous, q8h ROBERT  •  hydrALAZINE, 25 mg, oral, q8h PRN  •  HYDROmorphone, 0.5 mg, intravenous, q3h PRN  •  lactated ringer's, , intravenous, Continuous  •  nitroglycerin, 0.4 mg, sublingual, q5 min PRN  •  pantoprazole, 40 mg, intravenous, q12h ROBERT  •  prochlorperazine, 10 mg, intravenous, q6h PRN  •  sertraline, 25 mg, oral, q PM    Physical Exam  General appearance: alert, appears stated age and cooperative  Head: normocephalic, without obvious abnormality, atraumatic  Lungs: clear to auscultation bilaterally  Heart: regular rate and rhythm, S1, S2 normal, no murmur, click, rub or gallop  Abdomen:Soft, mild tenderness, normal bowel sounds; no bruits, organomegaly or masses.  Extremities: extremities normal, warm and well-perfused; no cyanosis, clubbing, or edema  Skin: Skin color, texture, turgor  normal. No rashes or lesions  Neurologic: Grossly normal     LABS & IMAGING   Labs      Results from last 7 days   Lab Units 07/23/21  0633 07/22/21  0536 07/21/21  2100 07/21/21  1202 07/21/21  1202   WBC K/uL 28.93* 24.34*  --   --  14.47*   HEMOGLOBIN g/dL 11.7* 13.3* 12.5*   < > 12.6*   HEMATOCRIT % 35.3* 39.5* 39.0*   < > 38.9*   PLATELETS K/uL 170 238  --   --  254    < > = values in this interval not displayed.     Results from last 7 days   Lab Units 07/23/21  0903 07/22/21  0536 07/21/21  1202   SODIUM mEQ/L 138 140 141   POTASSIUM mEQ/L 3.7 4.3 4.3   CHLORIDE mEQ/L 107 107 111*   CO2 mEQ/L 21* 19* 21*   BUN mg/dL 35* 43* 44*   CREATININE mg/dL 2.4* 2.6* 3.0*   CALCIUM mg/dL 9.1 9.7 9.7   ALBUMIN g/dL  --  3.5 3.6   BILIRUBIN TOTAL mg/dL  --  0.5 0.5   ALK PHOS IU/L  --  50 47   ALT IU/L  --  14* 15*   AST IU/L  --  29 20   GLUCOSE mg/dL 125* 153* 138*     Results from last 7 days   Lab Units 07/21/21  1202   INR  1.0       Imaging  I have reviewed the Imaging from the last 24 hrs.    X-RAY CHEST 1 VIEW    Result Date: 7/23/2021  CLINICAL HISTORY: Shortness of breath PRIOR STUDY:  Chest x-ray dated 7/21/2021 TECHNIQUE:  Frontal image of the chest COMMENT:  There is minimal lower lobe airspace disease perhaps slightly more prominent on the right compared to previous..  No significant effusion or pneumothorax.  There is mild stable cardiomegaly.  The osseous and soft tissues are grossly unremarkable.     IMPRESSION: Minimal lower lobe airspace disease which may represent atelectasis or pneumonia.       ASSESSMENT & PLAN     Active Problems:    Hypertension    CKD (chronic kidney disease) stage 4, GFR 15-29 ml/min (CMS/HCC)    Prostate CA (CMS/HCC)    Abdominal pain    Sinus bradycardia    Acute pancreatitis    Anemia    Abdominal pain    Shortness of breath      Abdominal pain  Assessment & Plan  See pancreatitis section for details.     Acute pancreatitis  Assessment & Plan  80 y M w/ PMHx of CKD St 3, HTN,  prostate cancer s/p prostatectomy now on LUPRON presenting for consult for pancreatitis; pt with no prior episodes of pancreatitis; still has GB, Ca2+ normal, triglycerides pending; 1 day n/v/acute upper abd pain, no chronic GI symptoms; imaging consistent with possible chronic pancreatitis -- ? Acute on chronic flare of pancreatitis (from prior etoh use) vs other etiology -- triglycerides, IPMN, meds, idio (?) There is evidence of PD dilation on CT scan    -IVF, pain meds  -Given reports of worsening pain , and with uptrending WBC count- would repeat the patient's CT - r/o necrosis or signs of infected necrosis   -patient appears comfortable in bed without signs of tachycardia or significant tenderness- not the appearance of an ischemic picture.  - Will need eventual MRI eiher before DC or 2-4 wks s/p DC to evaluate pancreas better - cannot do this during acute inflammation   - T/c sending off IgG subclasses for completeness   - Antiemetics prn   - No role in endoscopy - doubt what was in emesis was true hematemesis   - Supportive care             Sai Aguero MD  7/24/2021  6:58 AM

## 2021-07-24 NOTE — PLAN OF CARE
MRI reviewed. Called wife and left VM.   MRI with ? IPMN  Need to discuss with GI in am if further testing/ bx needed once pancreatitis resolved and to which extent did this contribute to this episode of pancreatitis if family reports minimal ETOH intake.

## 2021-07-25 PROBLEM — R00.1 SINUS BRADYCARDIA: Status: RESOLVED | Noted: 2021-01-01 | Resolved: 2021-01-01

## 2021-07-25 PROBLEM — R10.9 ABDOMINAL PAIN: Status: RESOLVED | Noted: 2021-01-01 | Resolved: 2021-01-01

## 2021-07-25 NOTE — PROGRESS NOTES
GI Daily Progress Note           SUBJECTIVE    LOS: 4 days     Interval History: abdominal pain much better.  Passing stool. Tolerating liquids.     Review of Systems  All other systems were reviewed and are negative oither than as stated in the HPI   OBJECTIVE   Vital signs in last 24 hours:  Temp:  [36.3 °C (97.3 °F)-36.7 °C (98.1 °F)] 36.3 °C (97.3 °F)  Heart Rate:  [71-92] 92  Resp:  [18-20] 18  BP: (132-182)/(74-86) 154/74      Intake/Output Summary (Last 24 hours) at 7/25/2021 0652  Last data filed at 7/24/2021 1000  Gross per 24 hour   Intake 600 ml   Output --   Net 600 ml       Intake/Output this shift:  No intake/output data recorded.   Current Medications:  •  acetaminophen, 975 mg, oral, q8h ROBERT  •  albuterol, 2.5 mg, nebulization, q6h PRN  •  alum-mag hydroxide-simeth, 30 mL, oral, q4h PRN  •  atropine, 0.5 mg, intravenous, q5 min PRN  •  glucose, 16-32 g of dextrose, oral, PRN **OR** dextrose, 15-30 g of dextrose, oral, PRN **OR** glucagon, 1 mg, intramuscular, PRN **OR** dextrose in water, 25 mL, intravenous, PRN  •  heparin (porcine), 5,000 Units, subcutaneous, q8h ROBERT  •  hydrALAZINE, 25 mg, oral, q8h PRN  •  HYDROmorphone, 0.5 mg, intravenous, q3h PRN  •  lactated ringer's, , intravenous, Continuous  •  NIFEdipine, 30 mg, oral, Nightly  •  NIFEdipine, 60 mg, oral, Daily  •  nitroglycerin, 0.4 mg, sublingual, q5 min PRN  •  pantoprazole, 40 mg, intravenous, q12h ROBERT  •  prochlorperazine, 10 mg, intravenous, q6h PRN  •  sertraline, 25 mg, oral, q PM    Physical Exam  General appearance: alert, appears stated age and cooperative  Head: normocephalic, without obvious abnormality, atraumatic  Lungs: clear to auscultation bilaterally  Heart: regular rate and rhythm, S1, S2 normal, no murmur, click, rub or gallop  Abdomen:Soft, minimal epigastric tenderness, normal bowel sounds; no bruits, organomegaly or masses  Extremities: extremities normal, warm and well-perfused; no cyanosis, clubbing, or  edema  Skin: Skin color, texture, turgor normal. No rashes or lesions  Neurologic: Grossly normal     LABS & IMAGING   Labs      Results from last 7 days   Lab Units 07/24/21  1054 07/23/21  0633 07/22/21  0536   WBC K/uL 25.44* 28.93* 24.34*   HEMOGLOBIN g/dL 12.5* 11.7* 13.3*   HEMATOCRIT % 37.4* 35.3* 39.5*   PLATELETS K/uL 169 170 238     Results from last 7 days   Lab Units 07/24/21  1054 07/23/21  0903 07/22/21  0536 07/21/21  1202 07/21/21  1202   SODIUM mEQ/L 137 138 140   < > 141   POTASSIUM mEQ/L 3.3* 3.7 4.3   < > 4.3   CHLORIDE mEQ/L 108 107 107   < > 111*   CO2 mEQ/L 20* 21* 19*   < > 21*   BUN mg/dL 33* 35* 43*   < > 44*   CREATININE mg/dL 2.6* 2.4* 2.6*   < > 3.0*   CALCIUM mg/dL 9.0 9.1 9.7   < > 9.7   ALBUMIN g/dL 2.9*  --  3.5  --  3.6   BILIRUBIN TOTAL mg/dL 1.0  --  0.5  --  0.5   ALK PHOS IU/L 48  --  50  --  47   ALT IU/L 37  --  14*  --  15*   AST IU/L 60*  --  29  --  20   GLUCOSE mg/dL 163* 125* 153*   < > 138*    < > = values in this interval not displayed.     Results from last 7 days   Lab Units 07/21/21  1202   INR  1.0       Imaging  I have reviewed the Imaging from the last 24 hrs.    MRI ABDOMEN WITHOUT CONTRAST MRCP    Result Date: 7/24/2021  CLINICAL HISTORY:    Pancreatitis., concerning for pancreatic necrosis COMPARISON: Prior CT from 7/21/2021 PROCEDURE: Noncontrast MRI of the abdomen was performed.  Three-dimensional MRCP was performed using maximum intensity projection reconstruction on the same workstation. FINDINGS: Liver:    There is no hepatic steatosis. No solid liver mass. Intrahepatic and extrahepatic bile ducts: , Bile duct measures 1.2 cm. Mild intrahepatic biliary ductal dilatation. Gallbladder:  No gallstones. Normal caliber wall. Pancreas: Diffuse peripancreatic fat stranding is noted. There is loss of intrinsic T1 hyperintense signal in the pancreas in keeping with acute pancreatitis.. Cystic 3.2 cm lesion in the distal body of the pancreas. Intrinsic T1  hyperintense material along the pancreaticoduodenal groove is worrisome for hemorrhagic pancreatitis, image 40 of series 15. This tracks along the right anterior pararenal space. Spleen: Within normal limits. Adrenal glands: Within normal limits. Kidneys: Atrophic kidneys. Bilateral simple renal cysts including a 4.9 cm left upper pole cyst. Lymph nodes: Not enlarged. Abdominal Vessels: Expected flow voids are grossly preserved. Bones: Degenerative changes of the spine Lower Thorax: Bibasilar atelectasis. Bowel: Within normal limits. Abdominal Wall: Within normal limits. Ascites:  Small abdominopelvic ascites     IMPRESSION: 1.  Acute pancreatitis with intrinsic T1 hyperintense material tracking along the pancreatic head, raising concern for hemorrhagic pancreatitis. 2.  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 3.  Mild extrahepatic and intrahepatic hepatic biliary ductal dilatation.        ASSESSMENT & PLAN     Active Problems:    Hypertension    CKD (chronic kidney disease) stage 4, GFR 15-29 ml/min (CMS/HCC)    Prostate CA (CMS/HCC)    Abdominal pain    Sinus bradycardia    Acute pancreatitis    Anemia    Abdominal pain      Abdominal pain  Assessment & Plan  See pancreatitis section for details.     Acute pancreatitis  Assessment & Plan  80 y M w/ PMHx of CKD St 3, HTN, prostate cancer s/p prostatectomy now on LUPRON presenting for consult for pancreatitis; pt with no prior episodes of pancreatitis; still has GB, Ca2+ normal, triglycerides pending; 1 day n/v/acute upper abd pain, no chronic GI symptoms; imaging consistent with possible chronic pancreatitis --    MRI yesterday instead of CT (poor renal function) given continued symptoms and increased wbc   - findings concerning for hemorrhagic pancreatitis and possible IPMN.   -clinically improving with greatly improved abd pain.  -IVF, pain meds  -would consider advancing to low fat  diet  -Antiemetics prn   -Would recommend referral for EUS when acute pancreatitis resolves   - if findings are c/w main duct IPMN - would recommend surgical evaluation as there is a greater risk of malignant transformation with main duct involvement.  - Supportive care for now            Sai Aguero MD  7/25/2021  6:52 AM

## 2021-07-25 NOTE — PROGRESS NOTES
"   Hospital Medicine Service -  Daily Progress Note       SUBJECTIVE   Interval History:Abdominal pain resolved but abdomen remains distended. Discussed case with GI today. Plan for EUS. Advanced diet. SOB with minimal exertion- stopped ivf but because he is not uncomfortable will wait instead of offering diuretics. Discussed with family and updated.    OBJECTIVE      Vital signs in last 24 hours:  Temp:  [36.3 °C (97.3 °F)-37.2 °C (98.9 °F)] 37.2 °C (98.9 °F)  Heart Rate:  [81-92] 85  Resp:  [16-18] 16  BP: (133-175)/(62-86) 139/85    Intake/Output Summary (Last 24 hours) at 7/25/2021 1527  Last data filed at 7/25/2021 1100  Gross per 24 hour   Intake 630 ml   Output --   Net 630 ml       PHYSICAL EXAMINATION      Physical Exam examined patient at 10 am.   Visit Vitals  /85 (BP Location: Right upper arm, Patient Position: Lying)   Pulse 85   Temp 37.2 °C (98.9 °F) (Oral)   Resp 16   Ht 1.854 m (6' 1\")   Wt 91.4 kg (201 lb 9.6 oz)   SpO2 92%   BMI 26.60 kg/m²     General:  Awake, alert.  No acute distress.  HEENT:  Sclerae anicteric.  Moist mucus membranes.   Lungs:  Clear to ascultation bilaterally.  No wheezes, rales, or ronchi.  Cardiovascular:  Regular rate and rhythm.  No murmurs.  Abdomen:  Soft, not tender, distended.  Positive bowel sounds.  :NA  Extremities:  No edema bilaterally.  Neuro: Non-focal.  Walking and moving all ext.  Skin: pale  Psych: A/Ox3; full range affect     LABS / IMAGING / TELE      Labs  CBC   CBC Results       07/23/21 07/22/21 07/21/21                    0633 0536 2100         WBC 28.93 24.34 --         RBC 3.87 4.29 --         HGB 11.7 13.3 12.5         HCT 35.3 39.5 39.0         MCV 91.2 92.1 --         MCH 30.2 31.0 --         MCHC 33.1 33.7 --          238 --                      BMP or CMP   BMP Results       07/23/21 07/22/21 07/21/21                    0903 0536 1202          140 141         K 3.7 4.3 4.3         Cl 107 107 111         CO2 21 19 21         " Glucose 125 153 138         BUN 35 43 44         Creatinine 2.4 2.6 3.0         Calcium 9.1 9.7 9.7         Anion Gap 10 14 9         EGFR 26.0 23.7 20.1         Comment for K at 1202 on 07/21/21: Results obtained on plasma. Plasma Potassium values may be up to 0.4 mEQ/L less than serum values. The differences may be greater for patients with high platelet or white cell counts.        CMP Results       07/23/21 07/22/21 07/21/21                    0903 0536 1202          140 141         K 3.7 4.3 4.3         Cl 107 107 111         CO2 21 19 21         Glucose 125 153 138         BUN 35 43 44         Creatinine 2.4 2.6 3.0         Calcium 9.1 9.7 9.7         Anion Gap 10 14 9         AST -- 29 20         ALT -- 14 15         Albumin -- 3.5 3.6         EGFR 26.0 23.7 20.1         Comment for K at 1202 on 07/21/21: Results obtained on plasma. Plasma Potassium values may be up to 0.4 mEQ/L less than serum values. The differences may be greater for patients with high platelet or white cell counts.           INR Results from last 7 days   Lab Units 07/21/21  1202   INR  1.0          Imaging  MRI ABDOMEN WITHOUT CONTRAST MRCP    Result Date: 7/24/2021  CLINICAL HISTORY:    Pancreatitis., concerning for pancreatic necrosis COMPARISON: Prior CT from 7/21/2021 PROCEDURE: Noncontrast MRI of the abdomen was performed.  Three-dimensional MRCP was performed using maximum intensity projection reconstruction on the same workstation. FINDINGS: Liver:    There is no hepatic steatosis. No solid liver mass. Intrahepatic and extrahepatic bile ducts: , Bile duct measures 1.2 cm. Mild intrahepatic biliary ductal dilatation. Gallbladder:  No gallstones. Normal caliber wall. Pancreas: Diffuse peripancreatic fat stranding is noted. There is loss of intrinsic T1 hyperintense signal in the pancreas in keeping with acute pancreatitis.. Cystic 3.2 cm lesion in the distal body of the pancreas. Intrinsic T1 hyperintense material along the  pancreaticoduodenal groove is worrisome for hemorrhagic pancreatitis, image 40 of series 15. This tracks along the right anterior pararenal space. Spleen: Within normal limits. Adrenal glands: Within normal limits. Kidneys: Atrophic kidneys. Bilateral simple renal cysts including a 4.9 cm left upper pole cyst. Lymph nodes: Not enlarged. Abdominal Vessels: Expected flow voids are grossly preserved. Bones: Degenerative changes of the spine Lower Thorax: Bibasilar atelectasis. Bowel: Within normal limits. Abdominal Wall: Within normal limits. Ascites:  Small abdominopelvic ascites     IMPRESSION: 1.  Acute pancreatitis with intrinsic T1 hyperintense material tracking along the pancreatic head, raising concern for hemorrhagic pancreatitis. 2.  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 3.  Mild extrahepatic and intrahepatic hepatic biliary ductal dilatation.         ECG/Telemetry  I have independently reviewed the telemetry. No events for the last 24 hours.    ASSESSMENT AND PLAN      Shortness of breath  Assessment & Plan  Multifactorial: volume overload + abdominal distension may contribute +/- pneumonia  Incentive spirometry.  Held IVF  Repeat CXR  Doubt PE as already on DVT ppx and not hypoxic, no chest pain. Not easy to screen for PE considering Cr ~ 2.7 with CT scan in case indicated. Will reevaluate in am.       Anemia  Assessment & Plan  Chronic and at his baseline  Due to CKD  Hb stable    Acute pancreatitis  Assessment & Plan  Acute on chronic pancreatitis.  Per family not drinking much.   MRI c/w hemorrhagic pancreatitis.   Possible intraductal mucinous cyst.   Needs EUS.   Discussed w GI- plan to txf to [facility]/ Dr Hi for test.   Await and appreciate support from GI.     Prostate CA (CMS/HCC)  Assessment & Plan  outpt f/u    CKD (chronic kidney disease) stage 4, GFR 15-29 ml/min (CMS/HCC)  Assessment & Plan  Reviewed records, and his  creatinine seems to be at baseline 2.5-3, c/w CKD 4  Avoid nephrotoxins and renally dose meds  Started Bicitra.   F/up w Nephrology as outpatient.     Hypertension  Assessment & Plan  BP better controlled.   Nifedipine ER 30 BID.  Stopped ivf.        VTE Assessment: Padua VTE Score: 4  VTE Prophylaxis Plan: heparin  Code Status: Full Code  Estimated Discharge Date: 7/26/2021  Disposition Planning: tele     Keyla Rodriguez MD  7/25/2021

## 2021-07-26 NOTE — PLAN OF CARE
Problem: Adult Inpatient Plan of Care  Goal: Readiness for Transition of Care  Outcome: Progressing  Intervention: Mutually Develop Transition Plan  Flowsheets (Taken 7/26/2021 8677)  Discharge Coordination/Progress: met with patien wife and son. pt was sitting out of be in chair wearing O2 at 3l, c/o abdominal distention with diarrhea.Patient reports he has been feeling weak. Requested PA order a PT evaluation Discussed DC disposition pt choose home  now wants homecare, choose [facility], await medical stability and PT eval/ progress  for feasability of plan.   DPatient's Choice of Community Agency(s): Choose [facility], referal to isaac White  Offered/Gave Vendor List: yes

## 2021-07-26 NOTE — PATIENT CARE CONFERENCE
Care Progression Rounds Note  Date: 7/26/2021  Time: 11:01 AM     Patient Name: Adalberto Galaviz     Medical Record Number: [id number]   YOB: 1940  Sex: Male      Room/Bed: 0362    Admitting Diagnosis: Sinus bradycardia [R00.1]  Acute pancreatitis, unspecified complication status, unspecified pancreatitis type [K85.90]   Admit Date/Time: 7/21/2021 11:55 AM    Primary Diagnosis: No Principal Problem: There is no principal problem currently on the Problem List. Please update the Problem List and refresh.  Principal Problem: No Principal Problem: There is no principal problem currently on the Problem List. Please update the Problem List and refresh.    GMLOS: pending  Anticipated Discharge Date: 7/28/2021    AM-PAC:  Mobility Score: 20    Discharge Planning:  Anticipated Discharge Disposition: home/family member's home    Barriers to Discharge:  Barriers to Discharge: Test pending, Medical issues not resolved, Consultant recommendations pending    Participants:  nursing, social work/services, , advanced practice provider

## 2021-07-26 NOTE — PROGRESS NOTES
S/p MRI over the weekend showing diffuse peripancreatic fat stranding / acute pancreatitis. Cystic 3.2 cm lesion in distal body of pancreas, intrinsic T1 hyperintense material along the pancreaticoduodenal groove - worrisome for hemorrhagic pancreatitis -- although patient did show improvement over the weekend so do not suspect this to be ongoing process. Given evidence of cystic lesion in distal body of pancreas could consider outpatient EUS to further evaluate. On low residue / low fat diet. Continue to monitor labs / symptoms - outpatient f/u for consideration of EUS. No further inpatient GI interventions planned, call with ?s.

## 2021-07-26 NOTE — PROGRESS NOTES
Hospital Medicine Service -  Daily Progress Note       SUBJECTIVE   Interval History: Patient reports lower abdominal pain.  Reports that it is about the same as yesterday.  Denies chest pain.  Reports mild shortness of breath.    Patient's son August, updated on plan of care, and all of his questions were answered.   OBJECTIVE      Vital signs in last 24 hours:  Temp:  [36.6 °C (97.8 °F)-36.9 °C (98.5 °F)] 36.6 °C (97.9 °F)  Heart Rate:  [] 89  Resp:  [16-30] 18  BP: (116-158)/(58-84) 128/62    Intake/Output Summary (Last 24 hours) at 7/26/2021 1259  Last data filed at 7/25/2021 1500  Gross per 24 hour   Intake 270 ml   Output --   Net 270 ml       PHYSICAL EXAMINATION      Physical Exam  Vitals reviewed.   Constitutional:       Comments: Elderly male, no acute distress   HENT:      Head: Normocephalic and atraumatic.      Nose: Nose normal.      Mouth/Throat:      Mouth: Mucous membranes are dry.   Cardiovascular:      Rate and Rhythm: Normal rate and regular rhythm.   Pulmonary:      Effort: Pulmonary effort is normal. No respiratory distress.   Abdominal:      Palpations: Abdomen is soft.      Tenderness: There is no abdominal tenderness.   Musculoskeletal:      Right lower leg: No edema.      Left lower leg: No edema.   Skin:     General: Skin is warm and dry.   Neurological:      General: No focal deficit present.   Psychiatric:      Comments: Cooperative            LINES, CATHETERS, DRAINS, AIRWAYS, AND WOUNDS   Lines, Drains, and Airways:  Wounds (agree with documentation and present on admission):  Peripheral IV (Adult) 07/22/21 Left Forearm (Active)   Number of days: 4       Peripheral IV (Adult) 07/22/21 Left Hand (Active)   Number of days: 4         Comments:      LABS / IMAGING / TELE      Labs  I have reviewed the patient's pertinent labs.  Significant abnormals are below.    SARS-CoV-2 (COVID-19) (no units)   Date/Time Value   07/21/2021 1211 Negative     Results from last 7 days   Lab Units  07/26/21  0916   WBC K/uL 20.97*   HEMOGLOBIN g/dL 12.0*   HEMATOCRIT % 36.5*   PLATELETS K/uL 230     Results from last 7 days   Lab Units 07/26/21  0916   SODIUM mEQ/L 141   POTASSIUM mEQ/L 3.6   CHLORIDE mEQ/L 108   CO2 mEQ/L 21*   BUN mg/dL 46*   CREATININE mg/dL 3.6*   CALCIUM mg/dL 9.1   ALBUMIN g/dL 2.6*   BILIRUBIN TOTAL mg/dL 0.8   ALK PHOS IU/L 57   ALT IU/L 29   AST IU/L 26   GLUCOSE mg/dL 153*       Imaging  I have independently reviewed the pertinent imaging from the last 24 hrs.    ECG/Telemetry  I have independently reviewed the telemetry. Significant findings include pvcs.    ASSESSMENT AND PLAN      Acute renal failure (ARF) (CMS/[facility])  Assessment & Plan  Reviewed records, and his creatinine seems to be at baseline 2.5-3, c/w CKD 4  -acute on chronic CKD   -Avoid nephrotoxins and renally dose meds  -creatinine 3.6 today  -Check UA, urine culture, renal ultrasound and bladder scan  -stopped PPI for now, start pepcid     Shortness of breath  Assessment & Plan  -Multifactorial: volume overload + abdominal distension may contribute +/- pneumonia  -Incentive spirometry.  -Doubt PE as already on DVT ppx and not hypoxic, no chest pain. Not easy to screen for PE considering Cr ~ 2.7 with CT scan in case indicated.  -CXR showed: stable bilateral lower lobe airspace disease -start CTX    Anemia  Assessment & Plan  Chronic and at his baseline  Due to CKD  -hemoglobin stable, follow CBC    Acute pancreatitis  Assessment & Plan  -Acute on chronic pancreatitis.  -Per family not drinking much.   -MRI c/w hemorrhagic pancreatitis.   -Possible intraductal mucinous cyst.   -plan for outpatient EUS per GI   -continue low residue low-fat diet    Prostate CA (CMS/[facility])  Assessment & Plan  outpt f/u    Hypertension  Assessment & Plan  -nifedipine on hold for now   -orthostatics negative        VTE Assessment: Padua VTE Score: 4  VTE Prophylaxis:  Current anticoagulants:  heparin (porcine) 5,000 unit/mL injection 5,000  Units, subcutaneous, q8h ROBERT      Code Status: Full Code      Estimated Discharge Date: 7/28/2021   Disposition Planning: MAYELA Zeng  7/26/2021

## 2021-07-27 PROBLEM — R19.7 DIARRHEA: Status: ACTIVE | Noted: 2021-01-01

## 2021-07-27 PROBLEM — R79.89 ELEVATED TROPONIN: Status: ACTIVE | Noted: 2021-01-01

## 2021-07-27 PROBLEM — R60.9 EDEMA: Status: ACTIVE | Noted: 2021-01-01

## 2021-07-27 PROBLEM — E87.20 ACIDOSIS, METABOLIC: Status: ACTIVE | Noted: 2021-01-01

## 2021-07-27 NOTE — HOSPITAL COURSE
Adalberto is a 80 y.o. male admitted on 7/21/2021 with Sinus bradycardia [R00.1]  Acute pancreatitis, unspecified complication status, unspecified pancreatitis type [K85.90]. Principal problem is No Principal Problem: There is no principal problem currently on the Problem List. Please update the Problem List and refresh..    Past Medical History  Adalberto has a past medical history of CKD (chronic kidney disease) stage 4, GFR 15-29 ml/min (CMS/[facility]), DJD (degenerative joint disease), Hypertension, Nocturia, Prostate CA (CMS/[facility]), and Prostate CA (CMS/[facility]).    History of Present Illness  80 y.o. male from home w/ spouse who presents with severe abdominal pain, nausea, vomiting. Bradycardic in 30s in ED 7/21/21 (suspect vasovagal due to pain/nausea/vomiting). +Pancreatitis, GABY, metabolic acidosis, Neg DVT LLE

## 2021-07-27 NOTE — ASSESSMENT & PLAN NOTE
Suspect demand ischemia not MI.  -Started high intensity statin and low dose beta blocker.   -He is intolerant to ASA.   -outpatient ischemic eval with cardiology

## 2021-07-27 NOTE — PROGRESS NOTES
Patient:  Adalberto Galaviz  Location:  [facility] 3C 0362  MRN:  [id number]  Today's date:  7/27/2021    Speech Pathology: Therapy session    SLP Diagnosis: Pt presents with mild oral phase however do not suspect coughing in s/o aspiration.    Recommendations:  1. Regular solids (L7) and thin liquids (L0).  2. Slow rate of eating and ensure upright posture, out of bed up in chair ideal.  3. Stay away from dry foods/ use of extra moisture as needed.  4. SLP services to briefly follow.    Summary/Handoff:  Daily Outcome Statement: SLP f/u w/ pt bedside this AM to address diet tolerance, etc.  Pt has since been advanced to regular solids off clear liquids.  Pt reports to feel better however he has some mild increased work of breating just noted in conversation, on 3L NC.  With oral trials of regular solids and thin liquids, mastication efforts efficient and there were some intermittent dry coughs (pt related to dry texture of cracker) otherwise no other overt ss/x of aspiration.  Dry coughing also noted in absence of po and there was no wetness to VQ.  Pt appears safe to remain on prescribed diet from oral- pharyngeal standpoint w/ ST to briefly follow.      Session Notes:   CXR (7/26):  The cardiac and mediastinal silhouette is stable.  The heart is top normal in size.  Bibasilar density likely reflecting subsegmental atelectasis is not significantly changed.  Horizontal linear density in the right mid lung field reflects plate like atelectasis as well.  The pulmonary vasculature is normal in caliber and there is no pneumothorax.  There are degenerative changes within the right shoulder joint.  The bony thorax is otherwise grossly unremarkable.   --  IMPRESSION: Stable appearance of the chest as described above.         Dietary Orders   (From admission, onward)             Start     Ordered    07/27/21 0733  Adult Diet Regular; 2gm Sodium; Low Fiber/Residue; RD/LDN may adjust order  Diet effective now      Question Answer Comment   Diet Texture Regular    Sodium Restriction: 2gm Sodium    Other Restriction(s): Low Fiber/Residue    Delegation of Authority. Diet orders written by PA/Jimmie may not be adjusted by RD/LDNs. RD/LDN may adjust order        07/27/21 0733                Results from last 7 days   Lab Units 07/27/21  0510 07/26/21  0916 07/25/21  0905   WBC K/uL 15.89* 20.97* 22.09*   HEMOGLOBIN g/dL 11.4* 12.0* 11.5*   HEMATOCRIT % 34.3* 36.5* 34.5*   PLATELETS K/uL 223 230 171          Patient left with call bell in reach and alarms as found.    Adalberto is a 80 y.o. male admitted on 7/21/2021 with Sinus bradycardia [R00.1]  Acute pancreatitis, unspecified complication status, unspecified pancreatitis type [K85.90]. Principal problem is No Principal Problem: There is no principal problem currently on the Problem List. Please update the Problem List and refresh..    Past Medical History  Adalberto has a past medical history of CKD (chronic kidney disease) stage 4, GFR 15-29 ml/min (CMS/HCC), DJD (degenerative joint disease), Hypertension, Nocturia, Prostate CA (CMS/HCC), and Prostate CA (CMS/HCC).    History of Present Illness  80 y.o. male who presents with severe abdominal pain, nausea, vomiting. Bradycardic in 30s in ED (suspect vasovagal due to pain/nausea/vomiting).            Prior Level of Function      Most Recent Value   Eating  independent   Communication  understands/communicates without difficulty   Swallowing  swallows foods/liquids without difficulty   Baseline Diet/Method of Nutritional Intake  thin liquids (Level 0), regular solids (Level 7), no diet restrictions   Past History of Dysphagia  denies h/o dysphagia.   Assistive Device Currently Used at Home  none          SLP Evaluation and Treatment - 07/27/21 0955        SLP Time Calculation    Start Time  0955     Stop Time  1005     Time Calculation (min)  10 min        Session Details    Document Type  daily treatment/progress note     Mode of  Treatment  individual therapy;speech language pathology        General Information    Patient Profile Reviewed  yes     Onset of Illness/Injury or Date of Surgery  07/21/21     General Observations of Patient  patient OOB/ up in chair, pleasant.     Existing Precautions/Restrictions  oxygen therapy device and L/min     Limitations/Impairments  swallowing        Cognition/Psychosocial    Affect/Mental Status (Cognition)  WFL     Orientation Status (Cognition)  oriented x 3        Functional Communication Measures    FCM: Swallowing  6-->Level 6        General Swallowing Observations    Current Diet/Method of Nutritional Intake  thin liquids (Level 0);regular solids (Level 7)     Signs/Symptoms of Aspiration (Current Diet)  cough     Respiratory Support (General Swallowing Observations)  supplemental oxygen;nasal cannula    3L    Comment, Secretions/Suctioning  unremarkable        Food and Liquid Trials (NIS)    Patient Positioning  HOB elevated (specify degrees)     Oral Intake/Feeding Performance  independent/appropriate self-feeding skills     Liquid Consistencies Evaluated  thin liquids (Level 0)     Thin Liquids (Level 0)  intact;sips from cup     Food Consistencies Evaluated  regular solids (Level 7)     Regular Solids (Level 7)  intact;patient controlled amounts     Oral Preparatory Phase of Swallow  mild impairment;bolus cohesion decreased;mastication, slow but effective     Pharyngeal Phase of Swallow  no clinical symptoms     Esophageal Phase of Swallow  no clinical symptoms        Swallowing Recommendations    Diet Consistency Recommendations  thin liquids (Level 0);regular solids (Level 7)     Medication Administration  whole with liquid     Supervision Level for Intake  distant supervision needed     Instrumental Assessment Recommendations  reassess via non-instrumental clinical swallow evaluation        Swallowing Intervention    Dysphagia/Swallowing Interventions  monitor tolerance of;current diet  without evidence of aspiration;compensatory swallowing strategies        AM-PAC (TM) - Cognition (Current Function)    Following/understanding a 10-15 minute speech or presentation?  4 - None     Understanding familiar people during ordinary conversations?  4 - None     Remembering to take medications at the appropriate time?  4 - None     Remembering where things were placed or put away?  4 - None     Remembering a list of 3 or 4 errands without writing it down?  4 - None     Taking care of complicated tasks?  3 - A little     AM-PAC (TM) Cognition Score  23                       SLP Assessment/Plan      Most Recent Value   Daily Outcome Statement  SLP f/u w/ pt bedside this AM to address diet tolerance, etc.  Pt has since been advanced to regular solids off CLD from eval.  Pt reports to feel better however some mild increased work of breating just noted in conversation.  With po trials of regular solids and thin liquids, mastication efforts efficient and there were some intermittent dry coughs (pt related to dry texture of cracker) otherwise no other overt ss/x of aspiration.  Dry coughing also noted in absence of po and no wetness to VQ.  Pt appears safe to remain on prescribed diet from oral- pharyngeal standpoint w/ ST to briefly follow.   at 07/27/2021 0955   SLP Diagnosis  Pt presents with mild oral phase however do not suspect coughing in s/o aspiration. at 07/27/2021 0955   Rehab Potential  good, to achieve stated therapy goals at 07/27/2021 0955   Therapy Frequency  1-2 times/wk at 07/27/2021 0955   Planned Therapy Interventions  dysphagia therapy at 07/27/2021 0955   Patient/Family Therapy Goal Statement  to get better at 07/27/2021 0955          SLP Goals      Most Recent Value   Oral Nutrition/Hydration Goal   Oral Nutrition/Hydration Goal  Pt will consume LRD w/o overt ss/x of aspiration. at 07/23/2021 1420   Time Frame  long-term goal (LTG), by discharge at 07/23/2021 1420   Progress/Outcome  goal  ongoing at 07/27/2021 0935

## 2021-07-27 NOTE — PLAN OF CARE
Problem: Adult Inpatient Plan of Care  Goal: Plan of Care Review  Flowsheets (Taken 7/27/2021 1128)  Progress: no change  Plan of Care Reviewed With: caregiver  Outcome Summary: Pt is tolerating po diet per RN. Pt with liquid stool, to r/o cdiff. Pt still with distended abdomen and some nausea.  Goal: consume % energy needs via mouth  Recommendations:  Agree with Low fiber, 2gmNa+ diet.  Monitor po intake and labs, especially Cr.

## 2021-07-27 NOTE — ASSESSMENT & PLAN NOTE
Likely demand ischemia in setting of acute pancreatitis.  Cont med regimen.    Continue low dose B-bl and Statin for cardio-protection  Previously noted to have bradycardia, but was in setting of vagal episode.  HR has been stable since.   Consider ischemic eval as outpt once he is clincally stable.

## 2021-07-27 NOTE — PROGRESS NOTES
"   Hospital Medicine Service -  Daily Progress Note       SUBJECTIVE   Interval History: feels nauseous but this is not new. Feels short of breath.      OBJECTIVE      Vital signs in last 24 hours:  Temp:  [36.6 °C (97.9 °F)-37.1 °C (98.8 °F)] 36.8 °C (98.3 °F)  Heart Rate:  [70-99] 89  Resp:  [18-24] 18  BP: (118-149)/(57-81) 147/70  No intake or output data in the 24 hours ending 07/27/21 1701    PHYSICAL EXAMINATION      Physical Exam   Visit Vitals  BP (!) 147/70 (BP Location: Left upper arm, Patient Position: Lying)   Pulse 89   Temp 36.8 °C (98.3 °F) (Temporal)   Resp 18   Ht 1.854 m (6' 1\")   Wt 93.9 kg (207 lb)   SpO2 95%   BMI 27.31 kg/m²     General:  Awake, alert.  No acute distress.  HEENT: Sclerae anicteric.  Moist mucus membranes.   Lungs:  Diminished breath sounds at bilateral lung bases. No wheezes, rales, or ronchi.  Cardiovascular:  Regular rate and rhythm.  No murmurs.  Abdomen:  Soft, reports discomfort but no pain on palpation, very distended.  Diminished bowel sounds.  :urine clear  Extremities:  + edema more on L than R  Neuro: Non-focal.   Skin: no lesions  Psych: A/Ox3; full range affect     LABS / IMAGING / TELE      Labs  CBC   CBC Results       07/27/21 07/26/21 07/25/21                    0510 0916 0905         WBC 15.89 20.97 22.09         RBC 3.81 4.05 3.87         HGB 11.4 12.0 11.5         HCT 34.3 36.5 34.5         MCV 90.0 90.1 89.1         MCH 29.9 29.6 29.7         MCHC 33.2 32.9 33.3          230 171                      BMP or CMP   BMP Results       07/27/21 07/26/21 07/26/21                    0510 1449 0916          139 141         K 4.6 3.5 3.6         Cl 111 108 108         CO2 16 19 21         Glucose 144 157 153         BUN 47 49 46         Creatinine 3.7 3.5 3.6         Calcium 8.6 8.6 9.1         Anion Gap 12 12 12         EGFR 15.8 16.9 16.3         Comment for K at 0510 on 07/27/21: MODERATE HEMOLYSIS, RESULT MAY BE INCREASED.        CMP Results       " 07/27/21 07/26/21 07/26/21                    0510 1449 0916          139 141         K 4.6 3.5 3.6         Cl 111 108 108         CO2 16 19 21         Glucose 144 157 153         BUN 47 49 46         Creatinine 3.7 3.5 3.6         Calcium 8.6 8.6 9.1         Anion Gap 12 12 12         AST 40 -- 26         ALT 33 -- 29         Albumin 2.2 -- 2.6         EGFR 15.8 16.9 16.3         Comment for K at 0510 on 07/27/21: MODERATE HEMOLYSIS, RESULT MAY BE INCREASED.    Comment for AST at 0510 on 07/27/21: MODERATE HEMOLYSIS, RESULT MAY BE INCREASED.    Comment for ALT at 0510 on 07/27/21: MODERATE HEMOLYSIS, RESULT MAY BE INCREASED.           INR Results from last 7 days   Lab Units 07/21/21  1202   INR  1.0          Imaging  ULTRASOUND KIDNEYS    Result Date: 7/26/2021  CLINICAL HISTORY: Acute kidney injury on chronic renal failure. COMMENT: A limited retroperitoneal ultrasound with attention to the kidneys is performed.  Grayscale and color Doppler imaging is utilized. COMPARISON: CT abdomen pelvis 7/21/2021. The mildly atrophic right kidney measures  5.1 x 4.0 x 9.7 cm. There is increased parenchymal echogenicity in keeping with medical renal disease.  Right interpolar renal cyst measures 0.9 x 0.7 x 0.9 cm. There is no hydronephrosis. There are no renal calculi. The mildly atrophic left kidney measures 5.0 x 4.7 x 9.6 cm. There is increased parenchymal echogenicity in keeping with medical renal disease.  Left interpolar parapelvic renal cyst measures 3.2 x 3.3 x 4.2 cm. There is no hydronephrosis. There are no renal calculi.     IMPRESSION: 1.  No hydronephrosis. 2.  Atrophic kidneys. Increased parenchymal echogenicity in keeping with medical renal disease. 3.  Bilateral renal cysts. I certify that I have reviewed this examination and agree with this report. Trini Mas D.O.    X-RAY CHEST 1 VIEW    Result Date: 7/26/2021  CLINICAL HISTORY: Shortness of breath COMMENT:A single AP erect view the chest was  obtained portably on 7/26/2021 2:20 PM.  Comparison is made to previous study performed 7/25/2021. The cardiac and mediastinal silhouette is stable.  The heart is top normal in size.  Bibasilar density likely reflecting subsegmental atelectasis is not significantly changed.  Horizontal linear density in the right mid lung field likely reflects plate like atelectasis as well.  The pulmonary vasculature is normal in caliber and there is no pneumothorax.  There are degenerative changes within the right shoulder joint.  The bony thorax is otherwise grossly unremarkable.     IMPRESSION: Stable appearance of the chest as described above.        ECG/Telemetry  I have independently reviewed the telemetry. No events for the last 24 hours.    ASSESSMENT AND PLAN      Acute pancreatitis  Assessment & Plan  -Acute pancreatitis.  -MRI c/w hemorrhagic pancreatitis.   -Possible intraductal mucinous cyst.   -plan for outpatient EUS per GI   -continue low residue low-fat diet  - has diarrhea/started pancreatic enzymes    Acute kidney injury superimposed on chronic kidney disease (CMS/HCC)  Assessment & Plan  Reviewed records, and his creatinine seems to be at baseline 2.5-3, c/w CKD 4  Patient has h/o prostate cancer/ does not follow regularly with a nephrologist.   Cr peaked at 3.7 and has been out of his range since yesterday.   UA - noninflamatory/ no casts/ + WBCs/ urine culture pending.   Renal US shows atrophic kidneys.   Urine lytes pending.     IMPRESSION.   Suspect GABY in the context of hemodynamic instability (hypotension BP 120s when compared to recent baseline of 170s-190s) +/- ? Cardiorenal component (patient volume overloaded, now on O2).    PLAN:  Held all antihypertensives  Tolerates po intake well. No indication for IVF (also did not respond to fluids and seems overloaded).  Changed PPI to Pepcid.   Okay to start lasix iv and will follow-up on ins/ outs- but was told by RN he is not using the urinal.   Considering  advanced CKD and GABY on CKD involved nephrology for closer follow-up. Appreciate support!        Elevated troponin  Assessment & Plan  Suspect demand ischemia not MI.  Checked EKG and tehre were no significant changes in ST/ T segments when compared to prior  troponins trending down  Checked lipid panel.  Intolerant to ASA.  Obtain ECHO.  Offer diuretic as concern that volume o/l may contribute.   Started high intensity statin and low dose beta blocker.   He is intolerant to ASA.   Stress test as outpatient.   Discussed with cardiology.    Hypertension  Assessment & Plan  - very hypertensive early in admission mainly in the context of pain  - calcium channel blocker on hold to avoid hypotension.  -orthostatics negative     Edema  Assessment & Plan  assymmetrical more on L than R.   Will rule out DVT for completeness.     Diarrhea  Assessment & Plan  Start creon enzymes.   Rule out c diff.     Acidosis, metabolic  Assessment & Plan  From gaby on ckd/ NSS  Will continue bicittra.    Shortness of breath  Assessment & Plan  -Multifactorial: volume overload + abdominal distension may contribute +/-atelectasis or pneumonia  -Doubt PE as already on DVT ppx and not hypoxic, no chest pain. Not easy to screen for PE considering Cr ~ 2.7 with CT scan in case indicated.    PLAN:  - Incentive spirometry.  - continue ceftriaxone for short course abx * day 2/5  - lasix 40 iv and follow-up UOP/ may need to give more.     Anemia  Assessment & Plan  Chronic and at his baseline  Due to CKD  -hemoglobin stable, follow CBC    Prostate CA (CMS/HCC)  Assessment & Plan  outpt f/u       VTE Assessment: Padua VTE Score: 4  VTE Prophylaxis Plan: heparin  Code Status: Full Code  Estimated Discharge Date: 7/31/2021  Disposition Planning:tele     Keyla Rodriguez MD  7/27/2021

## 2021-07-27 NOTE — PLAN OF CARE
Problem: Adult Inpatient Plan of Care  Goal: Readiness for Transition of Care  Outcome: Progressing  Intervention: Mutually Develop Transition Plan  Flowsheets (Taken 7/27/2021 5103)  Discharge Coordination/Progress: Await medical stability,c/w iv rocephin, had lasix 40 IV today,  cr-3.7, c/w loose stool, per RN abd distention is decreasing, nausea persists- unable to participate in PT today,  remains on O2 at 3l with O2 sat of 95%.

## 2021-07-27 NOTE — PLAN OF CARE
Problem: Adult Inpatient Plan of Care  Goal: Plan of Care Review  Flowsheets (Taken 7/27/2021 4614)  Progress: improving  Plan of Care Reviewed With: patient  Outcome Summary: Pt still having loose stool. r/o cdiff when he goes again . TTE done at bedside and US of LLeg pending .  Pt tolerating PO diet. CTM   Plan of Care Review  Plan of Care Reviewed With: patient  Progress: improving  Outcome Summary: Pt still having loose stool. r/o cdiff when he goes again . TTE done at bedside and US of LLeg pending .  Pt tolerating PO diet. CTM

## 2021-07-27 NOTE — PLAN OF CARE
Plan of Care Review  Plan of Care Reviewed With: patient  Progress: no change  Outcome Summary: WILKINSON, O2 Sat 91-92%3L NC. Abd distended. No c/o pain this shift Virtual Ted ongoing  Problem: Adult Inpatient Plan of Care  Goal: Plan of Care Review  Outcome: Progressing  Flowsheets (Taken 7/27/2021 0652)  Progress: no change  Plan of Care Reviewed With: patient  Outcome Summary: WILKINSON, O2 Sat 91-92%3L NC. Abd distended. No c/o pain this shift Virtual Ted ongoing

## 2021-07-27 NOTE — PATIENT CARE CONFERENCE
Care Progression Rounds Note  Date: 7/27/2021  Time: 11:09 AM     Patient Name: Adalberto Galaviz     Medical Record Number: [id number]   YOB: 1940  Sex: Male      Room/Bed: 0362    Admitting Diagnosis: Sinus bradycardia [R00.1]  Acute pancreatitis, unspecified complication status, unspecified pancreatitis type [K85.90]   Admit Date/Time: 7/21/2021 11:55 AM    Primary Diagnosis: No Principal Problem: There is no principal problem currently on the Problem List. Please update the Problem List and refresh.  Principal Problem: No Principal Problem: There is no principal problem currently on the Problem List. Please update the Problem List and refresh.    GMLOS: pending  Anticipated Discharge Date: 7/31/2021    AM-PAC:  Mobility Score: 20    Discharge Planning:  Anticipated Discharge Disposition: home/family member's home    Barriers to Discharge:  Barriers to Discharge: Medical issues not resolved, Consultant recommendations pending, Test pending    Participants:  , nursing, social work/services, advanced practice provider

## 2021-07-27 NOTE — CONSULTS
Nephrology Consult Note    Subjective     Adalberto Galaviz is a 80 y.o. male who was admitted for Sinus bradycardia [R00.1]  Acute pancreatitis, unspecified complication status, unspecified pancreatitis type [K85.90]. Patient was referred by Dr. Rodriguez for management recommendations. Patient is a 81 yo man with history of CKD with baseline creatinine 2.9 in December 2020, HTN, and hypertension.  He was admitted for severe abdominal pain, nausea, and vomiting. He was diagnosed for pancreatitis. On this admission, his creatinine has been slowly increasing from 2.4 to 3.7 over the course. He has been on IVF initially.  Now he has hypoxia and edema. His renal ultra sousnd does not show hydronephrosis. He has not been on nephrotoxic agents or Ct with contrast.  His systolic blood pressure has been high over 180's. He was placed on antihypertensive medication which decreased his blood pressure to 110's on July 26.  Now, he is off of all antihypertensive medication and was given Lasix due to hypoxia. Currently, he has 2-3 times of loose bm for several days.     Pertinent lab results reviewed.    Medical History:   Past Medical History:   Diagnosis Date   • CKD (chronic kidney disease) stage 4, GFR 15-29 ml/min (CMS/HCC)    • DJD (degenerative joint disease)    • Hypertension    • Nocturia    • Prostate CA (CMS/HCC)    • Prostate CA (CMS/HCC)        Surgical History:   Past Surgical History:   Procedure Laterality Date   • PROSTATECTOMY         Social History:   Social History     Socioeconomic History   • Marital status:      Spouse name: None   • Number of children: None   • Years of education: None   • Highest education level: None   Occupational History   • None   Tobacco Use   • Smoking status: Former Smoker   • Smokeless tobacco: Never Used   Substance and Sexual Activity   • Alcohol use: Yes   • Drug use: Never   • Sexual activity: None   Other Topics Concern   • None   Social History Narrative   • None     Social  Determinants of Health     Financial Resource Strain:    • Difficulty of Paying Living Expenses:    Food Insecurity:    • Worried About Running Out of Food in the Last Year:    • Ran Out of Food in the Last Year:    Transportation Needs:    • Lack of Transportation (Medical):    • Lack of Transportation (Non-Medical):    Physical Activity:    • Days of Exercise per Week:    • Minutes of Exercise per Session:    Stress:    • Feeling of Stress :    Social Connections:    • Frequency of Communication with Friends and Family:    • Frequency of Social Gatherings with Friends and Family:    • Attends Sikh Services:    • Active Member of Clubs or Organizations:    • Attends Club or Organization Meetings:    • Marital Status:    Intimate Partner Violence:    • Fear of Current or Ex-Partner:    • Emotionally Abused:    • Physically Abused:    • Sexually Abused:        Family History: History reviewed. No pertinent family history.    Allergies: Aspirin, Losartan, and Morphine    Current Inpatient Medications   Medication Dose Route Frequency Provider Last Rate Last Admin   • acetaminophen (TYLENOL) tablet 975 mg  975 mg oral q8h PRN Keyla Rodriguez MD       • albuterol nebulizer solution 2.5 mg  2.5 mg nebulization q6h PRN Keyla Rodriguez MD       • alum-mag hydroxide-simeth (MAALOX) 200-200-20 mg/5 mL suspension 30 mL  30 mL oral q4h PRN Charlene Mo DO       • atropine injection 0.5 mg  0.5 mg intravenous q5 min PRN Charlene Mo DO       • cefTRIAXone (ROCEPHIN) IVPB 1 g in 100 mL NSS vial in bag  1 g intravenous q24h INT Keyla Rodriguez MD   Stopped at 07/27/21 1251   • glucose chewable tablet 16-32 g of dextrose  16-32 g of dextrose oral PRN Charlene Mo DO        Or   • dextrose 40 % oral gel 15-30 g of dextrose  15-30 g of dextrose oral PRN Charlene Mo DO        Or   • glucagon (GLUCAGEN) injection 1 mg  1 mg intramuscular PRN Charlene Mo DO        Or   • dextrose in water  injection 12.5 g  25 mL intravenous PRN Charlene Mo DO       • famotidine (PEPCID) tablet 20 mg  20 mg oral Daily Keyla Rodriguez MD   20 mg at 07/27/21 0848   • heparin (porcine) 5,000 unit/mL injection 5,000 Units  5,000 Units subcutaneous q8h [facility] Amandeep Washington MD   5,000 Units at 07/27/21 0516   • HYDROmorphone (DILAUDID) injection 0.4 mg  0.4 mg intravenous q4h PRN Keyla Rodriguez MD       • nitroglycerin (NITROSTAT) SL tablet 0.4 mg  0.4 mg sublingual q5 min PRN Charlene Mo DO       • pancrelipase (Lip-Prot-Amyl) (CREON) capsule,delayed release(DR/EC) 12,000 units of lipase  12,000 units of lipase oral TID with meals Noemí Dubon PA C   12,000 units of lipase at 07/27/21 1216   • prochlorperazine (COMPAZINE) injection 10 mg  10 mg intravenous q6h PRN Charlene Mo DO   10 mg at 07/23/21 1419   • sertraline (ZOLOFT) tablet 25 mg  25 mg oral q PM Charlene Mo DO   25 mg at 07/26/21 1839   • sodium citrate-citric acid (BICITRA) 500-334 mg/5 mL solution 15 mL  15 mL oral BID Keyla Rodriguez MD   15 mL at 07/27/21 0848       Review of Systems  A complete review of systems was performed and aside from as mentioned above, was otherwise negative.    Objective   Labs   Recent Results (from the past 24 hour(s))   ECG 12 lead    Collection Time: 07/26/21  2:24 PM   Result Value Ref Range    Ventricular rate 91     Atrial rate 91     GA Interval 142     QRS duration 94     QT Interval 344     QTC Calculation(Bazett) 423     P Axis 29     R Axis -28     T Wave Axis 19    Troponin I    Collection Time: 07/26/21  2:49 PM   Result Value Ref Range    Troponin I 0.80 (HH) <0.05 ng/mL   Lactic acid, Venous    Collection Time: 07/26/21  2:49 PM   Result Value Ref Range    Lactate 0.9 0.4 - 2.0 mmol/L   Basic metabolic panel    Collection Time: 07/26/21  2:49 PM   Result Value Ref Range    Sodium 139 136 - 144 mEQ/L    Potassium 3.5 (L) 3.6 - 5.1 mEQ/L    Chloride 108 98 - 109 mEQ/L     CO2 19 (L) 22 - 32 mEQ/L    BUN 49 (H) 8 - 20 mg/dL    Creatinine 3.5 (H) 0.8 - 1.3 mg/dL    Glucose 157 (H) 70 - 99 mg/dL    Calcium 8.6 (L) 8.9 - 10.3 mg/dL    eGFR 16.9 (L) >=60.0 mL/min/1.73m*2    Anion Gap 12 3 - 15 mEQ/L   Lipase    Collection Time: 07/26/21  2:51 PM   Result Value Ref Range    Lipase 19 (L) 20 - 51 U/L   UA Macroscopic    Collection Time: 07/26/21  3:13 PM   Result Value Ref Range    Color, Urine Yellow Yellow    Clarity, Urine Cloudy (A) Clear    Specific Gravity, Urine 1.016 1.002 - 1.030    pH, Urine 6.0 4.5 - 8.0    Leukocyte Esterase +2 (A) Negative    Nitrite, Urine Negative Negative    Protein, Urine +1 (A) Negative    Glucose, Urine Negative Negative mg/dL    Ketones, Urine Negative Negative mg/dL    Urobilinogen, Urine 0.2 <2.0 EU/dL EU/dL    Bilirubin, Urine Negative Negative mg/dL    Blood, Urine Negative Negative   UA Microscopic    Collection Time: 07/26/21  3:13 PM   Result Value Ref Range    WBC, Urine 4 TO 10 (A) 0 TO 3 /HPF    Squamous Epithelial +1 (A) None Seen /hpf    Bacteria, Urine None Seen None Seen /HPF    RBC, Urine 0 TO 4 0 TO 4 /HPF    Hyaline Casts None Seen None Seen /lpf    Transitional Epithelial Rare (A) None Seen /hpf   Troponin I    Collection Time: 07/26/21 10:49 PM   Result Value Ref Range    Troponin I 0.61 (HH) <0.05 ng/mL   Comprehensive metabolic panel    Collection Time: 07/27/21  5:10 AM   Result Value Ref Range    Sodium 139 136 - 144 mEQ/L    Potassium 4.6 3.6 - 5.1 mEQ/L    Chloride 111 (H) 98 - 109 mEQ/L    CO2 16 (L) 22 - 32 mEQ/L    BUN 47 (H) 8 - 20 mg/dL    Creatinine 3.7 (H) 0.8 - 1.3 mg/dL    Glucose 144 (H) 70 - 99 mg/dL    Calcium 8.6 (L) 8.9 - 10.3 mg/dL    AST (SGOT) 40 15 - 41 IU/L    ALT (SGPT) 33 16 - 63 IU/L    Alkaline Phosphatase 62 35 - 126 IU/L    Total Protein 4.7 (L) 6.0 - 8.2 g/dL    Albumin 2.2 (L) 3.4 - 5.0 g/dL    Bilirubin, Total 1.1 0.3 - 1.2 mg/dL    eGFR 15.8 (L) >=60.0 mL/min/1.73m*2    Anion Gap 12 3 - 15 mEQ/L    CBC    Collection Time: 07/27/21  5:10 AM   Result Value Ref Range    WBC 15.89 (H) 3.80 - 10.50 K/uL    RBC 3.81 (L) 4.50 - 5.80 M/uL    Hemoglobin 11.4 (L) 13.7 - 17.5 g/dL    Hematocrit 34.3 (L) 40.1 - 51.0 %    MCV 90.0 83.0 - 98.0 fL    MCH 29.9 28.0 - 33.2 pg    MCHC 33.2 32.2 - 36.5 g/dL    RDW 15.1 (H) 11.6 - 14.4 %    Platelets 223 150 - 350 K/uL    MPV 11.3 9.4 - 12.4 fL   Troponin I    Collection Time: 07/27/21  5:10 AM   Result Value Ref Range    Troponin I 0.60 (HH) <0.05 ng/mL   Magnesium    Collection Time: 07/27/21  5:10 AM   Result Value Ref Range    Magnesium 2.1 1.8 - 2.5 mg/dL   Phosphorus    Collection Time: 07/27/21  5:10 AM   Result Value Ref Range    Phosphorus 3.0 2.4 - 4.7 mg/dL   Lipid panel    Collection Time: 07/27/21  5:10 AM   Result Value Ref Range    Triglycerides 236 (H) 30 - 149 mg/dL    Cholesterol 140 <=200 mg/dL    HDL 27 (L) >=45 mg/dL    LDL Calculated 66 <=100 mg/dL    Non-HDL, Calculated 113 mg/dL    RISK 5.2 (H) <=5.0   ELECTROLYTES, URINE RANDOM    Collection Time: 07/27/21 12:25 PM   Result Value Ref Range    Sodium, Ur 17 mEQ/L    Potassium, Ur 25.4 mEQ/L    Chloride, Ur 22 mEQ/L   Creatinine, urine, random    Collection Time: 07/27/21 12:25 PM   Result Value Ref Range    Creatinine, Urine 126.2 mg/dL     I have reviewed the pertinent patient's labs.    Imaging  I have reviewed the pertinent patient's imaging results for this admission.     Physical Exam  NAD, AA, pleasant  Anicteric, dry mucus. Normal conjunctivae  RRR, S1, S2, trace of edema   Decreased BS at base bilaterally. No wheezing or rhonchi  Soft +BS distended. No tenderness  No C/C    Assessment   80 y.o. male being consulted for management recommendations       Plan     1. GABY : His urine does not show granular casts.  However, his renal function seems to behave like prerenal + ATN. UA shows pyuria without nitrite or bacteria.  I will check urine eosinophil even though I have low suspicion of AIN.  I would  follow his urine output and BMP for now.  I would repeat UA intermittently.  Check bladder scan.     2. Metabolic acidosis : sodium bicarbonate 650 mg po TID. The target level is 24-26.     3. Volume overload :check albumin and prn Lasix if respiratory status deteriorates.     4. Pancreatitis : improving clinically.     Thank you for allowing me to participate in his care. Please feel free to call us if any concern about his clinical status at [phone].

## 2021-07-27 NOTE — PROGRESS NOTES
"Daily Progress Note    Subjective    The patient has no complaints of chest pain, palpitations, lightheadedness, dizziness, near syncope or syncope.  +SOB.  +N and +diarrhea.    Objective    Vital signs in last 24 hours:  Temp:  [36.6 °C (97.9 °F)-37.1 °C (98.8 °F)] 36.6 °C (97.9 °F)  Heart Rate:  [75-99] 75  Resp:  [18-24] 18  BP: (118-149)/(57-81) 118/57      Intake/Output Summary (Last 24 hours) at 7/27/2021 1028  Last data filed at 7/26/2021 1450  Gross per 24 hour   Intake --   Output 200 ml   Net -200 ml       Physical Exam:  Visit Vitals  BP (!) 118/57 (BP Location: Right upper arm, Patient Position: Lying)   Pulse 75   Temp 36.6 °C (97.9 °F) (Oral)   Resp 18   Ht 1.854 m (6' 1\")   Wt 93.9 kg (207 lb 1.6 oz)   SpO2 95%   BMI 27.32 kg/m²        GEN - NAD  HEENT - OP Clear, MMM  CV - Reg S1,S2, No M/R/G  PULM - diminished bs B/L bases  ABD - Soft, +BS +distended  EXT - No C/C +edema  PSYCH - AAO x 3  SKIN - Warm/Dry.  No lesions/rashes        •  acetaminophen, 975 mg, oral, q8h PRN  •  albuterol, 2.5 mg, nebulization, q6h PRN  •  alum-mag hydroxide-simeth, 30 mL, oral, q4h PRN  •  atropine, 0.5 mg, intravenous, q5 min PRN  •  cefTRIAXone, 1 g, intravenous, q24h INT  •  glucose, 16-32 g of dextrose, oral, PRN **OR** dextrose, 15-30 g of dextrose, oral, PRN **OR** glucagon, 1 mg, intramuscular, PRN **OR** dextrose in water, 25 mL, intravenous, PRN  •  famotidine, 20 mg, oral, Daily  •  furosemide, 40 mg, intravenous, Once  •  heparin (porcine), 5,000 Units, subcutaneous, q8h ROBERT  •  HYDROmorphone, 0.4 mg, intravenous, q4h PRN  •  nitroglycerin, 0.4 mg, sublingual, q5 min PRN  •  pancrelipase (Lip-Prot-Amyl), 12,000 units of lipase, oral, TID with meals  •  prochlorperazine, 10 mg, intravenous, q6h PRN  •  sertraline, 25 mg, oral, q PM  •  sodium citrate-citric acid, 15 mL, oral, BID    Labs  Recent labs reviewed by me.  WBC   Date Value Ref Range Status   07/27/2021 15.89 (H) 3.80 - 10.50 K/uL Final "     Hemoglobin   Date Value Ref Range Status   07/27/2021 11.4 (L) 13.7 - 17.5 g/dL Final     Hematocrit   Date Value Ref Range Status   07/27/2021 34.3 (L) 40.1 - 51.0 % Final     Platelets   Date Value Ref Range Status   07/27/2021 223 150 - 350 K/uL Final     Cholesterol   Date Value Ref Range Status   07/27/2021 140 <=200 mg/dL Final     Triglycerides   Date Value Ref Range Status   07/27/2021 236 (H) 30 - 149 mg/dL Final     HDL   Date Value Ref Range Status   07/27/2021 27 (L) >=45 mg/dL Final     Comment:     2001 NCEP GUIDELINES  <40 mg/dL Low  >=60 mg/dL High     ALT (SGPT)   Date Value Ref Range Status   07/27/2021 33 16 - 63 IU/L Final     Comment:     MODERATE HEMOLYSIS, RESULT MAY BE INCREASED.     AST (SGOT)   Date Value Ref Range Status   07/27/2021 40 15 - 41 IU/L Final     Comment:     MODERATE HEMOLYSIS, RESULT MAY BE INCREASED.     Sodium   Date Value Ref Range Status   07/27/2021 139 136 - 144 mEQ/L Final     Potassium   Date Value Ref Range Status   07/27/2021 4.6 3.6 - 5.1 mEQ/L Final     Comment:     MODERATE HEMOLYSIS, RESULT MAY BE INCREASED.     Chloride   Date Value Ref Range Status   07/27/2021 111 (H) 98 - 109 mEQ/L Final     Creatinine   Date Value Ref Range Status   07/27/2021 3.7 (H) 0.8 - 1.3 mg/dL Final     BUN   Date Value Ref Range Status   07/27/2021 47 (H) 8 - 20 mg/dL Final     CO2   Date Value Ref Range Status   07/27/2021 16 (L) 22 - 32 mEQ/L Final     INR   Date Value Ref Range Status   07/21/2021 1.0   Final     Comment:     INR has no defined significance when PT is within Reference Range.     Troponin I   Date Value Ref Range Status   07/27/2021 0.60 (HH) <0.05 ng/mL Final     Comment:     Consistent with previous results         Imaging  Recent imaging reviewed by me.  ULTRASOUND KIDNEYS    Result Date: 7/26/2021  CLINICAL HISTORY: Acute kidney injury on chronic renal failure. COMMENT: A limited retroperitoneal ultrasound with attention to the kidneys is performed.   Grayscale and color Doppler imaging is utilized. COMPARISON: CT abdomen pelvis 7/21/2021. The mildly atrophic right kidney measures  5.1 x 4.0 x 9.7 cm. There is increased parenchymal echogenicity in keeping with medical renal disease.  Right interpolar renal cyst measures 0.9 x 0.7 x 0.9 cm. There is no hydronephrosis. There are no renal calculi. The mildly atrophic left kidney measures 5.0 x 4.7 x 9.6 cm. There is increased parenchymal echogenicity in keeping with medical renal disease.  Left interpolar parapelvic renal cyst measures 3.2 x 3.3 x 4.2 cm. There is no hydronephrosis. There are no renal calculi.     IMPRESSION: 1.  No hydronephrosis. 2.  Atrophic kidneys. Increased parenchymal echogenicity in keeping with medical renal disease. 3.  Bilateral renal cysts. I certify that I have reviewed this examination and agree with this report. Trini Mas D.O.    X-RAY CHEST 1 VIEW    Result Date: 7/26/2021  CLINICAL HISTORY: Shortness of breath COMMENT:A single AP erect view the chest was obtained portably on 7/26/2021 2:20 PM.  Comparison is made to previous study performed 7/25/2021. The cardiac and mediastinal silhouette is stable.  The heart is top normal in size.  Bibasilar density likely reflecting subsegmental atelectasis is not significantly changed.  Horizontal linear density in the right mid lung field likely reflects plate like atelectasis as well.  The pulmonary vasculature is normal in caliber and there is no pneumothorax.  There are degenerative changes within the right shoulder joint.  The bony thorax is otherwise grossly unremarkable.     IMPRESSION: Stable appearance of the chest as described above.        ECG/Telemetry  Personally reviewed.  NSR.         Assessment & Plan    Elevated troponin  Assessment & Plan  Likely demand ischemia in setting of acute pancreatitis.  Cont med regimen.    Would add low dose B-bl and Statin for cardio-protection  Previously noted to have bradycardia, but was in  setting of vagal episode.  HR has been stable since.   Consider ischemic eval as outpt once he is clincally stable.      Shortness of breath  Assessment & Plan  May be mildly volume overloaded in setting of IVF's for pancreatitis  Agree with Lasix x 1 today and monitor I/O's and renal function   Check echo    Acute pancreatitis  Assessment & Plan  Cont care per HMS/GI.    Hypertension  Assessment & Plan  BP stable/mildly elevated  Would add beta blocker at this time.            René Mcgrath MD  7/27/2021

## 2021-07-27 NOTE — ASSESSMENT & PLAN NOTE
Resolved at this time.  Was mildly volume overloaded in setting of IVF's for pancreatitis  Echo now with hyperdynamic LV, likely in setting of infection and diarrhea.  Hold further diuretics at this time.

## 2021-07-27 NOTE — PROGRESS NOTES
Patient: Adalberto Galaviz  Location: [facility] 3C 0362  MRN: [id number]  Today's date: 7/27/2021    Attempted to see patient for therapy. Unable due to medical hold. nausea, vomiting/painful abdominal distention. LLE US pending to r/o DVT.

## 2021-07-28 PROBLEM — R06.02 SHORTNESS OF BREATH: Status: RESOLVED | Noted: 2021-01-01 | Resolved: 2021-01-01

## 2021-07-28 NOTE — PLAN OF CARE
Problem: Adult Inpatient Plan of Care  Goal: Readiness for Transition of Care  Outcome: Progressing  Intervention: Mutually Develop Transition Plan  Flowsheets (Taken 7/28/2021 0043)  Discharge Coordination/Progress: Bun/cr-50/3.7, await medical stability for DC, [facility] [place]

## 2021-07-28 NOTE — PROGRESS NOTES
"Patient: Adalberto Galaviz  Location:  [facility] 3C 0362  MRN:  [id number]  Today's date:  7/28/2021    Patient reclined in bed s/p PT session, BLE elevated, SCD's activated, VSS, NAD, no c/o chest pain, SOB or nausea, no neuro s/s, bed alarm set, Nurse notified of PT session results/recommendations, all patient belongings and call bell within reach    Adalberto is a 80 y.o. male admitted on 7/21/2021 with Sinus bradycardia [R00.1]  Acute pancreatitis, unspecified complication status, unspecified pancreatitis type [K85.90]. Principal problem is No Principal Problem: There is no principal problem currently on the Problem List. Please update the Problem List and refresh..    Past Medical History  Adalberto has a past medical history of CKD (chronic kidney disease) stage 4, GFR 15-29 ml/min (CMS/HCC), DJD (degenerative joint disease), Hypertension, Nocturia, Prostate CA (CMS/HCC), and Prostate CA (CMS/HCC).    History of Present Illness  80 y.o. male from home w/ spouse who presents with severe abdominal pain, nausea, vomiting. Bradycardic in 30s in ED 7/21/21 (suspect vasovagal due to pain/nausea/vomiting). +Pancreatitis, GABY, metabolic acidosis, Neg DVT LLE     Visit Vitals  BP (!) 159/74 (BP Location: Right upper arm, Patient Position: Sitting)   Pulse 75   Temp 36.9 °C (98.4 °F) (Temporal)   Resp 16   Ht 1.854 m (6' 1\")   Wt 92.1 kg (203 lb 1.6 oz)   SpO2 97%   BMI 26.80 kg/m²     no c/o pain before/after PT Eval     Prior Living Environment      Most Recent Value   People in Home  spouse   Current Living Arrangements  home/apartment/condo   Home Accessibility  stairs to enter home (Group)   Number of Stairs, Main Entrance  1        Prior Level of Function      Most Recent Value   Dominant Hand  right   Ambulation  independent   Transferring  independent   Toileting  independent   Bathing  independent   Dressing  independent   Eating  independent   Communication  understands/communicates without difficulty "   Swallowing  swallows foods/liquids without difficulty   Baseline Diet/Method of Nutritional Intake  thin liquids (Level 0), regular solids (Level 7), no diet restrictions   Past History of Dysphagia  denies h/o dysphagia.   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, retired    Assistive Device Currently Used at Home  none          PT Evaluation and Treatment - 07/28/21 1058        PT Time Calculation    Start Time  1058     Stop Time  1121     Time Calculation (min)  23 min        Session Details    Document Type  initial evaluation     Mode of Treatment  physical therapy        General Information    Patient Profile Reviewed  yes     Onset of Illness/Injury or Date of Surgery  07/21/21     Referring Physician  Dr Washington      Patient/Family/Caregiver Comments/Observations  cleared by 3c nurse, present on PT Eval      General Observations of Patient  80 y.o. male from home w/ spouse who presents with severe abdominal pain, nausea, vomiting. Bradycardic in 30s in ED 7/21/21 (suspect vasovagal due to pain/nausea/vomiting). +Pancreatitis, GABY, metabolic acidosis, Neg DVT LLE      Existing Precautions/Restrictions  cardiac;fall;special contact;head of bed elevated 30 degrees    AAT, ambulate, I+O, special contact r/o cdiff     Limitations/Impairments  swallowing        Living Environment    Primary Care Provided by  self        Cognition/Psychosocial    Affect/Mental Status (Cognition)  WFL     Orientation Status (Cognition)  oriented x 4     Follows Commands (Cognition)  WFL     Cognitive Function  WFL        Sensory    Hearing Status  hearing impairment, bilaterally        Vision Assessment/Intervention    Visual Impairment/Limitations  corrective lenses full-time        Sensory Assessment (Somatosensory)    Sensory Assessment (Somatosensory)  sensation intact     Sensory Subjective Reports  numbness    B feet at times prior to  admission        Range of Motion (ROM)    Range of Motion  ROM is [facility]        Range of Motion Comprehensive    Comprehensive Range of Motion  ROM is [facility]        Strength (Manual Muscle Testing)    Strength (Manual Muscle Testing)  strength is [facility]        Strength Comprehensive (MMT)    Comprehensive MMT Assessment  no strength deficits identified        Bed Mobility    [place], Supine to Sit  close supervision;nonverbal cues (demo/gesture);verbal cues     Verbal Cues (Supine to Sit)  hand placement;preparatory posture;technique     [place], Sit to Supine  close supervision;nonverbal cues (demo/gesture);verbal cues     Verbal Cues (Sit to Supine)  hand placement;preparatory posture;technique     Assistive Device  head of bed elevated        Transfers    Transfers  toilet transfer;shower transfer;car transfer        Sit to Stand Transfer    [place], Sit to Stand Transfer  close supervision;nonverbal cues (demo/gesture);verbal cues     Verbal Cues  hand placement;preparatory posture;technique     Assistive Device  none        Stand to Sit Transfer    [place], Stand to Sit Transfer  close supervision;nonverbal cues (demo/gesture);verbal cues     Verbal Cues  hand placement;preparatory posture;technique     Assistive Device  none        Toilet Transfer    Transfer Technique  stand-sit;sit-stand     [place], Toilet Transfer  close supervision;nonverbal cues (demo/gesture);verbal cues     Verbal Cues  hand placement;preparatory posture;technique     Assistive Device  none        Gait Training    [place], Gait  touching/steadying assist;close supervision;nonverbal cues (demo/gesture);verbal cues     Assistive Device  none     Distance in Feet  20 feet     Pattern (Gait)  step-through     Deviations/Abnormal Patterns (Gait)  bilateral deviations;grace decreased;gait speed decreased;step length decreased;stride length decreased     Bilateral Gait Deviations  heel strike decreased     Comment  (Gait/Stairs)  amb 20 ft in 3c room w/ HHA/CS, LOB x 1 when standing at sink washing his hands s/p bathroom use, he reports h/o balance loss prior to adm, VSS/NAD s/p session, nurse notified         Stairs Training    [place], Stairs  not tested        Safety Issues, Functional Mobility    Impairments Affecting Function (Mobility)  balance;endurance/activity tolerance;range of motion (ROM);strength;pain        Balance    Balance Assessment  sitting static balance;sitting dynamic balance;sit to stand dynamic balance;standing static balance;standing dynamic balance     Static Sitting Balance  WFL     Dynamic Sitting Balance  WFL     Sit to Stand Dynamic Balance  mild impairment     Static Standing Balance  mild impairment     Dynamic Standing Balance  mild impairment        Motor Skills    Motor Skills  coordination;functional endurance     Coordination  minimal impairment     Functional Endurance  Fair+        Skin WDL    Location(s) - bruised (ecchymotic)  skin checks by nursing staff daily         AM-PAC (TM) - Mobility (Current Function)    Turning from your back to your side while in a flat bed without using bedrails?  4 - None     Moving from lying on your back to sitting on the side of a flat bed without using bedrails?  4 - None     Moving to and from a bed to a chair?  3 - A Little     Standing up from a chair using your arms?  3 - A Little     To walk in a hospital room?  3 - A Little     Climbing 3-5 steps with a railing?  3 - A Little     AM-PAC (TM) Mobility Score  20         Education Documentation  Signs/Symptoms, taught by Cash Corea, PT at 7/28/2021  3:02 PM.  Learner: Patient  Readiness: Acceptance  Method: Explanation, Demonstration  Response: Verbalizes Understanding, Demonstrated Understanding, Needs Reinforcement  Comment: bed mob, xfers, GT w/ HHA, use SPC next treat, PT POC, safety at home, fall risk reduction, skin protection, ADL's, dress/grooming, stairs, car xfer, importance of  daily mobility to prevent health risks, all patient concerns addressed    Risk Factors, taught by Cash Corea PT at 7/28/2021  3:02 PM.  Learner: Patient  Readiness: Acceptance  Method: Explanation, Demonstration  Response: Verbalizes Understanding, Demonstrated Understanding, Needs Reinforcement  Comment: bed mob, xfers, GT w/ HHA, use SPC next treat, PT POC, safety at home, fall risk reduction, skin protection, ADL's, dress/grooming, stairs, car xfer, importance of daily mobility to prevent health risks, all patient concerns addressed    Skin Integrity Promotion, taught by Cash Corea PT at 7/28/2021  3:02 PM.  Learner: Patient  Readiness: Acceptance  Method: Explanation, Demonstration  Response: Verbalizes Understanding, Demonstrated Understanding, Needs Reinforcement  Comment: bed mob, xfers, GT w/ HHA, use SPC next treat, PT POC, safety at home, fall risk reduction, skin protection, ADL's, dress/grooming, stairs, car xfer, importance of daily mobility to prevent health risks, all patient concerns addressed    Skin Injury Prevention Measures, taught by Cash Corea PT at 7/28/2021  3:02 PM.  Learner: Patient  Readiness: Acceptance  Method: Explanation, Demonstration  Response: Verbalizes Understanding, Demonstrated Understanding, Needs Reinforcement  Comment: bed mob, xfers, GT w/ HHA, use SPC next treat, PT POC, safety at home, fall risk reduction, skin protection, ADL's, dress/grooming, stairs, car xfer, importance of daily mobility to prevent health risks, all patient concerns addressed    Home Safety, taught by Cash Corea PT at 7/28/2021  3:02 PM.  Learner: Patient  Readiness: Acceptance  Method: Explanation, Demonstration  Response: Verbalizes Understanding, Demonstrated Understanding, Needs Reinforcement  Comment: bed mob, xfers, GT w/ HHA, use SPC next treat, PT POC, safety at home, fall risk reduction, skin protection, ADL's, dress/grooming, stairs, car xfer, importance of daily  mobility to prevent health risks, all patient concerns addressed    Energy Conservation, taught by Cash Corea PT at 7/28/2021  3:02 PM.  Learner: Patient  Readiness: Acceptance  Method: Explanation, Demonstration  Response: Verbalizes Understanding, Demonstrated Understanding, Needs Reinforcement  Comment: bed mob, xfers, GT w/ HHA, use SPC next treat, PT POC, safety at home, fall risk reduction, skin protection, ADL's, dress/grooming, stairs, car xfer, importance of daily mobility to prevent health risks, all patient concerns addressed    Fall Prevention, taught by Cash Corea PT at 7/28/2021  3:02 PM.  Learner: Patient  Readiness: Acceptance  Method: Explanation, Demonstration  Response: Verbalizes Understanding, Demonstrated Understanding, Needs Reinforcement  Comment: bed mob, xfers, GT w/ HHA, use SPC next treat, PT POC, safety at home, fall risk reduction, skin protection, ADL's, dress/grooming, stairs, car xfer, importance of daily mobility to prevent health risks, all patient concerns addressed    Equipment/Home Supplies, taught by Cash Corea PT at 7/28/2021  3:02 PM.  Learner: Patient  Readiness: Acceptance  Method: Explanation, Demonstration  Response: Verbalizes Understanding, Demonstrated Understanding, Needs Reinforcement  Comment: bed mob, xfers, GT w/ HHA, use SPC next treat, PT POC, safety at home, fall risk reduction, skin protection, ADL's, dress/grooming, stairs, car xfer, importance of daily mobility to prevent health risks, all patient concerns addressed    Call Light Use, taught by Cash Corea PT at 7/28/2021  3:02 PM.  Learner: Patient  Readiness: Acceptance  Method: Explanation, Demonstration  Response: Verbalizes Understanding, Demonstrated Understanding, Needs Reinforcement  Comment: bed mob, xfers, GT w/ HHA, use SPC next treat, PT POC, safety at home, fall risk reduction, skin protection, ADL's, dress/grooming, stairs, car xfer, importance of daily mobility to  prevent health risks, all patient concerns addressed          PT Assessment/Plan      Most Recent Value   Daily Outcome Statement  S bed mob, CS xfers, CS/HHA amb in room, [facility] 20/24, PT recommends amb w/ SPC due to poor balance and h/o falls, DC home w/ family assist and HPT, case mgt notified  at 07/28/2021 1058   PT Recommended Discharge Disposition  home with assistance, home with home health at 07/28/2021 1058   Rehab Potential  good, to achieve stated therapy goals at 07/28/2021 1058   Therapy Frequency  5 times/wk at 07/28/2021 1058   Planned Therapy Interventions  balance training, bed mobility training, gait training, home exercise program, patient/family education, ROM (range of motion), stair training, strengthening, transfer training at 07/28/2021 1058   Anticipated Equipment Needs at Discharge (PT)  commode, 3-in-1, shower chair, cane, straight at 07/28/2021 1058   Patient/Family Therapy Goals Statement  I want to go home  at 07/28/2021 1058          PT Goals      Most Recent Value   Bed Mobility Goal 1   Activity/Assistive Device  bed mobility activities, all at 07/28/2021 1058   [place]  independent at 07/28/2021 1058   Time Frame  by discharge at 07/28/2021 1058   Progress/Outcome  goal ongoing at 07/28/2021 1058   Transfer Goal 1   Activity/Assistive Device  all transfers, cane, straight at 07/28/2021 1058   [place]  modified independence at 07/28/2021 1058   Time Frame  by discharge at 07/28/2021 1058   Progress/Outcome  goal ongoing at 07/28/2021 1058   Gait Training Goal 1   Activity/Assistive Device  gait (walking locomotion), cane, straight at 07/28/2021 1058   [place]  modified independence at 07/28/2021 1058   Distance  130 at 07/28/2021 1058   Time Frame  by discharge at 07/28/2021 1058   Progress/Outcome  goal ongoing at 07/28/2021 1058   Stairs Goal 1   Activity/Assistive Device  ascending stairs, descending stairs, cane, straight at 07/28/2021 1058   [place]  modified  independence at 07/28/2021 1058   Number of Stairs  12 at 07/28/2021 1058   Time Frame  by discharge at 07/28/2021 1058   Progress/Outcome  goal ongoing at 07/28/2021 1058

## 2021-07-28 NOTE — PROGRESS NOTES
Subjective     Interval History: complains of thirst. no orthopnea or edema. He feels that his abdomen is softer. .     Objective     Vital signs in last 24 hours:  Temp:  [36.3 °C (97.4 °F)-37.1 °C (98.8 °F)] 36.9 °C (98.4 °F)  Heart Rate:  [65-89] 75  Resp:  [16-18] 16  BP: (115-182)/(57-84) 159/74         Intake/Output Summary (Last 24 hours) at 7/28/2021 1120  Last data filed at 7/28/2021 1100  Gross per 24 hour   Intake 480 ml   Output 300 ml   Net 180 ml       Labs  BUN   Date Value Ref Range Status   07/28/2021 50 (H) 8 - 20 mg/dL Final     Creatinine   Date Value Ref Range Status   07/28/2021 3.7 (H) 0.8 - 1.3 mg/dL Final     Potassium   Date Value Ref Range Status   07/28/2021 3.6 3.6 - 5.1 mEQ/L Final     Phosphorus   Date Value Ref Range Status   07/27/2021 3.0 2.4 - 4.7 mg/dL Final     Comment:     MODERATE HEMOLYSIS, RESULT MAY BE INCREASED.     Hemoglobin   Date Value Ref Range Status   07/28/2021 11.6 (L) 13.7 - 17.5 g/dL Final     Sodium   Date Value Ref Range Status   07/28/2021 144 136 - 144 mEQ/L Final     Albumin   Date Value Ref Range Status   07/27/2021 2.2 (L) 3.4 - 5.0 g/dL Final     I have reviewed the pertinent patient's labs.    Imaging  I have reviewed the pertinent patient's imaging results.     Physical Exam  NAD, AA, pleasant  Anicteric, dry mucus, normal conjunctivae  RRR, S1, S2, no edema  Decreased BS at the base. No wheezing.   Soft + BS, distended, NT  No C/C    Assessment /Plan  1. A on CKD : his creatinine is stable at 3.7.  Hopefully, his renal function is at the plateau.  Follow urine output and creatinine.     2. Hypoxia : now he is on 3 L NC.  Encourage to use incentive spirometry frequently. PRN Lasix     3. HTN : labile. On metoprolol    4. Metabolic acidosis : on sodium bicarbonate. The target level of CO2 is 24.     5. S/p pancreatitis           7/28/2021  Brunilda Rico MD

## 2021-07-28 NOTE — PROGRESS NOTES
Hospital Medicine Service -  Daily Progress Note       SUBJECTIVE   Interval History: Patient's oxygen saturations now stable on room air.  Denies cough.  Reports abdominal pain is mildly improved, however does have pain when he lies on his side.  Does not have much of an appetite.  1 episode of loose stool this morning.    Updated patient's wife and nephew on plan of care bedside and all of their questions were answered.     OBJECTIVE      Vital signs in last 24 hours:  Temp:  [36.3 °C (97.4 °F)-37.1 °C (98.8 °F)] 36.9 °C (98.4 °F)  Heart Rate:  [65-89] 75  Resp:  [16-18] 16  BP: (115-182)/(57-84) 159/74    Intake/Output Summary (Last 24 hours) at 7/28/2021 1346  Last data filed at 7/28/2021 1200  Gross per 24 hour   Intake 480 ml   Output 900 ml   Net -420 ml       PHYSICAL EXAMINATION      Physical Exam  Vitals reviewed.   Constitutional:       Comments: Elderly male, NAD   HENT:      Head: Normocephalic and atraumatic.      Nose: Nose normal.      Mouth/Throat:      Mouth: Mucous membranes are dry.   Cardiovascular:      Rate and Rhythm: Normal rate and regular rhythm.   Pulmonary:      Effort: Pulmonary effort is normal. No respiratory distress.   Abdominal:      Palpations: Abdomen is soft.      Tenderness: There is no abdominal tenderness.   Musculoskeletal:      Right lower leg: No edema.      Left lower leg: No edema.   Skin:     General: Skin is warm and dry.   Neurological:      General: No focal deficit present.      Mental Status: He is oriented to person, place, and time.   Psychiatric:      Comments: cooperative            LINES, CATHETERS, DRAINS, AIRWAYS, AND WOUNDS   Lines, Drains, and Airways:  Wounds (agree with documentation and present on admission):  Peripheral IV (Adult) 07/26/21 Posterior;Left Forearm (Active)   Number of days: 2         Comments:      LABS / IMAGING / TELE      Labs  I have reviewed the patient's pertinent labs.  Significant abnormals are below.    SARS-CoV-2 (COVID-19)  (no units)   Date/Time Value   07/21/2021 1211 Negative     Results from last 7 days   Lab Units 07/28/21  0643   WBC K/uL 17.48*   HEMOGLOBIN g/dL 11.6*   HEMATOCRIT % 35.9*   PLATELETS K/uL 206     Results from last 7 days   Lab Units 07/28/21  0643 07/27/21  0510 07/27/21  0510   SODIUM mEQ/L 144   < > 139   POTASSIUM mEQ/L 3.6   < > 4.6   CHLORIDE mEQ/L 110*   < > 111*   CO2 mEQ/L 18*   < > 16*   BUN mg/dL 50*   < > 47*   CREATININE mg/dL 3.7*   < > 3.7*   CALCIUM mg/dL 9.0   < > 8.6*   ALBUMIN g/dL  --   --  2.2*   BILIRUBIN TOTAL mg/dL  --   --  1.1   ALK PHOS IU/L  --   --  62   ALT IU/L  --   --  33   AST IU/L  --   --  40   GLUCOSE mg/dL 130*   < > 144*    < > = values in this interval not displayed.       Imaging  I have independently reviewed the pertinent imaging from the last 24 hrs.    ECG/Telemetry  I have independently reviewed the telemetry. No events for the last 24 hours.    ASSESSMENT AND PLAN      Acute kidney injury superimposed on chronic kidney disease (CMS/HCC)  Assessment & Plan  Reviewed records, and his creatinine seems to be at baseline 2.5-3, c/w CKD 4  Patient has h/o prostate cancer/ does not follow regularly with a nephrologist.   Cr peaked at 3.7 and has been out of his range since yesterday.   -appreciate nephrology recs  -follow BMP   -avoid nephrotoxins        Elevated troponin  Assessment & Plan  Suspect demand ischemia not MI.  -Started high intensity statin and low dose beta blocker.   -He is intolerant to ASA.   -outpatient ischemic eval with cardiology    Edema  Assessment & Plan  assymmetrical more on L than R.   Will rule out DVT for completeness--> U/s negative for DVT    Diarrhea  Assessment & Plan  -Start creon enzymes.   -Rule out c diff.     Acidosis, metabolic  Assessment & Plan  -continue sodium bicarb tabs     Anemia  Assessment & Plan  Chronic and at his baseline  Due to CKD  -hemoglobin stable, follow CBC    Acute pancreatitis  Assessment & Plan  -Acute  pancreatitis.  -MRI c/w hemorrhagic pancreatitis.   -Possible intraductal mucinous cyst.   -plan for outpatient EUS per GI   -continue low residue low-fat diet  - has diarrhea/started pancreatic enzymes  -abdominal pain improving    Prostate CA (CMS/HCC)  Assessment & Plan  outpt f/u    Hypertension  Assessment & Plan  - very hypertensive early in admission mainly in the context of pain  - Bps stable        VTE Assessment: Padua VTE Score: 4  VTE Prophylaxis:  Current anticoagulants:  heparin (porcine) 5,000 unit/mL injection 5,000 Units, subcutaneous, q8h ROBERT      Code Status: Full Code      Estimated Discharge Date: 7/31/2021   Disposition Planning: Return home, once medically stable     MAYELA Shabazz  7/28/2021

## 2021-07-28 NOTE — PLAN OF CARE
Plan of Care Review  Plan of Care Reviewed With: patient  Progress: no change  Outcome Summary: Pt still having loose watery stool.  Sample sent to the lab.  Poor appetite. Dietician consult added.  VSS. 96% on RA.  OOB to chair and bathroom today with 1 assist supervision. Tylenol requested and given for abdominal pain.

## 2021-07-28 NOTE — PLAN OF CARE
Problem: Adult Inpatient Plan of Care  Goal: Plan of Care Review  Outcome: Progressing  Flowsheets (Taken 7/28/2021 1500)  Progress: improving  Plan of Care Reviewed With: patient  Outcome Summary: S bed mob, CS xfers, CS/HHA amb in room, [facility] 20/24, PT recommends amb w/ SPC due to poor balance and h/o falls, DC home w/ family assist and HPT, case mgt notified

## 2021-07-28 NOTE — PROGRESS NOTES
"Daily Progress Note    Subjective    The patient has no complaints of chest pain, shortness of breath, palpitations, lightheadedness, dizziness, near syncope or syncope.  Still nauseated this am.    Objective    Vital signs in last 24 hours:  Temp:  [36.3 °C (97.4 °F)-37.1 °C (98.8 °F)] 36.9 °C (98.4 °F)  Heart Rate:  [65-89] 75  Resp:  [16-18] 16  BP: (115-182)/(57-84) 159/74      Intake/Output Summary (Last 24 hours) at 7/28/2021 1029  Last data filed at 7/27/2021 1840  Gross per 24 hour   Intake 240 ml   Output 300 ml   Net -60 ml       Physical Exam:  Visit Vitals  BP (!) 159/74 (BP Location: Right upper arm, Patient Position: Sitting)   Pulse 75   Temp 36.9 °C (98.4 °F) (Temporal)   Resp 16   Ht 1.854 m (6' 1\")   Wt 92.1 kg (203 lb 1.6 oz)   SpO2 97%   BMI 26.80 kg/m²        GEN - NAD  HEENT - OP Clear, MMM  CV - Reg S1,S2, No M/R/G  PULM - CTA B/L  ABD - Soft, NT/ND +BS  EXT - No C/C/E  PSYCH - AAO x 3  SKIN - Warm/Dry.  No lesions/rashes        •  acetaminophen, 975 mg, oral, q8h PRN  •  albuterol, 2.5 mg, nebulization, q6h PRN  •  alum-mag hydroxide-simeth, 30 mL, oral, q4h PRN  •  atorvastatin, 40 mg, oral, Daily (6p)  •  atropine, 0.5 mg, intravenous, q5 min PRN  •  cefTRIAXone, 1 g, intravenous, q24h INT  •  glucose, 16-32 g of dextrose, oral, PRN **OR** dextrose, 15-30 g of dextrose, oral, PRN **OR** glucagon, 1 mg, intramuscular, PRN **OR** dextrose in water, 25 mL, intravenous, PRN  •  famotidine, 20 mg, oral, Daily  •  heparin (porcine), 5,000 Units, subcutaneous, q8h ROBERT  •  HYDROmorphone, 0.4 mg, intravenous, q4h PRN  •  metoprolol succinate XL, 12.5 mg, oral, Daily with dinner  •  nitroglycerin, 0.4 mg, sublingual, q5 min PRN  •  pancrelipase (Lip-Prot-Amyl), 12,000 units of lipase, oral, TID with meals  •  prochlorperazine, 10 mg, intravenous, q6h PRN  •  sertraline, 25 mg, oral, q PM  •  sodium citrate-citric acid, 15 mL, oral, BID    Labs  Recent labs reviewed by me.  WBC   Date Value Ref Range " Status   07/28/2021 17.48 (H) 3.80 - 10.50 K/uL Final     Comment:     ALL RESULTS HAVE BEEN CHECKED     Hemoglobin   Date Value Ref Range Status   07/28/2021 11.6 (L) 13.7 - 17.5 g/dL Final     Hematocrit   Date Value Ref Range Status   07/28/2021 35.9 (L) 40.1 - 51.0 % Final     Platelets   Date Value Ref Range Status   07/28/2021 206 150 - 350 K/uL Final     Comment:     PLT CLUMPING SUSPECTED. PLT COUNT MAY BE SLIGHTLY HIGHER THAN REPORTED. IF CLINICALLY WARRANTED, SUGGEST COLLECTING SODIUM CITRATE TUBE (BLUE TOP) IN ADDITION TO EDTA TUBE FOR FOLLOW UP CBC TESTING.     Cholesterol   Date Value Ref Range Status   07/27/2021 140 <=200 mg/dL Final     Triglycerides   Date Value Ref Range Status   07/27/2021 236 (H) 30 - 149 mg/dL Final     HDL   Date Value Ref Range Status   07/27/2021 27 (L) >=45 mg/dL Final     Comment:     2001 NCEP GUIDELINES  <40 mg/dL Low  >=60 mg/dL High     ALT (SGPT)   Date Value Ref Range Status   07/27/2021 33 16 - 63 IU/L Final     Comment:     MODERATE HEMOLYSIS, RESULT MAY BE INCREASED.     AST (SGOT)   Date Value Ref Range Status   07/27/2021 40 15 - 41 IU/L Final     Comment:     MODERATE HEMOLYSIS, RESULT MAY BE INCREASED.     Sodium   Date Value Ref Range Status   07/28/2021 144 136 - 144 mEQ/L Final     Potassium   Date Value Ref Range Status   07/28/2021 3.6 3.6 - 5.1 mEQ/L Final     Chloride   Date Value Ref Range Status   07/28/2021 110 (H) 98 - 109 mEQ/L Final     Creatinine   Date Value Ref Range Status   07/28/2021 3.7 (H) 0.8 - 1.3 mg/dL Final     BUN   Date Value Ref Range Status   07/28/2021 50 (H) 8 - 20 mg/dL Final     CO2   Date Value Ref Range Status   07/28/2021 18 (L) 22 - 32 mEQ/L Final     INR   Date Value Ref Range Status   07/21/2021 1.0   Final     Comment:     INR has no defined significance when PT is within Reference Range.     Troponin I   Date Value Ref Range Status   07/27/2021 0.60 (HH) <0.05 ng/mL Final     Comment:     Consistent with previous  results         Imaging  Recent imaging reviewed by me.  Ultrasound venous leg left    Result Date: 7/27/2021  STUDY:   Vascular ultrasound examination of the left lower extremity venous utilizing gray scale, color, and pulsed Doppler sonography. CLINICAL HISTORY:  80-year-old male with left lower extremity swelling. COMPARISON:  None.     IMPRESSION:   No evidence for deep venous thrombosis in the left lower extremity. COMMENT: There is normal response to compression within the left common femoral, superficial femoral, and popliteal veins. Normal color-flow, venous waveforms, and response to augmentation is observed. Similar findings are observed in the posterior tibial, peroneal and gastrocnemius veins of the left calf.     Transthoracic Echo (TTE) Complete    Result Date: 7/27/2021  · Normal-sized LV. Hyperdynamic LV systolic function. Estimated EF >75%. Normal LV wall thickness. Grade I LV diastolic dysfunction. LV diastolic inflow pattern consistent with impaired relaxation. · Normal leaflet structure and normal leaflet motion of the mitral valve. Mitral annular calcification. · Trace mitral valve regurgitation. · Normal-sized LA. · Aortic valve structure is normal. Sclerotic aortic valve leaflets. Trace aortic valve regurgitation. No aortic valve stenosis. · Aortic root normal. Sinuses of Valsalva normal-sized. · Normal-sized RA. · Normal-sized RV. Normal RV systolic function. · Tricuspid valve structure is normal. No tricuspid valve regurgitation.          ECG/Telemetry  Personally reviewed.  NSR.         Assessment & Plan    Elevated troponin  Assessment & Plan  Likely demand ischemia in setting of acute pancreatitis.  Cont med regimen.    Continue low dose B-bl and Statin for cardio-protection  Previously noted to have bradycardia, but was in setting of vagal episode.  HR has been stable since.   Consider ischemic eval as outpt once he is clincally stable.      Shortness of breath  Assessment &  Plan  Resolved at this time.  Was mildly volume overloaded in setting of IVF's for pancreatitis  Echo now with hyperdynamic LV, likely in setting of infection and diarrhea.  Hold further diuretics at this time.     Acute pancreatitis  Assessment & Plan  Cont care per HMS/GI      Hypertension  Assessment & Plan  BP stable/mildly elevated  Continue low dose Toprol.  If bp remains elevated, can add Amlodipine in future.            René Mcgrath MD  7/28/2021

## 2021-07-28 NOTE — PATIENT CARE CONFERENCE
Care Progression Rounds Note  Date: 7/28/2021  Time: 10:34 AM     Patient Name: Adalberto Galaviz     Medical Record Number: [id number]   YOB: 1940  Sex: Male      Room/Bed: 0362    Admitting Diagnosis: Sinus bradycardia [R00.1]  Acute pancreatitis, unspecified complication status, unspecified pancreatitis type [K85.90]   Admit Date/Time: 7/21/2021 11:55 AM    Primary Diagnosis: No Principal Problem: There is no principal problem currently on the Problem List. Please update the Problem List and refresh.  Principal Problem: No Principal Problem: There is no principal problem currently on the Problem List. Please update the Problem List and refresh.    GMLOS: pending  Anticipated Discharge Date: 7/31/2021    AM-PAC:  Mobility Score: 20    Discharge Planning:  Anticipated Discharge Disposition: home/family member's home    Barriers to Discharge:  Barriers to Discharge: Medical issues not resolved    Participants:  advanced practice provider, , nursing, social work/services

## 2021-07-28 NOTE — PLAN OF CARE
Plan of Care Review  Plan of Care Reviewed With: patient  Progress: improving  Outcome Summary: Pt still ahve loose stool. Pt OOB to chair and bathroom with supervsion of 1.  VSS. Sats 96% on RA. Tylenol for abdoominal pain requested and given with relief.

## 2021-07-29 PROBLEM — R60.9 EDEMA: Status: RESOLVED | Noted: 2021-01-01 | Resolved: 2021-01-01

## 2021-07-29 NOTE — PLAN OF CARE
Problem: Adult Inpatient Plan of Care  Goal: Plan of Care Review  Outcome: Progressing  Flowsheets (Taken 7/29/2021 6153)  Progress: improving  Outcome Summary: Patient reported moderate abdominal pain with relief from PRN Tylenol, as ordered per MD, this shift. Patient ambulated with supervision OOB to chair x2 this shift.   Plan of Care Review  Progress: improving  Outcome Summary: Patient reported moderate abdominal pain with relief from PRN Tylenol, as ordered per MD, this shift. Patient ambulated with supervision OOB to chair x2 this shift.

## 2021-07-29 NOTE — PLAN OF CARE
Problem: Adult Inpatient Plan of Care  Goal: Readiness for Transition of Care  Outcome: Progressing  Intervention: Mutually Develop Transition Plan  Flowsheets (Taken 7/29/2021 1302)  Discharge Coordination/Progress: Await medical stability today creatinine 3.9, plan remains home with [facility] [place].

## 2021-07-29 NOTE — PATIENT CARE CONFERENCE
Care Progression Rounds Note  Date: 7/29/2021  Time: 10:34 AM     Patient Name: Adalberto Galaviz     Medical Record Number: [id number]   YOB: 1940  Sex: Male      Room/Bed: 0362    Admitting Diagnosis: Sinus bradycardia [R00.1]  Acute pancreatitis, unspecified complication status, unspecified pancreatitis type [K85.90]   Admit Date/Time: 7/21/2021 11:55 AM    Primary Diagnosis: No Principal Problem: There is no principal problem currently on the Problem List. Please update the Problem List and refresh.  Principal Problem: No Principal Problem: There is no principal problem currently on the Problem List. Please update the Problem List and refresh.    GMLOS: pending  Anticipated Discharge Date: 8/2/2021    AM-PAC:  Mobility Score: 20    Discharge Planning:  Current Living Arrangements: home/apartment/condo  Anticipated Discharge Disposition: home/family member's home    Barriers to Discharge:  Barriers to Discharge: Medical issues not resolved    Participants:  advanced practice provider, , nursing

## 2021-07-29 NOTE — PROGRESS NOTES
Hospital Medicine Service -  Daily Progress Note       SUBJECTIVE   Interval History: Patient's oxygen saturations  stable on room air.  Denies chest pain and shortness of breath.  Is trying to eat and drink more.  Still with abdominal pain, about the same as yesterday, much improved since admission.    Patient's family updated by attending at bedside.      OBJECTIVE      Vital signs in last 24 hours:  Temp:  [36.1 °C (97 °F)-37.2 °C (99 °F)] 37.2 °C (99 °F)  Heart Rate:  [62-87] 70  Resp:  [16] 16  BP: (149-182)/(68-87) 158/68  No intake or output data in the 24 hours ending 07/29/21 1519    PHYSICAL EXAMINATION      Physical Exam  Vitals reviewed.   Constitutional:       Comments: Elderly male, no acute distress   HENT:      Head: Normocephalic and atraumatic.      Nose: Nose normal.      Mouth/Throat:      Mouth: Mucous membranes are dry.   Cardiovascular:      Rate and Rhythm: Normal rate and regular rhythm.   Pulmonary:      Effort: Pulmonary effort is normal. No respiratory distress.   Abdominal:      Palpations: Abdomen is soft.      Tenderness: There is no abdominal tenderness.   Musculoskeletal:      Right lower leg: No edema.      Left lower leg: No edema.   Skin:     General: Skin is warm and dry.   Neurological:      General: No focal deficit present.   Psychiatric:      Comments: Cooperative            LINES, CATHETERS, DRAINS, AIRWAYS, AND WOUNDS   Lines, Drains, and Airways:  Wounds (agree with documentation and present on admission):  Peripheral IV (Adult) 07/26/21 Posterior;Left Forearm (Active)   Number of days: 2         Comments:      LABS / IMAGING / TELE      Labs  I have reviewed the patient's pertinent labs.  Significant abnormals are below.    SARS-CoV-2 (COVID-19) (no units)   Date/Time Value   07/21/2021 1211 Negative     Results from last 7 days   Lab Units 07/29/21  0717   WBC K/uL 19.53*   HEMOGLOBIN g/dL 11.5*   HEMATOCRIT % 35.6*   PLATELETS K/uL 286     Results from last 7 days    Lab Units 07/29/21  0717 07/28/21  0643 07/27/21  0510   SODIUM mEQ/L 143   < > 139   POTASSIUM mEQ/L 3.7   < > 4.6   CHLORIDE mEQ/L 108   < > 111*   CO2 mEQ/L 21*   < > 16*   BUN mg/dL 48*   < > 47*   CREATININE mg/dL 3.9*   < > 3.7*   CALCIUM mg/dL 9.0   < > 8.6*   ALBUMIN g/dL  --   --  2.2*   BILIRUBIN TOTAL mg/dL  --   --  1.1   ALK PHOS IU/L  --   --  62   ALT IU/L  --   --  33   AST IU/L  --   --  40   GLUCOSE mg/dL 131*   < > 144*    < > = values in this interval not displayed.       Imaging  I have independently reviewed the pertinent imaging from the last 24 hrs.    ECG/Telemetry  I have independently reviewed the telemetry. No events for the last 24 hours.    ASSESSMENT AND PLAN      Acute kidney injury superimposed on chronic kidney disease (CMS/HCC)  Assessment & Plan  -creatinine 3.9 today  -Baseline appears to be around 2.5-3  -Hold off on IV fluids for now, as patient became volume overloaded last time he was on IV fluids  -Discussed with Dr. Chaney, likely ATN  -Follow BMP  -Encourage p.o. intake  -Appreciate nephrology recommendations        Elevated troponin  Assessment & Plan  Suspect demand ischemia not MI.  -Started high intensity statin and low dose beta blocker.   -He is intolerant to ASA.   -outpatient ischemic eval with cardiology    Diarrhea  Assessment & Plan  -Start creon enzymes.   -C. difficile negative     Acidosis, metabolic  Assessment & Plan  -continue sodium bicarb tabs   -improving    Anemia  Assessment & Plan  Chronic and at his baseline  Due to CKD  -hemoglobin stable, follow CBC    Acute pancreatitis  Assessment & Plan  -Acute pancreatitis.  -MRI c/w hemorrhagic pancreatitis.   -Possible intraductal mucinous cyst.   -plan for outpatient EUS per GI   -continue low residue low-fat diet  - has diarrhea/started pancreatic enzymes  -abdominal pain improving and patient attempting to increase p.o. intake    Prostate CA (CMS/HCC)  Assessment & Plan  outpt  f/u    Hypertension  Assessment & Plan  - very hypertensive early in admission mainly in the context of pain  - Bps stable , will continue to monitor blood pressures closely       VTE Assessment: Padua VTE Score: 4  VTE Prophylaxis:  Current anticoagulants:  heparin (porcine) 5,000 unit/mL injection 5,000 Units, subcutaneous, q8h ROBERT      Code Status: Full Code      Estimated Discharge Date: 8/2/2021   Disposition Planning: Return home, once medically stable, [facility] upon dc     MAYELA Shabazz  7/29/2021

## 2021-07-29 NOTE — PROGRESS NOTES
"Renal Daily Progress Note    Subjective/Objective:  Subjective     Interval History: He does not feel well this morning. He reports nausea and some abdominal discomfort after he ate his breakfast. He is drinking water. His urine appears clear he tells me. He is voiding well.     Objective     Vital signs in last 24 hours:  Temp:  [36.1 °C (97 °F)-37.1 °C (98.8 °F)] 36.3 °C (97.3 °F)  Heart Rate:  [62-87] 74  Resp:  [16] 16  BP: (149-182)/(68-87) 156/72         Intake/Output Summary (Last 24 hours) at 7/29/2021 1117  Last data filed at 7/28/2021 1400  Gross per 24 hour   Intake 0 ml   Output 600 ml   Net -600 ml       Labs  BUN   Date Value Ref Range Status   07/29/2021 48 (H) 8 - 20 mg/dL Final     Creatinine   Date Value Ref Range Status   07/29/2021 3.9 (H) 0.8 - 1.3 mg/dL Final     Potassium   Date Value Ref Range Status   07/29/2021 3.7 3.6 - 5.1 mEQ/L Final     Phosphorus   Date Value Ref Range Status   07/27/2021 3.0 2.4 - 4.7 mg/dL Final     Comment:     MODERATE HEMOLYSIS, RESULT MAY BE INCREASED.     Hemoglobin   Date Value Ref Range Status   07/29/2021 11.5 (L) 13.7 - 17.5 g/dL Final     Sodium   Date Value Ref Range Status   07/29/2021 143 136 - 144 mEQ/L Final     Albumin   Date Value Ref Range Status   07/27/2021 2.2 (L) 3.4 - 5.0 g/dL Final     I have reviewed the pertinent patient's labs.    Imaging  No new imaging results.    VTE Assessment: I have reassessed and the patient's VTE risk and treatment plan is appropriate.    Physical Exam    Visit Vitals  BP (!) 156/72 (BP Location: Left upper arm, Patient Position: Sitting)   Pulse 74   Temp 36.3 °C (97.3 °F) (Oral)   Resp 16   Ht 1.854 m (6' 1\")   Wt 92.4 kg (203 lb 9.6 oz)   SpO2 96%   BMI 26.86 kg/m²     General appearance: no distress  Throat: lips, mucosa and tongue normal; no obvious lesions  Lungs: clear to auscultation bilaterally and respirations unlabored  Heart: regular rate and rhythm, S1, S2 normal, no murmur, no gallop and no " rub  Abdomen: soft, non-tender; bowel sounds normal; no masses, no organomegaly  Extremities: extremities normal, warm and well-perfused; no cyanosis or edema               Assessment/Plan   Acidosis, metabolic  Assessment & Plan  He has GABY on underlying CKD IV   On sodium bicarbonate tablet     Acute kidney injury superimposed on chronic kidney disease (CMS/HCC)  Assessment & Plan  He had previous serum creatinine of high 2 to 3.0 mg/dl  He had previously followed with a nephrologist but has not had regular follow up   SONJA shows atrophic kidneys    Rate of rise noted with GABY  He is behaving as ATN despite UA without granular casts  Urine eos are noted to be rare but no rash, fever or peripheral eos  No predicating medications are on board at this time. He was on a short 5 day course of Ceftriaxone which has been stopped.  He is taking in oral intake so there is no indication for IVF at this time  He had IVF prior and went into fluid overload requiring lasix  I would continue to monitor and continue supportive care  Encourage PO intake  Avoid NSAID and IV contrast  No indication for HD  Hopefully he will plateau  Spoke with MAYELA Shabazz             Expected Discharge Date:  8/2/2021

## 2021-07-29 NOTE — SUBJECTIVE & OBJECTIVE
"Subjective     Interval History: He does not feel well this morning. He reports nausea and some abdominal discomfort after he ate his breakfast. He is drinking water. His urine appears clear he tells me. He is voiding well.     Objective     Vital signs in last 24 hours:  Temp:  [36.1 °C (97 °F)-37.1 °C (98.8 °F)] 36.3 °C (97.3 °F)  Heart Rate:  [62-87] 74  Resp:  [16] 16  BP: (149-182)/(68-87) 156/72         Intake/Output Summary (Last 24 hours) at 7/29/2021 1117  Last data filed at 7/28/2021 1400  Gross per 24 hour   Intake 0 ml   Output 600 ml   Net -600 ml       Labs  BUN   Date Value Ref Range Status   07/29/2021 48 (H) 8 - 20 mg/dL Final     Creatinine   Date Value Ref Range Status   07/29/2021 3.9 (H) 0.8 - 1.3 mg/dL Final     Potassium   Date Value Ref Range Status   07/29/2021 3.7 3.6 - 5.1 mEQ/L Final     Phosphorus   Date Value Ref Range Status   07/27/2021 3.0 2.4 - 4.7 mg/dL Final     Comment:     MODERATE HEMOLYSIS, RESULT MAY BE INCREASED.     Hemoglobin   Date Value Ref Range Status   07/29/2021 11.5 (L) 13.7 - 17.5 g/dL Final     Sodium   Date Value Ref Range Status   07/29/2021 143 136 - 144 mEQ/L Final     Albumin   Date Value Ref Range Status   07/27/2021 2.2 (L) 3.4 - 5.0 g/dL Final     I have reviewed the pertinent patient's labs.    Imaging  No new imaging results.    VTE Assessment: I have reassessed and the patient's VTE risk and treatment plan is appropriate.    Physical Exam    Visit Vitals  BP (!) 156/72 (BP Location: Left upper arm, Patient Position: Sitting)   Pulse 74   Temp 36.3 °C (97.3 °F) (Oral)   Resp 16   Ht 1.854 m (6' 1\")   Wt 92.4 kg (203 lb 9.6 oz)   SpO2 96%   BMI 26.86 kg/m²     General appearance: no distress  Throat: lips, mucosa and tongue normal; no obvious lesions  Lungs: clear to auscultation bilaterally and respirations unlabored  Heart: regular rate and rhythm, S1, S2 normal, no murmur, no gallop and no rub  Abdomen: soft, non-tender; bowel sounds normal; no masses, " no organomegaly  Extremities: extremities normal, warm and well-perfused; no cyanosis or edema

## 2021-07-29 NOTE — ASSESSMENT & PLAN NOTE
He had previous serum creatinine of high 2 to 3.0 mg/dl  He had previously followed with a nephrologist but has not had regular follow up   SONJA shows atrophic kidneys    Rate of rise noted with GABY  He is behaving as ATN despite UA without granular casts  Urine eos are noted to be rare but no rash, fever or peripheral eos  No predicating medications are on board at this time. He was on a short 5 day course of Ceftriaxone which has been stopped.  He is taking in oral intake so there is no indication for IVF at this time  He had IVF prior and went into fluid overload requiring lasix  I would continue to monitor and continue supportive care  Encourage PO intake  Avoid NSAID and IV contrast  No indication for HD  Hopefully he will plateau

## 2021-07-30 PROBLEM — D72.829 LEUKOCYTOSIS: Status: ACTIVE | Noted: 2021-01-01

## 2021-07-30 NOTE — PROGRESS NOTES
Patient: Adalberto Galaviz  Location: [facility] 3C 0362  MRN: [id number]  Today's date: 7/30/2021    Attempted to see patient for therapy. Unable due to medical hold. asked by family to hold PT 2* incr'd back pain - awaiting discussion w/ MD re: kidney issues

## 2021-07-30 NOTE — PROGRESS NOTES
[facility] following, spoke with son Amaury. Agreeable to nursing, therapy and MSW with [facility].  Will continue to follow for discharge needs and complete referral at time of discharge.

## 2021-07-30 NOTE — PROGRESS NOTES
Hospital Medicine Service -  Daily Progress Note       SUBJECTIVE   Interval History: Patient feels well today. He complained of lower back pain. Still has some abd discomfort but tolerating a diet. No CP, NVD, fevers or chills. No HA or dizziness. No dysuria SOB or cough      OBJECTIVE      Vital signs in last 24 hours:  Temp:  [36.4 °C (97.6 °F)-37.7 °C (99.8 °F)] 37.7 °C (99.8 °F)  Heart Rate:  [58-73] 62  Resp:  [18] 18  BP: (150-181)/(67-81) 181/81  No intake or output data in the 24 hours ending 07/30/21 1829    PHYSICAL EXAMINATION      Physical Exam  Constitutional:       Appearance: He is well-developed.   HENT:      Head: Normocephalic.   Eyes:      Conjunctiva/sclera: Conjunctivae normal.   Cardiovascular:      Rate and Rhythm: Normal rate and regular rhythm.      Heart sounds: Normal heart sounds. No murmur heard.     Pulmonary:      Effort: Pulmonary effort is normal. No respiratory distress.      Breath sounds: Normal breath sounds. No wheezing.   Abdominal:      General: There is no distension.      Palpations: Abdomen is soft.      Tenderness: There is abdominal tenderness.   Musculoskeletal:      Right lower leg: No edema.      Left lower leg: No edema.   Skin:     General: Skin is warm and dry.   Neurological:      Mental Status: He is alert and oriented to person, place, and time.            LINES, CATHETERS, DRAINS, AIRWAYS, AND WOUNDS   Lines, Drains, and Airways:  Wounds (agree with documentation and present on admission):  Peripheral IV (Adult) 07/26/21 Posterior;Left Forearm (Active)   Number of days: 4         Comments:      LABS / IMAGING / TELE      Labs  Results from last 7 days   Lab Units 07/30/21  0830 07/29/21  0717 07/28/21  0643   WBC K/uL 20.06* 19.53* 17.48*   HEMOGLOBIN g/dL 9.9* 11.5* 11.6*   HEMATOCRIT % 30.2* 35.6* 35.9*   PLATELETS K/uL 321 286 206     Results from last 7 days   Lab Units 07/30/21  0830 07/29/21  0717 07/29/21  0717 07/28/21  0643 07/28/21  0643   SODIUM  mEQ/L 144  --  143  --  144   POTASSIUM mEQ/L 3.5*  --  3.7  --  3.6   CHLORIDE mEQ/L 111*  --  108  --  110*   CO2 mEQ/L 21*  --  21*  --  18*   BUN mg/dL 50*  --  48*  --  50*   CREATININE mg/dL 3.8*  --  3.9*  --  3.7*   GLUCOSE mg/dL 141*   < > 131*   < > 130*   CALCIUM mg/dL 8.7*  --  9.0  --  9.0    < > = values in this interval not displayed.           SARS-CoV-2 (COVID-19) (no units)   Date/Time Value   07/21/2021 1211 Negative       Imaging  No results found.      ASSESSMENT AND PLAN      Leukocytosis  Assessment & Plan  See plan for pancreatitis   WBC 20    Elevated troponin  Assessment & Plan  Suspect demand ischemia not MI.  -Started high intensity statin and low dose beta blocker.   -He is intolerant to ASA.   -outpatient ischemic eval with cardiology     Diarrhea  Assessment & Plan  -Start creon enzymes.   -C. difficile negative     Acidosis, metabolic  Assessment & Plan  -continue sodium bicarb tabs   -improving    Anemia  Assessment & Plan  Chronic and at his baseline  Due to CKD  -hemoglobin stable, follow CBC    Prostate CA (CMS/HCC)  Assessment & Plan  outpt f/u    Acute kidney injury superimposed on chronic kidney disease (CMS/HCC)  Assessment & Plan  -Baseline appears to be around 2.5-3  -Hold off on IV fluids for now, as patient became volume overloaded last time he was on IV fluids  -Discussed with Dr. Chaney, likely ATN  -Follow BMP  -Encourage p.o. intake  -Appreciate nephrology recommendations  - cr slightly better at 3.8 today.         Hypertension  Assessment & Plan  - very hypertensive early in admission mainly in the context of pain  - Bps stable , will continue to monitor blood pressures closely    * Acute pancreatitis  Assessment & Plan  -Acute pancreatitis.  -MRI c/w hemorrhagic pancreatitis.   -Possible intraductal mucinous cyst.   -plan for outpatient EUS per GI   -continue low residue low-fat diet  - has diarrhea/started pancreatic enzymes  -abdominal pain improving and patient  attempting to increase p.o. intake  - concerning increase in WBC. LFT and lipase normal. Will get CT ap      VTE Assessment: Padua VTE Score: 4  VTE Prophylaxis:  Current anticoagulants:  heparin (porcine) 5,000 unit/mL injection 5,000 Units, subcutaneous, q8h ROBERT      Code Status: Full Code      Estimated Discharge Date: 8/2/2021   Disposition Planning: plan for home with home care when stable     Amandeep Washington MD  7/30/2021             Spent more than 35 minutes with patient evaluation, discussion of current conditions with patient, RN and Case management, counseling and planning care.

## 2021-07-30 NOTE — PLAN OF CARE
Problem: Adult Inpatient Plan of Care  Goal: Plan of Care Review  Outcome: Progressing  Flowsheets (Taken 7/30/2021 5269)  Progress: improving  Plan of Care Reviewed With: patient  Outcome Summary: Patient ambulating OOB to bathroom with supervision, cardiac monitoring continued  remains melania at times. Denies Abd pain, nausea or vomitng this shift.

## 2021-07-30 NOTE — PROGRESS NOTES
Subjective     Interval History: complains of watery diarrhea.  no orthopnea. frequent urination noted. .     Objective     Vital signs in last 24 hours:  Temp:  [36.7 °C (98 °F)-37.2 °C (99 °F)] 37.2 °C (98.9 °F)  Heart Rate:  [58-73] 60  Resp:  [16-18] 18  BP: (150-172)/(67-82) 155/76       No intake or output data in the 24 hours ending 07/30/21 1121    Labs  BUN   Date Value Ref Range Status   07/30/2021 50 (H) 8 - 20 mg/dL Final     Creatinine   Date Value Ref Range Status   07/30/2021 3.8 (H) 0.8 - 1.3 mg/dL Final     Potassium   Date Value Ref Range Status   07/30/2021 3.5 (L) 3.6 - 5.1 mEQ/L Final     Phosphorus   Date Value Ref Range Status   07/27/2021 3.0 2.4 - 4.7 mg/dL Final     Comment:     MODERATE HEMOLYSIS, RESULT MAY BE INCREASED.     Hemoglobin   Date Value Ref Range Status   07/30/2021 9.9 (L) 13.7 - 17.5 g/dL Final     Sodium   Date Value Ref Range Status   07/30/2021 144 136 - 144 mEQ/L Final     Albumin   Date Value Ref Range Status   07/27/2021 2.2 (L) 3.4 - 5.0 g/dL Final     I have reviewed the pertinent patient's labs.    Physical Exam  NAD, AA, cooperative and pleasant  Anicteric, dry mucus, normal conjunctivae  RRR, S1, S2, trace of edema  Decreased BS at the base bilaterally. No wheezing.   Soft, +BS, distended, diffuse tenderness   No C/C    Assessment /Plan  1. GABY on CKD : stable renal function.  Most likely due to ATN  Was non-oliguric until yesterday.   Need better I/O record.   No indication of HD  Follow Creatinine and urine output    2. Metabolic acidosis : controlled on oral sodium bicarbonate    3. Pancreatitis : stable clinically. Persistent diarrhea noted.  On Creon    4. Vt. D deficiency : the target 25-OH Vt. D is 50's.  Give ergocalciferol 12060 unit x1 and cholecalcitriol 2000 unit daily.             7/30/2021  Brunilda Rico MD

## 2021-07-30 NOTE — PLAN OF CARE
Problem: Adult Inpatient Plan of Care  Goal: Plan of Care Review  Flowsheets (Taken 7/30/2021 1418)  Progress: no change  Plan of Care Reviewed With:   spouse   friend  Outcome Summary: Pt asleep during RD's revist today. Spoke to pt's wife who states pt ate fairly well at breakfast but does c/o nausea, diarrhea and abdominal distention after eating. Two Van Boost Glucose Control at bedside untouched. Wife states she often can get pt to drink this but does not want to wake pt up now.

## 2021-07-30 NOTE — PLAN OF CARE
Problem: Adult Inpatient Plan of Care  Goal: Plan of Care Review  Flowsheets (Taken 7/30/2021 1416)  Progress: no change  Plan of Care Reviewed With:   spouse   friend  Outcome Summary: Pt asleep during RD's revist today. Spoke to pt's wife who states pt ate fairly well at breakfast but does c/o nausea and abdominal distention after eating. Pt is getting total of 36,000 units of lipase with Creon TID daily. Two Van Boost Glucose Control at bedside untouched. Wife states she often can get pt to drink this but does not want to wake pt up now. Could try to liberalize diet slightly to provide more variety for pt.  Goal: Pt will consume >/= 75% meals and/or supplements to meet estimated energy, fluid and protein needs.  Recommendations:   1. Try cardiac diet, low fat diet as tolerated.   2. Continue to encourage consumption of Van Boost Glucose Control BID daily.   3. Monitor labs (LFT's, Alb, BG), weights and po intake.

## 2021-07-30 NOTE — PROGRESS NOTES
[facility] following for discharge.  If patient is discharged over the weekend please notify [facility] @ [phone], referral placed in hold bin.    If discharged over the weekend and needing oxygen hospital  will need to arrange for oxygen.

## 2021-07-31 NOTE — PROGRESS NOTES
Hospital Medicine Service -  Daily Progress Note       SUBJECTIVE   Interval History: Patient feels better today. He complained of lower back pain. Still has some abd discomfort but tolerating a diet. No CP, NVD, fevers or chills. No HA or dizziness. No dysuria SOB or cough. Making urine.     Updated family who was at bedside      OBJECTIVE      Vital signs in last 24 hours:  Temp:  [36.6 °C (97.9 °F)-36.9 °C (98.5 °F)] 36.6 °C (97.9 °F)  Heart Rate:  [52-74] 57  Resp:  [18] 18  BP: (119-179)/(64-83) 119/64  No intake or output data in the 24 hours ending 07/31/21 1625    PHYSICAL EXAMINATION      Physical Exam  Constitutional:       Appearance: He is well-developed.   HENT:      Head: Normocephalic.   Eyes:      Conjunctiva/sclera: Conjunctivae normal.   Cardiovascular:      Rate and Rhythm: Normal rate and regular rhythm.      Heart sounds: Normal heart sounds. No murmur heard.     Pulmonary:      Effort: Pulmonary effort is normal. No respiratory distress.      Breath sounds: Normal breath sounds. No wheezing.   Abdominal:      General: There is distension.      Palpations: Abdomen is soft.      Tenderness: There is no abdominal tenderness.   Musculoskeletal:      Right lower leg: No edema.      Left lower leg: No edema.   Skin:     General: Skin is warm and dry.   Neurological:      Mental Status: He is alert and oriented to person, place, and time.            LINES, CATHETERS, DRAINS, AIRWAYS, AND WOUNDS   Lines, Drains, and Airways:  Wounds (agree with documentation and present on admission):  Peripheral IV (Adult) 07/26/21 Posterior;Left Forearm (Active)   Number of days: 4         Comments:      LABS / IMAGING / TELE      Labs  Results from last 7 days   Lab Units 07/31/21  0320 07/30/21  0830 07/29/21  0717   WBC K/uL 21.10* 20.06* 19.53*   HEMOGLOBIN g/dL 9.6* 9.9* 11.5*   HEMATOCRIT % 29.5* 30.2* 35.6*   PLATELETS K/uL 322 321 286     Results from last 7 days   Lab Units 07/31/21  0320 07/30/21  0830  07/30/21  0830 07/29/21  0717 07/29/21  0717   SODIUM mEQ/L 143  --  144  --  143   POTASSIUM mEQ/L 3.6  --  3.5*  --  3.7   CHLORIDE mEQ/L 110*  --  111*  --  108   CO2 mEQ/L 21*  --  21*  --  21*   BUN mg/dL 48*  --  50*  --  48*   CREATININE mg/dL 3.6*  --  3.8*  --  3.9*   GLUCOSE mg/dL 139*   < > 141*   < > 131*   CALCIUM mg/dL 8.4*  --  8.7*  --  9.0    < > = values in this interval not displayed.           SARS-CoV-2 (COVID-19) (no units)   Date/Time Value   07/21/2021 1211 Negative       Imaging  No results found.      ASSESSMENT AND PLAN      Leukocytosis  Assessment & Plan  Patient with non specific complaints. Still has residual abd discomfort   WBC 20  CT scan showing moderate ascites  - will plan for IR para on Monday  - low threshold to start abx if he becomes febrile     Elevated troponin  Assessment & Plan  Suspect demand ischemia not MI.  -Started high intensity statin and low dose beta blocker.   -He is intolerant to ASA.   -outpatient ischemic eval with cardiology     Diarrhea  Assessment & Plan  -Start creon enzymes.   -C. difficile negative     Acidosis, metabolic  Assessment & Plan  -continue sodium bicarb tabs   -improving    Anemia  Assessment & Plan  Chronic and at his baseline  Due to CKD  -hemoglobin stable, follow CBC    Prostate CA (CMS/HCC)  Assessment & Plan  outpt f/u    Acute kidney injury superimposed on chronic kidney disease (CMS/HCC)  Assessment & Plan  -Baseline appears to be around 2.5-3  -Hold off on IV fluids for now, as patient became volume overloaded last time he was on IV fluids  -Discussed with Dr. Chaney, likely ATN  -Follow BMP  -Encourage p.o. intake  -Appreciate nephrology recommendations  - improving slowly       Hypertension  Assessment & Plan  - very hypertensive early in admission mainly in the context of pain  - Bps stable , will continue to monitor blood pressures closely    * Acute pancreatitis  Assessment & Plan  -Acute pancreatitis.  -MRI c/w hemorrhagic  pancreatitis.   -Possible intraductal mucinous cyst.   -plan for outpatient EUS per GI   -continue low residue low-fat diet  - has diarrhea/started pancreatic enzymes  -abdominal pain improving and patient attempting to increase p.o. intake  - concerning increase in WBC. LFT and lipase normal. Will get CT ap      VTE Assessment: Padua VTE Score: 4  VTE Prophylaxis:  Current anticoagulants:  heparin (porcine) 5,000 unit/mL injection 5,000 Units, subcutaneous, q8h ROBERT      Code Status: Full Code      Estimated Discharge Date: 8/2/2021   Disposition Planning: plan for home with home care when stable     Amandeep Washington MD  7/31/2021             Spent more than 35 minutes with patient evaluation, discussion of current conditions with patient, RN and Case management, counseling and planning care.

## 2021-07-31 NOTE — ASSESSMENT & PLAN NOTE
He had previous serum creatinine of high 2 to 3.0 mg/dl  He had previously followed with a nephrologist but has not had regular follow up   SONJA shows atrophic kidneys    Rate of rise noted with GABY  He is behaving as ATN despite UA without granular casts  Urine eos are noted to be rare but no rash, fever or peripheral eos  No predicating medications are on board at this time. He was on a short 5 day course of Ceftriaxone which has been stopped.  He is taking in oral intake so there is no indication for IVF at this time  He had IVF prior and went into fluid overload requiring lasix  I would continue to monitor and continue supportive care  Encourage PO intake  Avoid NSAID and IV contrast  No indication for HD  Hopefully he will plateau    Creatinine 3.2 mg/dl today  Patient follows with Dr. Terrazas.  He is on NSS at request of GI.  Low threshold to stop if oxygenation worsens and may need lasix.  Bicarbonate also noted in setting of GABY, CKD and saline infusion  Continue to follow BMP.  Discussed with patient on need for OP ff up with primary nephrologist.

## 2021-07-31 NOTE — PLAN OF CARE
Problem: Adult Inpatient Plan of Care  Goal: Plan of Care Review  Outcome: Progressing  Flowsheets (Taken 7/31/2021 0510)  Progress: no change  Plan of Care Reviewed With: patient  Outcome Summary: Pt had some baack pain overnight, lidocaine patch applied to lower back and tylenl given. Pt able to ambulate to bathroom with supervision. CT scan planned for AM, pt aware. VSS will ctm   Plan of Care Review  Plan of Care Reviewed With: patient  Progress: no change  Outcome Summary: Pt had some baack pain overnight, lidocaine patch applied to lower back and tylenl given. Pt able to ambulate to bathroom with supervision. CT scan planned for AM, pt aware. VSS will ctm

## 2021-07-31 NOTE — ASSESSMENT & PLAN NOTE
Abdominal pain noted  Tolerating diet.  CT of the abdomen- significant peripancreatic inflammatory changes, poss worsening phlegmonous changes; marked increase in ascites.

## 2021-07-31 NOTE — PROGRESS NOTES
"Subjective     Interval History: Creatinine slowly improving, serum bicarb stable, blood pressure stable, he reports he is drinking about 2 large cups of water a day   Review of Systems   Respiratory: Negative for shortness of breath.    Cardiovascular: Negative for chest pain.       Objective     Vital signs in last 24 hours:  Temp:  [36.6 °C (97.9 °F)-36.9 °C (98.5 °F)] 36.6 °C (97.9 °F)  Heart Rate:  [52-74] 57  Resp:  [18] 18  BP: (119-179)/(64-83) 119/64       No intake or output data in the 24 hours ending 07/31/21 1829      Physical Exam  Visit Vitals  /64 (BP Location: Left upper arm, Patient Position: Sitting)   Pulse (!) 57   Temp 36.6 °C (97.9 °F) (Oral)   Resp 18   Ht 1.854 m (6' 1\")   Wt 91 kg (200 lb 11.2 oz)   SpO2 96%   BMI 26.48 kg/m²       Physical Exam  Cardiovascular:      Rate and Rhythm: Normal rate.   Musculoskeletal:      Right lower leg: No edema.      Left lower leg: No edema.   Neurological:      Mental Status: He is alert and oriented to person, place, and time.         Labs  UA Results       07/26/21                          1513           Color Yellow           Clarity Cloudy           Glucose Negative           Bilirubin Negative           Ketones Negative           Sp Grav 1.016           Blood Negative           Ph 6.0           Protein +1           Urobilinogen 0.2           Nitrite Negative           Leuk Est +2           WBC 4 TO 10           RBC 0 TO 4           Bacteria None Seen           Comment for Blood at 1513 on 07/26/21: The sensitivity of the occult blood test is equivalent to approximately 4 intact RBC/HPF.        BUN   Date Value Ref Range Status   07/31/2021 48 (H) 8 - 20 mg/dL Final   07/30/2021 50 (H) 8 - 20 mg/dL Final   07/29/2021 48 (H) 8 - 20 mg/dL Final     Creatinine   Date Value Ref Range Status   07/31/2021 3.6 (H) 0.8 - 1.3 mg/dL Final   07/30/2021 3.8 (H) 0.8 - 1.3 mg/dL Final   07/29/2021 3.9 (H) 0.8 - 1.3 mg/dL Final     Potassium   Date Value Ref " Range Status   07/31/2021 3.6 3.6 - 5.1 mEQ/L Final   07/30/2021 3.5 (L) 3.6 - 5.1 mEQ/L Final   07/29/2021 3.7 3.6 - 5.1 mEQ/L Final     Phosphorus   Date Value Ref Range Status   07/27/2021 3.0 2.4 - 4.7 mg/dL Final     Comment:     MODERATE HEMOLYSIS, RESULT MAY BE INCREASED.   07/24/2021 2.6 2.4 - 4.7 mg/dL Final     Hemoglobin   Date Value Ref Range Status   07/31/2021 9.6 (L) 13.7 - 17.5 g/dL Final   07/30/2021 9.9 (L) 13.7 - 17.5 g/dL Final   07/29/2021 11.5 (L) 13.7 - 17.5 g/dL Final     Sodium   Date Value Ref Range Status   07/31/2021 143 136 - 144 mEQ/L Final   07/30/2021 144 136 - 144 mEQ/L Final   07/29/2021 143 136 - 144 mEQ/L Final     Albumin   Date Value Ref Range Status   07/30/2021 2.0 (L) 3.4 - 5.0 g/dL Final   07/27/2021 2.2 (L) 3.4 - 5.0 g/dL Final   07/26/2021 2.6 (L) 3.4 - 5.0 g/dL Final     CO2   Date Value Ref Range Status   07/31/2021 21 (L) 22 - 32 mEQ/L Final   07/30/2021 21 (L) 22 - 32 mEQ/L Final   07/29/2021 21 (L) 22 - 32 mEQ/L Final       I have reviewed the pertinent patient's labs.            Assessment & Plan    Acidosis, metabolic  Assessment & Plan  He has GABY on underlying CKD IV   On sodium bicarbonate tablet     Acute kidney injury superimposed on chronic kidney disease (CMS/HCC)  Assessment & Plan  He had previous serum creatinine of high 2 to 3.0 mg/dl  He had previously followed with a nephrologist but has not had regular follow up   SONJA shows atrophic kidneys    Rate of rise noted with GABY  He is behaving as ATN despite UA without granular casts  Urine eos are noted to be rare but no rash, fever or peripheral eos  No predicating medications are on board at this time. He was on a short 5 day course of Ceftriaxone which has been stopped.  He is taking in oral intake so there is no indication for IVF at this time  He had IVF prior and went into fluid overload requiring lasix  I would continue to monitor and continue supportive care  Encourage PO intake  Avoid NSAID and IV  contrast  No indication for HD  Hopefully he will plateau    His creatinine is finally starting to come down slowly  Continue to hold off on IV fluids  Continue to encourage p.o. fluids  Follow the basic metabolic panel    Hypertension  Assessment & Plan  Blood pressure stable  Continue current therapy    * Acute pancreatitis  Assessment & Plan  Abdominal pain improving  Advance diet as tolerated

## 2021-07-31 NOTE — ASSESSMENT & PLAN NOTE
Blood pressure labile   Presently on Amlodipine and Metoprolol  Little room to titrate Metoprolol given bradycardia  Can switch Amlodipine to Procardia if pressures remain elevated

## 2021-08-01 NOTE — PROGRESS NOTES
Hospital Medicine Service -  Daily Progress Note       SUBJECTIVE   Interval History: Patient feels better than yesterday. He complained of lower back pain. Still has some abd discomfort but tolerating a diet. No CP, NVD, fevers or chills. No HA or dizziness. No dysuria SOB or cough. Making urine.     Updated family who was at bedside      OBJECTIVE      Vital signs in last 24 hours:  Temp:  [36.4 °C (97.5 °F)-37.3 °C (99.1 °F)] 36.4 °C (97.5 °F)  Heart Rate:  [53-64] 58  Resp:  [16-18] 18  BP: (170-186)/(71-87) 186/85  No intake or output data in the 24 hours ending 08/01/21 1713    PHYSICAL EXAMINATION      Physical Exam  Constitutional:       Appearance: He is well-developed.   HENT:      Head: Normocephalic.   Eyes:      Conjunctiva/sclera: Conjunctivae normal.   Cardiovascular:      Rate and Rhythm: Normal rate and regular rhythm.      Heart sounds: Normal heart sounds. No murmur heard.     Pulmonary:      Effort: Pulmonary effort is normal. No respiratory distress.      Breath sounds: Normal breath sounds. No wheezing.   Abdominal:      General: There is distension.      Palpations: Abdomen is soft.      Tenderness: There is abdominal tenderness.   Musculoskeletal:      Right lower leg: No edema.      Left lower leg: No edema.   Skin:     General: Skin is warm and dry.   Neurological:      Mental Status: He is alert and oriented to person, place, and time.            LINES, CATHETERS, DRAINS, AIRWAYS, AND WOUNDS   Lines, Drains, and Airways:  Wounds (agree with documentation and present on admission):  Peripheral IV (Adult) 07/26/21 Posterior;Left Forearm (Active)   Number of days: 4         Comments:      LABS / IMAGING / TELE      Labs  Results from last 7 days   Lab Units 08/01/21  0847 07/31/21  0320 07/30/21  0830   WBC K/uL 20.33* 21.10* 20.06*   HEMOGLOBIN g/dL 10.2* 9.6* 9.9*   HEMATOCRIT % 31.7* 29.5* 30.2*   PLATELETS K/uL 402* 322 321     Results from last 7 days   Lab Units 08/01/21  0847  07/31/21  0320 07/31/21  0320 07/30/21  0830 07/30/21  0830   SODIUM mEQ/L 143  --  143  --  144   POTASSIUM mEQ/L 3.8  --  3.6  --  3.5*   CHLORIDE mEQ/L 110*  --  110*  --  111*   CO2 mEQ/L 22  --  21*  --  21*   BUN mg/dL 46*  --  48*  --  50*   CREATININE mg/dL 3.5*  --  3.6*  --  3.8*   GLUCOSE mg/dL 129*   < > 139*   < > 141*   CALCIUM mg/dL 8.9  --  8.4*  --  8.7*    < > = values in this interval not displayed.           SARS-CoV-2 (COVID-19) (no units)   Date/Time Value   07/21/2021 1211 Negative       Imaging  CT ABDOMEN PELVIS WITHOUT IV CONTRAST    Result Date: 7/31/2021  CLINICAL HISTORY: Abdominal distention.  Pancreatitis.  Elevated white count. COMPARISON: CT dated 7/21/2021.     IMPRESSION: Limited evaluation due to lack of IV contrast.  There is again redemonstration of marked abnormality of the pancreas which is overall diffusely enlarged with significant peripancreatic inflammatory changes, and I suspect the inflammatory changes and possibly phlegmonous changes around the pancreatic head may be worsened.  Cannot exclude focal or drainable collection.  Probable secondary inflammatory changes of the duodenum.  New bilateral pleural effusions, right greater than left.  There are several small bowel loops which are fluid-filled and mildly prominent but no evidence for obstruction or transition point.  This may be due to enteritis or ileus.  There is also interval development of ascites. STUDY: Axial CT images of the abdomen and pelvis were obtained from the lung bases to the pubic symphysis.  No IV or oral contrast demonstrate for this examination.  Images were reviewed in soft tissue, lung and bone windows. CT DOSE:  One or more dose reduction techniques (e.g. automated exposure control, adjustment of the mA and/or kV according to the patient size, use of iterative reconstruction technique) utilized for this examination. COMMENT: GI System: Liver:  Normal in size and appearance. Gallbladder and bile  ducts:  Gallbladder is distended but otherwise unremarkable.  No ductal dilatation. Pancreas:  Markedly abnormal appearance of the pancreas which is enlarged and edematous with marked surrounding inflammatory changes particularly the head of the pancreas probably worsened from the previous study.  A more focal lesion in the mid body of the pancreas measuring 2.8 x 3.1 cm although might be similar to the prior study. Stomach:  Normal in size and appearance. Small bowel:  Cannot exclude secondary inflammatory changes of the duodenum. Multiple fluid-filled and mildly prominent but not significantly dilated small bowel loops diffusely, likely related to enteritis or ileus.  No discrete transition point appreciated. Large bowel:  Normal  in caliber and appearance.  Normal appendix.  System: Adrenals:  Normal. Kidneys:  The kidneys are small measuring 8 cm on the right 8.2 cm on the left, no hydronephrosis.  Large left parapelvic renal cyst measuring 4.1 x 3.7 cm.. Urinary bladder:  Normal. Prostate gland and seminal vesicles: Prostate is surgically absent. RES System: Spleen:  Normal in size and appearance. Lymph nodes:  Although difficult to evaluate, there does appear to be multiple enlarged peripancreatic lymph nodes. Other: Peritoneum:  Moderate ascites, the largest component in the pelvis.  No free air.  No contained fluid collection definitely identified although limited evaluation due to lack of IV contrast. Aorta and iliac arteries:  Atherosclerotic calcifications of the abdominal aorta and iliac arteries. Abdominal wall: Normal. Lower lungs and pleura:  Small bilateral pleural effusions, right greater than left.  Bibasilar airspace disease, likely related to atelectasis. Visualized axial skeleton:  Multilevel degenerative spondylosis of the lumbar spine.        ASSESSMENT AND PLAN      Leukocytosis  Assessment & Plan  Patient with non specific complaints. Still has residual abd discomfort   WBC 20  CT scan  showing moderate ascites  - will plan for IR para on Monday  - low threshold to start abx if he becomes febrile     Elevated troponin  Assessment & Plan  Suspect demand ischemia not MI.  -Started high intensity statin and low dose beta blocker.   -He is intolerant to ASA.   -outpatient ischemic eval with cardiology     Diarrhea  Assessment & Plan  -Start creon enzymes.   -C. difficile negative     Acidosis, metabolic  Assessment & Plan  -continue sodium bicarb tabs   -improving    Anemia  Assessment & Plan  Chronic and at his baseline  Due to CKD  -hemoglobin stable, follow CBC    Prostate CA (CMS/HCC)  Assessment & Plan  outpt f/u    Acute kidney injury superimposed on chronic kidney disease (CMS/HCC)  Assessment & Plan  -Baseline appears to be around 2.5-3  -Hold off on IV fluids for now, as patient became volume overloaded last time he was on IV fluids  -Discussed with Dr. Chaney, likely ATN  -Follow BMP  -Encourage p.o. intake  -Appreciate nephrology recommendations  - improving slowly       Hypertension  Assessment & Plan  - very hypertensive early in admission mainly in the context of pain  - Bps stable , will continue to monitor blood pressures closely    * Acute pancreatitis  Assessment & Plan  -Acute pancreatitis.  -MRI c/w hemorrhagic pancreatitis.   -Possible intraductal mucinous cyst.   -plan for outpatient EUS per GI   -continue low residue low-fat diet  - has diarrhea/started pancreatic enzymes  -abdominal pain improving and patient attempting to increase p.o. intake  - concerning increase in WBC. LFT and lipase normal.   CT AP showing moderate ascites, again also demonstrating abnormalities in the pancreas - cannot exclude collection. Will follow up with GI      VTE Assessment: Padua VTE Score: 4  VTE Prophylaxis:  Current anticoagulants:  heparin (porcine) 5,000 unit/mL injection 5,000 Units, subcutaneous, q8h ROBERT      Code Status: Full Code      Estimated Discharge Date: 8/3/2021   Disposition  Planning: plan for home with home care when stable     Amandeep Washington MD  8/1/2021             Spent more than 35 minutes with patient evaluation, discussion of current conditions with patient, RN and Case management, counseling and planning care.

## 2021-08-01 NOTE — PROGRESS NOTES
"Subjective     Interval History: Creatinine slightly improved, blood pressure is elevated, but it has been labile, he is working through eating his breakfast, but he reports he does not have much of an appetite  Review of Systems   Respiratory: Negative for shortness of breath.    Cardiovascular: Negative for chest pain.       Objective     Vital signs in last 24 hours:  Temp:  [36.4 °C (97.5 °F)-37 °C (98.6 °F)] 36.4 °C (97.5 °F)  Heart Rate:  [53-64] 64  Resp:  [16-18] 18  BP: (119-185)/(64-87) 175/81       No intake or output data in the 24 hours ending 08/01/21 1051      Physical Exam  Visit Vitals  BP (!) 175/81 (BP Location: Right upper arm, Patient Position: Lying)   Pulse 64   Temp 36.4 °C (97.5 °F) (Oral)   Resp 18   Ht 1.854 m (6' 1\")   Wt 91.3 kg (201 lb 3.2 oz)   SpO2 94%   BMI 26.55 kg/m²       Physical Exam  Cardiovascular:      Rate and Rhythm: Normal rate.   Musculoskeletal:      Right lower leg: No edema.      Left lower leg: No edema.   Neurological:      Mental Status: He is alert and oriented to person, place, and time.   Labs  UA Results       07/26/21                          1513           Color Yellow           Clarity Cloudy           Glucose Negative           Bilirubin Negative           Ketones Negative           Sp Grav 1.016           Blood Negative           Ph 6.0           Protein +1           Urobilinogen 0.2           Nitrite Negative           Leuk Est +2           WBC 4 TO 10           RBC 0 TO 4           Bacteria None Seen           Comment for Blood at 1513 on 07/26/21: The sensitivity of the occult blood test is equivalent to approximately 4 intact RBC/HPF.        BUN   Date Value Ref Range Status   08/01/2021 46 (H) 8 - 20 mg/dL Final   07/31/2021 48 (H) 8 - 20 mg/dL Final   07/30/2021 50 (H) 8 - 20 mg/dL Final     Creatinine   Date Value Ref Range Status   08/01/2021 3.5 (H) 0.8 - 1.3 mg/dL Final   07/31/2021 3.6 (H) 0.8 - 1.3 mg/dL Final   07/30/2021 3.8 (H) 0.8 - 1.3 " mg/dL Final     Potassium   Date Value Ref Range Status   08/01/2021 3.8 3.6 - 5.1 mEQ/L Final   07/31/2021 3.6 3.6 - 5.1 mEQ/L Final   07/30/2021 3.5 (L) 3.6 - 5.1 mEQ/L Final     Phosphorus   Date Value Ref Range Status   07/27/2021 3.0 2.4 - 4.7 mg/dL Final     Comment:     MODERATE HEMOLYSIS, RESULT MAY BE INCREASED.   07/24/2021 2.6 2.4 - 4.7 mg/dL Final     Hemoglobin   Date Value Ref Range Status   08/01/2021 10.2 (L) 13.7 - 17.5 g/dL Final   07/31/2021 9.6 (L) 13.7 - 17.5 g/dL Final   07/30/2021 9.9 (L) 13.7 - 17.5 g/dL Final     Sodium   Date Value Ref Range Status   08/01/2021 143 136 - 144 mEQ/L Final   07/31/2021 143 136 - 144 mEQ/L Final   07/30/2021 144 136 - 144 mEQ/L Final     Albumin   Date Value Ref Range Status   07/30/2021 2.0 (L) 3.4 - 5.0 g/dL Final   07/27/2021 2.2 (L) 3.4 - 5.0 g/dL Final   07/26/2021 2.6 (L) 3.4 - 5.0 g/dL Final     CO2   Date Value Ref Range Status   08/01/2021 22 22 - 32 mEQ/L Final   07/31/2021 21 (L) 22 - 32 mEQ/L Final   07/30/2021 21 (L) 22 - 32 mEQ/L Final       I have reviewed the pertinent patient's labs.            Assessment & Plan    Acidosis, metabolic  Assessment & Plan  He has GABY on underlying CKD IV   On sodium bicarbonate tablet     Acute kidney injury superimposed on chronic kidney disease (CMS/HCC)  Assessment & Plan  He had previous serum creatinine of high 2 to 3.0 mg/dl  He had previously followed with a nephrologist but has not had regular follow up   SONJA shows atrophic kidneys    Rate of rise noted with GABY  He is behaving as ATN despite UA without granular casts  Urine eos are noted to be rare but no rash, fever or peripheral eos  No predicating medications are on board at this time. He was on a short 5 day course of Ceftriaxone which has been stopped.  He is taking in oral intake so there is no indication for IVF at this time  He had IVF prior and went into fluid overload requiring lasix  I would continue to monitor and continue supportive  care  Encourage PO intake  Avoid NSAID and IV contrast  No indication for HD  Hopefully he will plateau    His creatinine is finally starting to come down slowly  Continue to hold off on IV fluids  Continue to encourage p.o. fluids  Follow the basic metabolic panel    Hypertension  Assessment & Plan  Blood pressure labile   continue current therapy  If it remains elevated, can restart amlodipine    * Acute pancreatitis  Assessment & Plan  Abdominal pain improving  Advance diet as tolerated

## 2021-08-01 NOTE — NURSING NOTE
Virtual Ted discontinued. Patient alert and oriented x3 Using call bell for assistance when needing to get OOB. Gait steady. Dr Washington aware. Will continue to monitor.

## 2021-08-02 PROBLEM — R18.8 ASCITES: Status: ACTIVE | Noted: 2021-01-01

## 2021-08-02 PROBLEM — E87.20 ACIDOSIS, METABOLIC: Status: RESOLVED | Noted: 2021-01-01 | Resolved: 2021-01-01

## 2021-08-02 NOTE — PROGRESS NOTES
Patient:  Adalberto Galaviz  Location:  [facility] 3C 0362  MRN:  [id number]  Today's date:  8/2/2021      Speech Pathology: Therapy session    SLP Diagnosis: Functional oral and pharyngeal swallow. Suspect esophageal dysphagia.     Recommendations:  1. Regular solids pt to pick softer food options (L7) and thin liquids (L0).  2. Aspiration Precautions including small single sips, small bites, and slow rate of ingestion.   3. GERD precautions including upright position during and after meals.   4. Cyclic ingestion (alternating food and liquids 1:1)   5. No further follow up needed at this time. Please reconsult if needed.  6. Could consider GI follow up if concerns continue.     Summary/Handoff:  Daily Outcome Statement: Pt was seen for a follow up session. Pt alert and awake sitting upright in bed. Pt denied any swallowing difficulty with breakfast. Pt reports at baseline he has a hx of GERD and frequently belches. Pt reports his nausea has been worse during admission. No nausea during session. Functional oral and pharyngeal swallow. Suspect esophageal dysphagia. Would continue regular solids and thin liquids. Pt to pick softer food options with moisture adaptation. Strict aspiration/GERD precautions. If concerns continue, would recommend GI follow up. No further ST needed at this time. Please reconsult if needed.                 Dietary Orders   (From admission, onward)             Start     Ordered    07/30/21 1434  Adult Diet Regular; 2gm Sodium; Cardiac (Low Sodium/Low Fat); RD/LDN may adjust order  Diet effective now     Question Answer Comment   Diet Texture Regular    Sodium Restriction: 2gm Sodium    Other Restriction(s): Cardiac (Low Sodium/Low Fat)    Delegation of Authority. Diet orders written by PA/Jimmie may not be adjusted by RD/LDNs. RD/LDN may adjust order        07/30/21 1434    07/28/21 1444  Dietary nutrition supplements  Once     Question Answer Comment   Select Supplement ([facility]): Boost  Glucose Control Vanilla    Meal period Breakfast Dinner        07/28/21 1444                Results from last 7 days   Lab Units 08/01/21  0847 07/31/21  0320 07/30/21  0830   WBC K/uL 20.33* 21.10* 20.06*   HEMOGLOBIN g/dL 10.2* 9.6* 9.9*   HEMATOCRIT % 31.7* 29.5* 30.2*   PLATELETS K/uL 402* 322 321          Patient left with call bell in reach and alarms as found.    Adalberto is a 80 y.o. male admitted on 7/21/2021 with Sinus bradycardia [R00.1]  Acute pancreatitis, unspecified complication status, unspecified pancreatitis type [K85.90]. Principal problem is No Principal Problem: There is no principal problem currently on the Problem List. Please update the Problem List and refresh..    Past Medical History  Adalberto has a past medical history of CKD (chronic kidney disease) stage 4, GFR 15-29 ml/min (CMS/HCC), DJD (degenerative joint disease), Hypertension, Nocturia, Prostate CA (CMS/HCC), and Prostate CA (CMS/HCC).    History of Present Illness  80 y.o. male from home w/ spouse who presents with severe abdominal pain, nausea, vomiting. Bradycardic in 30s in ED 7/21/21 (suspect vasovagal due to pain/nausea/vomiting). +Pancreatitis, GABY, metabolic acidosis, Neg DVT LLE           Prior Living Environment      Most Recent Value   People in Home  spouse   Current Living Arrangements  home/apartment/condo   Home Accessibility  stairs to enter home (Group)   Number of Stairs, Main Entrance  1        Prior Level of Function      Most Recent Value   Dominant Hand  right   Ambulation  independent   Transferring  independent   Toileting  independent   Bathing  independent   Dressing  independent   Eating  independent   Communication  understands/communicates without difficulty   Swallowing  swallows foods/liquids without difficulty   Baseline Diet/Method of Nutritional Intake  thin liquids (Level 0), regular solids (Level 7), no diet restrictions   Past History of Dysphagia  denies h/o dysphagia.   Prior Level of Function  Comment  lives w/ spouse, 2SH, 1STE, no AD, , tub shower, 1/2 bath on 1st fl, owns a [place] house, retired    Assistive Device Currently Used at Home  none          SLP Evaluation and Treatment - 08/02/21 1110        SLP Time Calculation    Start Time  1035     Stop Time  1046     Time Calculation (min)  11 min        Session Details    Document Type  daily treatment/progress note     Mode of Treatment  individual therapy;speech language pathology        General Information    Patient Profile Reviewed  yes     General Observations of Patient  Pt was seen at bedside alert and awake.      Existing Precautions/Restrictions  aspiration;fall     Limitations/Impairments  swallowing        Cognition/Psychosocial    Affect/Mental Status (Cognition)  WFL        Functional Communication Measures    FCM: Swallowing  7-->Level 7        General Swallowing Observations    Current Diet/Method of Nutritional Intake  thin liquids (Level 0);regular solids (Level 7)     Signs/Symptoms of Aspiration (Current Diet)  none        Food and Liquid Trials (NIS)    Patient Positioning  HOB elevated (specify degrees)     Oral Intake/Feeding Performance  independent/appropriate self-feeding skills     Liquid Consistencies Evaluated  thin liquids (Level 0)     Thin Liquids (Level 0)  WFL;sips from cup;straw sips     Food Consistencies Evaluated  regular solids (Level 7)     Regular Solids (Level 7)  WFL     Oral Preparatory Phase of Swallow  WFL     Oral Phase of Swallow  WFL     Pharyngeal Phase of Swallow  no clinical symptoms     Esophageal Phase of Swallow  frequent belching     Comment  Pt reports hx of GERD and frequent nausea. Pt was educated on aspiration risk and precautions. Strategies including small bites/small sips, slow rate, and cyclic ingestion.         Swallowing Recommendations    Diet Consistency Recommendations  thin liquids (Level 0);regular solids (Level 7)     Medication Administration  whole with liquid      Supervision Level for Intake  patient independent     Feeding/Delivery Recommendations  allow patient to feed self if maintaining safety;allow extra time for meals;stop meal if showing signs of aspiration or fatigue (e.g., coughing, wet voice);small bites/sips     Posture Recommendations  fully upright in chair/chair mode of bed     Swallowing Strategies  alternate food and liquid intake     Comment, Swallowing Recommendations  Moisture adaptation         AM-PAC (TM) - Cognition (Current Function)    Following/understanding a 10-15 minute speech or presentation?  4 - None     Understanding familiar people during ordinary conversations?  4 - None     Remembering to take medications at the appropriate time?  3 - A little     Remembering where things were placed or put away?  4 - None     Remembering a list of 3 or 4 errands without writing it down?  4 - None     Taking care of complicated tasks?  3 - A little     AM-PAC (TM) Cognition Score  22                       SLP Assessment/Plan      Most Recent Value   Daily Outcome Statement  Pt was seen for a follow up session. Pt alert and awake sitting upright in bed. Pt denied any swallowing difficulty with breakfast. Pt reports at baseline he has a hx of GERD and frequently belches. Pt reports his nausea has been worse during admission. No nausea during session. Functional oral and pharyngeal swallow. Suspect esophageal dysphagia. Would continue regular solids and thin liquids. Pt to pick softer food options with moisture adaptation. Strict aspiration/GERD precautions. If concerns continue, would recommend GI follow up. No further ST needed at this time. Please reconsult if needed. at 08/02/2021 1110   SLP Diagnosis  Functional oral and pharyngeal swallow. Suspect esophageal dysphagia.  at 08/02/2021 1110   Rehab Potential  good, to achieve stated therapy goals at 08/02/2021 1110   Therapy Frequency  3 times/wk at 08/02/2021 1110   Planned Therapy Interventions   dysphagia therapy at 08/02/2021 1110   Patient/Family Therapy Goal Statement  to get better at 07/27/2021 0955          SLP Goals      Most Recent Value   Oral Nutrition/Hydration Goal   Oral Nutrition/Hydration Goal  Pt will consume LRD w/o overt ss/x of aspiration. at 07/23/2021 1420   Time Frame  long-term goal (LTG), by discharge at 07/23/2021 1420   Progress/Outcome  goal ongoing at 07/27/2021 0978

## 2021-08-02 NOTE — PROGRESS NOTES
Hospital Medicine Service -  Daily Progress Note       SUBJECTIVE   Interval History: Patient still with nausea.  Reports that he is eating breakfast and dinner.  Not eating lunch.  Does not have much of an appetite.     OBJECTIVE      Vital signs in last 24 hours:  Temp:  [36.3 °C (97.4 °F)-37.1 °C (98.8 °F)] 37.1 °C (98.8 °F)  Heart Rate:  [51-62] 60  Resp:  [16-18] 16  BP: (181-198)/(79-86) 187/86    Intake/Output Summary (Last 24 hours) at 8/2/2021 1509  Last data filed at 8/2/2021 1000  Gross per 24 hour   Intake 120 ml   Output --   Net 120 ml       PHYSICAL EXAMINATION      Physical Exam  Vitals reviewed.   Constitutional:       Comments: Elderly male, no acute distress   HENT:      Head: Normocephalic and atraumatic.      Nose: Nose normal.      Mouth/Throat:      Mouth: Mucous membranes are dry.   Cardiovascular:      Rate and Rhythm: Normal rate and regular rhythm.   Pulmonary:      Effort: Pulmonary effort is normal. No respiratory distress.   Abdominal:      General: There is distension.      Palpations: Abdomen is soft.      Tenderness: There is no abdominal tenderness. There is no guarding.   Musculoskeletal:      Right lower leg: No edema.      Left lower leg: No edema.   Skin:     General: Skin is warm and dry.   Neurological:      General: No focal deficit present.   Psychiatric:      Comments: Cooperative            LINES, CATHETERS, DRAINS, AIRWAYS, AND WOUNDS   Lines, Drains, and Airways:  Wounds (agree with documentation and present on admission):  Peripheral IV (Adult) 07/26/21 Posterior;Left Forearm (Active)   Number of days: 2         Comments:      LABS / IMAGING / TELE      Labs  I have reviewed the patient's pertinent labs.  Significant abnormals are below.    SARS-CoV-2 (COVID-19) (no units)   Date/Time Value   07/21/2021 1211 Negative     Results from last 7 days   Lab Units 08/02/21  1109   WBC K/uL 18.32*   HEMOGLOBIN g/dL 10.5*   HEMATOCRIT % 32.7*   PLATELETS K/uL 434*     Results  from last 7 days   Lab Units 08/02/21  0807 07/31/21  0320 07/30/21  0830   SODIUM mEQ/L 142   < > 144   POTASSIUM mEQ/L 3.8   < > 3.5*   CHLORIDE mEQ/L 109   < > 111*   CO2 mEQ/L 23   < > 21*   BUN mg/dL 42*   < > 50*   CREATININE mg/dL 3.2*   < > 3.8*   CALCIUM mg/dL 8.7*   < > 8.7*   ALBUMIN g/dL  --   --  2.0*   BILIRUBIN TOTAL mg/dL  --   --  0.7   ALK PHOS IU/L  --   --  66   ALT IU/L  --   --  25   AST IU/L  --   --  21   GLUCOSE mg/dL 129*   < > 141*    < > = values in this interval not displayed.       Imaging  I have independently reviewed the pertinent imaging from the last 24 hrs.    ECG/Telemetry  I have independently reviewed the telemetry. No events for the last 24 hours.    ASSESSMENT AND PLAN      Acute kidney injury superimposed on chronic kidney disease (CMS/[facility])  Assessment & Plan  -Baseline appears to be around 2.5-3  -Creatinine improved to 3.2 today  -Follow BMP  -Patient will need outpatient follow-up with nephrology      Ascites  Assessment & Plan  -Noted on CT abdomen  -We will trial response to Lasix, as patient does not have enough fluid for paracentesis per IR    Leukocytosis  Assessment & Plan  Patient with non specific complaints. Still has residual abd discomfort   WBC 20  CT scan showing moderate ascites  - per IR, not enough fluid for thora at this time   - low threshold to start abx if he becomes febrile     Elevated troponin  Assessment & Plan  Suspect demand ischemia not MI.  -Started high intensity statin and low dose beta blocker.   -He is intolerant to ASA.   -outpatient ischemic eval with cardiology     Diarrhea  Assessment & Plan  -Start creon enzymes.   -C. difficile negative     Anemia  Assessment & Plan  Chronic and at his baseline  Due to CKD  -hemoglobin stable, follow CBC    Prostate CA (CMS/HCC)  Assessment & Plan  outpt f/u    Hypertension  Assessment & Plan  - very hypertensive early in admission mainly in the context of pain  - Bps elevated--amlodipine restarted  today  -We will continue monitor blood pressures closely    * Acute pancreatitis  Assessment & Plan  -Acute pancreatitis.  -MRI c/w hemorrhagic pancreatitis.   -Possible intraductal mucinous cyst.   -plan for outpatient EUS per GI   -continue low residue low-fat diet  - has diarrhea/started pancreatic enzymes  -abdominal pain improving and patient attempting to increase p.o. intake  - concerning increase in WBC. LFT and lipase normal.   CT AP showing moderate ascites, again also demonstrating abnormalities in the pancreas - cannot exclude collection, will reach out to GI- GI team advised starting patient on aggressive IV fluids        VTE Assessment: [place] VTE Score: 4  VTE Prophylaxis:  Current anticoagulants:  heparin (porcine) 5,000 unit/mL injection 5,000 Units, subcutaneous, q8h ROBERT      Code Status: Full Code      Estimated Discharge Date: 8/3/2021   Disposition Planning: Return home, once medically stable, [facility] upon dc     MAYELA Shabazz  8/2/2021

## 2021-08-02 NOTE — PROGRESS NOTES
Patient: Adalberto Galaviz  Location:  [facility] 3C 0362  MRN:  [id number]  Today's date:  8/2/2021    Patient treatment session complete. Attending RN cleared patient and gave consent for therapy to see patient for PT session. Patient positioned comfortably in bed with call bell in reach and alarm status on. Pt did not report or present with any chest pain, no neuro signs/symptoms. Attending RN aware of treatment session progress pain + nausea.     Adalberto is a 80 y.o. male admitted on 7/21/2021 with Sinus bradycardia [R00.1]  Acute pancreatitis, unspecified complication status, unspecified pancreatitis type [K85.90]. Principal problem is No Principal Problem: There is no principal problem currently on the Problem List. Please update the Problem List and refresh..    Past Medical History  Adalberto has a past medical history of CKD (chronic kidney disease) stage 4, GFR 15-29 ml/min (CMS/HCC), DJD (degenerative joint disease), Hypertension, Nocturia, Prostate CA (CMS/HCC), and Prostate CA (CMS/HCC).    History of Present Illness  80 y.o. male from home w/ spouse who presents with severe abdominal pain, nausea, vomiting. Bradycardic in 30s in ED 7/21/21 (suspect vasovagal due to pain/nausea/vomiting). +Pancreatitis, GABY, metabolic acidosis, Neg DVT LLE       PT Vitals    Date/Time Pulse HR Source SpO2 Pt Activity O2 Therapy BP BP Location BP Method Pt Position [facility]   08/02/21 0920 62 Monitor 97 % At rest None (Room air) 187/86 Left upper arm Automatic Lying MTC      PT Pain    Date/Time Location Rating: Rest Rating: Activity Interventions [facility]   08/02/21 0920 abdomen 2 - mild pain 2 - mild pain position adjusted MTC          Prior Living Environment      Most Recent Value   People in Home  spouse   Current Living Arrangements  home/apartment/condo   Home Accessibility  stairs to enter home (Group)   Number of Stairs, Main Entrance  1        Prior Level of Function      Most Recent Value   Dominant Hand  right    Ambulation  independent   Transferring  independent   Toileting  independent   Bathing  independent   Dressing  independent   Eating  independent   Communication  understands/communicates without difficulty   Swallowing  swallows foods/liquids without difficulty   Baseline Diet/Method of Nutritional Intake  thin liquids (Level 0), regular solids (Level 7), no diet restrictions   Past History of Dysphagia  denies h/o dysphagia.   Prior Level of Function Comment  lives w/ spouse, 2SH, 1STE, no AD, , tub shower, 1/2 bath on 1st fl, owns a [place] house, retired    Assistive Device Currently Used at Home  none          PT Evaluation and Treatment - 08/02/21 0920        PT Time Calculation    Start Time  0920     Stop Time  0935     Time Calculation (min)  15 min        Session Details    Document Type  daily treatment/progress note     Mode of Treatment  physical therapy        General Information    Patient Profile Reviewed  yes     General Observations of Patient  Pt rec'd at bedside     Existing Precautions/Restrictions  aspiration;fall        Cognition/Psychosocial    Affect/Mental Status (Cognition)  WFL     Orientation Status (Cognition)  oriented x 4     Follows Commands (Cognition)  WFL     Cognitive Function  WFL        Sensory    Hearing Status  hearing impairment, bilaterally        Vision Assessment/Intervention    Visual Impairment/Limitations  corrective lenses full-time        Bed Mobility    [place], Sit to Supine  independent        Sit to Stand Transfer    [place], Sit to Stand Transfer  distant supervision     Verbal Cues  hand placement     Assistive Device  none     Comment  VC's for safe techniques.         Stand to Sit Transfer    [place], Stand to Sit Transfer  distant supervision     Verbal Cues  hand placement     Assistive Device  none     Comment  VC's for safe techniques. Vc's for slow descent.         Gait Training    [place], Gait  distant supervision      Assistive Device  none     Distance in Feet  18 feet     Pattern (Gait)  step-through     Deviations/Abnormal Patterns (Gait)  gait speed decreased;step length decreased;stride length decreased     Bilateral Gait Deviations  heel strike decreased     Comment (Gait/Stairs)  no Overt LOB's, distant supervision for balance mild unbalance, no overt LOB's        Stairs Training    [place], Stairs  supervision     Assistive Device  railing     Handrail Location (Stairs)  right side (ascending)     Number of Stairs  6     Ascending Stairs Technique  step-to-step     Descending Stairs Technique  step-to-step     Comment  Pt req'd cues for management of assistive device and cues for sequencing for BLE's. Recommending Supervision on stairs once d/c'd. Pt's family not present for treatment. Pt educated on how family can provide assistance / supervision for safety.         Balance    Static Sitting Balance  WFL     Dynamic Sitting Balance  WFL     Sit to Stand Dynamic Balance  WFL     Static Standing Balance  WFL     Dynamic Standing Balance  mild impairment     Comment, Balance  No Overt LOB's, mild unbalance        AM-PAC (TM) - Mobility (Current Function)    Turning from your back to your side while in a flat bed without using bedrails?  4 - None     Moving from lying on your back to sitting on the side of a flat bed without using bedrails?  4 - None     Moving to and from a bed to a chair?  4 - None     Standing up from a chair using your arms?  4 - None     To walk in a hospital room?  4 - None     Climbing 3-5 steps with a railing?  3 - A Little     AM-PAC (TM) Mobility Score  23                     PT Assessment/Plan      Most Recent Value   Daily Outcome Statement  Training conducted with negotiation of gait w/o device and transfer techniques + stairs. Pt. demo good carryover with instructions. Pt educated on how family can provide assistance / supervision for safety. Recommending D/c home unless pt's family unable  to provide appropriate assistance at home / hire [facility]. Has spouse who is able to provide supervision. at 08/02/2021 0920   PT Recommended Discharge Disposition  home with assistance, home with home health at 08/02/2021 0920   Rehab Potential  good, to achieve stated therapy goals at 07/28/2021 1058   Therapy Frequency  5 times/wk at 07/28/2021 1058   Planned Therapy Interventions  balance training, bed mobility training, gait training, home exercise program, patient/family education, ROM (range of motion), stair training, strengthening, transfer training at 07/28/2021 1058   Anticipated Equipment Needs at Discharge (PT)  commode, 3-in-1, shower chair, cane, straight at 07/28/2021 1058   Patient/Family Therapy Goals Statement  I want to go home  at 07/28/2021 1058          PT Goals      Most Recent Value   Bed Mobility Goal 1   Activity/Assistive Device  bed mobility activities, all at 07/28/2021 1058   [place]  independent at 07/28/2021 1058   Time Frame  by discharge at 07/28/2021 1058   Progress/Outcome  goal ongoing at 07/28/2021 1058   Transfer Goal 1   Activity/Assistive Device  all transfers, cane, straight at 07/28/2021 1058   [place]  modified independence at 07/28/2021 1058   Time Frame  by discharge at 07/28/2021 1058   Progress/Outcome  goal ongoing at 07/28/2021 1058   Gait Training Goal 1   Activity/Assistive Device  gait (walking locomotion), cane, straight at 07/28/2021 1058   [place]  modified independence at 07/28/2021 1058   Distance  130 at 07/28/2021 1058   Time Frame  by discharge at 07/28/2021 1058   Progress/Outcome  goal ongoing at 07/28/2021 1058   Stairs Goal 1   Activity/Assistive Device  ascending stairs, descending stairs, cane, straight at 07/28/2021 1058   [place]  modified independence at 07/28/2021 1058   Number of Stairs  12 at 07/28/2021 1058   Time Frame  by discharge at 07/28/2021 1058   Progress/Outcome  goal ongoing at 07/28/2021 1058

## 2021-08-02 NOTE — PROGRESS NOTES
Nephrology Daily Progress Note             LOS: 12 days     Adalberto Galaviz is a 80 y.o. male who was admitted with Acute pancreatitis.     Subjective     Interval History:   No events overnight.  Still complains of nausea and abdominal discomfort.  No SOB. Tolerating diet.    Current Facility-Administered Medications   Medication Dose Route Frequency Provider Last Rate Last Admin   • acetaminophen (TYLENOL) tablet 975 mg  975 mg oral q8h PRN Keyla Rodriguez MD   975 mg at 07/31/21 1807   • albuterol nebulizer solution 2.5 mg  2.5 mg nebulization q6h PRN Keyla Rodriguez MD       • alum-mag hydroxide-simeth (MAALOX) 200-200-20 mg/5 mL suspension 30 mL  30 mL oral q4h PRN Charlene Mo DO       • amLODIPine (NORVASC) tablet 10 mg  10 mg oral Daily Noemí Dubon PA C   10 mg at 08/02/21 0957   • atorvastatin (LIPITOR) tablet 40 mg  40 mg oral Daily (6p) Keyla Rodriguez MD   40 mg at 08/01/21 1659   • atropine injection 0.5 mg  0.5 mg intravenous q5 min PRN Charlene Mo DO       • cholecalciferol (vitamin D3) tablet 2,000 Units  2,000 Units oral Daily Brunilda Rico MD   2,000 Units at 08/02/21 0957   • glucose chewable tablet 16-32 g of dextrose  16-32 g of dextrose oral PRN Charlene Mo DO        Or   • dextrose 40 % oral gel 15-30 g of dextrose  15-30 g of dextrose oral PRN Charlene Mo DO        Or   • glucagon (GLUCAGEN) injection 1 mg  1 mg intramuscular PRN Charlene Mo DO        Or   • dextrose in water injection 12.5 g  25 mL intravenous PRN Charlene Mo DO       • famotidine (PEPCID) tablet 20 mg  20 mg oral Daily Keyla Rodriguez MD   20 mg at 08/02/21 0957   • heparin (porcine) 5,000 unit/mL injection 5,000 Units  5,000 Units subcutaneous q8h Amandeep Her MD   5,000 Units at 08/02/21 0606   • HYDROmorphone (DILAUDID) injection 0.4 mg  0.4 mg intravenous q4h PRN Keyla Rodriguez MD       • lidocaine (ASPERCREME) 4 % topical patch 1 patch  1 patch  "Topical Daily Amandeep Washington MD   1 patch at 08/02/21 0957   • metoprolol succinate XL (TOPROL-XL) split 24 hr ER tablet 12.5 mg  12.5 mg oral Daily with dinner Keyla Rodriguez MD   12.5 mg at 08/01/21 1659   • nitroglycerin (NITROSTAT) SL tablet 0.4 mg  0.4 mg sublingual q5 min PRN Charlene Mo DO       • pancrelipase (Lip-Prot-Amyl) (CREON) capsule,delayed release(DR/EC) 12,000 units of lipase  12,000 units of lipase oral TID with meals Noemí Dubon PA C   12,000 units of lipase at 08/02/21 0957   • prochlorperazine (COMPAZINE) injection 10 mg  10 mg intravenous q6h PRN Charlene Mo DO   10 mg at 07/23/21 1419   • sertraline (ZOLOFT) tablet 25 mg  25 mg oral q PM Charlene Mo DO   25 mg at 08/01/21 1659   • sodium bicarbonate tablet 650 mg  650 mg oral TID Amandeep Washington MD   650 mg at 08/02/21 0957       Objective     Vital signs in last 24 hours:  Temp:  [36.3 °C (97.4 °F)-37.1 °C (98.8 °F)] 37.1 °C (98.8 °F)  Heart Rate:  [51-60] 60  Resp:  [16-18] 16  BP: (181-198)/(79-86) 181/84    Intake/Output last 3 shifts:  No intake/output data recorded.    Physical Exam:  Visit Vitals  BP (!) 181/84 (BP Location: Left upper arm, Patient Position: Lying)   Pulse 60   Temp 37.1 °C (98.8 °F) (Oral)   Resp 16   Ht 1.854 m (6' 1\")   Wt 90.5 kg (199 lb 8 oz)   SpO2 94%   BMI 26.32 kg/m²     General appearance: alert, cooperative and no distress  Lungs: clear to auscultation bilaterally  Heart: regular rate and rhythm, S1, S2 normal, no S3 or S4 and no rub  Abdomen: distended, + BS  Extremities: extremities normal, warm and well-perfused; no cyanosis, clubbing, or edema      Labs:      Results from last 7 days   Lab Units 08/02/21  1109 08/01/21  0847 07/31/21  0320   WBC K/uL 18.32* 20.33* 21.10*   HEMOGLOBIN g/dL 10.5* 10.2* 9.6*   HEMATOCRIT % 32.7* 31.7* 29.5*   PLATELETS K/uL 434* 402* 322     Results from last 7 days   Lab Units 08/02/21  0807 08/01/21  0847 07/31/21  0320 " 07/30/21  0830 07/30/21  0830 07/28/21  0643 07/27/21  0510   SODIUM mEQ/L 142 143 143   < > 144   < > 139   POTASSIUM mEQ/L 3.8 3.8 3.6   < > 3.5*   < > 4.6   CHLORIDE mEQ/L 109 110* 110*   < > 111*   < > 111*   CO2 mEQ/L 23 22 21*   < > 21*   < > 16*   BUN mg/dL 42* 46* 48*   < > 50*   < > 47*   CREATININE mg/dL 3.2* 3.5* 3.6*   < > 3.8*   < > 3.7*   CALCIUM mg/dL 8.7* 8.9 8.4*   < > 8.7*   < > 8.6*   ALBUMIN g/dL  --   --   --   --  2.0*  --  2.2*   BILIRUBIN TOTAL mg/dL  --   --   --   --  0.7  --  1.1   ALK PHOS IU/L  --   --   --   --  66  --  62   ALT IU/L  --   --   --   --  25  --  33   AST IU/L  --   --   --   --  21  --  40   GLUCOSE mg/dL 129* 129* 139*   < > 141*   < > 144*    < > = values in this interval not displayed.     Results from last 7 days   Lab Units 07/27/21  1225 07/26/21  1513   COLOR U   --  Yellow   CLARITY U   --  Cloudy*   PH U   --  6.0   SPEC GRAV U   --  1.016   PROTEIN U   --  +1*   BLOOD U   --  Negative   NITRITE U   --  Negative   POTASSIUM (MMOL/L) IN URINE mEQ/L 25.4  --    SODIUM UR mEQ/L 17  --    CHLORIDE UR mEQ/L 22  --    RBC UR HPF /HPF  --  0 TO 4   BACTERIA UR HPF /HPF  --  None Seen             Assessment/Plan     Principal Problem:    Acute pancreatitis  Active Problems:    Hypertension    Acute kidney injury superimposed on chronic kidney disease (CMS/HCC)    Prostate CA (CMS/HCC)    Anemia    Acidosis, metabolic    Diarrhea    Elevated troponin    Leukocytosis      Acidosis, metabolic  Assessment & Plan  Acidosis has improved.  Will dc bicarb for now- ascites noted on CT of abdomen.    Acute kidney injury superimposed on chronic kidney disease (CMS/HCC)  Assessment & Plan  He had previous serum creatinine of high 2 to 3.0 mg/dl  He had previously followed with a nephrologist but has not had regular follow up   SONJA shows atrophic kidneys    Rate of rise noted with GABY  He is behaving as ATN despite UA without granular casts  Urine eos are noted to be rare but no  rash, fever or peripheral eos  No predicating medications are on board at this time. He was on a short 5 day course of Ceftriaxone which has been stopped.  He is taking in oral intake so there is no indication for IVF at this time  He had IVF prior and went into fluid overload requiring lasix  I would continue to monitor and continue supportive care  Encourage PO intake  Avoid NSAID and IV contrast  No indication for HD  Hopefully he will plateau    Creatinine with improving trend.  Patient follows with Dr. Terrazas.  Continue to follow BMP.  Discussed with patient on need for OP ff up with primary nephrologist.    Hypertension  Assessment & Plan  Blood pressure labile   continue current therapy  If it remains elevated, can restart amlodipine    * Acute pancreatitis  Assessment & Plan  Abdominal pain improving  Tolerating diet.  CT of the abdomen- significant peripancreatic inflammatory changes, poss worsening phlegmonous changes; marked increase in ascites.        Katarina Ramirez MD

## 2021-08-02 NOTE — PATIENT CARE CONFERENCE
Care Progression Rounds Note  Date: 8/2/2021  Time: 10:34 AM     Patient Name: Adalberto Galaviz     Medical Record Number: [id number]   YOB: 1940  Sex: Male      Room/Bed: 0362    Admitting Diagnosis: Sinus bradycardia [R00.1]  Acute pancreatitis, unspecified complication status, unspecified pancreatitis type [K85.90]   Admit Date/Time: 7/21/2021 11:55 AM    Primary Diagnosis: Acute pancreatitis  Principal Problem: Acute pancreatitis    GMLOS: pending  Anticipated Discharge Date: 8/3/2021    AM-PAC:  Mobility Score: 20    Discharge Planning:  Current Living Arrangements: home/apartment/condo  Anticipated Discharge Disposition: home/family member's home    Barriers to Discharge:  Barriers to Discharge: Medical issues not resolved    Participants:  advanced practice provider, social work/services, nursing

## 2021-08-03 NOTE — PROGRESS NOTES
Hospital Medicine Service -  Daily Progress Note       SUBJECTIVE   Interval History: Patient still with nausea/abdominal pain and back pain.  Denies chest pain and shortness of breath.  Denies leg swelling.    Patient's wife and son updated at bedside and all their questions were answered.     OBJECTIVE      Vital signs in last 24 hours:  Temp:  [36.5 °C (97.7 °F)-37 °C (98.6 °F)] 36.5 °C (97.7 °F)  Heart Rate:  [52-71] 52  Resp:  [16] 16  BP: (125-177)/(60-87) 160/87  No intake or output data in the 24 hours ending 08/03/21 1332    PHYSICAL EXAMINATION      Physical Exam  Vitals reviewed.   Constitutional:       Comments: Elderly male, no acute distress   HENT:      Head: Normocephalic and atraumatic.      Nose: Nose normal.      Mouth/Throat:      Mouth: Mucous membranes are dry.   Cardiovascular:      Rate and Rhythm: Normal rate and regular rhythm.   Pulmonary:      Effort: Pulmonary effort is normal. No respiratory distress.      Breath sounds: Normal breath sounds.   Abdominal:      Palpations: Abdomen is soft.      Tenderness: There is abdominal tenderness.   Skin:     General: Skin is warm and dry.   Neurological:      General: No focal deficit present.      Mental Status: He is oriented to person, place, and time.            LINES, CATHETERS, DRAINS, AIRWAYS, AND WOUNDS   Lines, Drains, and Airways:  Wounds (agree with documentation and present on admission):  Peripheral IV (Adult) 07/26/21 Posterior;Left Forearm (Active)   Number of days: 2         Comments:      LABS / IMAGING / TELE      Labs  I have reviewed the patient's pertinent labs.  Significant abnormals are below.    SARS-CoV-2 (COVID-19) (no units)   Date/Time Value   07/21/2021 1211 Negative     Results from last 7 days   Lab Units 08/03/21  0633   WBC K/uL 19.76*   HEMOGLOBIN g/dL 10.1*   HEMATOCRIT % 30.9*   PLATELETS K/uL 422*     Results from last 7 days   Lab Units 08/03/21  0633 07/31/21  0320 07/30/21  0830   SODIUM mEQ/L 141   < >  144   POTASSIUM mEQ/L 4.2   < > 3.5*   CHLORIDE mEQ/L 109   < > 111*   CO2 mEQ/L 16*   < > 21*   BUN mg/dL 39*   < > 50*   CREATININE mg/dL 3.2*   < > 3.8*   CALCIUM mg/dL 8.9   < > 8.7*   ALBUMIN g/dL  --   --  2.0*   BILIRUBIN TOTAL mg/dL  --   --  0.7   ALK PHOS IU/L  --   --  66   ALT IU/L  --   --  25   AST IU/L  --   --  21   GLUCOSE mg/dL 128*   < > 141*    < > = values in this interval not displayed.       Imaging  I have independently reviewed the pertinent imaging from the last 24 hrs.    ECG/Telemetry  I have independently reviewed the telemetry. No events for the last 24 hours.    ASSESSMENT AND PLAN      Acute kidney injury superimposed on chronic kidney disease (CMS/HCC)  Assessment & Plan  -Baseline appears to be around 2.5-3  -Creatinine improved to 3.2 today  -Follow BMP  -Patient will need outpatient follow-up with nephrology      * Acute pancreatitis  Assessment & Plan  -Acute pancreatitis.  -MRI c/w hemorrhagic pancreatitis.   -Possible intraductal mucinous cyst.   -plan for outpatient EUS per GI   -continue low residue low-fat diet  - has diarrhea/started pancreatic enzymes  -White count still elevated  CT AP showing moderate ascites, again also demonstrating abnormalities in the pancreas - cannot exclude collection, will reach out to GI- GI team advised starting patient on aggressive IV fluids and IV PPI twice daily  -Also advised starting patient on clear liquid diet    Ascites  Assessment & Plan  -Noted on CT abdomen, however not enough fluid for paracentesis    Leukocytosis  Assessment & Plan  Patient with non specific complaints. Still has residual abd discomfort   WBC 20  CT scan showing moderate ascites  - per IR, not enough fluid for thora at this time   - low threshold to start abx if he becomes febrile   -monitor CBC     Elevated troponin  Assessment & Plan  Suspect demand ischemia not MI.  -Started high intensity statin and low dose beta blocker.   -He is intolerant to ASA.    -outpatient ischemic eval with cardiology     Diarrhea  Assessment & Plan  -Start creon enzymes.   -C. difficile negative     Anemia  Assessment & Plan  Chronic and at his baseline  Due to CKD  -hemoglobin stable, follow CBC    Prostate CA (CMS/HCC)  Assessment & Plan  outpt f/u    Hypertension  Assessment & Plan  - very hypertensive early in admission mainly in the context of pain  - Bps improved after restarting amlodipine  -We will continue monitor blood pressures closely       VTE Assessment: Padua VTE Score: 4  VTE Prophylaxis:  Current anticoagulants:  heparin (porcine) 5,000 unit/mL injection 5,000 Units, subcutaneous, q8h ROBERT      Code Status: Full Code      Estimated Discharge Date: 8/5/2021   Disposition Planning: Return home, once medically stable, [facility] upon dc     MAYELA Shabazz  8/3/2021

## 2021-08-03 NOTE — PROGRESS NOTES
GI Daily Progress Note       SUBJECTIVE    LOS: 13 days     Interval History: Re-contacted for continued abd pain / concern for ongoing pancreatitis. Was intermittently on O2 yesterday. HD stable, afebrile. WBC 19.76 today, Hgb 10.1, creatinine 3.2.     Last imaging 7/31 showing re-demonstration of abnormality of pancreas - enlarged with peripancreatic inflammatory changes, possibly phlegmonous changes around pancreatic head, probably 2/2 changes of duodenum, new b/l pleural effusions, small Bowel loops which are fluid-filled + mildly prominent but no evidence for obstruction of TP. +Ascites.       C/o diarrhea. Abd pain RUQ + epigastrium. Not hungry - tolerated clear liquids this AM. Food reportedly not making things worse per patient. Appearing well. +Moderate / diffuse abd distention.      OBJECTIVE   Vital signs in last 24 hours:  Temp:  [36.5 °C (97.7 °F)-37 °C (98.6 °F)] 36.5 °C (97.7 °F)  Heart Rate:  [52-71] 52  Resp:  [16] 16  BP: (125-177)/(60-87) 160/87    No intake or output data in the 24 hours ending 08/03/21 1253    Intake/Output this shift:  No intake/output data recorded.   Current Medications:  •  acetaminophen, 975 mg, oral, q8h PRN  •  albuterol, 2.5 mg, nebulization, q6h PRN  •  alum-mag hydroxide-simeth, 30 mL, oral, q4h PRN  •  amLODIPine, 10 mg, oral, Daily  •  atorvastatin, 40 mg, oral, Daily (6p)  •  atropine, 0.5 mg, intravenous, q5 min PRN  •  cholecalciferol (vitamin D3), 2,000 Units, oral, Daily  •  glucose, 16-32 g of dextrose, oral, PRN **OR** dextrose, 15-30 g of dextrose, oral, PRN **OR** glucagon, 1 mg, intramuscular, PRN **OR** dextrose in water, 25 mL, intravenous, PRN  •  famotidine, 20 mg, oral, Daily  •  heparin (porcine), 5,000 Units, subcutaneous, q8h ROBERT  •  HYDROmorphone, 0.4 mg, intravenous, q4h PRN  •  lidocaine, 1 patch, Topical, Daily  •  metoprolol succinate XL, 12.5 mg, oral, Daily with dinner  •  nitroglycerin, 0.4 mg, sublingual, q5 min PRN  •  pancrelipase  (Lip-Prot-Amyl), 12,000 units of lipase, oral, TID with meals  •  prochlorperazine, 10 mg, intravenous, q6h PRN  •  sertraline, 25 mg, oral, q PM  •  sodium bicarbonate, 650 mg, oral, Daily  •  sodium chloride 0.9 %, , intravenous, Continuous    Physical Exam  General appearance: alert, appears stated age, cooperative, in NAD  HEENT: normocephalic, without obvious abnormality, atraumatic  Lungs: clear to auscultation bilaterally  Heart: regular rate and rhythm, S1, S2 normal, no murmur, click, rub or gallop  Abdomen: soft w/ moderate abd distention centralized periumbilically, +moderate upper abd tenderness,+BS x 4  Rectal: n/a  Extremities: extremities normal, warm and well-perfused; no cyanosis, clubbing, or edema  Skin: Skin color, texture, turgor normal. No rashes or lesions  Neurologic: Grossly normal; A/o x 3     LABS & IMAGING   Labs  I have reviewed the past 24 hour labs    Results from last 7 days   Lab Units 08/03/21  0633 08/02/21  1109 08/01/21  0847   WBC K/uL 19.76* 18.32* 20.33*   HEMOGLOBIN g/dL 10.1* 10.5* 10.2*   HEMATOCRIT % 30.9* 32.7* 31.7*   PLATELETS K/uL 422* 434* 402*     Results from last 7 days   Lab Units 08/03/21  0633 08/02/21  0807 08/01/21  0847 07/31/21  0320 07/30/21  0830   SODIUM mEQ/L 141 142 143   < > 144   POTASSIUM mEQ/L 4.2 3.8 3.8   < > 3.5*   CHLORIDE mEQ/L 109 109 110*   < > 111*   CO2 mEQ/L 16* 23 22   < > 21*   BUN mg/dL 39* 42* 46*   < > 50*   CREATININE mg/dL 3.2* 3.2* 3.5*   < > 3.8*   CALCIUM mg/dL 8.9 8.7* 8.9   < > 8.7*   ALBUMIN g/dL  --   --   --   --  2.0*   BILIRUBIN TOTAL mg/dL  --   --   --   --  0.7   ALK PHOS IU/L  --   --   --   --  66   ALT IU/L  --   --   --   --  25   AST IU/L  --   --   --   --  21   GLUCOSE mg/dL 128* 129* 129*   < > 141*    < > = values in this interval not displayed.           Imaging  I have reviewed the Imaging from the last 24 hrs.    CT ABDOMEN PELVIS WITHOUT IV CONTRAST    Result Date: 7/31/2021  IMPRESSION: Limited evaluation  due to lack of IV contrast.  There is again redemonstration of marked abnormality of the pancreas which is overall diffusely enlarged with significant peripancreatic inflammatory changes, and I suspect the inflammatory changes and possibly phlegmonous changes around the pancreatic head may be worsened.  Cannot exclude focal or drainable collection.  Probable secondary inflammatory changes of the duodenum.  New bilateral pleural effusions, right greater than left.  There are several small bowel loops which are fluid-filled and mildly prominent but no evidence for obstruction or transition point.  This may be due to enteritis or ileus.  There is also interval development of ascites. STUDY: Axial CT images of the abdomen and pelvis were obtained from the lung bases to the pubic symphysis.  No IV or oral contrast demonstrate for this examination.  Images were reviewed in soft tissue, lung and bone windows. CT DOSE:  One or more dose reduction techniques (e.g. automated exposure control, adjustment of the mA and/or kV according to the patient size, use of iterative reconstruction technique) utilized for this examination. COMMENT: GI System: Liver:  Normal in size and appearance. Gallbladder and bile ducts:  Gallbladder is distended but otherwise unremarkable.  No ductal dilatation. Pancreas:  Markedly abnormal appearance of the pancreas which is enlarged and edematous with marked surrounding inflammatory changes particularly the head of the pancreas probably worsened from the previous study.  A more focal lesion in the mid body of the pancreas measuring 2.8 x 3.1 cm although might be similar to the prior study. Stomach:  Normal in size and appearance. Small bowel:  Cannot exclude secondary inflammatory changes of the duodenum. Multiple fluid-filled and mildly prominent but not significantly dilated small bowel loops diffusely, likely related to enteritis or ileus.  No discrete transition point appreciated. Large bowel:   Normal  in caliber and appearance.  Normal appendix.  System: Adrenals:  Normal. Kidneys:  The kidneys are small measuring 8 cm on the right 8.2 cm on the left, no hydronephrosis.  Large left parapelvic renal cyst measuring 4.1 x 3.7 cm.. Urinary bladder:  Normal. Prostate gland and seminal vesicles: Prostate is surgically absent. RES System: Spleen:  Normal in size and appearance. Lymph nodes:  Although difficult to evaluate, there does appear to be multiple enlarged peripancreatic lymph nodes. Other: Peritoneum:  Moderate ascites, the largest component in the pelvis.  No free air.  No contained fluid collection definitely identified although limited evaluation due to lack of IV contrast. Aorta and iliac arteries:  Atherosclerotic calcifications of the abdominal aorta and iliac arteries. Abdominal wall: Normal. Lower lungs and pleura:  Small bilateral pleural effusions, right greater than left.  Bibasilar airspace disease, likely related to atelectasis. Visualized axial skeleton:  Multilevel degenerative spondylosis of the lumbar spine.    X-RAY CHEST 2 VIEWS    Result Date: 7/25/2021  IMPRESSION: Stable.    CT ANGIOGRAPHY CHEST WITH AND WITHOUT IV CONTRAST    Result Date: 7/21/2021  IMPRESSION: 1.  There is fluid and stranding throughout the peripancreatic fat predominantly the region of the pancreatic head and neck.  Clinical correlation for acute pancreatitis is advised.  There is dilatation of pancreatic duct throughout the body and tail.  No obvious pancreatic mass is demonstrated.  An underlying intraductal lesion is not excluded on the basis of this exam. 2.  Normal caliber thoracoabdominal aorta without evidence of dissection. Repeat.  Additional incidental findings as described above.     CT ANGIOGRAPHY ABDOMEN PELVIS WITH AND WITHOUT IV CONTRAST    Result Date: 7/21/2021  IMPRESSION: 1.  There is fluid and stranding throughout the peripancreatic fat predominantly the region of the pancreatic head and  neck.  Clinical correlation for acute pancreatitis is advised.  There is dilatation of pancreatic duct throughout the body and tail.  No obvious pancreatic mass is demonstrated.  An underlying intraductal lesion is not excluded on the basis of this exam. 2.  Normal caliber thoracoabdominal aorta without evidence of dissection. Repeat.  Additional incidental findings as described above.     ULTRASOUND KIDNEYS    Result Date: 7/26/2021  IMPRESSION: 1.  No hydronephrosis. 2.  Atrophic kidneys. Increased parenchymal echogenicity in keeping with medical renal disease. 3.  Bilateral renal cysts. I certify that I have reviewed this examination and agree with this report. Trini Mas D.O.    X-RAY CHEST 1 VIEW    Result Date: 7/26/2021  IMPRESSION: Stable appearance of the chest as described above.    X-RAY CHEST 1 VIEW    Result Date: 7/23/2021  IMPRESSION: Minimal lower lobe airspace disease which may represent atelectasis or pneumonia.    X-RAY CHEST 1 VIEW    Result Date: 7/21/2021  IMPRESSION: No definite active disease.    MRI ABDOMEN WITHOUT CONTRAST MRCP    Result Date: 7/24/2021  IMPRESSION: 1.  Acute pancreatitis with intrinsic T1 hyperintense material tracking along the pancreatic head, raising concern for hemorrhagic pancreatitis. 2.  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 3.  Mild extrahepatic and intrahepatic hepatic biliary ductal dilatation.     Ultrasound venous leg left    Result Date: 7/27/2021  IMPRESSION:   No evidence for deep venous thrombosis in the left lower extremity. COMMENT: There is normal response to compression within the left common femoral, superficial femoral, and popliteal veins. Normal color-flow, venous waveforms, and response to augmentation is observed. Similar findings are observed in the posterior tibial, peroneal and gastrocnemius veins of the left calf.        ASSESSMENT & PLAN     Assessment:    80 y M w/  PMHx of CKD St 3, HTN, prostate cancer s/p prostatectomy now on LUPRON presenting for consult for pancreatitis; pt with no prior episodes of pancreatitis; still has GB, Ca2+ normal, triglycerides pending; 1 day n/v/acute upper abd pain, no chronic GI symptoms; imaging consistent with possible chronic pancreatitis --     MRI performed 7/24 concerning for hemorrhagic pancreatitis and possible IPMN; pt initially responded from GI perspective; + GABY last week - continued w/ nausea / poor PO intake / abd pain so had re-CT ordered 7/31 showing re-demonstration of abnormality of pancreas - enlarged with peripancreatic inflammatory changes, possibly phlegmonous changes around pancreatic head, probably 2/2 changes of duodenum, new b/l pleural effusions, small Bowel loops which are fluid-filled + mildly prominent but no evidence for obstruction of TP. +Ascites.     Initial MRCPL     1.  Acute pancreatitis with intrinsic T1 hyperintense material tracking along   the pancreatic head, raising concern for hemorrhagic pancreatitis.   2.  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   3.  Mild extrahepatic and intrahepatic hepatic biliary ductal dilatation.     Main issues at present is persistent abd pain - ? Smoldering / re-pancreatitis via imaging over the weekend     Recommendations:     - Would continue high volume fluids for complete tx of pancreatitis - LR @ 200 cc / hr   - Continue to monitor renal numbers   - Daily CBC + BMP   - Serial abdominal exams   - Antiemetics prn   - PPI 40 mg BID IV for now   - Would keep on clear liquids given degree of pain this Am  - T/c EUS at later date to evaluate possible main duct IPMN         MAYELA Richardson  8/3/2021  12:53 PM       ATTENDING DOCUMENTATION       Agree.  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  The main differential would be a  chronic pseudocyst if in fact he previously had subclinical pancreatitis and this is a late sequela.  He has had no symptoms however prior to this admission to suggest that this is the case.    Jovanni Ramirez MD

## 2021-08-03 NOTE — SUBJECTIVE & OBJECTIVE
"Subjective     Interval History: Complains of abdominal pain. IVF restarted at request of GI. He intermitently needed 3L NC yesterday evening but is on room air presently.    Objective     Vital signs in last 24 hours:  Temp:  [36.5 °C (97.7 °F)-37 °C (98.6 °F)] 36.5 °C (97.7 °F)  Heart Rate:  [52-71] 52  Resp:  [16] 16  BP: (125-177)/(60-87) 160/87         Intake/Output Summary (Last 24 hours) at 8/3/2021 0950  Last data filed at 8/2/2021 1000  Gross per 24 hour   Intake 120 ml   Output --   Net 120 ml       Labs  BUN   Date Value Ref Range Status   08/03/2021 39 (H) 8 - 20 mg/dL Final     Creatinine   Date Value Ref Range Status   08/03/2021 3.2 (H) 0.8 - 1.3 mg/dL Final     Potassium   Date Value Ref Range Status   08/03/2021 4.2 3.6 - 5.1 mEQ/L Final     Comment:     SLIGHT HEMOLYSIS, RESULT MAY BE INCREASED.     Phosphorus   Date Value Ref Range Status   07/27/2021 3.0 2.4 - 4.7 mg/dL Final     Comment:     MODERATE HEMOLYSIS, RESULT MAY BE INCREASED.     Hemoglobin   Date Value Ref Range Status   08/03/2021 10.1 (L) 13.7 - 17.5 g/dL Final     Sodium   Date Value Ref Range Status   08/03/2021 141 136 - 144 mEQ/L Final     Albumin   Date Value Ref Range Status   07/30/2021 2.0 (L) 3.4 - 5.0 g/dL Final     I have reviewed the pertinent patient's labs.    Imaging  No new imaging results.    VTE Assessment: I have reassessed and the patient's VTE risk and treatment plan is appropriate.    Physical Exam    Visit Vitals  BP (!) 160/87 (BP Location: Right upper arm, Patient Position: Lying)   Pulse (!) 52   Temp 36.5 °C (97.7 °F) (Oral)   Resp 16   Ht 1.854 m (6' 1\")   Wt 88.2 kg (194 lb 8 oz)   SpO2 96%   BMI 25.66 kg/m²     General appearance: no distress  Lungs: clear to auscultation bilaterally and respirations unlabored  Heart: regular rate and rhythm, S1, S2 normal, no murmur, no gallop and no rub  Abdomen: soft, non-tender; bowel sounds normal; no masses, no organomegaly  Extremities: extremities normal, warm " and well-perfused; no cyanosis or edema

## 2021-08-03 NOTE — PROGRESS NOTES
"Renal Daily Progress Note    Subjective/Objective:  Subjective     Interval History: Complains of abdominal pain. IVF restarted at request of GI. He intermitently needed 3L NC yesterday evening but is on room air presently. No intakes or outputs are recorded.    Objective     Vital signs in last 24 hours:  Temp:  [36.5 °C (97.7 °F)-37 °C (98.6 °F)] 36.5 °C (97.7 °F)  Heart Rate:  [52-71] 52  Resp:  [16] 16  BP: (125-177)/(60-87) 160/87         Intake/Output Summary (Last 24 hours) at 8/3/2021 0950  Last data filed at 8/2/2021 1000  Gross per 24 hour   Intake 120 ml   Output --   Net 120 ml       Labs  BUN   Date Value Ref Range Status   08/03/2021 39 (H) 8 - 20 mg/dL Final     Creatinine   Date Value Ref Range Status   08/03/2021 3.2 (H) 0.8 - 1.3 mg/dL Final     Potassium   Date Value Ref Range Status   08/03/2021 4.2 3.6 - 5.1 mEQ/L Final     Comment:     SLIGHT HEMOLYSIS, RESULT MAY BE INCREASED.     Phosphorus   Date Value Ref Range Status   07/27/2021 3.0 2.4 - 4.7 mg/dL Final     Comment:     MODERATE HEMOLYSIS, RESULT MAY BE INCREASED.     Hemoglobin   Date Value Ref Range Status   08/03/2021 10.1 (L) 13.7 - 17.5 g/dL Final     Sodium   Date Value Ref Range Status   08/03/2021 141 136 - 144 mEQ/L Final     Albumin   Date Value Ref Range Status   07/30/2021 2.0 (L) 3.4 - 5.0 g/dL Final     I have reviewed the pertinent patient's labs.    Imaging  No new imaging results.    VTE Assessment: I have reassessed and the patient's VTE risk and treatment plan is appropriate.    Physical Exam    Visit Vitals  BP (!) 160/87 (BP Location: Right upper arm, Patient Position: Lying)   Pulse (!) 52   Temp 36.5 °C (97.7 °F) (Oral)   Resp 16   Ht 1.854 m (6' 1\")   Wt 88.2 kg (194 lb 8 oz)   SpO2 96%   BMI 25.66 kg/m²     General appearance: no distress  Lungs: clear to auscultation bilaterally and respirations unlabored  Heart: regular rate and rhythm, S1, S2 normal, no murmur, no gallop and no rub  Abdomen: soft, non-tender; " bowel sounds normal; no masses, no organomegaly  Extremities: extremities normal, warm and well-perfused; no cyanosis or edema               Assessment/Plan   Acute kidney injury superimposed on chronic kidney disease (CMS/HCC)  Assessment & Plan  He had previous serum creatinine of high 2 to 3.0 mg/dl  He had previously followed with a nephrologist but has not had regular follow up   SONJA shows atrophic kidneys    Rate of rise noted with GABY  He is behaving as ATN despite UA without granular casts  Urine eos are noted to be rare but no rash, fever or peripheral eos  No predicating medications are on board at this time. He was on a short 5 day course of Ceftriaxone which has been stopped.  He is taking in oral intake so there is no indication for IVF at this time  He had IVF prior and went into fluid overload requiring lasix  I would continue to monitor and continue supportive care  Encourage PO intake  Avoid NSAID and IV contrast  No indication for HD  Hopefully he will plateau    Creatinine 3.2 mg/dl today  Patient follows with Dr. eTrrazas.  He is on NSS at request of GI.  Low threshold to stop if oxygenation worsens and may need lasix.  Bicarbonate also noted in setting of GABY, CKD and saline infusion  Continue to follow BMP.  Discussed with patient on need for OP ff up with primary nephrologist.    Hypertension  Assessment & Plan  Blood pressure labile   Presently on Amlodipine and Metoprolol  Little room to titrate Metoprolol given bradycardia  Can switch Amlodipine to Procardia if pressures remain elevated    * Acute pancreatitis  Assessment & Plan  Abdominal pain noted  Tolerating diet.  CT of the abdomen- significant peripancreatic inflammatory changes, poss worsening phlegmonous changes; marked increase in ascites.             Expected Discharge Date:  8/3/2021

## 2021-08-04 PROBLEM — R19.7 DIARRHEA: Status: RESOLVED | Noted: 2021-01-01 | Resolved: 2021-01-01

## 2021-08-04 PROBLEM — M10.9 GOUT: Status: ACTIVE | Noted: 2021-01-01

## 2021-08-04 NOTE — PROGRESS NOTES
GI Daily Progress Note           SUBJECTIVE    LOS: 14 days     Interval History: Continues to c/o nausea / abd pain. Although exam much improved today. +BMs. Wants to eat.. frustrated..     OBJECTIVE   Vital signs in last 24 hours:  Temp:  [36.6 °C (97.8 °F)-36.9 °C (98.5 °F)] 36.6 °C (97.8 °F)  Heart Rate:  [55-62] 55  Resp:  [16-18] 18  BP: (145-162)/(64-74) 162/74      Intake/Output Summary (Last 24 hours) at 8/4/2021 1248  Last data filed at 8/4/2021 1100  Gross per 24 hour   Intake 120 ml   Output --   Net 120 ml       Intake/Output this shift:  I/O this shift:  In: 120 [P.O.:120]  Out: -    Current Medications:  •  acetaminophen, 975 mg, oral, q8h PRN  •  albuterol, 2.5 mg, nebulization, q6h PRN  •  alum-mag hydroxide-simeth, 30 mL, oral, q4h PRN  •  atorvastatin, 40 mg, oral, Daily (6p)  •  atropine, 0.5 mg, intravenous, q5 min PRN  •  cholecalciferol (vitamin D3), 2,000 Units, oral, Daily  •  glucose, 16-32 g of dextrose, oral, PRN **OR** dextrose, 15-30 g of dextrose, oral, PRN **OR** glucagon, 1 mg, intramuscular, PRN **OR** dextrose in water, 25 mL, intravenous, PRN  •  heparin (porcine), 5,000 Units, subcutaneous, q8h ROBERT  •  HYDROmorphone, 0.4 mg, intravenous, q4h PRN  •  lidocaine, 1 patch, Topical, Daily  •  metoprolol succinate XL, 12.5 mg, oral, Daily with dinner  •  NIFEdipine, 30 mg, oral, BID  •  nitroglycerin, 0.4 mg, sublingual, q5 min PRN  •  ondansetron, 8 mg, intravenous, q6h PRN  •  pancrelipase (Lip-Prot-Amyl), 12,000 units of lipase, oral, TID with meals  •  pantoprazole, 40 mg, intravenous, q12h ROBERT  •  sertraline, 25 mg, oral, q PM  •  sodium bicarbonate, 650 mg, oral, Daily    Physical Exam  General appearance: alert, appears stated age, cooperative, in NAD  HEENT: normocephalic, without obvious abnormality, atraumatic  Lungs: clear to auscultation bilaterally  Heart: regular rate and rhythm, S1, S2 normal, no murmur, click, rub or gallop  Abdomen: soft, mild upper abd tenderness  with deep palpation, no rebound or guarding, non-distended, +BS x 4, no rebound or guarding  Rectal: n/a  Extremities: extremities normal, warm and well-perfused; no cyanosis, clubbing, or edema  Skin: Skin color, texture, turgor normal. No rashes or lesions  Neurologic: Grossly normal     LABS & IMAGING   Labs  I have reviewed the past 24 hour labs    Results from last 7 days   Lab Units 08/04/21  0552 08/03/21  0633 08/02/21  1109   WBC K/uL 18.86* 19.76* 18.32*   HEMOGLOBIN g/dL 9.4* 10.1* 10.5*   HEMATOCRIT % 28.5* 30.9* 32.7*   PLATELETS K/uL 455* 422* 434*     Results from last 7 days   Lab Units 08/04/21  0552 08/03/21  0633 08/02/21  0807 07/31/21  0320 07/30/21  0830   SODIUM mEQ/L 140 141 142   < > 144   POTASSIUM mEQ/L 4.0 4.2 3.8   < > 3.5*   CHLORIDE mEQ/L 111* 109 109   < > 111*   CO2 mEQ/L 19* 16* 23   < > 21*   BUN mg/dL 32* 39* 42*   < > 50*   CREATININE mg/dL 3.0* 3.2* 3.2*   < > 3.8*   CALCIUM mg/dL 8.6* 8.9 8.7*   < > 8.7*   ALBUMIN g/dL  --   --   --   --  2.0*   BILIRUBIN TOTAL mg/dL  --   --   --   --  0.7   ALK PHOS IU/L  --   --   --   --  66   ALT IU/L  --   --   --   --  25   AST IU/L  --   --   --   --  21   GLUCOSE mg/dL 138* 128* 129*   < > 141*    < > = values in this interval not displayed.           Imaging  I have reviewed the Imaging from the last 24 hrs.    CT ABDOMEN PELVIS WITHOUT IV CONTRAST    Result Date: 7/31/2021  IMPRESSION: Limited evaluation due to lack of IV contrast.  There is again redemonstration of marked abnormality of the pancreas which is overall diffusely enlarged with significant peripancreatic inflammatory changes, and I suspect the inflammatory changes and possibly phlegmonous changes around the pancreatic head may be worsened.  Cannot exclude focal or drainable collection.  Probable secondary inflammatory changes of the duodenum.  New bilateral pleural effusions, right greater than left.  There are several small bowel loops which are fluid-filled and mildly  prominent but no evidence for obstruction or transition point.  This may be due to enteritis or ileus.  There is also interval development of ascites. STUDY: Axial CT images of the abdomen and pelvis were obtained from the lung bases to the pubic symphysis.  No IV or oral contrast demonstrate for this examination.  Images were reviewed in soft tissue, lung and bone windows. CT DOSE:  One or more dose reduction techniques (e.g. automated exposure control, adjustment of the mA and/or kV according to the patient size, use of iterative reconstruction technique) utilized for this examination. COMMENT: GI System: Liver:  Normal in size and appearance. Gallbladder and bile ducts:  Gallbladder is distended but otherwise unremarkable.  No ductal dilatation. Pancreas:  Markedly abnormal appearance of the pancreas which is enlarged and edematous with marked surrounding inflammatory changes particularly the head of the pancreas probably worsened from the previous study.  A more focal lesion in the mid body of the pancreas measuring 2.8 x 3.1 cm although might be similar to the prior study. Stomach:  Normal in size and appearance. Small bowel:  Cannot exclude secondary inflammatory changes of the duodenum. Multiple fluid-filled and mildly prominent but not significantly dilated small bowel loops diffusely, likely related to enteritis or ileus.  No discrete transition point appreciated. Large bowel:  Normal  in caliber and appearance.  Normal appendix.  System: Adrenals:  Normal. Kidneys:  The kidneys are small measuring 8 cm on the right 8.2 cm on the left, no hydronephrosis.  Large left parapelvic renal cyst measuring 4.1 x 3.7 cm.. Urinary bladder:  Normal. Prostate gland and seminal vesicles: Prostate is surgically absent. RES System: Spleen:  Normal in size and appearance. Lymph nodes:  Although difficult to evaluate, there does appear to be multiple enlarged peripancreatic lymph nodes. Other: Peritoneum:  Moderate  ascites, the largest component in the pelvis.  No free air.  No contained fluid collection definitely identified although limited evaluation due to lack of IV contrast. Aorta and iliac arteries:  Atherosclerotic calcifications of the abdominal aorta and iliac arteries. Abdominal wall: Normal. Lower lungs and pleura:  Small bilateral pleural effusions, right greater than left.  Bibasilar airspace disease, likely related to atelectasis. Visualized axial skeleton:  Multilevel degenerative spondylosis of the lumbar spine.    X-RAY CHEST 2 VIEWS    Result Date: 7/25/2021  IMPRESSION: Stable.    CT ANGIOGRAPHY CHEST WITH AND WITHOUT IV CONTRAST    Result Date: 7/21/2021  IMPRESSION: 1.  There is fluid and stranding throughout the peripancreatic fat predominantly the region of the pancreatic head and neck.  Clinical correlation for acute pancreatitis is advised.  There is dilatation of pancreatic duct throughout the body and tail.  No obvious pancreatic mass is demonstrated.  An underlying intraductal lesion is not excluded on the basis of this exam. 2.  Normal caliber thoracoabdominal aorta without evidence of dissection. Repeat.  Additional incidental findings as described above.     CT ANGIOGRAPHY ABDOMEN PELVIS WITH AND WITHOUT IV CONTRAST    Result Date: 7/21/2021  IMPRESSION: 1.  There is fluid and stranding throughout the peripancreatic fat predominantly the region of the pancreatic head and neck.  Clinical correlation for acute pancreatitis is advised.  There is dilatation of pancreatic duct throughout the body and tail.  No obvious pancreatic mass is demonstrated.  An underlying intraductal lesion is not excluded on the basis of this exam. 2.  Normal caliber thoracoabdominal aorta without evidence of dissection. Repeat.  Additional incidental findings as described above.     ULTRASOUND KIDNEYS    Result Date: 7/26/2021  IMPRESSION: 1.  No hydronephrosis. 2.  Atrophic kidneys. Increased parenchymal echogenicity in  keeping with medical renal disease. 3.  Bilateral renal cysts. I certify that I have reviewed this examination and agree with this report. Trini Mas D.O.    X-RAY CHEST 1 VIEW    Result Date: 7/26/2021  IMPRESSION: Stable appearance of the chest as described above.    X-RAY CHEST 1 VIEW    Result Date: 7/23/2021  IMPRESSION: Minimal lower lobe airspace disease which may represent atelectasis or pneumonia.    X-RAY CHEST 1 VIEW    Result Date: 7/21/2021  IMPRESSION: No definite active disease.    MRI ABDOMEN WITHOUT CONTRAST MRCP    Result Date: 7/24/2021  IMPRESSION: 1.  Acute pancreatitis with intrinsic T1 hyperintense material tracking along the pancreatic head, raising concern for hemorrhagic pancreatitis. 2.  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 3.  Mild extrahepatic and intrahepatic hepatic biliary ductal dilatation.     Ultrasound venous leg left    Result Date: 7/27/2021  IMPRESSION:   No evidence for deep venous thrombosis in the left lower extremity. COMMENT: There is normal response to compression within the left common femoral, superficial femoral, and popliteal veins. Normal color-flow, venous waveforms, and response to augmentation is observed. Similar findings are observed in the posterior tibial, peroneal and gastrocnemius veins of the left calf.        ASSESSMENT & PLAN       Assessment:    80 y M w/ PMHx of CKD St 3, HTN, prostate cancer s/p prostatectomy now on LUPRON presenting for consult for pancreatitis; pt with no prior episodes of pancreatitis; still has GB, Ca2+ normal, triglycerides pending; 1 day n/v/acute upper abd pain, no chronic GI symptoms; imaging consistent with possible chronic pancreatitis --     MRI performed 7/24 concerning for hemorrhagic pancreatitis and possible IPMN; pt initially responded from GI perspective; + GABY last week - continued w/ nausea / poor PO intake / abd pain so had re-CT ordered  7/31 showing re-demonstration of abnormality of pancreas - enlarged with peripancreatic inflammatory changes, possibly phlegmonous changes around pancreatic head, probably 2/2 changes of duodenum, new b/l pleural effusions, small Bowel loops which are fluid-filled + mildly prominent but no evidence for obstruction of TP. +Ascites.      Initial MRCPL     1.  Acute pancreatitis with intrinsic T1 hyperintense material tracking along   the pancreatic head, raising concern for hemorrhagic pancreatitis.   2.  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   3.  Mild extrahepatic and intrahepatic hepatic biliary ductal dilatation.      Main issues at present is persistent abd pain - ? Smoldering / re-pancreatitis via imaging over the weekend   WBC remains significantly elevated      Recommendations:      - Would continue high volume fluids for complete tx of pancreatitis - LR @ 200 cc / hr   - Continue to monitor creat - nephrology following    - Daily CBC + BMP   - Serial abdominal exams - exam improved today   - Antiemetics prn    - PPI 40 mg BID IV for now   - Ok for low res / low fat diet   - T/c EUS at later date to evaluate possible main duct IPMN         MAYELA Richardson  8/4/2021  12:48 PM       ATTENDING DOCUMENTATION       Agree, still active pancreatitis.  Would add enteric supplement to his clrs.  Plan guided FNA of his possible main duct IPMN by Dr Sussy Ramirez MD

## 2021-08-04 NOTE — ASSESSMENT & PLAN NOTE
Abdominal pain noted  Tolerating diet.  CT of the abdomen- significant peripancreatic inflammatory changes, poss worsening phlegmonous changes; marked increase in ascites.  Outpatient EUS per GI

## 2021-08-04 NOTE — SUBJECTIVE & OBJECTIVE
"Subjective     Interval History: Complains of abdominal pain.    Objective     Vital signs in last 24 hours:  Temp:  [36.6 °C (97.8 °F)-36.9 °C (98.5 °F)] 36.6 °C (97.8 °F)  Heart Rate:  [55-62] 55  Resp:  [16-18] 18  BP: (145-162)/(64-74) 162/74         Intake/Output Summary (Last 24 hours) at 8/4/2021 1052  Last data filed at 8/3/2021 1400  Gross per 24 hour   Intake 480 ml   Output --   Net 480 ml       Labs  BUN   Date Value Ref Range Status   08/04/2021 32 (H) 8 - 20 mg/dL Final     Creatinine   Date Value Ref Range Status   08/04/2021 3.0 (H) 0.8 - 1.3 mg/dL Final     Potassium   Date Value Ref Range Status   08/04/2021 4.0 3.6 - 5.1 mEQ/L Final     Phosphorus   Date Value Ref Range Status   07/27/2021 3.0 2.4 - 4.7 mg/dL Final     Comment:     MODERATE HEMOLYSIS, RESULT MAY BE INCREASED.     Hemoglobin   Date Value Ref Range Status   08/04/2021 9.4 (L) 13.7 - 17.5 g/dL Final     Sodium   Date Value Ref Range Status   08/04/2021 140 136 - 144 mEQ/L Final     Albumin   Date Value Ref Range Status   07/30/2021 2.0 (L) 3.4 - 5.0 g/dL Final     I have reviewed the pertinent patient's labs.    Imaging  No new imaging results.    VTE Assessment: I have reassessed and the patient's VTE risk and treatment plan is appropriate.    Physical Exam    Visit Vitals  BP (!) 162/74 (BP Location: Right upper arm, Patient Position: Lying)   Pulse (!) 55   Temp 36.6 °C (97.8 °F) (Oral)   Resp 18   Ht 1.854 m (6' 1\")   Wt 87.6 kg (193 lb 3.2 oz)   SpO2 96%   BMI 25.49 kg/m²     General appearance: no distress  Lungs: clear to auscultation bilaterally and respirations unlabored  Heart: regular rate and rhythm, S1, S2 normal, no murmur, no gallop and no rub  Abdomen: ascites  Extremities: extremities normal, warm and well-perfused; no cyanosis or edema           "

## 2021-08-04 NOTE — PROGRESS NOTES
"Renal Daily Progress Note    Subjective/Objective:  Subjective     Interval History: Complains of abdominal pain.    Objective     Vital signs in last 24 hours:  Temp:  [36.6 °C (97.8 °F)-36.9 °C (98.5 °F)] 36.6 °C (97.8 °F)  Heart Rate:  [55-62] 55  Resp:  [16-18] 18  BP: (145-162)/(64-74) 162/74         Intake/Output Summary (Last 24 hours) at 8/4/2021 1052  Last data filed at 8/3/2021 1400  Gross per 24 hour   Intake 480 ml   Output --   Net 480 ml       Labs  BUN   Date Value Ref Range Status   08/04/2021 32 (H) 8 - 20 mg/dL Final     Creatinine   Date Value Ref Range Status   08/04/2021 3.0 (H) 0.8 - 1.3 mg/dL Final     Potassium   Date Value Ref Range Status   08/04/2021 4.0 3.6 - 5.1 mEQ/L Final     Phosphorus   Date Value Ref Range Status   07/27/2021 3.0 2.4 - 4.7 mg/dL Final     Comment:     MODERATE HEMOLYSIS, RESULT MAY BE INCREASED.     Hemoglobin   Date Value Ref Range Status   08/04/2021 9.4 (L) 13.7 - 17.5 g/dL Final     Sodium   Date Value Ref Range Status   08/04/2021 140 136 - 144 mEQ/L Final     Albumin   Date Value Ref Range Status   07/30/2021 2.0 (L) 3.4 - 5.0 g/dL Final     I have reviewed the pertinent patient's labs.    Imaging  No new imaging results.    VTE Assessment: I have reassessed and the patient's VTE risk and treatment plan is appropriate.    Physical Exam    Visit Vitals  BP (!) 162/74 (BP Location: Right upper arm, Patient Position: Lying)   Pulse (!) 55   Temp 36.6 °C (97.8 °F) (Oral)   Resp 18   Ht 1.854 m (6' 1\")   Wt 87.6 kg (193 lb 3.2 oz)   SpO2 96%   BMI 25.49 kg/m²     General appearance: no distress  Lungs: clear to auscultation bilaterally and respirations unlabored  Heart: regular rate and rhythm, S1, S2 normal, no murmur, no gallop and no rub  Abdomen: ascites  Extremities: extremities normal, warm and well-perfused; no cyanosis or edema               Assessment/Plan   Ascites  Assessment & Plan  Status post thoracentesis  Avoid large volume paracentesis with risk of " GABY      Acute kidney injury superimposed on chronic kidney disease (CMS/[facility])  Assessment & Plan  He had previous serum creatinine of high 2 to 3.0 mg/dl  He had previously followed with Dr. Terrazas but has not had regular follow up   SONJA shows atrophic kidneys    Rate of rise noted with GABY  He is behaving as ATN despite UA without granular casts  Urine eos are noted to be rare but no rash, fever or peripheral eos  No predicating medications are on board at this time. He was on a short 5 day course of Ceftriaxone which has been stopped.  He had IVF prior and went into fluid overload requiring lasix    His serum creatinine is close to, if not at his baseline  Intravenous fluid stopped  No objection for diuretic use if needed  Discussed with patient on need for OP ff up with primary nephrologist.    Hypertension  Assessment & Plan  Blood pressure labile   Presently on Amlodipine and Metoprolol  Little room to titrate Metoprolol given bradycardia  Switched Amlodipine to Procardia BID    * Acute pancreatitis  Assessment & Plan  Abdominal pain noted  Tolerating diet.  CT of the abdomen- significant peripancreatic inflammatory changes, poss worsening phlegmonous changes; marked increase in ascites.  Outpatient EUS per GI             Expected Discharge Date:  8/5/2021

## 2021-08-04 NOTE — PLAN OF CARE
Plan of Care Review  Plan of Care Reviewed With: patient, spouse  Progress: improving  Outcome Summary: Pt abdomen distended, firm.  Still having loose bowels. Pt requesting food.  Diet increased to low residue.  Tolerating diet. WBC is still elevated.  Pt complaints of left foot pain and limping with ambulation.  MD aware and believes it may be gout.  Prednisone ordered and first dose given this afternoon.  Continue with IVF.

## 2021-08-04 NOTE — PLAN OF CARE
Problem: Adult Inpatient Plan of Care  Goal: Plan of Care Review  Flowsheets (Taken 8/4/2021 1514)  Progress: improving  Plan of Care Reviewed With: caregiver  Outcome Summary: Pt sleeping left undisturbed per request. RN stated pt ate well for breakfast but he ate a late breakfast and did not want to order lunch. Pt still having diarrhea.  Goal: consume % energy and protein needs via mouth  Recommendations:  Agree with low fat, low residue diet.  Monitor po intake and labs.

## 2021-08-04 NOTE — PROGRESS NOTES
Hospital Medicine Service -  Daily Progress Note       SUBJECTIVE   Interval History: Patient reports that his abdominal pain is about the same as yesterday.  Denies chest pain and shortness of breath.  Still having nausea.    Patient's family updated by attending at the bedside and all of their questions were answered.     OBJECTIVE      Vital signs in last 24 hours:  Temp:  [36.6 °C (97.8 °F)-36.9 °C (98.5 °F)] 36.6 °C (97.8 °F)  Heart Rate:  [55-62] 55  Resp:  [16-18] 18  BP: (145-162)/(64-74) 162/74    Intake/Output Summary (Last 24 hours) at 8/4/2021 1400  Last data filed at 8/4/2021 1100  Gross per 24 hour   Intake 120 ml   Output --   Net 120 ml       PHYSICAL EXAMINATION      Physical Exam  Vitals reviewed.   Constitutional:       Comments: Elderly male no acute distress   HENT:      Head: Normocephalic and atraumatic.      Nose: Nose normal.      Mouth/Throat:      Mouth: Mucous membranes are dry.   Cardiovascular:      Rate and Rhythm: Normal rate and regular rhythm.   Pulmonary:      Effort: Pulmonary effort is normal. No respiratory distress.   Abdominal:      Palpations: Abdomen is soft.      Tenderness: There is abdominal tenderness.   Musculoskeletal:      Right lower leg: No edema.      Left lower leg: No edema.   Skin:     General: Skin is warm and dry.   Neurological:      General: No focal deficit present.   Psychiatric:      Comments: Cooperative            LINES, CATHETERS, DRAINS, AIRWAYS, AND WOUNDS   Lines, Drains, and Airways:  Wounds (agree with documentation and present on admission):  Peripheral IV (Adult) 07/26/21 Posterior;Left Forearm (Active)   Number of days: 2         Comments:      LABS / IMAGING / TELE      Labs  I have reviewed the patient's pertinent labs.  Significant abnormals are below.    SARS-CoV-2 (COVID-19) (no units)   Date/Time Value   07/21/2021 1211 Negative     Results from last 7 days   Lab Units 08/04/21  0552   WBC K/uL 18.86*   HEMOGLOBIN g/dL 9.4*    HEMATOCRIT % 28.5*   PLATELETS K/uL 455*     Results from last 7 days   Lab Units 08/04/21  0552 07/31/21  0320 07/30/21  0830   SODIUM mEQ/L 140   < > 144   POTASSIUM mEQ/L 4.0   < > 3.5*   CHLORIDE mEQ/L 111*   < > 111*   CO2 mEQ/L 19*   < > 21*   BUN mg/dL 32*   < > 50*   CREATININE mg/dL 3.0*   < > 3.8*   CALCIUM mg/dL 8.6*   < > 8.7*   ALBUMIN g/dL  --   --  2.0*   BILIRUBIN TOTAL mg/dL  --   --  0.7   ALK PHOS IU/L  --   --  66   ALT IU/L  --   --  25   AST IU/L  --   --  21   GLUCOSE mg/dL 138*   < > 141*    < > = values in this interval not displayed.       Imaging  I have independently reviewed the pertinent imaging from the last 24 hrs.    ECG/Telemetry  I have independently reviewed the telemetry. No events for the last 24 hours.    ASSESSMENT AND PLAN      Acute kidney injury superimposed on chronic kidney disease (CMS/HCC)  Assessment & Plan  -Baseline appears to be around 2.5-3  -Creatinine improved to 3.0 today  -Follow BMP  -Patient will need outpatient follow-up with nephrology  -Appreciate nephrology input    * Acute pancreatitis  Assessment & Plan  -Acute pancreatitis.  -MRI c/w hemorrhagic pancreatitis.   -Possible intraductal mucinous cyst.   -plan for outpatient EUS per GI   -continue low residue low-fat diet  CT AP showing moderate ascites, again also demonstrating abnormalities in the pancreas - cannot exclude collection  -White count remains elevated- continue aggressive IVF for now per GI     Gout  Assessment & Plan  -will avoid colchicine due to GABY  -will discuss with attending about starting steroids     Ascites  Assessment & Plan  -Noted on CT abdomen, however not enough fluid for paracentesis per IR    Leukocytosis  Assessment & Plan  Patient with non specific complaints. Still has residual abd discomfort   WBC remain elevated 18  CT scan showing moderate ascites  - per IR, not enough fluid for thora at this time   - low threshold to start abx if he becomes febrile   -monitor CBC ,  suspect elevated white count due to pancreatitis     Elevated troponin  Assessment & Plan  Suspect demand ischemia not MI.  -Started high intensity statin and low dose beta blocker.   -He is intolerant to ASA.   -outpatient ischemic eval with cardiology     Anemia  Assessment & Plan  Chronic and at his baseline  Due to CKD  -hemoglobin stable, follow CBC    Prostate CA (CMS/HCC)  Assessment & Plan  outpt f/u    Hypertension  Assessment & Plan  - very hypertensive early in admission mainly in the context of pain  - amlodipine changed to nifedipine twice daily  -We will continue monitor blood pressures closely       VTE Assessment: [place] VTE Score: 4  VTE Prophylaxis:  Current anticoagulants:  heparin (porcine) 5,000 unit/mL injection 5,000 Units, subcutaneous, q8h ROBERT      Code Status: Full Code      Estimated Discharge Date: 8/5/2021   Disposition Planning: Return home, once medically stable, [facility] upon dc     MAYELA Shabazz  8/4/2021

## 2021-08-04 NOTE — PATIENT CARE CONFERENCE
Care Progression Rounds Note  Date: 8/4/2021  Time: 11:01 AM     Patient Name: Adalberto Galaviz     Medical Record Number: [id number]   YOB: 1940  Sex: Male      Room/Bed: 0362    Admitting Diagnosis: Sinus bradycardia [R00.1]  Acute pancreatitis, unspecified complication status, unspecified pancreatitis type [K85.90]   Admit Date/Time: 7/21/2021 11:55 AM    Primary Diagnosis: Acute pancreatitis  Principal Problem: Acute pancreatitis    GMLOS: pending  Anticipated Discharge Date: 8/5/2021    AM-PAC:  Mobility Score: 23    Discharge Planning:  Current Living Arrangements: home/apartment/condo  Anticipated Discharge Disposition: home/family member's home    Barriers to Discharge:  Barriers to Discharge: Medical issues not resolved  Comment: WBC still elevatd.  Started on a diet.  GI following.      Participants:  advanced practice provider, social work/services, nursing

## 2021-08-04 NOTE — ASSESSMENT & PLAN NOTE
He had previous serum creatinine of high 2 to 3.0 mg/dl  He had previously followed with Dr. Terrazas but has not had regular follow up   SONJA shows atrophic kidneys    Rate of rise noted with GABY  He is behaving as ATN despite UA without granular casts  Urine eos are noted to be rare but no rash, fever or peripheral eos  No predicating medications are on board at this time. He was on a short 5 day course of Ceftriaxone which has been stopped.  He had IVF prior and went into fluid overload requiring lasix    His serum creatinine is close to, if not at his baseline  Intravenous fluid stopped  No objection for diuretic use if needed  Discussed with patient on need for OP ff up with primary nephrologist.

## 2021-08-04 NOTE — ASSESSMENT & PLAN NOTE
Blood pressure labile   Presently on Amlodipine and Metoprolol  Little room to titrate Metoprolol given bradycardia  Switched Amlodipine to Procardia BID

## 2021-08-05 NOTE — ASSESSMENT & PLAN NOTE
He had previous serum creatinine of high 2 to 3.0 mg/dl  He had previously followed with Dr. Terrazas but has not had regular follow up   SONJA shows atrophic kidneys    Rate of rise noted with GABY  He is behaving as ATN despite UA without granular casts  Urine eos are noted to be rare but no rash, fever or peripheral eos  No predicating medications are on board at this time. He was on a short 5 day course of Ceftriaxone which has been stopped.  He had IVF prior and went into fluid overload requiring lasix    His serum creatinine is stable.  Off of IVF.  Discussed with patient  And family on need for OP ff up with primary nephrologist ( Dr. Terrazas).

## 2021-08-05 NOTE — ASSESSMENT & PLAN NOTE
Abdominal pain better.  Tolerating diet.  CT of the abdomen- significant peripancreatic inflammatory changes, poss worsening phlegmonous changes; marked increase in ascites.   EUS per GI

## 2021-08-05 NOTE — PROGRESS NOTES
Nephrology Daily Progress Note             LOS: 15 days     Adalberto Galaviz is a 80 y.o. male who was admitted with Acute pancreatitis.     Subjective     Interval History:   Feels slightly better.  Tolerating diet.  Some nausea but no vomiting.  Abdominal pain improved.  Patient is mainly complaining of generalized weakness.    Current Facility-Administered Medications   Medication Dose Route Frequency Provider Last Rate Last Admin   • acetaminophen (TYLENOL) tablet 975 mg  975 mg oral q8h PRN Keyla Rodriguez MD   975 mg at 08/04/21 1739   • albuterol nebulizer solution 2.5 mg  2.5 mg nebulization q6h PRN Keyla Rodriguez MD       • alum-mag hydroxide-simeth (MAALOX) 200-200-20 mg/5 mL suspension 30 mL  30 mL oral q4h PRN Charlene Mo DO       • atorvastatin (LIPITOR) tablet 40 mg  40 mg oral Daily (6p) Keyla Rodriguez MD   40 mg at 08/04/21 1732   • atropine injection 0.5 mg  0.5 mg intravenous q5 min PRN Charlene Mo DO       • cholecalciferol (vitamin D3) tablet 2,000 Units  2,000 Units oral Daily Brunilda Rico MD   2,000 Units at 08/05/21 0839   • glucose chewable tablet 16-32 g of dextrose  16-32 g of dextrose oral PRN Charlene Mo DO        Or   • dextrose 40 % oral gel 15-30 g of dextrose  15-30 g of dextrose oral PRN Charlene Mo DO        Or   • glucagon (GLUCAGEN) injection 1 mg  1 mg intramuscular PRN Charlene Mo, DO        Or   • dextrose in water injection 12.5 g  25 mL intravenous PRN Charlene Mo, DO       • heparin (porcine) 5,000 unit/mL injection 5,000 Units  5,000 Units subcutaneous q8h Replaced by [facility] Amandeep Washington MD   5,000 Units at 08/05/21 0610   • HYDROmorphone (DILAUDID) injection 0.4 mg  0.4 mg intravenous q4h PRN Keyla Rodriguez MD       • lidocaine (ASPERCREME) 4 % topical patch 1 patch  1 patch Topical Daily Amandeep Washington MD   1 patch at 08/03/21 0863   • metoprolol succinate XL (TOPROL-XL) split 24 hr ER tablet 12.5 mg  12.5 mg oral Daily with  "Keyla Raza MD   12.5 mg at 08/04/21 1735   • NIFEdipine (ADALAT CC, PROCARDIA XL) tablet extended release 30 mg  30 mg oral BID Radha Chaney DO   30 mg at 08/05/21 0838   • nitroglycerin (NITROSTAT) SL tablet 0.4 mg  0.4 mg sublingual q5 min PRN Charlene Mo DO       • ondansetron (ZOFRAN) 8 mg in sodium chloride 0.9 % 50 mL IVPB  8 mg intravenous q6h PRN Jewels Art PA C       • pancrelipase (Lip-Prot-Amyl) (CREON) capsule,delayed release(DR/EC) 12,000 units of lipase  12,000 units of lipase oral TID with meals Noemí Dubon PA C   12,000 units of lipase at 08/05/21 0839   • pantoprazole (PROTONIX) injection 40 mg  40 mg intravenous q12h ROBERT Jewels Art PA C   40 mg at 08/05/21 0838   • predniSONE (DELTASONE) tablet 40 mg  40 mg oral Daily Noemí Dubon PA C   40 mg at 08/05/21 0839   • sertraline (ZOLOFT) tablet 25 mg  25 mg oral q PM Charlene Mo DO   25 mg at 08/04/21 1737   • sodium bicarbonate tablet 650 mg  650 mg oral Daily Radha Chaney DO   650 mg at 08/05/21 0838   • sodium chloride 0.9 % infusion   intravenous Continuous Terra Oquendo Q,  mL/hr at 08/05/21 0849 New Bag at 08/05/21 0849       Objective     Vital signs in last 24 hours:  Temp:  [36.1 °C (97 °F)-36.7 °C (98 °F)] 36.4 °C (97.6 °F)  Heart Rate:  [54-72] 54  Resp:  [16-18] 16  BP: (131-140)/(62-65) 131/62    Intake/Output last 3 shifts:  I/O last 3 completed shifts:  In: 120 [P.O.:120]  Out: -     Physical Exam:  Visit Vitals  /62 (BP Location: Right upper arm, Patient Position: Lying)   Pulse (!) 54   Temp 36.4 °C (97.6 °F) (Oral)   Resp 16   Ht 1.854 m (6' 1\")   Wt 87.5 kg (192 lb 14.1 oz)   SpO2 96%   BMI 25.45 kg/m²     General appearance: alert, cooperative and no distress  Lungs: normal respiratory effort  Abdomen: soft, nontender  Extremities: extremities normal, warm and well-perfused; no cyanosis, clubbing, or edema      Labs:      Results from last 7 days "   Lab Units 08/05/21  0703 08/04/21  0552 08/03/21  0633   WBC K/uL 20.11* 18.86* 19.76*   HEMOGLOBIN g/dL 8.9* 9.4* 10.1*   HEMATOCRIT % 27.1* 28.5* 30.9*   PLATELETS K/uL 448* 455* 422*     Results from last 7 days   Lab Units 08/05/21  0703 08/04/21  0552 08/03/21  0633 07/31/21  0320 07/30/21  0830   SODIUM mEQ/L 139 140 141   < > 144   POTASSIUM mEQ/L 3.9 4.0 4.2   < > 3.5*   CHLORIDE mEQ/L 110* 111* 109   < > 111*   CO2 mEQ/L 19* 19* 16*   < > 21*   BUN mg/dL 34* 32* 39*   < > 50*   CREATININE mg/dL 3.1* 3.0* 3.2*   < > 3.8*   CALCIUM mg/dL 8.5* 8.6* 8.9   < > 8.7*   ALBUMIN g/dL  --   --   --   --  2.0*   BILIRUBIN TOTAL mg/dL  --   --   --   --  0.7   ALK PHOS IU/L  --   --   --   --  66   ALT IU/L  --   --   --   --  25   AST IU/L  --   --   --   --  21   GLUCOSE mg/dL 167* 138* 128*   < > 141*    < > = values in this interval not displayed.                 Assessment/Plan     Principal Problem:    Acute pancreatitis  Active Problems:    Hypertension    Acute kidney injury superimposed on chronic kidney disease (CMS/HCC)    Prostate CA (CMS/[facility])    Anemia    Elevated troponin    Leukocytosis    Ascites    Gout    Acute kidney injury superimposed on chronic kidney disease (CMS/HCC)  Assessment & Plan  He had previous serum creatinine of high 2 to 3.0 mg/dl  He had previously followed with Dr. Terrazas but has not had regular follow up   SONJA shows atrophic kidneys    Rate of rise noted with GABY  He is behaving as ATN despite UA without granular casts  Urine eos are noted to be rare but no rash, fever or peripheral eos  No predicating medications are on board at this time. He was on a short 5 day course of Ceftriaxone which has been stopped.  He had IVF prior and went into fluid overload requiring lasix    His serum creatinine is stable.  Can dc IVF.  Discussed with patient on need for OP ff up with primary nephrologist.    Hypertension  Assessment & Plan  Blood pressure labile   Presently on Amlodipine and  Metoprolol  Little room to titrate Metoprolol given bradycardia  Switched Amlodipine to Procardia BID    BP improved this am.  Continue to follow on the same meds.    * Acute pancreatitis  Assessment & Plan  Abdominal pain better.  Tolerating diet.  CT of the abdomen- significant peripancreatic inflammatory changes, poss worsening phlegmonous changes; marked increase in ascites.   EUS per GI            Katarina Ramirez MD

## 2021-08-05 NOTE — PLAN OF CARE
Plan of Care Review  Plan of Care Reviewed With: patient, spouse  Progress: improving  Outcome Summary: abd pain improved. CT abd. hopeful for d/c tomorrow.

## 2021-08-05 NOTE — ASSESSMENT & PLAN NOTE
Blood pressure labile   Presently on Amlodipine and Metoprolol  Little room to titrate Metoprolol given bradycardia  Switched Amlodipine to Procardia BID    BP stable.  Continue to follow on the same meds.

## 2021-08-05 NOTE — PROGRESS NOTES
Hospital Medicine Service -  Daily Progress Note       SUBJECTIVE   Interval History: Patient feels well today.  He states that his abdominal pain is improving.  He still feels a distention.  Denied any shortness of breath, chest pain, nausea, fevers or chills.  Having normal BMs.   OBJECTIVE      Vital signs in last 24 hours:  Temp:  [36.1 °C (97 °F)-36.7 °C (98 °F)] 36.4 °C (97.6 °F)  Heart Rate:  [54-72] 54  Resp:  [16-18] 16  BP: (131-140)/(62-65) 131/62  No intake or output data in the 24 hours ending 08/05/21 1328    PHYSICAL EXAMINATION      Physical Exam  Constitutional:       Appearance: He is well-developed.   HENT:      Head: Normocephalic.   Eyes:      Conjunctiva/sclera: Conjunctivae normal.   Cardiovascular:      Rate and Rhythm: Normal rate and regular rhythm.      Heart sounds: Normal heart sounds. No murmur heard.     Pulmonary:      Effort: Pulmonary effort is normal. No respiratory distress.      Breath sounds: Normal breath sounds. No wheezing.   Abdominal:      General: There is distension.      Palpations: Abdomen is soft.   Musculoskeletal:      Right lower leg: No edema.      Left lower leg: No edema.   Skin:     General: Skin is warm and dry.   Neurological:      Mental Status: He is alert and oriented to person, place, and time.            LINES, CATHETERS, DRAINS, AIRWAYS, AND WOUNDS   Lines, Drains, and Airways:  Wounds (agree with documentation and present on admission):  Peripheral IV (Adult) 07/26/21 Posterior;Left Forearm (Active)   Number of days: 4         Comments:      LABS / IMAGING / TELE      Labs  Results from last 7 days   Lab Units 08/05/21  0703 08/04/21  0552 08/03/21  0633   WBC K/uL 20.11* 18.86* 19.76*   HEMOGLOBIN g/dL 8.9* 9.4* 10.1*   HEMATOCRIT % 27.1* 28.5* 30.9*   PLATELETS K/uL 448* 455* 422*     Results from last 7 days   Lab Units 08/05/21  0703 08/04/21  0552 08/04/21  0552 08/03/21  0633 08/03/21  0633   SODIUM mEQ/L 139  --  140  --  141   POTASSIUM mEQ/L  3.9  --  4.0  --  4.2   CHLORIDE mEQ/L 110*  --  111*  --  109   CO2 mEQ/L 19*  --  19*  --  16*   BUN mg/dL 34*  --  32*  --  39*   CREATININE mg/dL 3.1*  --  3.0*  --  3.2*   GLUCOSE mg/dL 167*   < > 138*   < > 128*   CALCIUM mg/dL 8.5*  --  8.6*  --  8.9    < > = values in this interval not displayed.           SARS-CoV-2 (COVID-19) (no units)   Date/Time Value   07/21/2021 1211 Negative       Imaging  No results found.      ASSESSMENT AND PLAN      Gout  Assessment & Plan  -will avoid colchicine due to GABY  -will discuss with attending about starting steroids     Ascites  Assessment & Plan  -Noted on CT abdomen, however not enough fluid for paracentesis per IR    Leukocytosis  Assessment & Plan  Patient with non specific complaints. Still has residual abd discomfort   WBC remain elevated 18  CT scan showing moderate ascites  - per IR, not enough fluid for thora at this time   - low threshold to start abx if he becomes febrile   -monitor CBC , suspect elevated white count due to pancreatitis      Elevated troponin  Assessment & Plan  Suspect demand ischemia not MI.  -Started high intensity statin and low dose beta blocker.   -He is intolerant to ASA.   -outpatient ischemic eval with cardiology     Anemia  Assessment & Plan  Chronic and at his baseline  Due to CKD  -hemoglobin stable, follow CBC     Prostate CA (CMS/HCC)  Assessment & Plan  outpt f/u    Acute kidney injury superimposed on chronic kidney disease (CMS/HCC)  Assessment & Plan  -Baseline appears to be around 2.5-3  -Creatinine improved to 3.0   -Follow BMP  -Patient will need outpatient follow-up with nephrology  -Appreciate nephrology input    Hypertension  Assessment & Plan  - very hypertensive early in admission mainly in the context of pain  - amlodipine changed to nifedipine twice daily  -We will continue monitor blood pressures closely     * Acute pancreatitis  Assessment & Plan  -Acute pancreatitis.  -MRI c/w hemorrhagic pancreatitis.    -Possible intraductal mucinous cyst.   -plan for outpatient EUS per GI   -continue low residue low-fat diet  CT AP showing moderate ascites, again also demonstrating abnormalities in the pancreas - cannot exclude collection  -White count remains elevated- continue aggressive IVF for now per GI       VTE Assessment: Pad[place] VTE Score: 4  VTE Prophylaxis:  Current anticoagulants:  heparin (porcine) 5,000 unit/mL injection 5,000 Units, subcutaneous, q8h ROBERT      Code Status: Full Code      Estimated Discharge Date: 8/6/2021   Disposition Planning: plan for home with home care when stable     Amandeep Washington MD  8/5/2021     Spent more than 35 minutes with patient evaluation, discussion of current conditions with patient, RN and Case management, counseling and planning care.

## 2021-08-05 NOTE — PATIENT CARE CONFERENCE
Care Progression Rounds Note  Date: 8/5/2021  Time: 10:37 AM     Patient Name: Adalberto Galaviz     Medical Record Number: [id number]   YOB: 1940  Sex: Male      Room/Bed: 0362    Admitting Diagnosis: Sinus bradycardia [R00.1]  Acute pancreatitis, unspecified complication status, unspecified pancreatitis type [K85.90]   Admit Date/Time: 7/21/2021 11:55 AM    Primary Diagnosis: Acute pancreatitis  Principal Problem: Acute pancreatitis    GMLOS: pending  Anticipated Discharge Date: 8/6/2021    AM-PAC:  Mobility Score: 23    Discharge Planning:  Current Living Arrangements: home/apartment/condo  Anticipated Discharge Disposition: home/family member's home    Barriers to Discharge:  Barriers to Discharge: Medical issues not resolved  Comment: WBC still elevatd.  Started on a diet.  GI following.      Participants:  , nursing, social work/services

## 2021-08-05 NOTE — PLAN OF CARE
Problem: Adult Inpatient Plan of Care  Goal: Plan of Care Review  Outcome: Progressing  Flowsheets (Taken 8/5/2021 1328)  Progress: improving  Plan of Care Reviewed With: patient  Outcome Summary: PT re-eval complete.

## 2021-08-05 NOTE — PROGRESS NOTES
GI Daily Progress Note           SUBJECTIVE    LOS: 15 days     Interval History: No abd pain. Still c/o nausea. WBC 20.11 - up to 18. Creat 3.1 from 3.0 yesterday.      OBJECTIVE   Vital signs in last 24 hours:  Temp:  [36.1 °C (97 °F)-36.7 °C (98 °F)] 36.4 °C (97.6 °F)  Heart Rate:  [54-72] 54  Resp:  [16-18] 16  BP: (131-140)/(62-65) 131/62    No intake or output data in the 24 hours ending 08/05/21 1118    Intake/Output this shift:  No intake/output data recorded.   Current Medications:  •  acetaminophen, 975 mg, oral, q8h PRN  •  albuterol, 2.5 mg, nebulization, q6h PRN  •  alum-mag hydroxide-simeth, 30 mL, oral, q4h PRN  •  atorvastatin, 40 mg, oral, Daily (6p)  •  atropine, 0.5 mg, intravenous, q5 min PRN  •  cholecalciferol (vitamin D3), 2,000 Units, oral, Daily  •  glucose, 16-32 g of dextrose, oral, PRN **OR** dextrose, 15-30 g of dextrose, oral, PRN **OR** glucagon, 1 mg, intramuscular, PRN **OR** dextrose in water, 25 mL, intravenous, PRN  •  heparin (porcine), 5,000 Units, subcutaneous, q8h ROBERT  •  HYDROmorphone, 0.4 mg, intravenous, q4h PRN  •  lidocaine, 1 patch, Topical, Daily  •  metoprolol succinate XL, 12.5 mg, oral, Daily with dinner  •  NIFEdipine, 30 mg, oral, BID  •  nitroglycerin, 0.4 mg, sublingual, q5 min PRN  •  ondansetron, 8 mg, intravenous, q6h PRN  •  pancrelipase (Lip-Prot-Amyl), 12,000 units of lipase, oral, TID with meals  •  pantoprazole, 40 mg, intravenous, q12h ROBERT  •  predniSONE, 40 mg, oral, Daily  •  sertraline, 25 mg, oral, q PM  •  sodium bicarbonate, 650 mg, oral, Daily  •  sodium chloride 0.9 %, , intravenous, Continuous    Physical Exam  General appearance: alert, appears stated age, cooperative, in NAD, elderly male   HEENT: normocephalic, without obvious abnormality, atraumatic  Lungs: clear to auscultation bilaterally  Heart: regular rate and rhythm, S1, S2 normal, no murmur, click, rub or gallop  Abdomen: soft, non-tender, mild upper abd distention - no rebound or  guarding, non-distended, +BS x 4, no rebound or guarding  Rectal: n/a  Extremities: extremities normal, warm and well-perfused; no cyanosis, clubbing, or edema  Skin: Skin color, texture, turgor normal. No rashes or lesions  Neurologic: Grossly normal     LABS & IMAGING   Labs  I have reviewed the past 24 hour labs    Results from last 7 days   Lab Units 08/05/21 0703 08/04/21 0552 08/03/21  0633   WBC K/uL 20.11* 18.86* 19.76*   HEMOGLOBIN g/dL 8.9* 9.4* 10.1*   HEMATOCRIT % 27.1* 28.5* 30.9*   PLATELETS K/uL 448* 455* 422*     Results from last 7 days   Lab Units 08/05/21 0703 08/04/21  0552 08/03/21  0633 07/31/21  0320 07/30/21  0830   SODIUM mEQ/L 139 140 141   < > 144   POTASSIUM mEQ/L 3.9 4.0 4.2   < > 3.5*   CHLORIDE mEQ/L 110* 111* 109   < > 111*   CO2 mEQ/L 19* 19* 16*   < > 21*   BUN mg/dL 34* 32* 39*   < > 50*   CREATININE mg/dL 3.1* 3.0* 3.2*   < > 3.8*   CALCIUM mg/dL 8.5* 8.6* 8.9   < > 8.7*   ALBUMIN g/dL  --   --   --   --  2.0*   BILIRUBIN TOTAL mg/dL  --   --   --   --  0.7   ALK PHOS IU/L  --   --   --   --  66   ALT IU/L  --   --   --   --  25   AST IU/L  --   --   --   --  21   GLUCOSE mg/dL 167* 138* 128*   < > 141*    < > = values in this interval not displayed.           Imaging  I have reviewed the Imaging from the last 24 hrs.    CT ABDOMEN PELVIS WITHOUT IV CONTRAST    Result Date: 7/31/2021  IMPRESSION: Limited evaluation due to lack of IV contrast.  There is again redemonstration of marked abnormality of the pancreas which is overall diffusely enlarged with significant peripancreatic inflammatory changes, and I suspect the inflammatory changes and possibly phlegmonous changes around the pancreatic head may be worsened.  Cannot exclude focal or drainable collection.  Probable secondary inflammatory changes of the duodenum.  New bilateral pleural effusions, right greater than left.  There are several small bowel loops which are fluid-filled and mildly prominent but no evidence for  obstruction or transition point.  This may be due to enteritis or ileus.  There is also interval development of ascites. STUDY: Axial CT images of the abdomen and pelvis were obtained from the lung bases to the pubic symphysis.  No IV or oral contrast demonstrate for this examination.  Images were reviewed in soft tissue, lung and bone windows. CT DOSE:  One or more dose reduction techniques (e.g. automated exposure control, adjustment of the mA and/or kV according to the patient size, use of iterative reconstruction technique) utilized for this examination. COMMENT: GI System: Liver:  Normal in size and appearance. Gallbladder and bile ducts:  Gallbladder is distended but otherwise unremarkable.  No ductal dilatation. Pancreas:  Markedly abnormal appearance of the pancreas which is enlarged and edematous with marked surrounding inflammatory changes particularly the head of the pancreas probably worsened from the previous study.  A more focal lesion in the mid body of the pancreas measuring 2.8 x 3.1 cm although might be similar to the prior study. Stomach:  Normal in size and appearance. Small bowel:  Cannot exclude secondary inflammatory changes of the duodenum. Multiple fluid-filled and mildly prominent but not significantly dilated small bowel loops diffusely, likely related to enteritis or ileus.  No discrete transition point appreciated. Large bowel:  Normal  in caliber and appearance.  Normal appendix.  System: Adrenals:  Normal. Kidneys:  The kidneys are small measuring 8 cm on the right 8.2 cm on the left, no hydronephrosis.  Large left parapelvic renal cyst measuring 4.1 x 3.7 cm.. Urinary bladder:  Normal. Prostate gland and seminal vesicles: Prostate is surgically absent. RES System: Spleen:  Normal in size and appearance. Lymph nodes:  Although difficult to evaluate, there does appear to be multiple enlarged peripancreatic lymph nodes. Other: Peritoneum:  Moderate ascites, the largest component in the  pelvis.  No free air.  No contained fluid collection definitely identified although limited evaluation due to lack of IV contrast. Aorta and iliac arteries:  Atherosclerotic calcifications of the abdominal aorta and iliac arteries. Abdominal wall: Normal. Lower lungs and pleura:  Small bilateral pleural effusions, right greater than left.  Bibasilar airspace disease, likely related to atelectasis. Visualized axial skeleton:  Multilevel degenerative spondylosis of the lumbar spine.    X-RAY CHEST 2 VIEWS    Result Date: 7/25/2021  IMPRESSION: Stable.    CT ANGIOGRAPHY CHEST WITH AND WITHOUT IV CONTRAST    Result Date: 7/21/2021  IMPRESSION: 1.  There is fluid and stranding throughout the peripancreatic fat predominantly the region of the pancreatic head and neck.  Clinical correlation for acute pancreatitis is advised.  There is dilatation of pancreatic duct throughout the body and tail.  No obvious pancreatic mass is demonstrated.  An underlying intraductal lesion is not excluded on the basis of this exam. 2.  Normal caliber thoracoabdominal aorta without evidence of dissection. Repeat.  Additional incidental findings as described above.     CT ANGIOGRAPHY ABDOMEN PELVIS WITH AND WITHOUT IV CONTRAST    Result Date: 7/21/2021  IMPRESSION: 1.  There is fluid and stranding throughout the peripancreatic fat predominantly the region of the pancreatic head and neck.  Clinical correlation for acute pancreatitis is advised.  There is dilatation of pancreatic duct throughout the body and tail.  No obvious pancreatic mass is demonstrated.  An underlying intraductal lesion is not excluded on the basis of this exam. 2.  Normal caliber thoracoabdominal aorta without evidence of dissection. Repeat.  Additional incidental findings as described above.     ULTRASOUND KIDNEYS    Result Date: 7/26/2021  IMPRESSION: 1.  No hydronephrosis. 2.  Atrophic kidneys. Increased parenchymal echogenicity in keeping with medical renal disease. 3.   Bilateral renal cysts. I certify that I have reviewed this examination and agree with this report. Trini Mas D.O.    X-RAY CHEST 1 VIEW    Result Date: 7/26/2021  IMPRESSION: Stable appearance of the chest as described above.    X-RAY CHEST 1 VIEW    Result Date: 7/23/2021  IMPRESSION: Minimal lower lobe airspace disease which may represent atelectasis or pneumonia.    X-RAY CHEST 1 VIEW    Result Date: 7/21/2021  IMPRESSION: No definite active disease.    MRI ABDOMEN WITHOUT CONTRAST MRCP    Result Date: 7/24/2021  IMPRESSION: 1.  Acute pancreatitis with intrinsic T1 hyperintense material tracking along the pancreatic head, raising concern for hemorrhagic pancreatitis. 2.  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 3.  Mild extrahepatic and intrahepatic hepatic biliary ductal dilatation.     Ultrasound venous leg left    Result Date: 7/27/2021  IMPRESSION:   No evidence for deep venous thrombosis in the left lower extremity. COMMENT: There is normal response to compression within the left common femoral, superficial femoral, and popliteal veins. Normal color-flow, venous waveforms, and response to augmentation is observed. Similar findings are observed in the posterior tibial, peroneal and gastrocnemius veins of the left calf.        ASSESSMENT & PLAN     Assessment:    80 y M w/ PMHx of CKD St 3, HTN, prostate cancer s/p prostatectomy now on LUPRON presenting for consult for pancreatitis; pt with no prior episodes of pancreatitis; still has GB, Ca2+ normal, triglycerides pending; 1 day n/v/acute upper abd pain, no chronic GI symptoms; imaging consistent with possible chronic pancreatitis --     MRI performed 7/24 concerning for hemorrhagic pancreatitis and possible IPMN; pt initially responded from GI perspective; + GABY last week - continued w/ nausea / poor PO intake / abd pain so had re-CT ordered 7/31 showing re-demonstration of  abnormality of pancreas - enlarged with peripancreatic inflammatory changes, possibly phlegmonous changes around pancreatic head, probably 2/2 changes of duodenum, new b/l pleural effusions, small Bowel loops which are fluid-filled + mildly prominent but no evidence for obstruction of TP. +Ascites.      Initial MRCPL     1.  Acute pancreatitis with intrinsic T1 hyperintense material tracking along   the pancreatic head, raising concern for hemorrhagic pancreatitis.   2.  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   3.  Mild extrahepatic and intrahepatic hepatic biliary ductal dilatation.      Main issues at present is persistent abd pain - ? Smoldering / re-pancreatitis via imaging over the weekend   WBC remains significantly elevated      Recommendations:      - Would continue high volume fluids for complete tx of pancreatitis - LR @ 200 cc / hr   - Continue to monitor creat - nephrology following    - Daily CBC + BMP   - Serial abdominal exams  - Antiemetics prn    - PPI 40 mg BID IV   - Ok for low res / low fat diet   - Discussed w/ [facility]/interventional GI team and too early for endoscopic sample of tail of pancreas lesion / EUS - will need this done as outpatient once over acute pancreatic issues   - Encourage ambulation / OOB        MAYELA Richardson  8/5/2021  11:18 AM       ATTENDING DOCUMENTATION       Agree that in the face of continuing smoldering pancreatitis, we should hold off on diagnostic EUS guided FNA of his 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  He continues to have significant leukocytosis.  As it has been a week since his last imaging, would repeat this to make sure that he has not developed a complication such as walled off necrosis.  He is not stable for discharge.    Jovanni Ramirez MD

## 2021-08-05 NOTE — PROGRESS NOTES
Patient: Adalberto Galaviz  Location:  [facility] 3C 0362  MRN:  [id number]  Today's date:  8/5/2021     Patient left seated in recliner w/ alarm on and all needs within reach, vitals stable, and comfortable. Nursing aware. Family present.       Adalberto is a 80 y.o. male admitted on 7/21/2021 with Sinus bradycardia [R00.1]  Acute pancreatitis, unspecified complication status, unspecified pancreatitis type [K85.90]. Principal problem is No Principal Problem: There is no principal problem currently on the Problem List. Please update the Problem List and refresh..    Past Medical History  Adalberto has a past medical history of CKD (chronic kidney disease) stage 4, GFR 15-29 ml/min (CMS/HCC), DJD (degenerative joint disease), Hypertension, Nocturia, Prostate CA (CMS/HCC), and Prostate CA (CMS/HCC).    History of Present Illness  80 y.o. male from home w/ spouse who presents with severe abdominal pain, nausea, vomiting. Bradycardic in 30s in ED 7/21/21 (suspect vasovagal due to pain/nausea/vomiting). +Pancreatitis, GABY, metabolic acidosis, Neg DVT LLE           Prior Living Environment      Most Recent Value   People in Home  spouse   Current Living Arrangements  home/apartment/condo   Home Accessibility  stairs to enter home (Group)   Number of Stairs, Main Entrance  1        Prior Level of Function      Most Recent Value   Dominant Hand  right   Ambulation  independent   Transferring  independent   Toileting  independent   Bathing  independent   Dressing  independent   Eating  independent   Communication  understands/communicates without difficulty   Swallowing  swallows foods/liquids without difficulty   Baseline Diet/Method of Nutritional Intake  thin liquids (Level 0), regular solids (Level 7), no diet restrictions   Past History of Dysphagia  denies h/o dysphagia.   Prior Level of Function Comment  lives w/ spouse, 2SH, 1STE, no AD, , tub shower, 1/2 bath on 1st fl, owns a [employer] house, retired     Assistive Device Currently Used at Home  none          PT Evaluation and Treatment - 08/05/21 1255        PT Time Calculation    Start Time  1255     Stop Time  1305     Time Calculation (min)  10 min        Session Details    Document Type  re-evaluation     Mode of Treatment  physical therapy        General Information    Patient Profile Reviewed  yes     Onset of Illness/Injury or Date of Surgery  07/21/21     General Observations of Patient  Pt. received seated in bathroom, agreeable to limited PT     Existing Precautions/Restrictions  fall        Cognition/Psychosocial    Affect/Mental Status (Cognition)  WFL     Orientation Status (Cognition)  oriented x 3     Follows Commands (Cognition)  WFL     Cognitive Function  WFL        Bed Mobility    Comment (Bed Mobility)  Rec'd and left seated oob in recliner        Transfers    Transfers  toilet transfer        Sit to Stand Transfer    [place], Sit to Stand Transfer  supervision     Assistive Device  railing     Comment  From [place] effortful rise to stand, no assist req. Uses railing for support.         Stand to Sit Transfer    [place], Stand to Sit Transfer  supervision     Assistive Device  none     Comment  Good eccentric control to recliner w/ appropriate setup and tech.         Toilet Transfer    Transfer Technique  sit-stand     [place], Toilet Transfer  supervision     Assistive Device  grab bars/safety frame     Comment  See STS.         Gait Training    [place], Gait  supervision     Assistive Device  none     Distance in Feet  30 feet     Pattern (Gait)  step-through     Deviations/Abnormal Patterns (Gait)  bilateral deviations;base of support, wide;grace decreased;gait speed decreased;step length decreased;stride length decreased     Bilateral Gait Deviations  heel strike decreased     Comment (Gait/Stairs)  Pt. amb w/in room w/ slightly widened KRISTEN, subsequent dec. step/stride/heel strike/toe off. No overt LOB.  Self-limiting distance, as family had just shown up to visit.        Stairs Training    [place], Stairs  unable to assess        Safety Issues, Functional Mobility    Impairments Affecting Function (Mobility)  pain;endurance/activity tolerance;balance        Balance    Balance Assessment  sitting static balance;sit to stand dynamic balance;standing static balance;standing dynamic balance     Static Sitting Balance  WFL     Sit to Stand Dynamic Balance  mild impairment;unsupported     Static Standing Balance  WFL;unsupported     Dynamic Standing Balance  mild impairment;unsupported     Comment, Balance  Req. SUP for safe oob mobility, mild balance impairments w/ above gait deviations, generalized dec. activity tolerance        Coping    Observed Emotional State  calm;cooperative     Verbalized Emotional State  acceptance        AM-PAC (TM) - Mobility (Current Function)    Turning from your back to your side while in a flat bed without using bedrails?  4 - None     Moving from lying on your back to sitting on the side of a flat bed without using bedrails?  4 - None     Moving to and from a bed to a chair?  3 - A Little     Standing up from a chair using your arms?  3 - A Little     To walk in a hospital room?  3 - A Little     Climbing 3-5 steps with a railing?  3 - A Little     AM-PAC (TM) Mobility Score  20                       Education Documentation  Joint Mobility/Strength, taught by Bulmaro Smyth PT at 8/5/2021  1:29 PM.  Learner: Patient  Readiness: Acceptance  Method: Explanation  Response: Verbalizes Understanding  Comment: PT POC, safety, cont. mobility ex.    Home Safety, taught by Bulmaro Smyth PT at 8/5/2021  1:29 PM.  Learner: Patient  Readiness: Acceptance  Method: Explanation  Response: Verbalizes Understanding  Comment: PT POC, safety, cont. mobility ex.    Energy Conservation, taught by Bulmaro Smyth PT at 8/5/2021  1:29 PM.  Learner: Patient  Readiness: Acceptance  Method:  Explanation  Response: Verbalizes Understanding  Comment: PT POC, safety, cont. mobility ex.          PT Assessment/Plan      Most Recent Value   Daily Outcome Statement  Pt. seen for PT re-eval,  functionally limited by abd discomfort, dec. activity tolerance, balance,  req. SUP for safe oob mobility, tolerates amb x30 ft, is self-limiting during today's session. Will benefit from cont. PT while admitted to prep for safe return home at d/c.  at 08/05/2021 1255   PT Recommended Discharge Disposition  home with assistance, home with home health at 08/05/2021 1255   Rehab Potential  good, to achieve stated therapy goals at 08/05/2021 1255   Therapy Frequency  5 times/wk at 07/28/2021 1058   Planned Therapy Interventions  balance training, bed mobility training, gait training, home exercise program, patient/family education, ROM (range of motion), stair training, strengthening, transfer training at 07/28/2021 1058   Anticipated Equipment Needs at Discharge (PT)  commode, 3-in-1, shower chair, cane, straight at 07/28/2021 1058   Patient/Family Therapy Goals Statement  I want to go home  at 07/28/2021 1058          PT Goals      Most Recent Value   Bed Mobility Goal 1   Activity/Assistive Device  bed mobility activities, all at 07/28/2021 1058   [place]  independent at 07/28/2021 1058   Time Frame  by discharge at 07/28/2021 1058   Progress/Outcome  goal ongoing at 08/05/2021 1255   Transfer Goal 1   Activity/Assistive Device  all transfers, cane, straight at 07/28/2021 1058   [place]  modified independence at 07/28/2021 1058   Time Frame  by discharge at 07/28/2021 1058   Progress/Outcome  goal ongoing at 08/05/2021 1255   Gait Training Goal 1   Activity/Assistive Device  gait (walking locomotion), cane, straight at 07/28/2021 1058   [place]  modified independence at 07/28/2021 1058   Distance  130 at 07/28/2021 1058   Time Frame  by discharge at 07/28/2021 1058   Progress/Outcome  goal ongoing at  08/05/2021 1255   Stairs Goal 1   Activity/Assistive Device  ascending stairs, descending stairs, cane, straight at 07/28/2021 1058   [place]  modified independence at 07/28/2021 1058   Number of Stairs  12 at 07/28/2021 1058   Time Frame  by discharge at 07/28/2021 1058   Progress/Outcome  goal ongoing at 08/05/2021 1255

## 2021-08-06 NOTE — PLAN OF CARE
Problem: Adult Inpatient Plan of Care  Goal: Plan of Care Review  8/6/2021 1431 by Mia Humphrey, RN  Outcome: Progressing  Flowsheets (Taken 8/6/2021 1431)  Progress: improving  Plan of Care Reviewed With: patient  Outcome Summary: VSS. 95% on RA.  Pt reports feeling better. WBC still elevated.  Pt anxiuos to go home.  Awaiting GI to see pt to determine plan.   Plan of Care Review  Plan of Care Reviewed With: patient  Progress: improving  Outcome Summary: VSS. 95% on RA.  Pt reports feeling better. WBC still elevated.  Pt anxiuos to go home.  Awaiting GI to see pt to determine plan.

## 2021-08-06 NOTE — PLAN OF CARE
Plan of Care Review  Plan of Care Reviewed With: patient  Progress: improving  Outcome Summary: VSS. Pain better.  Surgery in to do dressing change.  NPO after MN for another I & D tomorrow.  Ambulating in room independently

## 2021-08-06 NOTE — PATIENT CARE CONFERENCE
Care Progression Rounds Note  Date: 8/6/2021  Time: 10:40 AM     Patient Name: Adalberto Galaviz     Medical Record Number: [id number]   YOB: 1940  Sex: Male      Room/Bed: 0362    Admitting Diagnosis: Sinus bradycardia [R00.1]  Acute pancreatitis, unspecified complication status, unspecified pancreatitis type [K85.90]   Admit Date/Time: 7/21/2021 11:55 AM    Primary Diagnosis: Acute pancreatitis  Principal Problem: Acute pancreatitis    GMLOS: pending  Anticipated Discharge Date: 8/7/2021    AM-PAC:  Mobility Score: 20    Discharge Planning:  Current Living Arrangements: home/apartment/condo  Anticipated Discharge Disposition: home with home health  Type of Home Care Services: home PT, nursing    Barriers to Discharge:  Barriers to Discharge: Medical issues not resolved  Comment: WBC still elevatd.  Started on a diet.  GI following.      Participants:  social work/services, nursing

## 2021-08-06 NOTE — PROGRESS NOTES
Nephrology Daily Progress Note             LOS: 16 days     Adalberto Galaviz is a 80 y.o. male who was admitted with Acute pancreatitis.     Subjective     Interval History:   Appeared comfortable.  Family at bedside.  Patient said that he is tolerating his food.  No difficulty voiding.    Current Facility-Administered Medications   Medication Dose Route Frequency Provider Last Rate Last Admin   • acetaminophen (TYLENOL) tablet 975 mg  975 mg oral q8h PRN Keyla Rodriguez MD   975 mg at 08/04/21 1739   • albuterol nebulizer solution 2.5 mg  2.5 mg nebulization q6h PRN Keyla Rodriguez MD       • alum-mag hydroxide-simeth (MAALOX) 200-200-20 mg/5 mL suspension 30 mL  30 mL oral q4h PRN Charlene Mo DO       • atorvastatin (LIPITOR) tablet 40 mg  40 mg oral Daily (6p) Keyla Rodriguez MD   40 mg at 08/05/21 1729   • atropine injection 0.5 mg  0.5 mg intravenous q5 min PRN Charlene Mo DO       • cholecalciferol (vitamin D3) tablet 2,000 Units  2,000 Units oral Daily Brunilda Rico MD   2,000 Units at 08/06/21 0850   • glucose chewable tablet 16-32 g of dextrose  16-32 g of dextrose oral PRN Charlene Mo DO        Or   • dextrose 40 % oral gel 15-30 g of dextrose  15-30 g of dextrose oral PRN Charlene Mo DO        Or   • glucagon (GLUCAGEN) injection 1 mg  1 mg intramuscular PRN Charlene Mo DO        Or   • dextrose in water injection 12.5 g  25 mL intravenous PRN Charlene Mo DO       • heparin (porcine) 5,000 unit/mL injection 5,000 Units  5,000 Units subcutaneous q8h [facility] Amandeep Washington MD   5,000 Units at 08/06/21 0558   • HYDROmorphone (DILAUDID) injection 0.4 mg  0.4 mg intravenous q4h PRN Keyla Rodriguez MD       • lidocaine (ASPERCREME) 4 % topical patch 1 patch  1 patch Topical Daily Amandeep Washingtno MD   1 patch at 08/03/21 0836   • metoprolol succinate XL (TOPROL-XL) split 24 hr ER tablet 12.5 mg  12.5 mg oral Daily with dinner Keyla Rodriguez MD   12.5 mg at  "08/05/21 1730   • NIFEdipine (ADALAT CC, PROCARDIA XL) tablet extended release 30 mg  30 mg oral BID Radha Chaney DO   30 mg at 08/06/21 0850   • nitroglycerin (NITROSTAT) SL tablet 0.4 mg  0.4 mg sublingual q5 min PRN Charlene Mo DO       • ondansetron (ZOFRAN) 8 mg in sodium chloride 0.9 % 50 mL IVPB  8 mg intravenous q6h PRN Jewels Art PA C       • pancrelipase (Lip-Prot-Amyl) (CREON) capsule,delayed release(DR/EC) 12,000 units of lipase  12,000 units of lipase oral TID with meals Noemí Dubon PA C   12,000 units of lipase at 08/06/21 0850   • pantoprazole (PROTONIX) injection 40 mg  40 mg intravenous q12h ROBERT Jewels Art PA C   40 mg at 08/06/21 0850   • predniSONE (DELTASONE) tablet 40 mg  40 mg oral Daily Noemí Dubon PA C   40 mg at 08/06/21 0850   • sertraline (ZOLOFT) tablet 25 mg  25 mg oral q PM Charlene Mo DO   25 mg at 08/05/21 1730   • sodium bicarbonate tablet 650 mg  650 mg oral Daily Radha Chaney DO   650 mg at 08/06/21 0850       Objective     Vital signs in last 24 hours:  Temp:  [36.6 °C (97.9 °F)-37 °C (98.6 °F)] 36.8 °C (98.2 °F)  Heart Rate:  [49-68] 49  Resp:  [16] 16  BP: (110-150)/(56-65) 150/63    Intake/Output last 3 shifts:  No intake/output data recorded.    Physical Exam:  Visit Vitals  BP (!) 150/63 (BP Location: Right upper arm, Patient Position: Lying)   Pulse (!) 49   Temp 36.8 °C (98.2 °F) (Oral)   Resp 16   Ht 1.854 m (6' 1\")   Wt 87.7 kg (193 lb 4.8 oz)   SpO2 95%   BMI 25.50 kg/m²     General appearance: alert, cooperative and no distress  Lungs: diminished breath sounds bilateral  Heart: regular rate and rhythm and S1, S2 normal  Abdomen: slightly distended, soft, nontender  Extremities: extremities normal, warm and well-perfused; no cyanosis, clubbing, or edema      Labs:      Results from last 7 days   Lab Units 08/06/21  0658 08/05/21  0703 08/04/21  0552   WBC K/uL 18.78* 20.11* 18.86*   HEMOGLOBIN g/dL 8.9* 8.9* 9.4* "   HEMATOCRIT % 27.7* 27.1* 28.5*   PLATELETS K/uL 501* 448* 455*     Results from last 7 days   Lab Units 08/06/21  0658 08/05/21  0703 08/04/21  0552   SODIUM mEQ/L 141 139 140   POTASSIUM mEQ/L 4.1 3.9 4.0   CHLORIDE mEQ/L 112* 110* 111*   CO2 mEQ/L 18* 19* 19*   BUN mg/dL 37* 34* 32*   CREATININE mg/dL 3.2* 3.1* 3.0*   CALCIUM mg/dL 8.8* 8.5* 8.6*   GLUCOSE mg/dL 123* 167* 138*               Imaging      CT ABDOMEN PELVIS WITHOUT IV CONTRAST    Result Date: 8/5/2021  CLINICAL HISTORY: Abdominal pain, pancreatitis     IMPRESSION: Acute pancreatitis with a slight increase in peripancreatic fluid. Evaluation for walled off necrosis is difficult without contrast. COMMENT: Technique: CT examination of the abdomen and pelvis was performed utilizing contiguous 2.5 mm transaxial sections. Images were acquired without intravenous contrast.. Oral contrast was not given CT DOSE:  One or more dose reduction techniques (e.g. automated exposure control, adjustment of the mA and/or kV according to patient size, use of iterative reconstruction technique) utilized for this examination. Comparison studies: CT abdomen pelvis dated 7/31/2021. Lung bases: There are hypoventilatory changes and small stable effusions.. Lower chest soft tissue: Normal. Liver: Normal. Spleen: Normal. Adrenals: Normal. Pancreas: Again noted is extensive inflammation about the pancreas with poorly defined pancreatic ductal dilatation and cystic lesion within the distal body. Since the previous examination there has been a slight increase in peripancreatic fluid.  There is no new air within the pancreas. Kidneys, ureters and bladder: Stable left parapelvic cysts.. Gallbladder:  Stable mild prominence.. Retroperitoneal structures: Normal. Bowel and mesentery: Scattered diverticuli noted.  The appendix is normal. There is stable ascites.. Lymph nodes: None enlarged. Pelvic structures:  Normal. Bones: Thoracolumbar degenerative change.. Vessels:Aortoiliac  vascular calcifications.         Assessment/Plan     Principal Problem:    Acute pancreatitis  Active Problems:    Hypertension    Acute kidney injury superimposed on chronic kidney disease (CMS/HCC)    Prostate CA (CMS/HCC)    Anemia    Elevated troponin    Leukocytosis    Ascites    Gout      Acute kidney injury superimposed on chronic kidney disease (CMS/HCC)  Assessment & Plan  He had previous serum creatinine of high 2 to 3.0 mg/dl  He had previously followed with Dr. Terrazas but has not had regular follow up   SONJA shows atrophic kidneys    Rate of rise noted with GABY  He is behaving as ATN despite UA without granular casts  Urine eos are noted to be rare but no rash, fever or peripheral eos  No predicating medications are on board at this time. He was on a short 5 day course of Ceftriaxone which has been stopped.  He had IVF prior and went into fluid overload requiring lasix    His serum creatinine is stable.  Off of IVF.  Discussed with patient  And family on need for OP ff up with primary nephrologist ( Dr. Terrazas).    Hypertension  Assessment & Plan  Blood pressure labile   Presently on Amlodipine and Metoprolol  Little room to titrate Metoprolol given bradycardia  Switched Amlodipine to Procardia BID    BP stable.  Continue to follow on the same meds.    * Acute pancreatitis  Assessment & Plan  Abdominal pain better.  Tolerating diet.  CT of the abdomen- significant peripancreatic inflammatory changes, poss worsening phlegmonous changes; marked increase in ascites.   EUS per GI        Katarina Ramirez MD

## 2021-08-06 NOTE — PROGRESS NOTES
Hospital Medicine Service -  Daily Progress Note       SUBJECTIVE   Interval History: pt seen and examined. Resting in bed. No chest pain/dyspnea.      OBJECTIVE      Vital signs in last 24 hours:  Temp:  [36.5 °C (97.7 °F)-37 °C (98.6 °F)] 36.5 °C (97.7 °F)  Heart Rate:  [49-68] 60  Resp:  [16] 16  BP: (110-165)/(56-79) 165/79  No intake or output data in the 24 hours ending 08/06/21 1604    PHYSICAL EXAMINATION      Physical Exam  Constitutional:       Appearance: He is not diaphoretic.   Eyes:      General: No scleral icterus.     Extraocular Movements: Extraocular movements intact.   Cardiovascular:      Rate and Rhythm: Regular rhythm.      Heart sounds: No friction rub. No gallop.    Pulmonary:      Effort: Pulmonary effort is normal. No respiratory distress.      Breath sounds: No stridor. No wheezing, rhonchi or rales.   Abdominal:      General: Bowel sounds are normal. There is no distension.      Palpations: Abdomen is soft.      Tenderness: There is no abdominal tenderness. There is no guarding or rebound.   Neurological:      Mental Status: He is alert and oriented to person, place, and time.        LINES, CATHETERS, DRAINS, AIRWAYS, AND WOUNDS   Lines, Drains, Airways, Wounds:  Peripheral IV (Adult) 07/26/21 Posterior;Left Forearm (Active)   Number of days: 11       Comments:      LABS / IMAGING / TELE      Labs  Results from last 7 days   Lab Units 08/06/21  0658   WBC K/uL 18.78*   HEMOGLOBIN g/dL 8.9*   HEMATOCRIT % 27.7*   PLATELETS K/uL 501*     Results from last 7 days   Lab Units 08/06/21  0658   SODIUM mEQ/L 141   POTASSIUM mEQ/L 4.1   CHLORIDE mEQ/L 112*   CO2 mEQ/L 18*   BUN mg/dL 37*   CREATININE mg/dL 3.2*   CALCIUM mg/dL 8.8*   GLUCOSE mg/dL 123*       IMAGING  CT ABDOMEN PELVIS WITHOUT IV CONTRAST    Result Date: 8/5/2021  IMPRESSION: Acute pancreatitis with a slight increase in peripancreatic fluid. Evaluation for walled off necrosis is difficult without contrast. COMMENT: Technique:  CT examination of the abdomen and pelvis was performed utilizing contiguous 2.5 mm transaxial sections. Images were acquired without intravenous contrast.. Oral contrast was not given CT DOSE:  One or more dose reduction techniques (e.g. automated exposure control, adjustment of the mA and/or kV according to patient size, use of iterative reconstruction technique) utilized for this examination. Comparison studies: CT abdomen pelvis dated 7/31/2021. Lung bases: There are hypoventilatory changes and small stable effusions.. Lower chest soft tissue: Normal. Liver: Normal. Spleen: Normal. Adrenals: Normal. Pancreas: Again noted is extensive inflammation about the pancreas with poorly defined pancreatic ductal dilatation and cystic lesion within the distal body. Since the previous examination there has been a slight increase in peripancreatic fluid.  There is no new air within the pancreas. Kidneys, ureters and bladder: Stable left parapelvic cysts.. Gallbladder:  Stable mild prominence.. Retroperitoneal structures: Normal. Bowel and mesentery: Scattered diverticuli noted.  The appendix is normal. There is stable ascites.. Lymph nodes: None enlarged. Pelvic structures:  Normal. Bones: Thoracolumbar degenerative change.. Vessels:Aortoiliac vascular calcifications.     CT ABDOMEN PELVIS WITHOUT IV CONTRAST    Result Date: 7/31/2021  IMPRESSION: Limited evaluation due to lack of IV contrast.  There is again redemonstration of marked abnormality of the pancreas which is overall diffusely enlarged with significant peripancreatic inflammatory changes, and I suspect the inflammatory changes and possibly phlegmonous changes around the pancreatic head may be worsened.  Cannot exclude focal or drainable collection.  Probable secondary inflammatory changes of the duodenum.  New bilateral pleural effusions, right greater than left.  There are several small bowel loops which are fluid-filled and mildly prominent but no evidence for  obstruction or transition point.  This may be due to enteritis or ileus.  There is also interval development of ascites. STUDY: Axial CT images of the abdomen and pelvis were obtained from the lung bases to the pubic symphysis.  No IV or oral contrast demonstrate for this examination.  Images were reviewed in soft tissue, lung and bone windows. CT DOSE:  One or more dose reduction techniques (e.g. automated exposure control, adjustment of the mA and/or kV according to the patient size, use of iterative reconstruction technique) utilized for this examination. COMMENT: GI System: Liver:  Normal in size and appearance. Gallbladder and bile ducts:  Gallbladder is distended but otherwise unremarkable.  No ductal dilatation. Pancreas:  Markedly abnormal appearance of the pancreas which is enlarged and edematous with marked surrounding inflammatory changes particularly the head of the pancreas probably worsened from the previous study.  A more focal lesion in the mid body of the pancreas measuring 2.8 x 3.1 cm although might be similar to the prior study. Stomach:  Normal in size and appearance. Small bowel:  Cannot exclude secondary inflammatory changes of the duodenum. Multiple fluid-filled and mildly prominent but not significantly dilated small bowel loops diffusely, likely related to enteritis or ileus.  No discrete transition point appreciated. Large bowel:  Normal  in caliber and appearance.  Normal appendix.  System: Adrenals:  Normal. Kidneys:  The kidneys are small measuring 8 cm on the right 8.2 cm on the left, no hydronephrosis.  Large left parapelvic renal cyst measuring 4.1 x 3.7 cm.. Urinary bladder:  Normal. Prostate gland and seminal vesicles: Prostate is surgically absent. RES System: Spleen:  Normal in size and appearance. Lymph nodes:  Although difficult to evaluate, there does appear to be multiple enlarged peripancreatic lymph nodes. Other: Peritoneum:  Moderate ascites, the largest component in the  pelvis.  No free air.  No contained fluid collection definitely identified although limited evaluation due to lack of IV contrast. Aorta and iliac arteries:  Atherosclerotic calcifications of the abdominal aorta and iliac arteries. Abdominal wall: Normal. Lower lungs and pleura:  Small bilateral pleural effusions, right greater than left.  Bibasilar airspace disease, likely related to atelectasis. Visualized axial skeleton:  Multilevel degenerative spondylosis of the lumbar spine.    X-RAY CHEST 2 VIEWS    Result Date: 7/25/2021  IMPRESSION: Stable.    CT ANGIOGRAPHY CHEST WITH AND WITHOUT IV CONTRAST    Result Date: 7/21/2021  IMPRESSION: 1.  There is fluid and stranding throughout the peripancreatic fat predominantly the region of the pancreatic head and neck.  Clinical correlation for acute pancreatitis is advised.  There is dilatation of pancreatic duct throughout the body and tail.  No obvious pancreatic mass is demonstrated.  An underlying intraductal lesion is not excluded on the basis of this exam. 2.  Normal caliber thoracoabdominal aorta without evidence of dissection. Repeat.  Additional incidental findings as described above.     CT ANGIOGRAPHY ABDOMEN PELVIS WITH AND WITHOUT IV CONTRAST    Result Date: 7/21/2021  IMPRESSION: 1.  There is fluid and stranding throughout the peripancreatic fat predominantly the region of the pancreatic head and neck.  Clinical correlation for acute pancreatitis is advised.  There is dilatation of pancreatic duct throughout the body and tail.  No obvious pancreatic mass is demonstrated.  An underlying intraductal lesion is not excluded on the basis of this exam. 2.  Normal caliber thoracoabdominal aorta without evidence of dissection. Repeat.  Additional incidental findings as described above.     ULTRASOUND KIDNEYS    Result Date: 7/26/2021  IMPRESSION: 1.  No hydronephrosis. 2.  Atrophic kidneys. Increased parenchymal echogenicity in keeping with medical renal disease. 3.   Bilateral renal cysts. I certify that I have reviewed this examination and agree with this report. Trini Mas D.O.    X-RAY CHEST 1 VIEW    Result Date: 7/26/2021  IMPRESSION: Stable appearance of the chest as described above.    X-RAY CHEST 1 VIEW    Result Date: 7/23/2021  IMPRESSION: Minimal lower lobe airspace disease which may represent atelectasis or pneumonia.    X-RAY CHEST 1 VIEW    Result Date: 7/21/2021  IMPRESSION: No definite active disease.    MRI ABDOMEN WITHOUT CONTRAST MRCP    Result Date: 7/24/2021  IMPRESSION: 1.  Acute pancreatitis with intrinsic T1 hyperintense material tracking along the pancreatic head, raising concern for hemorrhagic pancreatitis. 2.  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 3.  Mild extrahepatic and intrahepatic hepatic biliary ductal dilatation.     Ultrasound venous leg left    Result Date: 7/27/2021  IMPRESSION:   No evidence for deep venous thrombosis in the left lower extremity. COMMENT: There is normal response to compression within the left common femoral, superficial femoral, and popliteal veins. Normal color-flow, venous waveforms, and response to augmentation is observed. Similar findings are observed in the posterior tibial, peroneal and gastrocnemius veins of the left calf.       ASSESSMENT AND PLAN      * Acute pancreatitis  Assessment & Plan  -Acute pancreatitis.  -MRI c/w hemorrhagic pancreatitis.   -Possible intraductal mucinous cyst.   -plan for outpatient EUS per GI   -continue low residue low-fat diet  CT AP showing moderate ascites, again also demonstrating abnormalities in the pancreas - cannot exclude collection  -White count remains elevated- will monitor closley w LR infusion. Monitor for any signs of fluid overload. Gi on board.     Gout  Assessment & Plan  -will avoid colchicine due to GABY    Ascites  Assessment & Plan  -Noted on CT abdomen, however not enough fluid for  paracentesis per IR    Leukocytosis  Assessment & Plan  Will cont to trend cbc and monitor.     Elevated troponin  Assessment & Plan  Suspect demand ischemia not MI.  -Started high intensity statin and low dose beta blocker.   -He is intolerant to ASA.   -outpatient ischemic eval with cardiology     Anemia  Assessment & Plan  Chronic and at his baseline  Due to CKD  -hemoglobin stable, follow CBC     Prostate CA (CMS/HCC)  Assessment & Plan  outpt f/u    Acute kidney injury superimposed on chronic kidney disease (CMS/HCC)  Assessment & Plan  Cont to trend BMP  Nephrology closely following pt.     Hypertension  Assessment & Plan  Cont w bb/adalat and monitor.        VTE Prophylaxis Plan: heparin sq for dvt proph  Code Status: Full Code  Estimated Discharge Date: 8/7/2021     Colton Jackman DO  8/6/2021

## 2021-08-06 NOTE — PROGRESS NOTES
GI Daily Progress Note           SUBJECTIVE    LOS: 16 days     Interval History: No significant clinical change. Creat 3.2 today. WBC down to 18 from 20 yesterday.      OBJECTIVE   Vital signs in last 24 hours:  Temp:  [36.6 °C (97.9 °F)-37 °C (98.6 °F)] 36.8 °C (98.2 °F)  Heart Rate:  [49-68] 49  Resp:  [16] 16  BP: (110-150)/(56-65) 150/63    No intake or output data in the 24 hours ending 08/06/21 1306    Intake/Output this shift:  No intake/output data recorded.   Current Medications:  •  acetaminophen, 975 mg, oral, q8h PRN  •  albuterol, 2.5 mg, nebulization, q6h PRN  •  alum-mag hydroxide-simeth, 30 mL, oral, q4h PRN  •  atorvastatin, 40 mg, oral, Daily (6p)  •  atropine, 0.5 mg, intravenous, q5 min PRN  •  cholecalciferol (vitamin D3), 2,000 Units, oral, Daily  •  glucose, 16-32 g of dextrose, oral, PRN **OR** dextrose, 15-30 g of dextrose, oral, PRN **OR** glucagon, 1 mg, intramuscular, PRN **OR** dextrose in water, 25 mL, intravenous, PRN  •  heparin (porcine), 5,000 Units, subcutaneous, q8h ROBERT  •  HYDROmorphone, 0.4 mg, intravenous, q4h PRN  •  lidocaine, 1 patch, Topical, Daily  •  metoprolol succinate XL, 12.5 mg, oral, Daily with dinner  •  NIFEdipine, 30 mg, oral, BID  •  nitroglycerin, 0.4 mg, sublingual, q5 min PRN  •  ondansetron, 8 mg, intravenous, q6h PRN  •  pancrelipase (Lip-Prot-Amyl), 12,000 units of lipase, oral, TID with meals  •  pantoprazole, 40 mg, intravenous, q12h ROBERT  •  predniSONE, 40 mg, oral, Daily  •  sertraline, 25 mg, oral, q PM  •  sodium bicarbonate, 650 mg, oral, Daily    Physical Exam  General appearance: alert, appears stated age, cooperative, in NAD  HEENT: normocephalic, without obvious abnormality, atraumatic  Lungs: clear to auscultation bilaterally  Heart: regular rate and rhythm, S1, S2 normal, no murmur, click, rub or gallop  Abdomen: soft, minimal upper abd tenderness on exam, mild / diffuse abd distention, +BS x 4, no rebound or guarding  Rectal:  n/a  Extremities: extremities normal, warm and well-perfused; no cyanosis, clubbing, or edema  Skin: Skin color, texture, turgor normal. No rashes or lesions  Neurologic: Grossly normal     LABS & IMAGING   Labs  I have reviewed the past 24 hour labs    Results from last 7 days   Lab Units 08/06/21  0658 08/05/21  0703 08/04/21  0552   WBC K/uL 18.78* 20.11* 18.86*   HEMOGLOBIN g/dL 8.9* 8.9* 9.4*   HEMATOCRIT % 27.7* 27.1* 28.5*   PLATELETS K/uL 501* 448* 455*     Results from last 7 days   Lab Units 08/06/21  0658 08/05/21  0703 08/04/21  0552   SODIUM mEQ/L 141 139 140   POTASSIUM mEQ/L 4.1 3.9 4.0   CHLORIDE mEQ/L 112* 110* 111*   CO2 mEQ/L 18* 19* 19*   BUN mg/dL 37* 34* 32*   CREATININE mg/dL 3.2* 3.1* 3.0*   CALCIUM mg/dL 8.8* 8.5* 8.6*   GLUCOSE mg/dL 123* 167* 138*           Imaging  I have reviewed the Imaging from the last 24 hrs.    CT ABDOMEN PELVIS WITHOUT IV CONTRAST    Result Date: 8/5/2021  IMPRESSION: Acute pancreatitis with a slight increase in peripancreatic fluid. Evaluation for walled off necrosis is difficult without contrast. COMMENT: Technique: CT examination of the abdomen and pelvis was performed utilizing contiguous 2.5 mm transaxial sections. Images were acquired without intravenous contrast.. Oral contrast was not given CT DOSE:  One or more dose reduction techniques (e.g. automated exposure control, adjustment of the mA and/or kV according to patient size, use of iterative reconstruction technique) utilized for this examination. Comparison studies: CT abdomen pelvis dated 7/31/2021. Lung bases: There are hypoventilatory changes and small stable effusions.. Lower chest soft tissue: Normal. Liver: Normal. Spleen: Normal. Adrenals: Normal. Pancreas: Again noted is extensive inflammation about the pancreas with poorly defined pancreatic ductal dilatation and cystic lesion within the distal body. Since the previous examination there has been a slight increase in peripancreatic fluid.  There  is no new air within the pancreas. Kidneys, ureters and bladder: Stable left parapelvic cysts.. Gallbladder:  Stable mild prominence.. Retroperitoneal structures: Normal. Bowel and mesentery: Scattered diverticuli noted.  The appendix is normal. There is stable ascites.. Lymph nodes: None enlarged. Pelvic structures:  Normal. Bones: Thoracolumbar degenerative change.. Vessels:Aortoiliac vascular calcifications.     CT ABDOMEN PELVIS WITHOUT IV CONTRAST    Result Date: 7/31/2021  IMPRESSION: Limited evaluation due to lack of IV contrast.  There is again redemonstration of marked abnormality of the pancreas which is overall diffusely enlarged with significant peripancreatic inflammatory changes, and I suspect the inflammatory changes and possibly phlegmonous changes around the pancreatic head may be worsened.  Cannot exclude focal or drainable collection.  Probable secondary inflammatory changes of the duodenum.  New bilateral pleural effusions, right greater than left.  There are several small bowel loops which are fluid-filled and mildly prominent but no evidence for obstruction or transition point.  This may be due to enteritis or ileus.  There is also interval development of ascites. STUDY: Axial CT images of the abdomen and pelvis were obtained from the lung bases to the pubic symphysis.  No IV or oral contrast demonstrate for this examination.  Images were reviewed in soft tissue, lung and bone windows. CT DOSE:  One or more dose reduction techniques (e.g. automated exposure control, adjustment of the mA and/or kV according to the patient size, use of iterative reconstruction technique) utilized for this examination. COMMENT: GI System: Liver:  Normal in size and appearance. Gallbladder and bile ducts:  Gallbladder is distended but otherwise unremarkable.  No ductal dilatation. Pancreas:  Markedly abnormal appearance of the pancreas which is enlarged and edematous with marked surrounding inflammatory changes  particularly the head of the pancreas probably worsened from the previous study.  A more focal lesion in the mid body of the pancreas measuring 2.8 x 3.1 cm although might be similar to the prior study. Stomach:  Normal in size and appearance. Small bowel:  Cannot exclude secondary inflammatory changes of the duodenum. Multiple fluid-filled and mildly prominent but not significantly dilated small bowel loops diffusely, likely related to enteritis or ileus.  No discrete transition point appreciated. Large bowel:  Normal  in caliber and appearance.  Normal appendix.  System: Adrenals:  Normal. Kidneys:  The kidneys are small measuring 8 cm on the right 8.2 cm on the left, no hydronephrosis.  Large left parapelvic renal cyst measuring 4.1 x 3.7 cm.. Urinary bladder:  Normal. Prostate gland and seminal vesicles: Prostate is surgically absent. RES System: Spleen:  Normal in size and appearance. Lymph nodes:  Although difficult to evaluate, there does appear to be multiple enlarged peripancreatic lymph nodes. Other: Peritoneum:  Moderate ascites, the largest component in the pelvis.  No free air.  No contained fluid collection definitely identified although limited evaluation due to lack of IV contrast. Aorta and iliac arteries:  Atherosclerotic calcifications of the abdominal aorta and iliac arteries. Abdominal wall: Normal. Lower lungs and pleura:  Small bilateral pleural effusions, right greater than left.  Bibasilar airspace disease, likely related to atelectasis. Visualized axial skeleton:  Multilevel degenerative spondylosis of the lumbar spine.    X-RAY CHEST 2 VIEWS    Result Date: 7/25/2021  IMPRESSION: Stable.    CT ANGIOGRAPHY CHEST WITH AND WITHOUT IV CONTRAST    Result Date: 7/21/2021  IMPRESSION: 1.  There is fluid and stranding throughout the peripancreatic fat predominantly the region of the pancreatic head and neck.  Clinical correlation for acute pancreatitis is advised.  There is dilatation of  pancreatic duct throughout the body and tail.  No obvious pancreatic mass is demonstrated.  An underlying intraductal lesion is not excluded on the basis of this exam. 2.  Normal caliber thoracoabdominal aorta without evidence of dissection. Repeat.  Additional incidental findings as described above.     CT ANGIOGRAPHY ABDOMEN PELVIS WITH AND WITHOUT IV CONTRAST    Result Date: 7/21/2021  IMPRESSION: 1.  There is fluid and stranding throughout the peripancreatic fat predominantly the region of the pancreatic head and neck.  Clinical correlation for acute pancreatitis is advised.  There is dilatation of pancreatic duct throughout the body and tail.  No obvious pancreatic mass is demonstrated.  An underlying intraductal lesion is not excluded on the basis of this exam. 2.  Normal caliber thoracoabdominal aorta without evidence of dissection. Repeat.  Additional incidental findings as described above.     ULTRASOUND KIDNEYS    Result Date: 7/26/2021  IMPRESSION: 1.  No hydronephrosis. 2.  Atrophic kidneys. Increased parenchymal echogenicity in keeping with medical renal disease. 3.  Bilateral renal cysts. I certify that I have reviewed this examination and agree with this report. Trini Mas D.O.    X-RAY CHEST 1 VIEW    Result Date: 7/26/2021  IMPRESSION: Stable appearance of the chest as described above.    X-RAY CHEST 1 VIEW    Result Date: 7/23/2021  IMPRESSION: Minimal lower lobe airspace disease which may represent atelectasis or pneumonia.    X-RAY CHEST 1 VIEW    Result Date: 7/21/2021  IMPRESSION: No definite active disease.    MRI ABDOMEN WITHOUT CONTRAST MRCP    Result Date: 7/24/2021  IMPRESSION: 1.  Acute pancreatitis with intrinsic T1 hyperintense material tracking along the pancreatic head, raising concern for hemorrhagic pancreatitis. 2.  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 3.  Mild extrahepatic and intrahepatic hepatic  biliary ductal dilatation.     Ultrasound venous leg left    Result Date: 7/27/2021  IMPRESSION:   No evidence for deep venous thrombosis in the left lower extremity. COMMENT: There is normal response to compression within the left common femoral, superficial femoral, and popliteal veins. Normal color-flow, venous waveforms, and response to augmentation is observed. Similar findings are observed in the posterior tibial, peroneal and gastrocnemius veins of the left calf.        ASSESSMENT & PLAN     Assessment:    80 y M w/ PMHx of CKD St 3, HTN, prostate cancer s/p prostatectomy now on LUPRON presenting for consult for pancreatitis; pt with no prior episodes of pancreatitis; still has GB, Ca2+ normal, triglycerides pending; 1 day n/v/acute upper abd pain, no chronic GI symptoms; imaging consistent with possible chronic pancreatitis --     MRI performed 7/24 concerning for hemorrhagic pancreatitis and possible IPMN; pt initially responded from GI perspective; + GABY last week - continued w/ nausea / poor PO intake / abd pain so had re-CT ordered 7/31 showing re-demonstration of abnormality of pancreas - enlarged with peripancreatic inflammatory changes, possibly phlegmonous changes around pancreatic head, probably 2/2 changes of duodenum, new b/l pleural effusions, small Bowel loops which are fluid-filled + mildly prominent but no evidence for obstruction of TP. +Ascites.      Initial MRCP:    1.  Acute pancreatitis with intrinsic T1 hyperintense material tracking along   the pancreatic head, raising concern for hemorrhagic pancreatitis.   2.  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   3.  Mild extrahepatic and intrahepatic hepatic biliary ductal dilatation.      Main issues at present is persistent abd pain - ? Smoldering / re-pancreatitis via imaging over the weekend   WBC remains significantly elevated     Re-CT AP 8/5 showing:   IMPRESSION:  Acute pancreatitis with a slight increase in peripancreatic fluid.   Evaluation for walled off necrosis is difficult without contrast.     Given imaging findings to date suspect smoldering pancreatitis -- continue tx'ing this      Recommendations:      - continue high volume fluids for complete tx of pancreatitis - LR @ 200 cc / hr   - Continue to monitor creat - nephrology following    - Daily CBC + BMP   - Serial abdominal exams  - Antiemetics prn    - PPI 40 mg BID IV   - Discussed w/ [facility]/interventional GI team and too early for endoscopic sample of tail of pancreas lesion / EUS - will need this done as outpatient once over acute pancreatic issues -- will continue discussions pending clinical course   - Encourage ambulation / OOB        MAYELA Richardson  8/6/2021  1:06 PM       ATTENDING DOCUMENTATION     Agree.  Still w smouldering pancreatitis.  Continue conservative approach w EUS FNA of main duct IPMN once better.  Dr Hancock to see tomorrow    Jovanni Ramirez MD

## 2021-08-06 NOTE — PROGRESS NOTES
[facility]:  Referral started in the system, please call [facility] if Pt becomes stable for weekend discharge, [phone]. Cindy Bhagat RN

## 2021-08-06 NOTE — PLAN OF CARE
Problem: Adult Inpatient Plan of Care  Goal: Plan of Care Review  Flowsheets (Taken 8/6/2021 5054)  Outcome Summary: As per rounds, pt would like to d/c to home today. Medical team is stating pt is not cleared for d/c. SW left weekend handoff for weekend CCRN.    DCP: home w/ [facility]

## 2021-08-07 NOTE — PROGRESS NOTES
Gastroenterology  Daily Progress Note       SUBJECTIVE   Interval History:     Patient states that he still has abdominal discomfort across the top of his abdomen he rates a 5-6/10.  Is not worsened by eating.  He is moving his bowels and reports that he recently had 2 bowel movements.  He expresses frustration that his family came to the hospital last night and was told that he needed to stay due to his elevated white blood cell count.     OBJECTIVE      Vital signs in last 24 hours:  Temp:  [36.4 °C (97.5 °F)-36.8 °C (98.3 °F)] 36.4 °C (97.5 °F)  Heart Rate:  [52-62] 57  Resp:  [16-18] 18  BP: (150-165)/(67-79) 150/67    Intake/Output Summary (Last 24 hours) at 8/7/2021 1416  Last data filed at 8/7/2021 1300  Gross per 24 hour   Intake 240 ml   Output --   Net 240 ml       PHYSICAL EXAMINATION      Physical Exam  Constitutional:       General: He is not in acute distress.     Appearance: He is well-developed.      Comments: Elderly male resting comfortably in bed   HENT:      Head: Normocephalic and atraumatic.   Eyes:      General: No scleral icterus.     Conjunctiva/sclera: Conjunctivae normal.   Cardiovascular:      Rate and Rhythm: Normal rate and regular rhythm.      Heart sounds: S1 normal and S2 normal. No murmur heard.     Pulmonary:      Breath sounds: Normal breath sounds. No rales.   Abdominal:      General: Bowel sounds are normal. There is distension.      Palpations: Abdomen is soft.      Tenderness: There is abdominal tenderness in the right upper quadrant and left upper quadrant. There is no guarding or rebound.   Musculoskeletal:      Right lower leg: No edema.      Left lower leg: No edema.   Skin:     General: Skin is warm and dry.   Neurological:      Mental Status: He is alert and oriented to person, place, and time.        LABS / IMAGING / TELE        Labs  Results from last 7 days   Lab Units 08/07/21  0608 08/06/21  0658 08/05/21  0703   SODIUM mEQ/L 139 141 139   POTASSIUM mEQ/L 4.0 4.1  3.9   CHLORIDE mEQ/L 112* 112* 110*   CO2 mEQ/L 19* 18* 19*   BUN mg/dL 38* 37* 34*   CREATININE mg/dL 3.0* 3.2* 3.1*   CALCIUM mg/dL 8.7* 8.8* 8.5*   ALBUMIN g/dL 2.1*  --   --    BILIRUBIN TOTAL mg/dL 0.4  --   --    ALK PHOS IU/L 53  --   --    ALT IU/L 41  --   --    AST IU/L 27  --   --    GLUCOSE mg/dL 133* 123* 167*           Results from last 7 days   Lab Units 08/07/21  0608   MAGNESIUM mg/dL 1.6*     Results from last 7 days   Lab Units 08/07/21  0608 08/06/21  0658 08/05/21  0703   WBC K/uL 18.68* 18.78* 20.11*   HEMOGLOBIN g/dL 9.0* 8.9* 8.9*   HEMATOCRIT % 27.1* 27.7* 27.1*   PLATELETS K/uL 517* 501* 448*     Imaging  I have independently reviewed the pertinent imaging from the last 24 hrs.    ASSESSMENT AND PLAN      * Acute pancreatitis  Assessment & Plan  79 yo M with PMHx of CKD St 3, HTN, prostate cancer s/p prostatectomy now on LUPRON presenting for consult for pancreatitis; pt with no prior episodes of pancreatitis; still has GB, Ca2+ normal, triglycerides pending; 1 day n/v/acute upper abd pain, no chronic GI symptoms; imaging consistent with possible chronic pancreatitis.    CTA 7/21: There is fluid and stranding throughout the peripancreatic fat predominantly the region of the pancreatic head and neck.  Clinical correlation for acute pancreatitis is advised.  There is dilatation of pancreatic duct throughout the body and tail.  No obvious pancreatic mass is demonstrated.     MRI 7/24 concerning for hemorrhagic pancreatitis and possible IPMN; pt initially responded from GI perspective; + GABY last week - continued w/ nausea / poor PO intake / abd pain so had re-CT ordered 7/31 showing re-demonstration of abnormality of pancreas - enlarged with peripancreatic inflammatory changes, possibly phlegmonous changes around pancreatic head, probably 2/2 changes of duodenum, new b/l pleural effusions, small Bowel loops which are fluid-filled + mildly prominent but no evidence for obstruction of TP.  +Ascites.      MRCP 7/24:  1.  Acute pancreatitis with intrinsic T1 hyperintense material tracking along the pancreatic head, raising concern for hemorrhagic pancreatitis.   2.  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   3.  Mild extrahepatic and intrahepatic hepatic biliary ductal dilatation.      Re-CT AP 8/5 showing: Acute pancreatitis with a slight increase in peripancreatic fluid. Evaluation for walled off necrosis is difficult without contrast.      Patient with continued pancreatitis and peripancreatic fluid. He may have some necrosis that we can't seen on his CT scan. He is clinically improving and last dose of pain medication, acetaminophen was on 8/4 on chart review. Due to the degree of his pancreatic inflammation it will take time for him to become asymptomatic. There is no concerning discrete collection.     Plan:  - self hydrating  - no GI objection to discharge  - monitor for fevers  - monitor CBD/BMP  - Serial abdominal exams  - PPI 40 mg BID IV while hospitalized  - Outpatient EUS with Dr. 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

## 2021-08-07 NOTE — PROGRESS NOTES
Subjective     Interval History: complains of mild abd discomfort and BM x2. no short of breath. .     Objective     Vital signs in last 24 hours:  Temp:  [36.4 °C (97.5 °F)-36.8 °C (98.3 °F)] 36.4 °C (97.5 °F)  Heart Rate:  [52-62] 57  Resp:  [16-18] 18  BP: (150-165)/(67-79) 150/67         Intake/Output Summary (Last 24 hours) at 8/7/2021 1118  Last data filed at 8/6/2021 1900  Gross per 24 hour   Intake 0 ml   Output --   Net 0 ml       Labs  BUN   Date Value Ref Range Status   08/07/2021 38 (H) 8 - 20 mg/dL Final     Creatinine   Date Value Ref Range Status   08/07/2021 3.0 (H) 0.8 - 1.3 mg/dL Final     Potassium   Date Value Ref Range Status   08/07/2021 4.0 3.6 - 5.1 mEQ/L Final     Phosphorus   Date Value Ref Range Status   07/27/2021 3.0 2.4 - 4.7 mg/dL Final     Comment:     MODERATE HEMOLYSIS, RESULT MAY BE INCREASED.     Hemoglobin   Date Value Ref Range Status   08/07/2021 9.0 (L) 13.7 - 17.5 g/dL Final     Sodium   Date Value Ref Range Status   08/07/2021 139 136 - 144 mEQ/L Final     Albumin   Date Value Ref Range Status   08/07/2021 2.1 (L) 3.4 - 5.0 g/dL Final     I have reviewed the pertinent patient's labs.    Physical Exam  NAD, AA, pleasant  Anicteric, dry mucus, normal conjunctivae  RRR, S1, S2, no edema  CTA no wheezing  Soft, +BS, distended. NT  No C/C      Assessment /Plan  Acute kidney injury superimposed on chronic kidney disease (CMS/HCC)  Assessment & Plan  He had previous serum creatinine of high 2 to 3.0 mg/dl  He had previously followed with Dr. Terrazas but has not had regular follow up   SONJA shows atrophic kidneys    Rate of rise noted with GABY  He is behaving as ATN despite UA without granular casts  Urine eos are noted to be rare but no rash, fever or peripheral eos  No predicating medications are on board at this time. He was on a short 5 day course of Ceftriaxone which has been stopped.  He had IVF prior and went into fluid overload requiring lasix    His serum creatinine is  stable.  Off of IVF.  Discussed with patient  And family on need for OP ff up with primary nephrologist ( Dr. Terrazas).    Hypertension  Assessment & Plan  Blood pressure labile   Presently on Amlodipine and Metoprolol  Little room to titrate Metoprolol given bradycardia  Switched Amlodipine to Procardia BID    BP stable.  Continue to follow on the same meds.    * Acute pancreatitis  Assessment & Plan  Abdominal pain better.  Tolerating diet.  CT of the abdomen- significant peripancreatic inflammatory changes, poss worsening phlegmonous changes; marked increase in ascites.   EUS per GI        Stable renal function on LR  D/C LR and follow BMP  FU with Dr. Terrazas once DC'ed  Hypomagnesemia : Mag sulfate 2 g IV today      8/7/2021  Brunilda Rico MD   Subjective

## 2021-08-07 NOTE — DISCHARGE SUMMARY
Hospital Medicine Service -  Inpatient Discharge Summary        BRIEF OVERVIEW   Admitting Provider: Charlene Mo DO  Attending Provider: No att. providers found Attending phys phone: N/A    PCP: Myles Longoria [phone]    Admission Date: 7/21/2021  Discharge Date: 8/7/2021     DISCHARGE DIAGNOSES      Primary Discharge Diagnosis  Acute pancreatitis    Secondary Discharge Diagnoses  Active Hospital Problems    Diagnosis Date Noted   • Acute pancreatitis 07/21/2021     Priority: High   • Gout 08/04/2021   • Ascites 08/02/2021   • Leukocytosis 07/30/2021   • Elevated troponin 07/27/2021   • Anemia 07/21/2021   • Hypertension    • Stage 4 chronic kidney disease (CMS/HCC)    • Prostate CA (CMS/HCC)       Resolved Hospital Problems    Diagnosis Date Noted Date Resolved   • Acidosis, metabolic 07/27/2021 08/02/2021   • Diarrhea 07/27/2021 08/04/2021   • Edema 07/27/2021 07/29/2021   • Shortness of breath 07/23/2021 07/28/2021   • Abdominal pain 07/21/2021 07/25/2021   • Sinus bradycardia 07/21/2021 07/25/2021   • SIRS (systemic inflammatory response syndrome) (CMS/HCC) 07/21/2021 07/23/2021   • Abdominal pain 07/21/2021 07/25/2021       Problem List on Day of Discharge  * Acute pancreatitis  Assessment & Plan  -Acute pancreatitis.  -MRI c/w hemorrhagic pancreatitis.   -Possible intraductal mucinous cyst.   -plan for outpatient EUS per GI   F/u with , d/w pt   -continue low residue low-fat diet  CT AP showing moderate ascites, again also demonstrating abnormalities in the pancreas  D/w GI cleared for Dc with outpt f/u with Kat  D/w pt and his family at bedside    Gout  Assessment & Plan  Foot pain resolved with steroids  Dc prednisone      Ascites  Assessment & Plan  -Noted on CT abdomen, however not enough fluid for paracentesis per IR    Leukocytosis  Assessment & Plan  Reactive from pancreatitis  Also he received PO prednisone for gout   afberile   monitor.     Elevated  troponin  Assessment & Plan  Suspect demand ischemia  -outpatient ischemic eval with cardiology     Anemia  Assessment & Plan  Chronic and at his baseline  Due to CKD  -hemoglobin stable, follow CBC     Prostate CA (CMS/HCC)  Assessment & Plan  outpt f/u    Stage 4 chronic kidney disease (CMS/HCC)  Assessment & Plan  Cr baseline high 2 to 3.0 mg/dl  Follow  with Dr. Terrazas oupt  Avoid nephrotoxins  Cleared by Renal    Hypertension  Assessment & Plan  Dc home on home regimen  Norvasc  F/u with PCP out pt       SUMMARY OF HOSPITALIZATION      Presenting Problem/History of Present Illness  Sinus bradycardia [R00.1]  Acute pancreatitis, unspecified complication status, unspecified pancreatitis type [K85.90]    This is a 80 y.o. year-old male admitted on 7/21/2021 with Sinus bradycardia [R00.1]  Acute pancreatitis, unspecified complication status, unspecified pancreatitis type [K85.90].        Hospital Course    Patient is a 81 yo man with history of CKD stage 4 and hypertension was admitted for severe abdominal pain, nausea, and vomiting. He was diagnosed  With pancreatitis.  On admission CT scan of the abdomen showed marked abnormality of the pancreas which is overall diffusely enlarged with significant peripancreatic inflammatory changes, and possibly phlegmonous changes around the pancreatic head  Patient was seen in consultation by GI.  Also had an MRI of the abdomen which showed acute pancreatitis with intrinsic T1 hyperintense material tracking along the pancreatic head, raising concern for hemorrhagic pancreatitis.   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     Patient was initially kept n.p.o. and as pain improved was started on clears and advanced patient currently tolerating diet.  GI recommended to follow-up with Kike Hi for endoscopic ultrasound biopsy of the pancreas to rule out IPMN.    On admission he also had marked  bradycardia which is attributed to increased vagal tone from his pain was seen by cardiology troponin was mildly elevated which was suspected to be demand ischemia.  He was advised to follow-up with cardiology    His blood pressure during the hospital course was running high likely from the pain.  Meds adjusted during hospital course patient will be discharged with his home Norvasc dose and outpatient follow-up with primary care physician.    He was also followed by nephrology for acute on chronic kidney disease during the hospital course his creatinine increased to 3.7 responded with fluids currently at his baseline.  He is to follow-up with his nephrologist.    Instructions were explained to the patient and the family at the bedside.  Exam on Day of Discharge  Physical Exam  Vitals reviewed.   HENT:      Head: Normocephalic and atraumatic.      Mouth/Throat:      Mouth: Mucous membranes are moist.   Eyes:      Extraocular Movements: Extraocular movements intact.      Conjunctiva/sclera: Conjunctivae normal.   Cardiovascular:      Rate and Rhythm: Normal rate and regular rhythm.      Heart sounds: Normal heart sounds.   Pulmonary:      Effort: Pulmonary effort is normal.      Breath sounds: Normal breath sounds.   Abdominal:      General: Bowel sounds are normal.      Palpations: Abdomen is soft.      Tenderness: There is no abdominal tenderness.   Musculoskeletal:      Cervical back: Neck supple.      Right lower leg: No edema.      Left lower leg: No edema.   Skin:     General: Skin is warm and dry.   Neurological:      General: No focal deficit present.      Mental Status: He is alert and oriented to person, place, and time.   Psychiatric:         Mood and Affect: Mood normal.         Consults During Admission  IP CONSULT TO CARDIOLOGY  IP CONSULT TO NEPHROLOGY    DISCHARGE MEDICATIONS        Medication List      START taking these medications    cholecalciferol (vitamin D3) 1,000 unit (25 mcg) tablet  Take 1  tablet (1,000 Units total) by mouth daily.  Dose: 1,000 Units     lidocaine 4 % adhesive patch,medicated topical patch  Commonly known as: ASPERCREME  Apply 1 patch topically daily.  Dose: 1 patch     pancrelipase (Lip-Prot-Amyl) 6,000-19,000 -30,000 unit capsule  Commonly known as: CREON  Take 2 capsules (12,000 units of lipase total) by mouth 3 (three) times a day with meals.  Dose: 12,000 units of lipase     pantoprazole 40 mg EC tablet  Commonly known as: PROTONIX  Take 1 tablet (40 mg total) by mouth daily.  Dose: 40 mg     sodium bicarbonate 650 mg tablet  Take 1 tablet (650 mg total) by mouth daily.  Dose: 650 mg        CONTINUE taking these medications    amLODIPine 10 mg tablet  Commonly known as: NORVASC  Take 10 mg by mouth daily.  Dose: 10 mg     APPLE CIDER VINEGAR ORAL  Take 1 tablet by mouth daily.  Dose: 1 tablet     cod liver oil oil  Take 1 tablet by mouth daily.  Dose: 1 tablet     coenzyme Q10 100 mg capsule  Commonly known as: COQ10  Take 100 mg by mouth daily.  Dose: 100 mg     FISH OIL ORAL  Take 1 capsule by mouth daily.  Dose: 1 capsule     glucosamine-chondroitin 500-400 mg tablet  Take 1 tablet by mouth daily.  Dose: 1 tablet     lutein 10 mg tablet  Take 10 mg by mouth daily.  Dose: 10 mg     OREGANO OIL ORAL  Take 1 capsule by mouth daily.  Dose: 1 capsule     sertraline 50 mg tablet  Commonly known as: ZOLOFT  Take 25 mg by mouth every evening.  Dose: 25 mg             Instructions for after discharge     Discharge diet      Diet Type / Texture:  Regular  Cardiac (Low Sodium, Low Fat)       Low fiber low residue diet    Follow-up with Provider:      Please follow up with Dr. Hancock within one week.    Luzma Hancock MD   [phone] [address]  [address]  [place] [address]       Follow-up with Provider:      Please follow up with Dr. Terrazas within 1 week.    Kapil Terrazas MD   [phone] [address]  [facility] [address]       Follow-up with  Provider:      Please follow up with Dr. Andrea within 1-2 weeks.    Nava Andrea MD   [phone]    [address]  [address]  [place] [address]       Follow-up with primary physician (PCP)      F/u with PCP next week    Follow-up with provider      Dr.Jack Carmona, Gastroenterologist at [facility] call     Other Follow-up      Dr.Bob Hi  Gastroenterologist at [facility] Call  [phone] for appointment for Endoscopic  Pancreatic ultrasound    Post-Discharge Activity: Normal activity as tolerated.      Normal activity as tolerated.             PROCEDURES / LABS / IMAGING      Operative Procedures      Other Procedure  Pertinent Labs  Results from last 7 days   Lab Units 08/07/21  0608 08/06/21  0658 08/05/21  0703   WBC K/uL 18.68* 18.78* 20.11*   HEMOGLOBIN g/dL 9.0* 8.9* 8.9*   HEMATOCRIT % 27.1* 27.7* 27.1*   PLATELETS K/uL 517* 501* 448*       SARS-CoV-2 (COVID-19) (no units)   Date/Time Value   07/21/2021 1211 Negative       Pertinent Imaging  CT ABDOMEN PELVIS WITHOUT IV CONTRAST    Result Date: 8/5/2021  IMPRESSION: Acute pancreatitis with a slight increase in peripancreatic fluid. Evaluation for walled off necrosis is difficult without contrast. COMMENT: Technique: CT examination of the abdomen and pelvis was performed utilizing contiguous 2.5 mm transaxial sections. Images were acquired without intravenous contrast.. Oral contrast was not given CT DOSE:  One or more dose reduction techniques (e.g. automated exposure control, adjustment of the mA and/or kV according to patient size, use of iterative reconstruction technique) utilized for this examination. Comparison studies: CT abdomen pelvis dated 7/31/2021. Lung bases: There are hypoventilatory changes and small stable effusions.. Lower chest soft tissue: Normal. Liver: Normal. Spleen: Normal. Adrenals: Normal. Pancreas: Again noted is extensive inflammation about the pancreas with poorly defined pancreatic ductal  dilatation and cystic lesion within the distal body. Since the previous examination there has been a slight increase in peripancreatic fluid.  There is no new air within the pancreas. Kidneys, ureters and bladder: Stable left parapelvic cysts.. Gallbladder:  Stable mild prominence.. Retroperitoneal structures: Normal. Bowel and mesentery: Scattered diverticuli noted.  The appendix is normal. There is stable ascites.. Lymph nodes: None enlarged. Pelvic structures:  Normal. Bones: Thoracolumbar degenerative change.. Vessels:Aortoiliac vascular calcifications.     CT ABDOMEN PELVIS WITHOUT IV CONTRAST    Result Date: 7/31/2021  IMPRESSION: Limited evaluation due to lack of IV contrast.  There is again redemonstration of marked abnormality of the pancreas which is overall diffusely enlarged with significant peripancreatic inflammatory changes, and I suspect the inflammatory changes and possibly phlegmonous changes around the pancreatic head may be worsened.  Cannot exclude focal or drainable collection.  Probable secondary inflammatory changes of the duodenum.  New bilateral pleural effusions, right greater than left.  There are several small bowel loops which are fluid-filled and mildly prominent but no evidence for obstruction or transition point.  This may be due to enteritis or ileus.  There is also interval development of ascites. STUDY: Axial CT images of the abdomen and pelvis were obtained from the lung bases to the pubic symphysis.  No IV or oral contrast demonstrate for this examination.  Images were reviewed in soft tissue, lung and bone windows. CT DOSE:  One or more dose reduction techniques (e.g. automated exposure control, adjustment of the mA and/or kV according to the patient size, use of iterative reconstruction technique) utilized for this examination. COMMENT: GI System: Liver:  Normal in size and appearance. Gallbladder and bile ducts:  Gallbladder is distended but otherwise unremarkable.  No ductal  dilatation. Pancreas:  Markedly abnormal appearance of the pancreas which is enlarged and edematous with marked surrounding inflammatory changes particularly the head of the pancreas probably worsened from the previous study.  A more focal lesion in the mid body of the pancreas measuring 2.8 x 3.1 cm although might be similar to the prior study. Stomach:  Normal in size and appearance. Small bowel:  Cannot exclude secondary inflammatory changes of the duodenum. Multiple fluid-filled and mildly prominent but not significantly dilated small bowel loops diffusely, likely related to enteritis or ileus.  No discrete transition point appreciated. Large bowel:  Normal  in caliber and appearance.  Normal appendix.  System: Adrenals:  Normal. Kidneys:  The kidneys are small measuring 8 cm on the right 8.2 cm on the left, no hydronephrosis.  Large left parapelvic renal cyst measuring 4.1 x 3.7 cm.. Urinary bladder:  Normal. Prostate gland and seminal vesicles: Prostate is surgically absent. RES System: Spleen:  Normal in size and appearance. Lymph nodes:  Although difficult to evaluate, there does appear to be multiple enlarged peripancreatic lymph nodes. Other: Peritoneum:  Moderate ascites, the largest component in the pelvis.  No free air.  No contained fluid collection definitely identified although limited evaluation due to lack of IV contrast. Aorta and iliac arteries:  Atherosclerotic calcifications of the abdominal aorta and iliac arteries. Abdominal wall: Normal. Lower lungs and pleura:  Small bilateral pleural effusions, right greater than left.  Bibasilar airspace disease, likely related to atelectasis. Visualized axial skeleton:  Multilevel degenerative spondylosis of the lumbar spine.    X-RAY CHEST 2 VIEWS    Result Date: 7/25/2021  IMPRESSION: Stable.    CT ANGIOGRAPHY CHEST WITH AND WITHOUT IV CONTRAST    Result Date: 7/21/2021  IMPRESSION: 1.  There is fluid and stranding throughout the peripancreatic fat  predominantly the region of the pancreatic head and neck.  Clinical correlation for acute pancreatitis is advised.  There is dilatation of pancreatic duct throughout the body and tail.  No obvious pancreatic mass is demonstrated.  An underlying intraductal lesion is not excluded on the basis of this exam. 2.  Normal caliber thoracoabdominal aorta without evidence of dissection. Repeat.  Additional incidental findings as described above.     CT ANGIOGRAPHY ABDOMEN PELVIS WITH AND WITHOUT IV CONTRAST    Result Date: 7/21/2021  IMPRESSION: 1.  There is fluid and stranding throughout the peripancreatic fat predominantly the region of the pancreatic head and neck.  Clinical correlation for acute pancreatitis is advised.  There is dilatation of pancreatic duct throughout the body and tail.  No obvious pancreatic mass is demonstrated.  An underlying intraductal lesion is not excluded on the basis of this exam. 2.  Normal caliber thoracoabdominal aorta without evidence of dissection. Repeat.  Additional incidental findings as described above.     ULTRASOUND KIDNEYS    Result Date: 7/26/2021  IMPRESSION: 1.  No hydronephrosis. 2.  Atrophic kidneys. Increased parenchymal echogenicity in keeping with medical renal disease. 3.  Bilateral renal cysts. I certify that I have reviewed this examination and agree with this report. Trini Mas D.O.    X-RAY CHEST 1 VIEW    Result Date: 7/26/2021  IMPRESSION: Stable appearance of the chest as described above.    X-RAY CHEST 1 VIEW    Result Date: 7/23/2021  IMPRESSION: Minimal lower lobe airspace disease which may represent atelectasis or pneumonia.    X-RAY CHEST 1 VIEW    Result Date: 7/21/2021  IMPRESSION: No definite active disease.    MRI ABDOMEN WITHOUT CONTRAST MRCP    Result Date: 7/24/2021  IMPRESSION: 1.  Acute pancreatitis with intrinsic T1 hyperintense material tracking along the pancreatic head, raising concern for hemorrhagic pancreatitis. 2.  Cystic dilatation of the  main pancreatic duct measuring up to 3.2 cm in diameter, may represent a main duct IPMN. 3.  Mild extrahepatic and intrahepatic hepatic biliary ductal dilatation.     Ultrasound venous leg left    Result Date: 7/27/2021  IMPRESSION:   No evidence for deep venous thrombosis in the left lower extremity. COMMENT: There is normal response to compression within the left common femoral, superficial femoral, and popliteal veins. Normal color-flow, venous waveforms, and response to augmentation is observed. Similar findings are observed in the posterior tibial, peroneal and gastrocnemius veins of the left calf.       OUTPATIENT  FOLLOW-UP / REFERRALS / PENDING TESTS        Outpatient Follow-Up Appointments  Encounter Information    This patient does not currently have any appointments scheduled.         Referrals  No orders of the defined types were placed in this encounter.      Test Results Pending at Discharge  Unresulted Labs (From admission, onward)     Start     Ordered    07/31/21 1625  Cytology, Fluids Peritoneal Fluid  (Paracentesis labs for IR collection)  Once     Question Answer Comment   Specimen Source Peritoneal Fluid    Collect in IR Yes    Release to patient Immediate        07/31/21 1624    07/31/21 1624  AFB culture Peritoneal Fluid  (Paracentesis labs for IR collection)  STAT     Question Answer Comment   Collect in IR Yes    Release to patient Immediate        07/31/21 1624    07/31/21 1624  Fungal culture / smear Peritoneal Fluid  (Paracentesis labs for IR collection)  Once     Question Answer Comment   Collect in IR Yes    Release to patient Immediate        07/31/21 1624    07/31/21 1624  Anaerobic Culture / Smear (includes Aerobic Culture) Peritoneal Fluid  (Paracentesis labs for IR collection)  STAT     Question Answer Comment   Collect in IR Yes    Release to patient Immediate        07/31/21 1624    07/31/21 1624  AFB stain Peritoneal Fluid  PROCEDURE ONCE     Question:  Release to patient   Answer:  Immediate    07/31/21 1624    07/31/21 1624  AFB Culture Peritoneal Fluid  PROCEDURE ONCE     Question:  Release to patient  Answer:  Immediate    07/31/21 1624    07/31/21 1624  Fungal Stain Peritoneal Fluid  PROCEDURE ONCE     Question:  Release to patient  Answer:  Immediate    07/31/21 1624    07/31/21 1624  Fungal Culture Peritoneal Fluid  PROCEDURE ONCE     Question:  Release to patient  Answer:  Immediate    07/31/21 1624                Important Issues to Address in Follow-Up  Follow-up with GI Dr. Kike Hi at [facility] for endoscopic ultrasound of pancreas.    Follow-up with cardiology and nephrology      DISCHARGE DISPOSITION      Disposition: Home     Code Status At Discharge: Prior      Total time spent 40 minutes evaluating patient, coordinating care, preparing the discharge and discussing with family.  Physician Order for Life-Sustaining Treatment Document Status      No documents found

## 2021-08-08 NOTE — ASSESSMENT & PLAN NOTE
-Acute pancreatitis.  -MRI c/w hemorrhagic pancreatitis.   -Possible intraductal mucinous cyst.   -plan for outpatient EUS per GI   F/u with , d/w pt   -continue low residue low-fat diet  CT AP showing moderate ascites, again also demonstrating abnormalities in the pancreas  D/w GI cleared for Dc with outpt f/u with Kat  D/w pt and his family at bedside

## 2021-08-08 NOTE — ASSESSMENT & PLAN NOTE
Cr baseline high 2 to 3.0 mg/dl  Follow  with Dr. Terrazas oupt  Avoid nephrotoxins  Cleared by Renal

## 2021-08-15 PROBLEM — N17.9 ACUTE KIDNEY INJURY SUPERIMPOSED ON CHRONIC KIDNEY DISEASE (CMS/HCC)  (CMS/HCC): Status: ACTIVE | Noted: 2021-01-01

## 2021-08-15 PROBLEM — N18.9 ACUTE KIDNEY INJURY SUPERIMPOSED ON CHRONIC KIDNEY DISEASE (CMS/HCC)  (CMS/HCC): Status: ACTIVE | Noted: 2021-01-01

## 2021-08-15 PROBLEM — K63.1 DUODENAL PERFORATION (CMS/HCC): Status: ACTIVE | Noted: 2021-01-01

## 2021-08-15 NOTE — ED ATTESTATION NOTE
I have personally seen and examined the patient.  I reviewed and agree with physician assistant´s assessment and plan of care. I was physically present for the key/critical portions of the procedures.    General     Uncomfortable. Pale. Awake and able to answer questions.   HEENT    Normocephalic and atraumatic.     Conjunctivae, EOM and lids are grossly normal. PERRL.    No rhinorrhea. Dry mm    Normal ROM and full passive ROM of neck without pain. Neck supple.     No JVD. No tracheal deviation present.   Cardio/Pulm    tachycardia, normal heart sounds and intact distal pulses.     No respiratory distress. Effort normal and normal BS bilat.     No stridor. No wheezes  Abdomen and Back    Abd tense Normal appearance and BS normal. Mod tender, No distension.     No rebound or guarding. No CVA tenderness.   Extremities and Skin    Extrem are grossly normal. normal ROM. No edema or tenderness.     Skin is warm, dry and intact. No rash noted. Not diaphoretic. No pallor.  Neuro/Psych    AAOx3. Cooperative and mood/affect normal.    No aphasia. Speech normal. No dysphasia or dysarthria.     Vision grossly normal     Generally weak but No gross CN deficit or facial weakness.     No gross motor deficit and moves all extremities equally.      No gross sensory deficit.        Nursing note and vitals reviewed.  \\            Repeat ECG at 1424  HR94              D/w surg 1425      Critical Care  Performed by: WING CORDERO  Authorized by: WING CORDERO     Critical care provider statement:     Critical care time (minutes):  35    Critical care time was exclusive of:  Separately billable procedures and treating other patients    Critical care was time spent personally by me on the following activities:  Development of treatment plan with patient or surrogate, discussions with consultants, discussions with primary provider, evaluation of patient's response to treatment, examination of patient, review of old charts,  re-evaluation of patient's condition, pulse oximetry, ordering and review of radiographic studies, ordering and review of laboratory studies and ordering and performing treatments and interventions            Admit           Wilbur Baez MD  08/15/21 1214

## 2021-08-15 NOTE — PROGRESS NOTES
Spoke with patient and confirmed with medication list from [facility] and/or patient's own pharmacy to complete the medication reconciliation.     Prior to admission medication list:    Current Outpatient Medications:   •  leuprolide, 3 month, (LUPRON DEPOT, 3 MONTH,) 22.5 mg intramuscular injection, Inject 22.5 mg into the shoulder, thigh, or buttocks every 3 (three) months., Disp: , Rfl:   •  amLODIPine (NORVASC) 10 mg tablet, Take 10 mg by mouth daily., Disp: , Rfl:   •  APPLE CIDER VINEGAR ORAL, Take 1 tablet by mouth daily.  , Disp: , Rfl:   •  cholecalciferol, vitamin D3, 1,000 unit (25 mcg) tablet, Take 1 tablet (1,000 Units total) by mouth daily., Disp: 30 tablet, Rfl: 0  •  cod liver oil oil, Take 1 tablet by mouth daily.  , Disp: , Rfl:   •  coenzyme Q10 (COQ10) 100 mg capsule, Take 100 mg by mouth daily., Disp: , Rfl:   •  docosahexaenoic acid/epa (FISH OIL ORAL), Take 1 capsule by mouth daily.  , Disp: , Rfl:   •  glucosamine-chondroitin 500-400 mg tablet, Take 1 tablet by mouth daily.  , Disp: , Rfl:   •  lidocaine (ASPERCREME) 4 % adhesive patch,medicated topical patch, Apply 1 patch topically daily., Disp: 30 patch, Rfl: 0  •  lutein 10 mg tablet, Take 10 mg by mouth daily., Disp: , Rfl:   •  ondansetron (ZOFRAN) 4 mg tablet, Take 4 mg by mouth every 6 (six) hours as needed. for nausea, Disp: , Rfl:   •  OREGANO OIL ORAL, Take 1 capsule by mouth daily., Disp: , Rfl:   •  oxyCODONE (ROXICODONE) 5 mg immediate release tablet, , Disp: , Rfl:   •  pancrelipase, Lip-Prot-Amyl, (CREON) 6,000-19,000 -30,000 unit capsule, Take 2 capsules (12,000 units of lipase total) by mouth 3 (three) times a day with meals., Disp: 180 capsule, Rfl: 0  •  pantoprazole (PROTONIX) 40 mg EC tablet, Take 1 tablet (40 mg total) by mouth daily., Disp: 30 tablet, Rfl: 0  •  sertraline (ZOLOFT) 50 mg tablet, Take 25 mg by mouth every evening.  , Disp: , Rfl:   •  sodium bicarbonate 650 mg tablet, Take 1 tablet (650 mg total) by  mouth daily., Disp: 30 tablet, Rfl: 0    Comments about home medications:  Admission for Acute pancreatitis 7/21/2021 - 8/7/2021 at [facility] noted.    Additional comments:   Unable to assess med list at this time due to patient pain level.

## 2021-08-15 NOTE — ANESTHESIA PREPROCEDURE EVALUATION
"Relevant Problems   CARDIOVASCULAR   (+) Hypertension      GASTROINTESTINAL   (+) Ascites      HEMATOLOGY   (+) Anemia      URINARY SYSTEM   (+) Acute kidney injury superimposed on chronic kidney disease (CMS/HCC)      Other   (+) Acute pancreatitis   (+) Duodenal perforation (CMS/HCC)   (+) Elevated troponin   (+) Gout   (+) Leukocytosis   (+) Prostate CA (CMS/HCC)   (+) Stage 4 chronic kidney disease (CMS/HCC)     79 yo M with hx of CKD stage 4 and HTN recently admitted for pancreatitis now found to have duodenal perforation.     Anesthesia ROS/MED HX      Cardiovascular  Dysrhythmias (sinus bradycardia)  Hematological    anemia  Renal Disease   chronic renal insufficiency  Endo/Other  History of cancer and prostate cancer  ROS/MED HX Comments:    Cardiology: Elevated troponins. Believed to be demand ischemia    GI/Hepatic/Renal: Hx of pancreatitis recently in 7/2021. Duodenal perforation   Musculoskeletal: gout   ROS/Hx: Per cardiac consultation on 7/21/21: \"Baseline HR 45-55 bpm. No history of syncope. Questionable exertional lightheadedness when taking stairs but not on ground level. HR went down to the 30s while in the ED associated with mild lightheadedness. BP has been stable. HR now in the 40-50s. Suspect underlying sinus node dysfunction aggravated by increased vagal tone from abdominal pain. No indication for pacemaker at this time. Continue to monitor on tele. He might need pacemaker in the future if he develops symptoms from bradycardia.\"       TTE 7/2021  · Normal-sized LV. Hyperdynamic LV systolic function. Estimated EF >75%. Normal LV wall thickness. Grade I LV diastolic dysfunction. LV diastolic inflow pattern consistent with impaired relaxation.  · Normal leaflet structure and normal leaflet motion of the mitral valve. Mitral annular calcification.  · Trace mitral valve regurgitation.  · Normal-sized LA.  · Aortic valve structure is normal. Sclerotic aortic valve leaflets. Trace aortic valve " regurgitation. No aortic valve stenosis.  · Aortic root normal. Sinuses of Valsalva normal-sized.  · Normal-sized RA.  · Normal-sized RV. Normal RV systolic function.  · Tricuspid valve structure is normal. No tricuspid valve regurgitation.    Past Surgical History:   Procedure Laterality Date   • PROSTATECTOMY         Patient Active Problem List   Diagnosis   • Hypertension   • Stage 4 chronic kidney disease (CMS/HCC)   • Prostate CA (CMS/HCC)   • Acute pancreatitis   • Anemia   • Elevated troponin   • Leukocytosis   • Ascites   • Gout   • Acute kidney injury superimposed on chronic kidney disease (CMS/HCC)   • Duodenal perforation (CMS/HCC)       Current Facility-Administered Medications   Medication Dose Route Frequency   • morphine  4 mg intravenous q30 min PRN   • sodium chloride  1,000 mL intravenous Once       Prior to Admission medications    Medication Sig Start Date End Date Taking? Authorizing Provider   amLODIPine (NORVASC) 10 mg tablet Take 10 mg by mouth daily.    ProviderSariah MD   APPLE CIDER VINEGAR ORAL Take 1 tablet by mouth daily.      Sariah Grande MD   cholecalciferol, vitamin D3, 1,000 unit (25 mcg) tablet Take 1 tablet (1,000 Units total) by mouth daily. 8/8/21 9/7/21  Kim Plummer MD   cod liver oil oil Take 1 tablet by mouth daily.      Sariah Grande MD   coenzyme Q10 (COQ10) 100 mg capsule Take 100 mg by mouth daily.    Sariah Grande MD   docosahexaenoic acid/epa (FISH OIL ORAL) Take 1 capsule by mouth daily.      Sariah Grande MD   glucosamine-chondroitin 500-400 mg tablet Take 1 tablet by mouth daily.      Sariah Grande MD   lidocaine (ASPERCREME) 4 % adhesive patch,medicated topical patch Apply 1 patch topically daily. 8/8/21 9/7/21  Kim Plummer MD   lutein 10 mg tablet Take 10 mg by mouth daily.    Sariah Grande MD   ondansetron (ZOFRAN) 4 mg tablet Take 4 mg by mouth every 6 (six) hours as needed. for nausea 8/10/21    Sariah Grande MD   OREGANO OIL ORAL Take 1 capsule by mouth daily.    Sariah Grande MD   oxyCODONE (ROXICODONE) 5 mg immediate release tablet  8/13/21   Sariah Grande MD   pancrelipase, Lip-Prot-Amyl, (CREON) 6,000-19,000 -30,000 unit capsule Take 2 capsules (12,000 units of lipase total) by mouth 3 (three) times a day with meals. 8/7/21 9/6/21  Kim Plummer MD   pantoprazole (PROTONIX) 40 mg EC tablet Take 1 tablet (40 mg total) by mouth daily. 8/7/21 9/6/21  Kim Plummer MD   sertraline (ZOLOFT) 50 mg tablet Take 25 mg by mouth every evening.   5/24/21   Sariah Grande MD   sodium bicarbonate 650 mg tablet Take 1 tablet (650 mg total) by mouth daily. 8/7/21 9/6/21  Kim Plummer MD       CBC Results       08/15/21 08/07/21 08/06/21                    1150 [id number]         WBC 11.95 18.68 18.78         RBC 4.15 3.02 3.04         HGB 11.9 9.0 8.9         HCT 37.7 27.1 27.7         MCV 90.8 89.7 91.1         MCH 28.7 29.8 29.3         MCHC 31.6 33.2 32.1          517 501                       BMP Results       08/15/21 08/07/21 08/06/21                    1150 [id number] [id number]          139 141         K 3.7 4.0 4.1         Cl 100 112 112         CO2 19 19 18         Glucose 145 133 123         BUN 68 38 37         Creatinine 5.1 3.0 3.2         Calcium 9.9 8.7 8.8         Anion Gap 17 8 11         EGFR 10.9 20.1 18.7         Comment for K at 1150 on 08/15/21: Results obtained on plasma. Plasma Potassium values may be up to 0.4 mEQ/L less than serum values. The differences may be greater for patients with high platelet or white cell counts.    Comment for Creatinine at 1150 on 08/15/21: Consistent with previous results            Results from last 7 days   Lab Units 08/15/21  1150   INR  1.3   PTT sec 29       CT ABDOMEN PELVIS WITHOUT IV CONTRAST   Final Result   IMPRESSION:   1.  Findings again consistent with acute pancreatitis with interval increase in   gas  within the known peripancreatic fluid collections, findings which are   suspicious for communication and perforation of the adjacent duodenum.  Although   there is no oral contrast extravasation on this study there is a large amount of   contrast retained in the stomach, likely secondary to inflammatory changes and   findings are likely felt to represent duodenal perforation.      ULTRASOUND ABDOMEN LIMITED         X-RAY CHEST 1 VIEW   Final Result   IMPRESSION:   Bibasilar areas of atelectasis and/or scarring are noted.  Volume loss is   present in both lower lobes right greater than left are no large effusions are   present         CT ABDOMEN PELVIS WITHOUT IV CONTRAST   Final Result   IMPRESSION: Findings again consistent with acute pancreatitis with interval   increase in gas within the known peripancreatic fluid collections, findings   which are suspicious for communication and perforation of the adjacent duodenum.   There is small amount of free air noted in the upper abdomen in keeping with   perforated viscus, likely duodenal in origin.  Small amount of free fluid in the   pelvis, similar to prior study.         Finding:    New or increased pneumoperitoneum or other evidence of perforated   viscus   Acuity: Critical  Status:  CLOSED      Critical read back was performed and results were read back by Dr. Baez,  on   8/15/2021 at at 2:20 PM         COMMENT:      Technique: CT examination of the abdomen and pelvis was performed utilizing   contiguous 2.5 mm transaxial sections. Images were acquired without intravenous   contrast.. Oral contrast was not given      CT DOSE:  One or more dose reduction techniques (e.g. automated exposure   control, adjustment of the mA and/or kV according to patient size, use of   iterative reconstruction technique) utilized for this examination.      Comparison studies: CT abdomen pelvis dated 8/5/2021      Lung bases: Trace left pleural effusion.  Bibasilar atelectasis..       Lower chest soft tissue: Normal.      Liver: Normal.      Spleen: Normal.      Adrenals: Normal.      Pancreas: Again noted is extensive inflammation about the pancreas with poorly   defined pancreatic ductal dilatation and cystic lesion within the distal body.   The distribution of the collection/extensive information surrounding the   pancreas is stable when compared prior study however there are multiple new foci   of gas within the collection raising concern for perforation noting that the   duodenum is inseparable from the collection in this area.  There is a small   amount of free fluid in the pelvis and free air noted predominantly in the upper   abdomen.      Kidneys, ureters and bladder: Stable left parapelvic cysts..      Gallbladder:  Stable mild prominence..      Retroperitoneal structures: Normal.      Bowel and mesentery: Scattered diverticuli noted.  There is mild bowel wall   thickening of the transverse colon in the right upper quadrant, findings are   likely reactive to adjacent collection.  The appendix is normal.      Lymph nodes: Prominent mesenteric lymph nodes adjacent to the pancreas are   likely reactive.      Pelvic structures:  Normal.      Bones: Thoracolumbar degenerative change..      Vessels:Aortoiliac vascular calcifications.                     ECG 12 lead   Final Result      ECG 12 lead    (Results Pending)         Physical Exam    Airway   Mallampati: IV   TM distance: >3 FB   Neck ROM: full  Cardiovascular - normal   Rhythm: regular   Rate: normalPulmonary - normal   clear to auscultation    Anesthesia  Exam Comments:   Exam Airway: beard          Anesthesia Plan    Plan: general    Technique: general endotracheal     Lines and Monitors: PIV, additional IV, arterial line and central line     Airway: oral intubation and direct visual laryngoscopy   ASA 4 - emergent  Blood Products:     Use of Blood Products Discussed: Yes     Consented to blood products  Anesthetic plan and risks  discussed with: patient, significant other and adult children  Induction:    intravenous   Postop Plan:   Patient Disposition: inpatient floor planned admission and ICU planned admission   Pain Management: IV analgesics  Comments:    Plan: Patient found with altered mental status in pre-op area. Hypotensive 60-70s/40-50s. Pulse ox difficult to . Spoke to both son and wife and discussed likely/possible need for additional access, pressors, blood products, and post-op intubation.

## 2021-08-15 NOTE — H&P
Attending Comments     I examined the patient. I reviewed the vitals, labs, imaging and medication data in the chart.     I reviewed and agree with the NP/PA/resident's documentation and plan of care with the exception or additions below.    80-year-old male who presents with abdominal pain.  A CT scan was obtained which revealed findings concerning for duodenal perforation in the setting of pancreatitis.  Acute care surgery was consulted.    A repeat CT scan was obtained with p.o. contrast.  The contrast remained confined to the stomach and was unable to pass into the duodenum (likely related to extensive pyloric and duodenal inflammation).    Past medical history chronic kidney disease stage IV, hypertension, prostate cancer status post prostatectomy via a lower midline incision in 1999, recent pancreatitis requiring hospitalization at [facility] from July 21 through August 7.    On exam patient is in septic shock with systolic blood pressures in the 80s after receiving 4 L of crystalloid.  He has low-grade tachycardia.  He has received Zosyn.    His abdomen is soft and diffusely tender to palpation.  He has a lower midline well-healed scar.    Laboratory studies revealed a lactic acidosis of 3.4 which responded to 2.9 with fluid resuscitation.  He has hypoalbuminemia 1.9.  He has evidence of acute kidney injury with a creatinine of 5.1 from 3 on 8/7.  He has leukocytosis with bandemia.     Assessment/plan: 80-year-old male presenting with septic shock secondary to perforated viscus    Perforated viscus  -Likely duodenal in origin secondary to recent pancreatitis.  -Start Zosyn and fluconazole  -PPI  -Informed consent obtained from patient (before he became increasingly altered) and then his wife for surgical intervention.  I had extensive discussion with the patient's wife and his son at bedside. We discussed the conduct including exploratory laparotomy, possible Julius patch, possible pyloric exclusion, possible  gastrojejunostomy, and temporary abdominal closure. I discussed that patient may require multiple trips to the operating room.  I discussed risks of surgery including death, heart attack, stroke, ventilator dependent respiratory failure requiring tracheostomy, worsening of his chronic kidney disease requiring hemodialysis (this could be permanent).  I discussed the NSQIP database surgical calculator and reported he had at least a 30% risk of mortality.  All questions and concerns were addressed to the best my ability.  Family was appreciative of the care.    Acute kidney injury on chronic kidney disease  -Likely related to sepsis  -Intravenous fluid resuscitation and trend  -If coagulopathic in operating room, will administer DDAVP as BUN 68    Lactic acidosis with anion gap acidosis  -Intravenous fluid resuscitation and trend    Difficulty Bonner catheter placement  -History of prostatectomy.  Urology at bedside in ER and several attempts made at placement without success.  -Urology will place in the operating room prior to surgery    History of hypertension  -Hold home amlodipine as patient is in septic shock    I provided 35 minutes of critical care services to support this life/organ threatening illness. This time reflects daily cumulative, direct time spent in the care and direct coordination of care of the patient throughout the day by the attending physician, excluding separately billable services, procedures, teaching time and time spent by non-physician providers.    Anastacio Colon M.D.

## 2021-08-15 NOTE — ED PROVIDER NOTES
"Emergency Medicine Note  HPI   HISTORY OF PRESENT ILLNESS     80-year-old male with past medical history significant for chronic kidney disease, hypertension, prostate cancer treated with Lupron, recently admitted at [facility] from 7/21-8/7 with diagnosis of pancreatitis.  MRI at the time showed possible hemorrhagic pancreatitis.  Patient was discharged with instructions to follow-up with Dr. Hi for EUS as outpatient, but patient and family complain of worsening abdominal pain, bloating, nausea, and early satiety for the past week.  Initially, patient had been able to tolerate clear fluids and bland diet, but over the past 4 to 5 days pain and nausea have been worsening.  Patient states pain is currently 10/10, \"like hell,\" in upper abdomen, without radiation of pain.  Patient states bloating and abdominal tightness have worsened and states he has pain with movement, coughing, and breathing.  His wife notices intermittent night sweats but has not checked temperature.  Patient tried 5 mg of oxycodone yesterday and 10 mg today, both without relief.      History provided by:  Patient, relative and spouse   used: No    Abdominal Pain  Pain location:  Epigastric  Pain quality comment:  \"Like hell\"  Pain radiates to:  Does not radiate  Pain severity:  Severe  Onset quality:  Gradual  Duration:  1 week  Timing:  Constant  Progression:  Worsening  Chronicity:  Chronic  Context: diet changes and recent illness    Context: not alcohol use and not previous surgeries    Relieved by:  Nothing  Worsened by:  Coughing, deep breathing, eating, movement, palpation and position changes  Ineffective treatments:  Bowel activity, not moving and position changes (Oxycodone)  Associated symptoms: anorexia, chills, constipation, fatigue and nausea    Associated symptoms: no chest pain, no cough, no dysuria, no shortness of breath and no sore throat          Patient History   PAST HISTORY     Reviewed from " Nursing Triage:      Past Medical History:   Diagnosis Date   • CKD (chronic kidney disease) stage 4, GFR 15-29 ml/min (CMS/[facility])    • DJD (degenerative joint disease)    • Hypertension    • Nocturia    • Prostate CA (CMS/[facility])    • Prostate CA (CMS/[facility])        Past Surgical History:   Procedure Laterality Date   • PROSTATECTOMY         No family history on file.    Social History     Tobacco Use   • Smoking status: Former Smoker   • Smokeless tobacco: Never Used   Substance Use Topics   • Alcohol use: Yes   • Drug use: Never         Review of Systems   REVIEW OF SYSTEMS     Review of Systems   Constitutional: Positive for appetite change, chills, diaphoresis and fatigue.   HENT: Negative for rhinorrhea and sore throat.    Eyes: Negative for pain and redness.   Respiratory: Negative for cough and shortness of breath.    Cardiovascular: Negative for chest pain, palpitations and leg swelling.   Gastrointestinal: Positive for abdominal distention, abdominal pain, anorexia, constipation and nausea.   Genitourinary: Negative for dysuria and scrotal swelling.   Musculoskeletal: Negative for arthralgias and myalgias.   Skin: Negative for pallor and rash.   Neurological: Negative for seizures and speech difficulty.   Psychiatric/Behavioral: Negative for confusion and decreased concentration.         VITALS     ED Vitals    Date/Time Temp Pulse Resp BP SpO2 [facility]   08/15/21 1300 -- 99 28 125/56 -- [facility]   08/15/21 1227 -- 90 22 119/60 98 % [facility]   08/15/21 1145 -- -- 24 -- 93 % [facility]   08/15/21 1136 36.7 °C (98.1 °F) 76 22 112/57 88 % [facility]                       Physical Exam   PHYSICAL EXAM     Physical Exam  Constitutional:       Appearance: He is normal weight. He is ill-appearing.   HENT:      Head: Normocephalic and atraumatic.      Mouth/Throat:      Comments: Mucous membranes tacky  Eyes:      General: No scleral icterus.  Cardiovascular:      Rate and Rhythm: Normal rate and regular rhythm.      Heart sounds: Normal heart  sounds. No murmur heard.     Pulmonary:      Effort: Pulmonary effort is normal. No respiratory distress.      Breath sounds: Normal breath sounds. No wheezing.   Abdominal:      General: Abdomen is protuberant. Bowel sounds are decreased. There is distension.      Palpations: Abdomen is rigid. There is fluid wave.      Tenderness: There is generalized abdominal tenderness and tenderness in the epigastric area. There is guarding.   Skin:     General: Skin is warm and dry.      Capillary Refill: Capillary refill takes less than 2 seconds.   Neurological:      General: No focal deficit present.      Mental Status: He is alert and oriented to person, place, and time.   Psychiatric:         Mood and Affect: Mood normal.         Behavior: Behavior normal.           PROCEDURES     Procedures     DATA     Results     Procedure Component Value Units Date/Time    CBC and differential [[id number]]  (Abnormal) Collected: 08/15/21 1150    Specimen: Blood, Venous Updated: 08/15/21 1251     WBC 11.95 K/uL      RBC 4.15 M/uL      Hemoglobin 11.9 g/dL      Hematocrit 37.7 %      MCV 90.8 fL      MCH 28.7 pg      MCHC 31.6 g/dL      RDW 15.3 %      Platelets 413 K/uL      MPV 10.7 fL      Differential Type Manu     Neutrophils 76 %      Lymphocytes 7 %      Monocytes 4 %      Eosinophils 0 %      Basophils 0 %      Bands 11 %      Comment: This result has been called to STACI HOWE by Bev Tsai on 08 15 21 at 12:50, and has been read back.         Metamyelocytes 2 %      Neutrophils, Absolute 9.08 K/uL      Lymphocytes, Absolute 0.84 K/uL      Monocytes, Absolute 0.48 K/uL      Eosinophils, Absolute 0.00 K/uL      Basophils, Absolute 0.00 K/uL      Bands, Absolute 1.31 K/uL      Metamyelocytes, Absolute 0.24 K/uL      Platelet Estimate Increased (>400,000)     Clumped Platelets Present     Toxic Granulation Slight     Anisocytosis 1+     Poikilocytes 1+     Polychromasia Occasional     Schistocytes Occasional     Alex Cells 1+     Comprehensive metabolic panel [[id number]]  (Abnormal) Collected: 08/15/21 1150    Specimen: Blood, Venous Updated: 08/15/21 1230     Sodium 136 mEQ/L      Potassium 3.7 mEQ/L      Comment: Results obtained on plasma. Plasma Potassium values may be up to 0.4 mEQ/L less than serum values. The differences may be greater for patients with high platelet or white cell counts.        Chloride 100 mEQ/L      CO2 19 mEQ/L      BUN 68 mg/dL      Creatinine 5.1 mg/dL      Comment: Consistent with previous results          Glucose 145 mg/dL      Calcium 9.9 mg/dL      AST (SGOT) 38 IU/L      ALT (SGPT) 29 IU/L      Alkaline Phosphatase 151 IU/L      Total Protein 5.8 g/dL      Comment: Test performed on plasma which typically contains approximately 0.4 g/dL more protein than serum.        Albumin 1.9 g/dL      Bilirubin, Total 0.8 mg/dL      eGFR 10.9 mL/min/1.73m*2      Anion Gap 17 mEQ/L     Narrative:      Order only if pain is above umbilicus    Lipase, if pain is above umbilicus [[id number]]  (Normal) Collected: 08/15/21 1150    Specimen: Blood, Venous Updated: 08/15/21 1229     Lipase 41 U/L     Narrative:      Order only if pain is above umbilicus    Troponin I [[id number]]  (Abnormal) Collected: 08/15/21 1150    Specimen: Blood, Venous Updated: 08/15/21 1219     Troponin I 0.07 ng/mL      Comment: Result requires test to be repeated on new specimen 4-6 hours after the original.       Lactate, w/ reflex repeat if > 2.0 [[id number]]  (Abnormal) Collected: 08/15/21 1150    Specimen: Blood, Venous Updated: 08/15/21 1200     Lactate 3.4 mmol/L     [place] Draw Panel [[id number]] Collected: 08/15/21 1150    Specimen: Blood, Venous Updated: 08/15/21 1157    Narrative:      The following orders were created for panel order [place] Draw Panel.  Procedure                               Abnormality         Status                     ---------                               -----------         ------                     RAINBOW LT  BLUE[[id number]]                                  In process                   Please view results for these tests on the individual orders.    RAINBOW LT BLUE [[id number]] Collected: 08/15/21 1150    Specimen: Blood, Venous Updated: 08/15/21 1157          Imaging Results    None         No orders to display       Scoring tools                                 ED Course & MDM   MDM / ED COURSE and CLINICAL IMPRESSIONS     MDM    ED Course as of Aug 15 1436   Sun Aug 15, 2021   1334 GI to see in ED    [JG]   1406 From TTE, 7/27: Normal-sized LV. Hyperdynamic LV systolic function. Estimated EF >75%. Normal LV wall thickness. Grade I LV diastolic dysfunction. LV diastolic inflow pattern consistent with impaired relaxation    [JG]   1425 D/w radiol  D/w surg  D/w pt and fam    [BH]      ED Course User Index  [BH] Wilbur Baez MD  [JG] Katherine Rivero PA C         Clinical Impressions as of Aug 15 1436   Acute kidney injury superimposed on chronic kidney disease (CMS/HCC)   Leukocytosis, unspecified type   Bandemia   High serum lactate   Elevated troponin            Katherine Rivero PA C  09/01/21 1616

## 2021-08-15 NOTE — OR SURGEON
Pre-Procedure patient identification:  I am the primary operating surgeon/proceduralist and I have identified the patient on 08/15/21 at 6:15 PM Anastacio Colon MD  Phone Number: [phone]

## 2021-08-15 NOTE — H&P
General Surgery H&P    Subjective     Adalberto Galaviz is a 80 y.o. male who was admitted for No admission diagnoses are documented for this encounter.. Patient was seen in consultation at the request of referring physician for management recommendations.    Patient is 80-year-old male PMH CKD 4, HTN, prostate cancer s/p prostatectomy via a lower midline incision in 1999 (currently on Lupron) who presented with abdominal pain.  A CT scan was obtained which revealed findings concerning for duodenal perforation in the setting of pancreatitis.  Surgery was consulted.     A repeat CT scan was obtained with P.O. contrast.  The contrast remained confined to the stomach and was unable to pass into the duodenum (likely related to extensive pyloric and duodenal inflammation).     He had recent pancreatitis requiring hospitalization at [facility] from July 21 through August 7.  Since his discharge he noted initial nausea for a couple days, which transitioned the pain unrelieved by oxycodone over the last 2-3 days.  He presented to the emergency department today due to concern over the continued pain.     On exam patient is in septic shock with systolic blood pressures in the 80s after receiving 4 L of crystalloid.  He has low-grade tachycardia.  He has received Zosyn.  Additionally an NG tube was placed, and he was started on a PPi and fluconazole.  There were several failed attempts to place a Bonner catheter both by the emergency department and urology.  Urology will attempt to place a Bonner catheter intraoperatively.    Both patient and his family were consented bedside and he was expeditiously taken to the OR.    Medical History:   Past Medical History:   Diagnosis Date   • CKD (chronic kidney disease) stage 4, GFR 15-29 ml/min (CMS/HCC)    • DJD (degenerative joint disease)    • Hypertension    • Nocturia    • Prostate CA (CMS/HCC)    • Prostate CA (CMS/HCC)        Surgical History:   Past Surgical History:   Procedure  Laterality Date   • PROSTATECTOMY         Social History:   Social History     Social History Narrative   • Not on file       Family History: No family history on file.    Allergies: Aspirin, Losartan, and Morphine    Home Medications:  Not in a hospital admission.    Current Medications:  •  iohexoL, 1,000 mL, oral, Once  •  morphine, 4 mg, intravenous, q30 min PRN  •  amLODIPine  •  APPLE CIDER VINEGAR ORAL  •  cholecalciferol (vitamin D3)  •  cod liver oil  •  coenzyme Q10  •  docosahexaenoic acid/epa (FISH OIL ORAL)  •  glucosamine-chondroitin  •  lidocaine  •  lutein  •  ondansetron  •  OREGANO OIL ORAL  •  oxyCODONE  •  pancrelipase (Lip-Prot-Amyl)  •  pantoprazole  •  sertraline  •  sodium bicarbonate    Review of Systems  Pertinent items are noted in HPI.    Objective     Physicial Exam  Visit Vitals  BP (!) 99/51   Pulse 86   Temp 36.7 °C (98.1 °F)   Resp (!) 24   SpO2 96%       General appearance: alert, appears stated age and severe distress  Head: normocephalic, without obvious abnormality, atraumatic  Eyes: conjunctivae/corneas clear. PERRL, EOM's intact. Fundi benign.  Nose: Nares normal. Septum midline. Mucosa normal. No drainage or sinus tenderness. NGT in place with bilious output.  Throat: lips, mucosa, and tongue normal; teeth and gums normal  Neck: supple, symmetrical, trachea midline  Back: symmetric, no curvature. ROM normal. No CVA tenderness.  Lungs: No increased work of breathing.  Chest wall: no tenderness  Heart: Regular rate.  Abdomen: His abdomen is soft and diffusely tender to palpation.  He has a lower midline well-healed scar.  Rectal: deferred  Extremities: extremities normal, warm and well-perfused; no cyanosis, clubbing, or edema  Skin: Skin color, texture, turgor normal. No rashes or lesions  Neurologic: Grossly normal    Labs  No new labs.  Results from last 7 days   Lab Units 08/15/21  1150   WBC K/uL 11.95*   HEMOGLOBIN g/dL 11.9*   HEMATOCRIT % 37.7*   PLATELETS K/uL 413*      Results from last 7 days   Lab Units 08/15/21  1150   SODIUM mEQ/L 136   POTASSIUM mEQ/L 3.7   CHLORIDE mEQ/L 100   CO2 mEQ/L 19*   BUN mg/dL 68*   CREATININE mg/dL 5.1*   GLUCOSE mg/dL 145*   CALCIUM mg/dL 9.9     Labs significant for lactic acidosis of 3.4 which responded to 2.9 with fluid resuscitation.  He has hypoalbuminemia 1.9.  He has evidence of acute kidney injury with a creatinine of 5.1 from 3 on 8/7.  He has leukocytosis to 11.95 with bandemia. His lipase is 41.    Imaging  I have reviewed the Imaging from the last 24 hrs. Relevant Imaging as noted in HPI.    Assessment   80-year-old male  PMH CKD 4, HTN, prostate cancer s/p prostatectomy via a lower midline incision in 1999 (currently on Lupron) who presenting with septic shock secondary to perforated viscus.        Plan     #Perforated viscus  -Likely duodenal in origin secondary to recent pancreatitis.  -Start Zosyn and fluconazole  -PPI  -Informed consent obtained from patient (before he became increasingly altered) and then his wife for surgical intervention.  -Will proceed to the OR tonight     #Acute kidney injury on chronic kidney disease  -Likely related to sepsis  -Intravenous fluid resuscitation and trend  -If coagulopathic in operating room, will administer DDAVP as BUN 68     #Lactic acidosis with anion gap acidosis  -Intravenous fluid resuscitation and trend     #Difficulty Bonner catheter placement  -History of prostatectomy.  Urology at bedside in ER and several attempts made at placement without success.  -Urology will place in the operating room prior to surgery     #History of hypertension  -Hold home amlodipine as patient is in septic shock    Please page [phone] with questions/concerns.  Jeffrey Zarate MD

## 2021-08-15 NOTE — ANESTHESIA PROCEDURE NOTES
Airway  Urgency: emergent    Start Time: 8/15/2021 7:13 PM  Airway not difficult    General Information and Staff    Patient location during procedure: OR    Consent for Airway (if performed for an anesthetic, see related documentation for consents)  Patient identity confirmed: verbally with patient, arm band, hospital-assigned identification number and provided demographic data  Consent: No emergent situation. Verbal consent not obtained. Written consent obtained.  Risks and benefits: risks, benefits and alternatives were discussed  Consent given by: spouse      Indications and Patient Condition  Indications for airway management: anesthesia  Sedation level: deep  Preoxygenated: yes  Patient position: sniffing  MILS maintained throughout  Mask difficulty assessment: 0 - not attempted    Final Airway Details  Final airway type: endotracheal airway      Successful airway: ETT - double lumen right  Cuffed: yes   Successful intubation technique: video laryngoscopy  Facilitating devices/methods: intubating stylet  Endotracheal tube insertion site: oral  Blade type: glidescope.  Blade size: #4  Cormack-Lehane Classification: grade I - full view of glottis  Placement verified by: chest auscultation and capnometry   Measured from: teeth  ETT to teeth (cm): 24  Number of attempts at approach: 1  Number of other approaches attempted: 0  Atraumatic airway insertion

## 2021-08-16 PROBLEM — I48.0 PAROXYSMAL ATRIAL FIBRILLATION (CMS/HCC): Status: ACTIVE | Noted: 2021-01-01

## 2021-08-16 NOTE — PROGRESS NOTES
[facility] Notified via [facility] that patient readmitted. Will follow for SN, PT services. Alejandro Copeland RN b [phone]

## 2021-08-16 NOTE — ASSESSMENT & PLAN NOTE
New onset Afib with RVR on 8/16/21 in the setting of pancreatic necrosis s/p debridement.  S/p successful DC cardioversion on 8/16/21.  He remains in sinus rhythm.  Continue amiodarone 0.5 mg/min. Monitor for sinus bradycardia.  Started on therapeutic heparin for L hand ischemia. Continue heparin drip.

## 2021-08-16 NOTE — PROGRESS NOTES
Surgical Critical Care Progress Note    Adalberto Galaviz is a 80 y.o. male  s/p exploratory laparotomy, abdominal washout, pancreatic necrosectomy, EGD, middle colic vein repair, abthera placement on 8/15    S  On increasing pressors overnight, 16 of levo and 0.03 of vaso. Lactate stable at 9. K corrected to 4.4, and acidosis improved to pH 7.22 on bicarb gtt. Shock liver with elevated LFTs. Developing ARDS with P:F of 250. Troponin elevated at 0.15 - 0.18, EKG stable.     O  Vitals:  Blood pressure (!) 121/57, pulse (!) 114, temperature (!) 35.6 °C (96.1 °F), temperature source Axillary, resp. rate (!) 24, height 1.829 m (6'), weight 87 kg (191 lb 12.8 oz), SpO2 96 %.    Physical Exam   Physical Exam    General appearance: appears stated age and sedated , no acute distress  Lungs: equal chest rise b/l, intubated  Heart: regular rate and rhythm  Abdomen: soft, nondistended, abthera in place draining serosang fluid   Extremities: extremities normal, warm and well-perfused; no cyanosis, clubbing, or edema   Neurologic: sedated    A/P  Adalberto Galaviz is a 80 y.o. male  s/p exploratory laparotomy, abdominal washout, pancreatic necrosectomy, EGD, middle colic vein repair, abthera placement on 8/15    Neuro: fentanyl gtt, versed prn  Resp: Acidotic to pH 7.22. PRVC 22/500/10/60, developing ARDS. Decrease TV to 450 after CRRT has begun.   CV levophed at 24, vasopressin gtt at 0.03. LR bolus x1 L. Transfuse PRBC x1.  TTE today. Trend troponins Q6H, likely demand ischemia.   GI: Protonix bid, NGT to LIS  Renal: Nephrology consult to initiate CRRT. Bicarb gtt.  : iatrogenic urethral injury - maintain palmer x2 weeks. Urology following.  ID: Meropenem, vanco x1, ID consult.   Endo: ISS   Hem: SQH, trend Hb    Travis Jacob MD  Please page #[phone] with questions or concerns.  8/16/2021

## 2021-08-16 NOTE — PLAN OF CARE
Problem: Adult Inpatient Plan of Care  Goal: Plan of Care Review  Outcome: Progressing  Goal: Readiness for Transition of Care  Intervention: Mutually Develop Transition Plan  Flowsheets  Taken 8/16/2021 1006 by Angella Baez RN  Anticipated Discharge Disposition: home with home health  Equipment Needed After Discharge: none  Anticipated Changes Related to Illness: inability to care for self  Outpatient/Agency/Support Group Needs: homecare agency  Transportation Concerns: car, none  Patient/Family Anticipated Services at Transition: home health care  Patient/Family Anticipates Transition to:   home with family   home with help/services  Transportation Anticipated: family or friend will provide  Concerns to be Addressed: discharge planning  Patient's Choice of Community Agency(s): [facility]  Offered/Gave Vendor List: yes  Current Outpatient/Agency/Support Group: homecare agency  Type of Home Care Services:   nursing   home PT  Type of Skilled Nursing Care Services:   nursing   PT  Taken 8/16/2021 0000 by Eleanor Gibbons RN  Assistive Device/Animal Currently Used at Home: none    Pt discussed @ the Care Progression Rounds today. Pt lives with spouse/2 story home. Pt has an open episode with [facility]. Referral made to [facility]. D/C plan home with home care services.

## 2021-08-16 NOTE — PROGRESS NOTES
Attending Comments     NEURO: DC Precedex drip given recent history of bradycardia while admitted at [facility].  Start fentanyl drip for analgesia.  PRN Versed for sedation.  Will try to minimize propofol given hypotension requiring multiple pressors.    CV: Septic shock.  Currently on Levophed and vasopressin.  Wean pressors to MAP greater than 65.  Transduce CVP.  Check echocardiogram in the morning.  Check EKG and troponins.  Lactic acidosis.  Needs aggressive volume resuscitation.  Continue to trend.    PULM: Acute respiratory failure.  Patient is set up for developing ARDS secondary to volume overload and necrotizing pancreatitis.  If patient can tolerate it, will place on lung protective strategy on the ventilator.  We will try to aim for tidal volume of 6 to 7 cc/kg.  ABG checked which revealed a mixed metabolic and respiratory acidosis.  7.03/53/213/11 0.8/-16.9/97%.  Minute ventilation increased and FiO2 on ventilator decreased.  2 amps of bicarbonate administered.  Check repeat ABG in 1 hour.  Check chest x-ray to confirm placement of central line and endotracheal tube.    GI: N.p.o. with NG tube.  Protonix for GI prophylaxis.  Necrotizing pancreatitis status post pancreatic necrosectomy and temporary abdominal closure.  Will return to the OR in 1 to 2 days for further washout and debridement.    : Bonner for strict I's and O's.  Very difficult placement with urology.  Will keep in place until cleared by urology.  Hydrate with lactated Ringer's.  May need to switch to bicarbonate drip if repeat gas remains very acidotic.    HEME: Hemoglobin 10.8 from 11.9.  Continue to trend closely.  Okay to start subcutaneous heparin.  Platelets 338.    ID: Necrotizing pancreatitis.  Concern for superimposed infection.  Start meropenem and consult infectious disease.  DC fluconazole.  One-time dose of vancomycin.  Check MRSA swab.  If clinically deteriorates, will administer one-time dose of gentamicin.  Follow-up  operative cultures of peritoneal fluid and necrotic pancreas.  Blood cultures pending.  COVID-19 negative.    ENDO: We will keep a close eye on sugars given necrosectomy.  Insulin sliding scale.    MSK: SCD.    LINES/TUBES/DRAINS: Right IJ Cordis with additional ports, endotracheal tube, peripheral IV, left radial arterial line (no longer working), nasogastric tube, UNIQUE drain in abdomen, ABThera VAC.  Will place a triple-lumen catheter through the Cordis and a new femoral arterial line.    DVT PPX: SCDs & sq heparin TID    GI PPX:  PPI    DISPOSITION:  SICU    I provided an additional 50 minutes of critical care services to support this life/organ threatening illness. This time reflects daily cumulative, direct time spent in the care and direct coordination of care of the patient throughout the day by the attending physician, excluding separately billable services, procedures, teaching time and time spent by non-physician providers.      Anastacio Colon MD

## 2021-08-16 NOTE — PROGRESS NOTES
Urology Daily Progress Note    Subjective   Interval History: Now POD#1 following complex palmer catheter placement.  Urine clear yellow urine.    Objective     Vital signs in last 24 hours:  Temp:  [36.2 °C (97.1 °F)-36.9 °C (98.4 °F)] 36.2 °C (97.1 °F)  Heart Rate:  [] 67  Resp:  [20-35] 22  BP: ()/(46-83) 120/59  FiO2 (%) (Set):  [60 %-100 %] 60 %      Intake/Output Summary (Last 24 hours) at 8/16/2021 0706  Last data filed at 8/16/2021 0600  Gross per 24 hour   Intake 2222.52 ml   Output 555 ml   Net 1667.52 ml     Intake/Output this shift:  No intake/output data recorded.    Labs  BMP Results       08/16/21 08/15/21 08/15/21                    0435 2311 1150          141 136         K 4.4 5.6 3.7         Cl 110 112 100         CO2 16 14 19         Glucose 159 133 145         BUN 63 66 68         Creatinine 4.9 4.9 5.1         Calcium 7.9 8.2 9.9         Anion Gap 20 15 17         EGFR 11.4 11.4 10.9         Comment for K at 2311 on 08/15/21: SLIGHT HEMOLYSIS, RESULT MAY BE INCREASED.    Comment for K at 1150 on 08/15/21: Results obtained on plasma. Plasma Potassium values may be up to 0.4 mEQ/L less than serum values. The differences may be greater for patients with high platelet or white cell counts.    Comment for Creatinine at 1150 on 08/15/21: Consistent with previous results          CBC Results       08/16/21 08/16/21 08/15/21                    0435 0227 2311         WBC 43.59 36.03 28.40         RBC 3.22 3.47 3.76         HGB 9.2 10.0 10.8         HCT 29.7 31.9 35.2         MCV 92.2 91.9 93.6         MCH 28.6 28.8 28.7         MCHC 31.0 31.3 30.7          286 338         Comment for WBC at 0435 on 08/16/21: ALL RESULTS HAVE BEEN CHECKED. This result has been called to CONRAD MILAN by Emelia Rico on 08 16 21 at 05:11, and has been read back.     Comment for WBC at 0227 on 08/16/21: This result has been called to JAYA GARCIA by Jarod Castaneda on 08 16 21 at 02:59, and has been  read back.     Comment for WBC at 2311 on 08/15/21: RESULTS CHECKED        UA Results       08/15/21                          2316           Color Yellow           Clarity Cloudy           Glucose Negative           Bilirubin Negative           Ketones Negative           Sp Grav <=1.005           Blood +3           Ph 6.0           Protein Negative           Urobilinogen 0.2           Nitrite Negative           Leuk Est Negative           WBC 0 TO 3           RBC 20 TO 35           Bacteria Rare           Comment for Blood at 2316 on 08/15/21: The sensitivity of the occult blood test is equivalent to approximately 4 intact RBC/HPF.    Comment for Leuk Est at 2316 on 08/15/21: Results can be falsely negative due to high specific gravity, some antibiotics, glucose >3 g/dl, or WBC other than neutrophils.        Microbiology Results     Procedure Component Value Units Date/Time    MRSA Screen, Nares Only Nose [[id number]]  (Normal) Collected: 08/16/21 0013    Specimen: Nasal Swab from Nose Updated: 08/16/21 0309     MRSA DNA, Nares Negative    SARS-CoV-2 (COVID-19), PCR Nasopharynx [[id number]] Collected: 08/15/21 1350    Specimen: Nasopharyngeal Swab from Nasopharynx Updated: 08/15/21 1613    Narrative:      The following orders were created for panel order SARS-CoV-2 (COVID-19), PCR Nasopharynx.  Procedure                               Abnormality         Status                     ---------                               -----------         ------                     SARS-COV-2 (COVID-19)/ F...[[id number]]                      Final result                 Please view results for these tests on the individual orders.    SARS-COV-2 (COVID-19)/ FLU A/B, AND RSV, PCR Nasopharynx [[id number]] Collected: 08/15/21 1350    Specimen: Nasopharyngeal Swab from Nasopharynx Updated: 08/15/21 1613     SARS-CoV-2 (COVID-19) Negative     Influenza A Negative     Influenza B Negative     Respiratory Syncytial Virus --    Clostridium  difficile tox screen Stool [[id number]]  (Normal) Collected: 07/28/21 1107    Specimen: Stool Updated: 07/28/21 1855     C difficile Toxin Screen Negative    Urine culture Urine, Clean Catch [[id number]] Collected: 07/26/21 1513    Specimen: Urine, Clean Catch Updated: 07/28/21 1244     Urine Culture Probable contaminants, suggest recollection      5 x 10^4 CFU/mL Mixed Gram Positive Organisms    SARS-CoV-2 (COVID-19), PCR Nasopharynx [[id number]]  (Normal) Collected: 07/21/21 1211    Specimen: Nasopharyngeal Swab from Nasopharynx Updated: 07/21/21 1337    Narrative:      The following orders were created for panel order SARS-CoV-2 (COVID-19), PCR Nasopharynx.  Procedure                               Abnormality         Status                     ---------                               -----------         ------                     SARS-CoV-2 (COVID-19), P...[[id number]]  Normal              Final result                 Please view results for these tests on the individual orders.    SARS-CoV-2 (COVID-19), PCR Nasopharynx [[id number]]  (Normal) Collected: 07/21/21 1211    Specimen: Nasopharyngeal Swab from Nasopharynx Updated: 07/21/21 1337     SARS-CoV-2 (COVID-19) Negative    Narrative:      Nursing instructions: Obtain nasopharyngeal swab ONLY. Send swab in viral transport media.          Physicial Exam  Visit Vitals  BP (!) 120/59   Pulse 67   Temp 36.2 °C (97.1 °F) (Axillary)   Resp (!) 22   Ht 1.829 m (6')   Wt 87 kg (191 lb 12.8 oz)   SpO2 97%   BMI 26.01 kg/m²     General appearance: alert, cooperative, no acute distress  Lungs: Unlabored respirations, symmetric chest expansion  Heart: regular rate and rhythm  Abdomen: soft, non distended  Genitalia: palmer catheter in place.  Clear yellow urine.  Rectal: deferred  Extremities: extremities normal, warm and well-perfused; no cyanosis, clubbing, or edema and no redness or tenderness in the calves bilaterally  Neurologic: Alert and oriented X 3       Assessment    80-year-old male with a history of prostate cancer status post prostatectomy and adjuvant radiation now with biochemical recurrence managed with androgen deprivation therapy presenting with perforated viscus secondary to acute pancreatitis.  Palmer catheter inserted for strict ins and outs in the setting of hemodynamic instability.  Nursing incidentally inflated balloon in the patient's proximal urethra.  Multiple attempts were made to place a Palmer catheter blindly at the bedside, unsuccessfully.  8/16:  [place] palmer catheter placed under cystoscopy guidance        Plan       -Patient was taken to the operating room for cystoscopy, complex Palmer catheter insertion (see procedure note).  -Palmer catheter will stay at least 2 weeks and until the patient is out of the intensive care unit and returned to his clinical and functional baseline  -Follow-up with urologist at [facility] after discharge for prostate cancer management.  - No further urologic care indicated at this time.  Rest of care per primary team.      Daniel Gagnon DO, PGY2  [facility]  [facility] [phone]  [place] [phone]

## 2021-08-16 NOTE — PLAN OF CARE
Problem: Adult Inpatient Plan of Care  Goal: Plan of Care Review  Outcome: Progressing  Goal: Patient-Specific Goal (Individualized)  Outcome: Progressing  Goal: Absence of Hospital-Acquired Illness or Injury  Outcome: Progressing  Goal: Optimal Comfort and Wellbeing  Outcome: Progressing  Goal: Readiness for Transition of Care  Outcome: Progressing

## 2021-08-16 NOTE — PROGRESS NOTES
Patient: Adalberto Galaviz  Location: [facility] SICU SICU05  MRN: [id number]  Today's date: 8/16/2021    Attempted to see patient for therapy. Unable due to medical hold (remained intubated / sedated ; will follow up once ).     Extubated and cleared to begin mobilization.

## 2021-08-16 NOTE — PROCEDURES
Arterial Line Insertion    Date/Time: 8/16/2021 12:09 AM  Performed by: Saroj Bustamante MD  Authorized by: Saroj Bustamante MD     Consent:     Consent obtained:  Emergent situation    Risks discussed:  Bleeding, infection, ischemia and repeat procedure  Indications:     Indications: hemodynamic monitoring and multiple ABGs    Pre-procedure details:     Skin preparation:  2% Chlorhexidine    Preparation: Patient was prepped and draped in sterile fashion    Procedure details:     Location:  R femoral    Needle gauge:  22 G    Placement technique:  Seldinger    Number of attempts:  1  Post-procedure details:     Post-procedure:  Biopatch applied, sterile dressing applied and sutured    Patient tolerance of procedure:  Tolerated well, no immediate complications  Comments:      Patient's right groin was positioned, preped with chlorhexidine, then toweled out with sterile towels. Under US guidance a 21G needle was used to gain access to the Right Common Femoral  Artery, pulsatile blood returned, and .018 wire was inserted into needle and advanced with no resistance. Needle was removed and catheter placed over wire, wire was removed and pulsatile blood returned. A Line tubing attached and wave form confirmed. Catheter was sutured to skin and dressed.

## 2021-08-16 NOTE — CONSULTS
Infectious Disease Consult Note    Patient Name: Adalberto Galaviz  MR#: [id number]  : 1940  Admission Date: 8/15/2021  Consult Date: 21 10:54 AM   Consultant: Chris Cervantes (Medical Student) with Dr. Allen    Reason for Consult: severe sepsis from nectrocipancreatitis  Referring Provider: Anastacio Colon,*      History of Present Illness     Adalberto Galaviz is a 80 y.o. male w/ PMH of CKD Stage 4, HTN, prostate cancer s/p prostatectomy (), presenting with abdominal pain, who was admitted on 8/15/2021 now POD1 s/p exploratory laparotomy and pancreatic necrosectomy, being consulted for severe sepsis 2/2 necrotizing pancreatitis.     (History from notes and charts, as patient is intubated, nonverbal, and nonresponsive). Pt had recent hospitalization for acute on chronic pancreatitis at [facility] (-;, etiology unclear, likely alcoholic).  At [facility], CTAP and MRI showed acute hemorrhagic pancreatitis with moderate ascites (not enough for paracentesis). Treated with supportive care (NPO, bowel rest). DC'ed w/ instructions for outpatient endoscopic US w/ GI (Dr Kike Hi) to rule out IPMN (pancreatic cancer).     Since discharge, he noted nausea and abdominal pain refractory to oxycodone for 2-3d. He presented to ED for pain and nausea, found to be in septic shock (sBP in 80s after receiving 4L crystalloids, tachycardia, tachypnea). Labs showed lipase 41, lactate 3.4, AG metabolic acidosis, GABY (BUN 68, Cr 5.1 b/l 3s), leukocytosis 12 (bandemia 11%). CT scan: acute complicated pancreatitis w/ peripancreatic fluid collections and foci of free air, with duodenal perforation. Intervention: Zosyn, Fluconazole, PPI, NG tube. Acute care surgery consulted (and urology consulted for difficult Bonner) and expeditiously taken to OR yesterday.     In the OR, intraoperative necrotizing pancreatitis was seen. Pt is now s/p exploratory laparotomy, pancreatitis necrosectomy (of pancreatic head and body),  repair of middle colic vein, abdominal washout, wound vac placement, difficult Bonner placement by Urology. The abdomen was left open, and abthera is in place.     This morning, POD1, patient is still intubated (FiO2 65%, PEEP 10, RR 24, ). For pressors, needing 16 Norepi q1h, and 0.03 vasopressin q1h    Outside records were reviewed.    Allergies:   Allergies   Allergen Reactions   • Aspirin Nausea And Vomiting and GI intolerance          • Losartan Other (see comments)     Loss of balance   • Morphine GI intolerance              Medical History:  has a past medical history of CKD (chronic kidney disease) stage 4, GFR 15-29 ml/min (CMS/HCC), DJD (degenerative joint disease), Hypertension, Nocturia, Prostate CA (CMS/HCC), and Prostate CA (CMS/HCC).    Surgical History:   Past Surgical History:   Procedure Laterality Date   • PROSTATECTOMY           Social History:  reports that he has quit smoking. He has never used smokeless tobacco. He reports current alcohol use. He reports that he does not use drugs.     Family History: family history is not on file.    Review of Systems  Unable to obtain (patient stuporous)    Medications:   Current Facility-Administered Medications   Medication Dose Route Frequency   • albuterol HFA  2 puff inhalation q6h PRN   • amiodarone (CORDARONE) IV loading dose 150 mg/100 mL D5W (for patients without amiodarone drip ordered)  150 mg intravenous Once   • amiodarone       • amiodarone  0.5 mg/min intravenous Continuous   • bicarbonate dialysate mixture with additional potassium for CRRT  5 L CRRT Continuous   • chlorhexidine  15 mL Mouth/Throat 2 times per day   • glucose  16-32 g of dextrose oral PRN    Or   • dextrose  15-30 g of dextrose oral PRN    Or   • glucagon  1 mg intramuscular PRN    Or   • dextrose in water  25 mL intravenous PRN   • fentaNYL  0-200 mcg/hr intravenous Titrated    And   • fentaNYL  25 mcg intravenous PRN   • insulin regular U-100  3-5 Units subcutaneous  q6h ROBERT   • lactated ringer's  1,000 mL intravenous Once   • magnesium sulfate  2 g intravenous PRN   • magnesium sulfate  2 g intravenous Once   • meropenem  500 mg intravenous q12h INT   • midazolam  2 mg intravenous q4h PRN   • norepinephrine  0.5-30 mcg/min intravenous Titrated   • pantoprazole  40 mg intravenous q12h ROBERT   • potassium chloride in water  20 mEq intravenous PRN   • sodium bicarbonate in D5W   intravenous Continuous   • sodium chloride 0.9 %  5 mL/hr intravenous PRN   • sodium chloride 0.9 %  5 mL/hr intravenous PRN   • vasopressin (PITRESSIN) continuous infusion  0.03 Units/min intravenous Continuous           Objective     Vital Signs:    Visit Vitals  BP (!) 126/57   Pulse (!) 128   Temp (!) 35.6 °C (96 °F) (Axillary)   Resp (!) 24   Ht 1.829 m (6')   Wt 87 kg (191 lb 12.8 oz)   SpO2 (!) 90%   BMI 26.01 kg/m²     Temp (72hrs), Av.6 °C (97.8 °F), Min:35.6 °C (96 °F), Max:36.9 °C (98.4 °F)  Tmin 35.6, afebrile   (65-13)  /57 (120s/50s)  Sat 90-97% on mechanical ventilator        Physical exam:   General: intubated and stuporous on sedation, nonresponsive, nonverbal  HEENT: NC/AT/ anicteric sclera, ETT and NG tube  Neck: supple, no JVD  Cardiovascular: tachycardic, irregular rhythm, regular S1/S2, no murmurs  Respiratory: clear to auscultation b/l (anterior), ventilator-assisted breath sounds, no crackles  GI/Abdomen: tense, distended, pt awakes on palpation of stomach ( unable to fully gauge tenderness or rebound due to patient stupor), open anterior abdominal wall w/ sealed wound vac and 1x lower abdomen drain draining 200cc serosanguinous fluid   Extremities: cool, no peripheral edema  MSK/JOINTS:  No swelling, erythema, or pain  Lymphatics: no peripheral lymphadenopathy  Neurology and psych: unable to assess  Skin: no rashes, lines clean dry intact  G.U.: Bonner in place draining minimal yellow urine      Lines, Drains, Airways, Wounds:  CVC Triple Lumen 21 Right Internal  jugular (Active)   Number of days: 0       Peripheral IV (Adult) 08/15/21 Left Forearm (Active)   Number of days: 1       Peripheral IV (Adult) 08/15/21 Right Hand (Active)   Number of days: 1       Drain Right;Lower Abdomen (Active)   Number of days: 0       NG/OG Tube 08/15/21 (Active)   Number of days: 1       Urethral Catheter Latex 16 Fr (Active)   Number of days: 1       ETT  (Active)   Number of days: 1       Arterial Line 08/16/21 Right Femoral (Active)   Number of days: 0       Negative Pressure Wound Therapy Midline Abdomen (Active)   Number of days: 0       Surgical Incision Abdomen Bilateral (Active)   Number of days: 1       Labs:  CBC Results       08/16/21 08/16/21 08/15/21                    0435 0227 2311         WBC 43.59 36.03 28.40         RBC 3.22 3.47 3.76         HGB 9.2 10.0 10.8         HCT 29.7 31.9 35.2         MCV 92.2 91.9 93.6         MCH 28.6 28.8 28.7         MCHC 31.0 31.3 30.7          286 338         Comment for WBC at 0435 on 08/16/21: ALL RESULTS HAVE BEEN CHECKED. This result has been called to CONRAD MILAN by Emelia Rico on 08 16 21 at 05:11, and has been read back.     Comment for WBC at 0227 on 08/16/21: This result has been called to JAYA GARCIA by Jarod Castaneda on 08 16 21 at 02:59, and has been read back.     Comment for WBC at 2311 on 08/15/21: RESULTS CHECKED      WBC 45 (36; uptrending)      CMP Results       08/16/21 08/15/21 08/15/21                    0435 2311 1150          141 136         K 4.4 5.6 3.7         Cl 110 112 100         CO2 16 14 19         Glucose 159 133 145         BUN 63 66 68         Creatinine 4.9 4.9 5.1         Calcium 7.9 8.2 9.9         Anion Gap 20 15 17          -- 38          -- 29         Albumin 1.6 -- 1.9         EGFR 11.4 11.4 10.9         Comment for K at 2311 on 08/15/21: SLIGHT HEMOLYSIS, RESULT MAY BE INCREASED.    Comment for K at 1150 on 08/15/21: Results obtained on plasma. Plasma Potassium values  may be up to 0.4 mEQ/L less than serum values. The differences may be greater for patients with high platelet or white cell counts.    Comment for Creatinine at 1150 on 08/15/21: Consistent with previous results        Metabolic Acidosis 2/2 lactic acidosis  AB.22/41/16.8   Anion Gap: 20 (15)  Lactate 9.4 (7.8)    CMP - GABY  BUN 63 (66)  Cr 4.9 (b/l 3s)    Liver (suggestive of ischemic hepatitis)        Microbiology Results     Procedure Component Value Units Date/Time    MRSA Screen, Nares Only Nose [[id number]]  (Normal) Collected: 21 0013    Specimen: Nasal Swab from Nose Updated: 21 0309     MRSA DNA, Nares Negative    SARS-CoV-2 (COVID-19), PCR Nasopharynx [[id number]] Collected: 08/15/21 1350    Specimen: Nasopharyngeal Swab from Nasopharynx Updated: 08/15/21 1613    Narrative:      The following orders were created for panel order SARS-CoV-2 (COVID-19), PCR Nasopharynx.  Procedure                               Abnormality         Status                     ---------                               -----------         ------                     SARS-COV-2 (COVID-19)/ F...[[id number]]                      Final result                 Please view results for these tests on the individual orders.    SARS-COV-2 (COVID-19)/ FLU A/B, AND RSV, PCR Nasopharynx [[id number]] Collected: 08/15/21 1350    Specimen: Nasopharyngeal Swab from Nasopharynx Updated: 08/15/21 1613     SARS-CoV-2 (COVID-19) Negative     Influenza A Negative     Influenza B Negative     Respiratory Syncytial Virus --    Clostridium difficile tox screen Stool [[id number]]  (Normal) Collected: 21 1107    Specimen: Stool Updated: 21 1855     C difficile Toxin Screen Negative    Urine culture Urine, Clean Catch [[id number]] Collected: 21 1513    Specimen: Urine, Clean Catch Updated: 21 1244     Urine Culture Probable contaminants, suggest recollection      5 x 10^4 CFU/mL Mixed Gram Positive Organisms     SARS-CoV-2 (COVID-19), PCR Nasopharynx [[id number]]  (Normal) Collected: 07/21/21 1211    Specimen: Nasopharyngeal Swab from Nasopharynx Updated: 07/21/21 1337    Narrative:      The following orders were created for panel order SARS-CoV-2 (COVID-19), PCR Nasopharynx.  Procedure                               Abnormality         Status                     ---------                               -----------         ------                     SARS-CoV-2 (COVID-19), P...[[id number]]  Normal              Final result                 Please view results for these tests on the individual orders.    SARS-CoV-2 (COVID-19), PCR Nasopharynx [[id number]]  (Normal) Collected: 07/21/21 1211    Specimen: Nasopharyngeal Swab from Nasopharynx Updated: 07/21/21 1337     SARS-CoV-2 (COVID-19) Negative    Narrative:      Nursing instructions: Obtain nasopharyngeal swab ONLY. Send swab in viral transport media.      Bcx - pending    Pancreas Cx  Cx: NGTD  Gram Stain: No WBC, no organisms    Peritoneal fluid  Cx: NGTD  Gram Stain: 3+ WBC, no organisms    Influenza/RSV/COVID - negative  MRSA Nares - negative        Imaging:  CT ABDOMEN PELVIS WITHOUT IV CONTRAST (8/15/21)  1.  Findings again consistent with acute pancreatitis with interval increase in   gas within the known peripancreatic fluid collections, findings which are   suspicious for communication and perforation of the adjacent duodenum.  Although   there is no oral contrast extravasation on this study there is a large amount of   contrast retained in the stomach, likely secondary to inflammatory changes and   findings are likely felt to represent duodenal perforation.            Assessment   Adalberto Galaviz is a 80 y.o. male w/ history of acute pancreatitis 3w ago, now presenting with septic shock 2/2 pancreatitis and perforated duodenum, found to have necrotizing pancreatitis via exploratory laparotomy yesterday, now POD1 s/p pancreatic necrosectomy, being consulted for severe  sepsis 2/2 necrotizing pancreatitis and antibiotic coverage    - Perforated duodenum likely secondary to recent acute on chronic pancreatitis w/ peripancreatic inflammation  - Pt has increased likelihood of infection of pancreatic fluid: Imaging demonstrating presence of gas within the necrosis  - Epidemiology: ~1/3 of patients with pancreatic necrosis develop infected necrosis  - Would give empiric antibiotics after surgery, given rising WBC count, and possibility of morbidity and mortality in acute necrotizing pancreatitis - antibiotics with good penetration include carbapenem, quinolone/ceftaz/cefepime + metronidazole (anaerobe)  - Micro to consider: often monomicrobial w/ gut-derived organisms (eg, Escherichia coli, Pseudomonas, Klebsiella, and Enterococcus)           Plan   #Necrotizing Pancreatitis s/p Pancreatic Necrosectomy, POD1  #Perforated Viscus, Duodenum s/p OR  Pres w/ septic shock, CT showed necrotizing pancreatitis w/ duodenal perforation, perforated duodenum likely secondary to recent pancreatitis, concerning for superimposed infection in pancreas, given meropenem, fluconazole, Zosyn, Vancomycin, now only on meropenem  - Start empiric cefepime (E. Coli, Pseudomonas, Klebsiella) + metronidazole (anaerobes) for treatment of infected necrotizing pancreatitis (given rising WBC, nonresolved lactic acidosis, septic)  - F/up peritoneal fluid and pancreatic tissue culture - tailor or stop antibiotics accordingly  - Rest per primary surgery team          Plan Discussed with: Dr. Tiffany Cervantes, MS4

## 2021-08-16 NOTE — PLAN OF CARE
Nutrition assessment completed  Rec TPN, see note for full recs     Problem: Adult Inpatient Plan of Care  Goal: Plan of Care Review  Outcome: Progressing

## 2021-08-16 NOTE — CONSULTS
General Surgery Consult    Subjective     Adalberto Galaviz is a 80 y.o. male who was admitted for Duodenal perforation (CMS/HCC) [K63.1]  Elevated troponin [R77.8]  Bandemia [D72.825]  High serum lactate [R79.89]  Acute kidney injury superimposed on chronic kidney disease (CMS/HCC) [N17.9, N18.9]  Leukocytosis, unspecified type [D72.829]. Patient was seen in consultation at the request of referring physician for management recommendations.     Patient is 80M with history of CKD4, HTN, DJD, prostate ca s/p open resection now on intermittent lupron injections, recent admission to [facility] with prolonged course for acute on chronic pancreatitis (unclear etiology, likely alcoholic) presents with worsening abdominal pain. CT scan in the ED performed which shows acute complicated pancreatitis with peripancreatic fluid collections as well as foci of free air. Due to concern for perforated viscus, namely duodenum secondary to severe peripancreatic inflammation, patient was emergently taken to the OR for exploration. Surgical oncology was consulted intraoperative to assist with this complex procedure.    In the OR, necrotizing pancreatitis was encountered, and a pancreatic necrosectomy involving the pancreatic head and body was performed. Packs were placed, abdomen left open, with abthera in place. He is sent to the surgical ICU for postop critical care.    Medical History:   Past Medical History:   Diagnosis Date   • CKD (chronic kidney disease) stage 4, GFR 15-29 ml/min (CMS/HCC)    • DJD (degenerative joint disease)    • Hypertension    • Nocturia    • Prostate CA (CMS/HCC)    • Prostate CA (CMS/HCC)        Surgical History:   Past Surgical History:   Procedure Laterality Date   • PROSTATECTOMY         Social History:   Social History     Social History Narrative   • Not on file       Family History: No family history on file.    Allergies: Aspirin, Losartan, and Morphine    Home Medications:  •  LUPRON DEPOT (3 MONTH), Inject  22.5 mg into the shoulder, thigh, or buttocks every 3 (three) months.  •  amLODIPine, Take 10 mg by mouth daily.  •  APPLE CIDER VINEGAR ORAL, Take 1 tablet by mouth daily.    •  cholecalciferol (vitamin D3), Take 1 tablet (1,000 Units total) by mouth daily.  •  cod liver oil, Take 1 tablet by mouth daily.    •  coenzyme Q10, Take 100 mg by mouth daily.  •  docosahexaenoic acid/epa (FISH OIL ORAL), Take 1 capsule by mouth daily.    •  glucosamine-chondroitin, Take 1 tablet by mouth daily.    •  lidocaine, Apply 1 patch topically daily.  •  lutein, Take 10 mg by mouth daily.  •  ondansetron, Take 4 mg by mouth every 6 (six) hours as needed. for nausea  •  OREGANO OIL ORAL, Take 1 capsule by mouth daily.  •  oxyCODONE,   •  pancrelipase (Lip-Prot-Amyl), Take 2 capsules (12,000 units of lipase total) by mouth 3 (three) times a day with meals.  •  pantoprazole, Take 1 tablet (40 mg total) by mouth daily.  •  sertraline, Take 25 mg by mouth every evening.    •  sodium bicarbonate, Take 1 tablet (650 mg total) by mouth daily.    Current Medications:  •  albuterol HFA, 2 puff, inhalation, q6h PRN  •  calcium gluconate, 1 g, intravenous, Once  •  chlorhexidine, 15 mL, Mouth/Throat, 2 times per day  •  glucose, 16-32 g of dextrose, oral, PRN **OR** dextrose, 15-30 g of dextrose, oral, PRN **OR** glucagon, 1 mg, intramuscular, PRN **OR** dextrose in water, 25 mL, intravenous, PRN  •  fentaNYL, 0-200 mcg/hr, intravenous, Titrated **AND** fentaNYL, 25 mcg, intravenous, PRN  •  insulin regular U-100, 3-5 Units, subcutaneous, q6h ROBERT  •  magnesium sulfate, 2 g, intravenous, PRN  •  Inpatient consult to Infectious Diseases, , , Once **AND** meropenem, 500 mg, intravenous, q12h INT  •  midazolam, 2 mg, intravenous, q4h PRN  •  norepinephrine, 0.5-30 mcg/min, intravenous, Titrated  •  [DISCONTINUED] pantoprazole, 40 mg, oral, Daily **OR** pantoprazole, 40 mg, intravenous, q12h ROBERT  •  [DISCONTINUED] potassium chloride in water, 20  mEq, intravenous, PRN **AND** potassium chloride in water, 20 mEq, intravenous, PRN  •  sodium bicarbonate infusion, , intravenous, Continuous  •  sodium chloride 0.9 %, 5 mL/hr, intravenous, PRN  •  vasopressin (PITRESSIN) continuous infusion, 0.03 Units/min, intravenous, Continuous    Review of Systems  Pertinent items are noted in HPI.    Objective     Physicial Exam  Visit Vitals  BP (!) 120/59   Pulse 70   Temp 36.9 °C (98.4 °F)   Resp (!) 22   Ht 1.829 m (6')   Wt 87 kg (191 lb 12.8 oz)   SpO2 97%   BMI 26.01 kg/m²       General appearance: intubated and sedated  Head: normocephalic, without obvious abnormality, atraumatic  Throat: lips, mucosa, and tongue normal; teeth and gums normal  Neck: no adenopathy, no carotid bruit, no JVD, supple, symmetrical, trachea midline and thyroid not enlarged, symmetric, no tenderness/mass/nodules  Back: symmetric, no curvature. ROM normal. No CVA tenderness.  Lungs: not in acute respiratory distress  Chest wall: no tenderness  Heart: RRR  Abdomen: soft, abthera in place with serosang output  Extremities: extremities normal, warm and well-perfused; no cyanosis, clubbing, or edema  Pulses: 2+ and symmetric  Skin: Skin color, texture, turgor normal. No rashes or lesions  Lymph nodes: Cervical, supraclavicular, and axillary nodes normal.  Neurologic: Grossly normal      Labs  No new labs.    Imaging  I have reviewed the Imaging from the last 24 hrs.    Assessment     80M with recent admission at [facility] for acute on chronic pancreatitis presents with worsening abdominal pain found to have necrotizing pancreatitis s/p exlap and pancreatitic necrosectomy and temporary abdominal closure on 8/15        Plan     - critical care per primary team  - will be available for re-exploration PRN    Please page [phone] with questions or concerns.    Saroj Bustamante MD

## 2021-08-16 NOTE — NURSING NOTE
Pushed ett in  4cm  To 27 @ lip. bilat diminished breath sounds. Vent changes made as per MD.     08/16/21 1656   ETT    Placement Date/Time: 08/15/21 (c) 1923   Technique: Video laryngoscopy  ETT Type: ETT - single  Cuffed: Yes  Laryngoscope: (c)   Blade Size: 4  Location: Oral  Airway Insertion Attempts: 1  Placement Verification: Auscultation;Capnometry   Secured at (cm) 27 cm  (as per MD)   Measured from Lips   Secured Location Left   Secured by Commercial tube briscoe   Site Condition Dry   Mechanical Ventilation   FiO2 (%) (Set) 60 %   FiO2 (%) (Analyzed) 60 %   Resp Rate (Set) 24   Resp Rate (Observed) 24   Vt (Set, mL) 450 mL   Vt (Observed, mL) 350 mL   Minute Ventilation (Observed, L/min) 8   PIP (Observed, cmH2O) 18 cm H2O

## 2021-08-16 NOTE — CONSULTS
Nephrology Consult Note    Subjective     Patient was referred by Dr. Colon for evaluation and management of GABY, CKD.     Adalberto Galaviz is a 80 y.o. male with PMHx of  with PMHx of CKD IV, HTN, prostate cance s/p prostectomy, recent pancreatitis (07/2021), DJD presented to [facility] on 8/15/2021 with a complaint of diffuse abdominal pain and noted to have perforated duodenal perforation.      He had recent pancreatitis and was admitted to [facility] from 7/21/2021 through 8/7/2021.  He had a creatinine in the range of 2.4-3.0 and peaked up to 3.9 mg/dL.  He was discharged with a creatinine of 3.0 mg/dL on 8/7/2021.      Now, presented with septic shock with systolic blood pressure in 80s in the setting of perforated duodenal ulcer.  He was emergently taken to the OR overnight and underwent exploratory laparotomy, pancreatic necrosectomy, repair of middle colic vein, abdominal washout and placement of wound VAC.    He presented with a creatinine of 5.1 mg/dL and was 4.9 mg/dL from this morning.  However, remains oliguric to anuric and currently on pressors. Remains acidotic.     All information point from review of his chart as he is intubated and on vent.       Medical History:   Past Medical History:   Diagnosis Date   • CKD (chronic kidney disease) stage 4, GFR 15-29 ml/min (CMS/[facility])    • DJD (degenerative joint disease)    • Hypertension    • Nocturia    • Prostate CA (CMS/HCC)    • Prostate CA (CMS/HCC)        Surgical History:   Past Surgical History:   Procedure Laterality Date   • PROSTATECTOMY         Social History:   Social History     Socioeconomic History   • Marital status:      Spouse name: Not on file   • Number of children: Not on file   • Years of education: Not on file   • Highest education level: Not on file   Occupational History   • Not on file   Tobacco Use   • Smoking status: Former Smoker   • Smokeless tobacco: Never Used   Substance and Sexual Activity   • Alcohol use: Yes   •  Drug use: Never   • Sexual activity: Not on file   Other Topics Concern   • Not on file   Social History Narrative   • Not on file     Social Determinants of Health     Financial Resource Strain:    • Difficulty of Paying Living Expenses:    Food Insecurity:    • Worried About Running Out of Food in the Last Year:    • Ran Out of Food in the Last Year:    Transportation Needs:    • Lack of Transportation (Medical):    • Lack of Transportation (Non-Medical):    Physical Activity:    • Days of Exercise per Week:    • Minutes of Exercise per Session:    Stress:    • Feeling of Stress :    Social Connections:    • Frequency of Communication with Friends and Family:    • Frequency of Social Gatherings with Friends and Family:    • Attends Judaism Services:    • Active Member of Clubs or Organizations:    • Attends Club or Organization Meetings:    • Marital Status:    Intimate Partner Violence:    • Fear of Current or Ex-Partner:    • Emotionally Abused:    • Physically Abused:    • Sexually Abused:        Family History: No family history on file.    Allergies: Aspirin, Losartan, and Morphine    Review of Systems:  Review of systems not obtained due to altered mental status.    Objective   Vital signs in last 24 hours:  Temp:  [36.2 °C (97.1 °F)-36.9 °C (98.4 °F)] 36.2 °C (97.1 °F)  Heart Rate:  [] 67  Resp:  [20-35] (P) 24  BP: ()/(46-83) (P) 126/57  FiO2 (%) (Set):  [60 %-100 %] 60 %     Intake & Output:    Intake/Output Summary (Last 24 hours) at 8/16/2021 0750  Last data filed at 8/16/2021 0600  Gross per 24 hour   Intake 2222.52 ml   Output 555 ml   Net 1667.52 ml       Physical Exam:  General Appearance:  Intubated and sedated on vent   Head:  Normocephalic, without obvious abnormality, atraumatic   Eyes:  Conjunctiva clear, anicteric slera   Ears:  No external abnormalities   Throat: Moist mucus membrane   Neck:  Supple, symmetrical   Heart:: S1 and S2 heard   Lungs:   Bilateral clear anteriorly    Abdomen:   Soft, non-tender, non-distended, bowel sounds heard.    Genitourinary:   Extremities:  Urinary catheter present.  No cyanosis or edema.   Musculoskeletal:  No injury or deformity   Skin: No rashes    Neurologic:  Behavior/  Emotional: Could not assess as sedated.  Could not assess as sedated.         Current Medications:  Current Facility-Administered Medications   Medication Dose Route Frequency   • albuterol HFA  2 puff inhalation q6h PRN   • chlorhexidine  15 mL Mouth/Throat 2 times per day   • glucose  16-32 g of dextrose oral PRN    Or   • dextrose  15-30 g of dextrose oral PRN    Or   • glucagon  1 mg intramuscular PRN    Or   • dextrose in water  25 mL intravenous PRN   • fentaNYL  0-200 mcg/hr intravenous Titrated    And   • fentaNYL  25 mcg intravenous PRN   • insulin regular U-100  3-5 Units subcutaneous q6h ROBERT   • magnesium sulfate  1 g intravenous Once   • magnesium sulfate  2 g intravenous PRN   • meropenem  500 mg intravenous q12h INT   • midazolam  2 mg intravenous q4h PRN   • norepinephrine  0.5-30 mcg/min intravenous Titrated   • pantoprazole  40 mg intravenous q12h ROBERT   • potassium chloride in water  20 mEq intravenous PRN   • sodium bicarbonate infusion   intravenous Continuous   • sodium chloride 0.9 %  5 mL/hr intravenous PRN   • vasopressin (PITRESSIN) continuous infusion  0.03 Units/min intravenous Continuous       Labs:  Lab Results   Component Value Date    CREATININE 4.9 (H) 08/16/2021    CREATININE 4.9 (H) 08/15/2021    CREATININE 5.1 (HH) 08/15/2021    CREATININE 3.0 (H) 08/07/2021    CREATININE 3.2 (H) 08/06/2021    CREATININE 3.1 (H) 08/05/2021    CREATININE 3.0 (H) 08/04/2021    CREATININE 3.2 (H) 08/03/2021    CREATININE 3.2 (H) 08/02/2021    CREATININE 3.5 (H) 08/01/2021     CMP Results       08/16/21 08/15/21 08/15/21                    0435 2311 1150          141 136         K 4.4 5.6 3.7         Cl 110 112 100         CO2 16 14 19         Glucose 159 133 145          BUN 63 66 68         Creatinine 4.9 4.9 5.1         Calcium 7.9 8.2 9.9         Anion Gap 20 15 17          -- 38          -- 29         Albumin 1.6 -- 1.9         EGFR 11.4 11.4 10.9         Comment for K at 2311 on 08/15/21: SLIGHT HEMOLYSIS, RESULT MAY BE INCREASED.    Comment for K at 1150 on 08/15/21: Results obtained on plasma. Plasma Potassium values may be up to 0.4 mEQ/L less than serum values. The differences may be greater for patients with high platelet or white cell counts.    Comment for Creatinine at 1150 on 08/15/21: Consistent with previous results          CBC Results       08/16/21 08/16/21 08/15/21                    0435 0227 2311         WBC 43.59 36.03 28.40         RBC 3.22 3.47 3.76         HGB 9.2 10.0 10.8         HCT 29.7 31.9 35.2         MCV 92.2 91.9 93.6         MCH 28.6 28.8 28.7         MCHC 31.0 31.3 30.7          286 338         Comment for WBC at 0435 on 08/16/21: ALL RESULTS HAVE BEEN CHECKED. This result has been called to CONRAD MILAN by Emelia Rico on 08 16 21 at 05:11, and has been read back.     Comment for WBC at 0227 on 08/16/21: This result has been called to JAYA GARCIA by Jarod Castaneda on 08 16 21 at 02:59, and has been read back.     Comment for WBC at 2311 on 08/15/21: RESULTS CHECKED        I have reviewed the pertinent patient's labs.    Imaging:  I have reviewed the pertinent patient's imaging results for this admission.     Assessment/Plan     Adalberto Galaviz is a 80 y.o. male with PMHx of  with PMHx of CKD IV, HTN, prostate cance s/p prostectomy, recent pancreatitis (07/2021), DJD [place] [facility] [place] with a complaint of diffuse abdominal pain and noted to have perforated duodenal perforation.      1. CKD IV  -Baseline creatinine in the range of 2.4-3.0 mg/dl..  Had a creatinine of 2.9 mg/dL in December 2020 prior to recent hospitalization at [facility] with acute pancreatitis.  Suffered from GABY with peak creatinine  of 3.9 mg/dL on 7/29/2021 and was discharged with a creatinine of 3.0 mg/dL on 8/7/2021 with EGFR of 20.1 ml/min  -Presumably secondary to hypertensive nephrosclerosis.  -Renal ultrasound from 7/26/2021 revealed atrophic kidneys (R 9.7 & L 9.6 cm) and but increased parenchymal echogenicity consistent with medical renal disease.  -Outpatient patient of Dr. Terrazas.     Recommend:  -Outpatient follow up with his nephrology if he gets discharge without needing dialysis    2.  GABY  -Likely ATN, in the setting of hemodynamic instability, shock   -Presented with creatinine of 5.1 mg/dl on 8/15/2021.   -Urinalysis on 8/15/2021 without any proteinuria/pyuria but with hematuria - ? related to Bonner catheter/prostate cancer with difficult catheter placement  -CT abdomen/pelvis without contrast - no mention of hydronephrosis    -Remains on 2 pressors.  Urine output minimal.  Remains acidotic with persistent lactic acidosis  -Currently on D5W with 150 mEq of sodium bicarbonate/L at 125 ml/h    Recommend:  -Continue supportive measures as you are  -With poor baseline renal function, now with oligo-anuric, acidosis, reasonable to initiate CRRT. Reached out to primary surgery team and awaiting call back.  -Avoid hypotension and maintain MAP > 65 mmHg. Use pressors soon.  -Continue to avoid nephrotoxins as much as possible, like NSAIDs, IV contrast dye, fleet enema etc.  -Continue to follow serial renal chemistries.    3. Metabolic acidosis  -Anion gap metolic acidosis in the setting of lactic acidosis.   -Continue resuscitative measures as per primary team.   -Initiation of CRRT as mention above    4. Hypertension  -Currently in shock with 2 pressors - vasopressin/ levophed    5. Anemia  -Anemia of CKD at baseline and now worsen surgery  -Transfuse prn as per primary team    6.  History of prostate cancer  -history of prostectomy  -Difficult Bonner catheter placement by urology  -As per urology    7.  Perforated duodenal  ulcer  -Likely in the setting of recent pancreatitis.   -s/p exploratory laparotomy, pancreatic necrosectomy, repair of middle colic vein, abdominal washout and placement of wound VAC on 8/15/2021.   -As per primary team    8.  Shock   -In the setting of perforated viscus.   -On empiric broad spectrum Abx  -As per primary team

## 2021-08-16 NOTE — ANESTHESIA POSTPROCEDURE EVALUATION
Patient: Adalberto Galaviz    Procedure Summary     Date: 08/15/21 Room / [facility]: [facility] OR [facility] / [facility] OR    Anesthesia Start: 1849 Anesthesia Stop:     Procedure: Exploratory laparotomy, abdominal washout, pancreatic necresectomy, mobilization of right colon, EGD, AbThera placement (N/A ) Diagnosis:       Duodenal perforation (CMS/HCC)      (Duodenal perforation (CMS/HCC) [K63.1])    Surgeons: Anastacio Colon MD Responsible Provider: Boston Shetty MD    Anesthesia Type: general ASA Status: 3 - Emergent          Anesthesia Type: general  PACU Vitals    No data found in the last 10 encounters.           Anesthesia Post Evaluation    Mode of pain management: IV medication  Patient location during evaluation: ICU  Patient participation: waiting for patient participation  Cardiovascular status: hypotensive, unstable and acceptable (requiring pressors)  Airway Patency: adequate and patent  Respiratory status: acceptable, intubated, ETT and ventilator  Hydration status: stable  Anesthetic complications: no  Comments: Critically ill patient transferred to ICU intubated, sedated, and on pressors. Vital signs stable throughout transport process.

## 2021-08-16 NOTE — PROCEDURES
Informed consent was obtained from an authorized representative. Patient was sedated with versed 2 mg iv and fentanyl 25 mcg iv. Defibrillation pads were placed anteriorly and posteriorly on the chest. A 200 J synchronized biphasic shock was delivered and sinus rhythm was restored.    Recommendations:  Continue amiodarone drip 0.5 mg/min.  Hold amiodarone if HR <50 bpm.

## 2021-08-16 NOTE — CONSULTS
ELECTROPHYSIOLOGY CONSULT NOTE      Reason for consult: Afib with RVR    SUBJECTIVE    Adalberto Galaviz is a 80 y.o. male who was admitted for Duodenal perforation (CMS/HCC) [K63.1]  Elevated troponin [R77.8]  Bandemia [D72.825]  High serum lactate [R79.89]  Acute kidney injury superimposed on chronic kidney disease (CMS/HCC) [N17.9, N18.9]  Leukocytosis, unspecified type [D72.829].     PMHx significant for prostate cancer s/p prostatectomy on lupron, HTN, CKD, pancreatitis.    HPI    Patient presented with abdominal pain. He was recently admitted at [facility] for acute pancreatitis. CT scan this admission was concerning for duodenal perforation. Patient was septic requiring pressors. 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 This morning he went into Afib with RVR in the 130s with mild increase in pressor requirement. Patient is sedated and intubated. No history of atrial fibrillation on record.      Review of Systems   Gastrointestinal: Positive for abdominal pain.     14-point ROS was performed. Negative except HPI.    Allergies: Aspirin, Losartan, and Morphine    History  Medical History:   Past Medical History:   Diagnosis Date    CKD (chronic kidney disease) stage 4, GFR 15-29 ml/min (CMS/HCC)     DJD (degenerative joint disease)     Hypertension     Nocturia     Prostate CA (CMS/HCC)     Prostate CA (CMS/HCC)        Surgical History:   Past Surgical History:   Procedure Laterality Date    PROSTATECTOMY         Social History:   Social History     Socioeconomic History    Marital status:      Spouse name: Not on file    Number of children: Not on file    Years of education: Not on file    Highest education level: Not on file   Occupational History    Not on file   Tobacco Use    Smoking status: Former Smoker    Smokeless tobacco: Never Used   Substance and Sexual Activity    Alcohol use: Yes    Drug use: Never    Sexual activity: Not on file   Other Topics Concern  "   Not on file   Social History Narrative    Not on file     Social Determinants of Health     Financial Resource Strain:     Difficulty of Paying Living Expenses:    Food Insecurity:     Worried About Running Out of Food in the Last Year:     Ran Out of Food in the Last Year:    Transportation Needs:     Lack of Transportation (Medical):     Lack of Transportation (Non-Medical):    Physical Activity:     Days of Exercise per Week:     Minutes of Exercise per Session:    Stress:     Feeling of Stress :    Social Connections:     Frequency of Communication with Friends and Family:     Frequency of Social Gatherings with Friends and Family:     Attends Pentecostal Services:     Active Member of Clubs or Organizations:     Attends Club or Organization Meetings:     Marital Status:    Intimate Partner Violence:     Fear of Current or Ex-Partner:     Emotionally Abused:     Physically Abused:     Sexually Abused:        Family History: No family history on file.    OBJECTIVE  Visit Vitals  BP 95/85   Pulse (!) 111   Temp (!) 35.6 °C (96.1 °F) (Axillary)   Resp (!) 24   Ht 1.854 m (6' 1\")   Wt 87 kg (191 lb 12.8 oz)   SpO2 94%   BMI 25.30 kg/m²     Temp (72hrs), Av.3 °C (97.4 °F), Min:35.6 °C (96 °F), Max:36.9 °C (98.4 °F)    Temp:  [35.6 °C (96 °F)-36.9 °C (98.4 °F)] 35.6 °C (96.1 °F)  Heart Rate:  [] 111  Resp:  [20-35] 24  BP: ()/(46-85) 95/85  FiO2 (%) (Set):  [60 %-100 %] 65 %    Physical Exam  Constitutional: Sedated and intubated.   HENT: Dry mucous membranes.  Eyes: EOM are normal.   Neck: No JVD.   Cardiovascular: Irregular rhythm. No murmur.  Pulmonary/Chest: Normal effort and air entry. No crackles.  Abdominal: S/p Exlap with wound vac.  Musculoskeletal: No lower extremity edema.  Neurological: Sedated and intubated.   Skin: Skin is warm and dry.     Recent Labs   Lab 21  1023 21  0435 08/15/21  2311 08/15/21  1150 08/15/21  1150    146* 141   < > 136   K 4.8 4.4 5.6*   < > 3.7 "    110* 112*   < > 100   CO2 16* 16* 14*   < > 19*   BUN 67* 63* 66*   < > 68*   CREATININE 5.2* 4.9* 4.9*   < > 5.1*   CALCIUM 7.8* 7.9* 8.2*   < > 9.9   ALBUMIN 1.5* 1.6*  --   --  1.9*   BILITOT 1.0 0.9  --   --  0.8   ALKPHOS 62 53  --   --  151*   * 271*  --   --  29   AST 1,702* 688*  --   --  38   GLUCOSE 280* 159* 133*   < > 145*    < > = values in this interval not displayed.     Recent Labs   Lab 08/16/21  1023 08/16/21  0909 08/16/21  0642 07/26/21  1449 07/26/21  0916   TROPONINI 0.17* 0.18* 0.15*   < >  --    BNP  --   --   --   --  253*    < > = values in this interval not displayed.     Recent Labs   Lab 08/16/21  1023 08/16/21  0435 08/16/21  0227   WBC 45.21* 43.59* 36.03*   HGB 9.5* 9.2* 10.0*    246 286        Intake/Output Summary (Last 24 hours) at 8/16/2021 1413  Last data filed at 8/16/2021 1253  Gross per 24 hour   Intake 4294.91 ml   Output 780 ml   Net 3514.91 ml       Cardiology results  ECG : Atrial fibrillation with rapid ventricular response 126 bpm.      Telemetry : SB 40-50 bpm.  Most recent Echo results:    TRANSTHORACIC ECHO (TTE) COMPLETE 07/27/2021 (Final) 7/27/2021    Interpretation Summary  · Normal-sized LV. Hyperdynamic LV systolic function. Estimated EF >75%. Normal LV wall thickness. Grade I LV diastolic dysfunction. LV diastolic inflow pattern consistent with impaired relaxation.  · Normal leaflet structure and normal leaflet motion of the mitral valve. Mitral annular calcification.  · Trace mitral valve regurgitation.  · Normal-sized LA.  · Aortic valve structure is normal. Sclerotic aortic valve leaflets. Trace aortic valve regurgitation. No aortic valve stenosis.  · Aortic root normal. Sinuses of Valsalva normal-sized.  · Normal-sized RA.  · Normal-sized RV. Normal RV systolic function.  · Tricuspid valve structure is normal. No tricuspid valve regurgitation.       Exercise nuclear stress test 2/18/16      Imaging:   CTA chest/abdomen  7/21/21  IMPRESSION:  1.  There is fluid and stranding throughout the peripancreatic fat predominantly  the region of the pancreatic head and neck.  Clinical correlation for acute  pancreatitis is advised.  There is dilatation of pancreatic duct throughout the  body and tail.  No obvious pancreatic mass is demonstrated.  An underlying  intraductal lesion is not excluded on the basis of this exam.  2.  Normal caliber thoracoabdominal aorta without evidence of dissection.  Repeat.  Additional incidental findings as described above.       Home medications    LUPRON DEPOT (3 MONTH), Inject 22.5 mg into the shoulder, thigh, or buttocks every 3 (three) months.    amLODIPine, Take 10 mg by mouth daily.    APPLE CIDER VINEGAR ORAL, Take 1 tablet by mouth daily.      cholecalciferol (vitamin D3), Take 1 tablet (1,000 Units total) by mouth daily.    cod liver oil, Take 1 tablet by mouth daily.      coenzyme Q10, Take 100 mg by mouth daily.    docosahexaenoic acid/epa (FISH OIL ORAL), Take 1 capsule by mouth daily.      glucosamine-chondroitin, Take 1 tablet by mouth daily.      lidocaine, Apply 1 patch topically daily.    lutein, Take 10 mg by mouth daily.    ondansetron, Take 4 mg by mouth every 6 (six) hours as needed. for nausea    OREGANO OIL ORAL, Take 1 capsule by mouth daily.    oxyCODONE,     pancrelipase (Lip-Prot-Amyl), Take 2 capsules (12,000 units of lipase total) by mouth 3 (three) times a day with meals.    pantoprazole, Take 1 tablet (40 mg total) by mouth daily.    sertraline, Take 25 mg by mouth every evening.      sodium bicarbonate, Take 1 tablet (650 mg total) by mouth daily.    Inpatient medications   amiodarone        chlorhexidine  15 mL Mouth/Throat 2 times per day    insulin regular U-100  3-5 Units subcutaneous q6h ROBERT    meropenem  500 mg intravenous q12h INT    pantoprazole  40 mg intravenous q12h ROBERT       ASSESSMENT AND PLAN  80 y.o. male who is admitted for Duodenal perforation (CMS/HCC)  [K63.1]  Elevated troponin [R77.8]  Bandemia [D72.825]  High serum lactate [R79.89]  Acute kidney injury superimposed on chronic kidney disease (CMS/HCC) [N17.9, N18.9]  Leukocytosis, unspecified type [D72.829].   ELECTROPHYSIOLOGY is being consulted for Afib with RVR    Paroxysmal atrial fibrillation (CMS/HCC)  Assessment & Plan  New onset Afib with RVR in the setting of pancreatic necrosis s/p debridement.  Agree with amiodarone drip.  Since he cannot be on anticoagulation will do bedside cardioversion this afternoon.  Patient has baseline sinus bradycardia. Monitor for bradycardia once he converts to sinus rhythm.      Oni Carrillo MD  8/16/2021  2:13 PM    I saw and examined the patient and reviewed the above note by the Cardiology Fellow and discussed the management. I agree with recommendations and plan as outlined and edited in the fellows note.    Aaron Kelley

## 2021-08-16 NOTE — CONSULTS
Paged at 11:55 for Family requesting a  for anointing of the sick/last rites.  Aya Baron called responded to request. Fr. Benjy Houser was called and came in around 12:30 to administer sacraments. Followed up with pts family who are at bedside. Grateful for Buddhist rituals and prayers.  Offered listening for expressed feelings as they processed his illness. Grateful for supportive presence and prayer. - Rev. Julius Alfaro, Spiritual Care Coordinator   [phone]

## 2021-08-16 NOTE — PROGRESS NOTES
MD paged about RN splashed in eye with pt blood. Called patient's spouse, Cecilio Galaviz, for consent for HIV testing as patient is intubated and sedated. Pt's spoused was informed of the possible interpretations, disclosures, and ramifications of the testing. The pt's spouse consented to testing.     Jessica Hyman MD  General Surgery

## 2021-08-16 NOTE — CONSULTS
"Nutrition Assessment    Recommendations  1. Consider TPN, will need dedicated central access  Day 1 Minimal volume TPN : 70 AA, 150 dextrose, 0 lipid   NO K+, No Phos, 10 mEq Ca, 50 mEq Na, 10 mEq Mg,   Rec insulin gtt     2. Day 2 TPN- Minimal volume: 150 AA, 200 dextrose, 68 SMOF lipid     3. Check BMP, Mg, Phos daily, Initial  And weekly LFT, Io ca, TG     4 BG goals 140-180mg/dl       Clinical Course: Patient is a 80 y.o. male who was admitted on 8/15/2021 with a diagnosis of Duodenal perforation (CMS/HCC) [K63.1]  Elevated troponin [R77.8]  Bandemia [D72.825]  High serum lactate [R79.89]  Acute kidney injury superimposed on chronic kidney disease (CMS/HCC) [N17.9, N18.9]  Leukocytosis, unspecified type [D72.829].     Past Medical History:   Diagnosis Date   • CKD (chronic kidney disease) stage 4, GFR 15-29 ml/min (CMS/HCC)    • DJD (degenerative joint disease)    • Hypertension    • Nocturia    • Prostate CA (CMS/HCC)    • Prostate CA (CMS/HCC)      Past Surgical History:   Procedure Laterality Date   • PROSTATECTOMY         Reason for Assessment  Reason For Assessment: identified at risk by screening criteria ([facility] )  Diagnosis: infection/sepsis     [facility] Nutrition Screen Tool  Has patient lost weight without trying?: 2-->Unsure  If yes,how much weight has been lost?: 2-->Unsure  Has patient been eating poorly due to decreased appetite?: 0-->No (nikky, sedated and intubated)  [facility] Nutrition Screen Score: 4         Physical Findings  Overall Physical Appearance: on ventilator support  Tubes: Nasogastric tube  Skin: surgical incision (open abdomen with VAC )     RETS18 Physical Appearance  Overall Physical Appearance: on ventilator support  Tubes: Nasogastric tube  Skin: surgical incision (open abdomen with VAC )     Nutrition Order  Nutrition Order: does not meet nutritional requirements  Nutrition Order Comments: NPO/NGT     Anthropometrics  Height: 185.4 cm (6' 1\")         Current Weight  Weight Method: Bed " scale  Weight: 87 kg (191 lb 12.8 oz)     Ideal Body Weight (IBW)  Ideal Body Weight (IBW) (kg): 84.86  % Ideal Body Weight: 102.52          Body Mass Index (BMI)  BMI (Calculated): 25.3     RETS18 Lab Results  Lab Results Reviewed: reviewed   BMP Results       08/16/21 08/16/21 08/15/21                    1023 0435 2311          146 141         K 4.8 4.4 5.6         Cl 105 110 112         CO2 16 16 14         Glucose 280 159 133         BUN 67 63 66         Creatinine 5.2 4.9 4.9         Calcium 7.8 7.9 8.2         Anion Gap 22 20 15         EGFR 10.7 11.4 11.4         Comment for K at 2311 on 08/15/21: SLIGHT HEMOLYSIS, RESULT MAY BE INCREASED.    Comment for Creatinine at 1023 on 08/16/21: Consistent with previous results          No results found for: HGBA1C  Mg 1.9, Phos 8.7,   Io ca 1.02 low   Lab Results   Component Value Date     (H) 08/16/2021    AST 1,702 (H) 08/16/2021    ALKPHOS 62 08/16/2021    BILITOT 1.0 08/16/2021           Medications  Pertinent Medications Reviewed: reviewed   • amiodarone  0.5 mg/min 0.5 mg/min (08/16/21 1100)   • bicarbonate dialysate mixture with additional potassium for CRRT  5 L     • fentaNYL  0-200 mcg/hr 150 mcg/hr (08/16/21 1400)   • norepinephrine  0.5-30 mcg/min 14.005 mcg/min (08/16/21 1400)   • sodium bicarbonate in D5W   125 mL/hr at 08/16/21 1400   • vasopressin (PITRESSIN) continuous infusion  0.03 Units/min 0.03 Units/min (08/16/21 1400)     • amiodarone       • chlorhexidine  15 mL Mouth/Throat 2 times per day   • insulin regular U-100  3-5 Units subcutaneous q6h ROBERT   • meropenem  500 mg intravenous q12h INT   • pantoprazole  40 mg intravenous q12h ROBERT         Calorie Requirements  Estimated kCal Needs: Actual Body Weight (87 kg )  Estimated Calorie Need Method: kcal/kg  Calorie/kg Recommended: 22-25  Calorie Recommendations: 8966-2723     Protein Requirements  Recommended Dosing Weight (Estimated Protein Needs): Actual Body Weight  Est Protein  Requirement Amount (gms/kg): 2-->2.0 gm protein  Protein Recommendations: 175 grams (2.0 grams/kg )      Fluid requirements: 20 ml/kg 1740 ml. ( starting CRRT)     Dosing weight: ABW 87 kg     Skin: surgical incision, open abdomen     NFPE: intubated     Clinical comments: Pt POD #1 s/p Expl lap, abdominal washout, pancreatic necresectomy, mobilization of R colon, EGD, Ab thera placement  Intubated PRVC 60% FIO2, peep 10   Plan for CRRT today     24H UO- 55ml  VAC 300ml     Noted pt was in H recently, with limited po intake.   Would consider TPN- with dedicated central access  Day 1 TPN: 70 AA, 150 dextrose, 0 lipid , minimal volume   To provide 790 kcals, .8 grams protein/kg, (40% protein needs)     Day 2 TPN: 150 AA, 200 dextrose, 68 SMOF lipid , minimal volume   Will provide: 1960 total kcals, (22 kcals/kg ABW), 1.7 grams protein/kg, 35% lipid. GIR 1.6 mg/kg/min     rec insulin gtt with TPN     Follow labs, and volume status     Goals: Meet pts est nutrition needs   Monitor:  Labs, skin, TPN zaki    Recommendations: See above     PES  Statement: PES Statement  Nutrition Diagnosis: Inadequate Energy Intake  Related To:: Decreased ability to consume sufficient energy  As Evidenced By:: Intubated, post op, NPO   Nutritional Needs Met?: No                                   Date: 08/16/21  Signature: Yulisa Lam RD

## 2021-08-16 NOTE — BRIEF OP NOTE
Right IJ cordis placement, Exploratory laparotomy, abdominal washout, pancreatic necrosectomy, mobilization of right colon, EGD, AbThera placement Procedure Note    Procedure:    Exploratory laparotomy, abdominal washout, pancreatic necresectomy, mobilization of right colon, EGD, AbThera placement  CPT(R) Code:  09254 - [facility]PL[facility] OF ABDOMEN      Pre-op Diagnosis     * Duodenal perforation (CMS/HCC) [K63.1]       Post-op Diagnosis     * Duodenal perforation (CMS/HCC) [K63.1]    Surgeon(s) and Role:     * Anastacio Colon MD - Primary     * Shoaib Eason DO - Resident - Assisting     * Kentrell Cowan MD - Resident - Assisting    Anesthesia: General    Staff:   Circulator: Pal Liao RN; Lorraine Carl RN  Scrub Person: Buffy Lutz; Andie Orozco    Estimated Blood Loss: 200 mL  LR: 1000 ml  Albumin: 250 ml  PRBC 1 unit        Specimens:                Order Name Source Comment Collection Info Order Time   ANAEROBIC CULTURE / SMEAR (INCLUDES AEROBIC CULTURE) Pancreatic Fluid Pre-op diagnosis:  Duodenal perforation (CMS/HCC) [K63.1] Collected By: Anastacio Colon MD 8/15/2021  9:23 PM     Release to patient   Immediate        ANAEROBIC CULTURE / SMEAR (INCLUDES AEROBIC CULTURE) Peritoneal Fluid Pre-op diagnosis:  Duodenal perforation (CMS/HCC) [K63.1] Collected By: Anastacio Colon MD 8/15/2021  9:31 PM     Release to patient   Immediate        ANAEROBIC CULTURE / SMEAR (INCLUDES AEROBIC CULTURE) Pancreas Pre-op diagnosis:  Duodenal perforation (CMS/HCC) [K63.1] Collected By: Anastacio Colon MD 8/15/2021 10:32 PM     Release to patient   Immediate        BLOOD GAS, ARTERIAL COMPREHENSIVE Blood, Arterial Pre-op diagnosis:  Duodenal perforation (CMS/HCC) [K63.1] Collected By: Anastacio Colon MD 8/15/2021  7:45 PM     Release to patient   Immediate        BLOOD GAS, ARTERIAL COMPREHENSIVE Blood, Arterial Pre-op diagnosis:  Duodenal  perforation (CMS/HCC) [K63.1] Collected By: Anastacio Colon MD 8/15/2021  8:35 PM     Release to patient   Immediate        BLOOD GAS, ARTERIAL COMPREHENSIVE Blood, Arterial Pre-op diagnosis:  Duodenal perforation (CMS/HCC) [K63.1] Collected By: Anastacio Colon MD 8/15/2021  9:10 PM     Release to patient   Immediate        BLOOD GAS, ARTERIAL COMPREHENSIVE Blood, Arterial Pre-op diagnosis:  Duodenal perforation (CMS/HCC) [K63.1] Collected By: Anastacio Colon MD 8/15/2021  9:52 PM     Release to patient   Immediate        PREPARE RBC    8/15/2021  9:49 PM     Transfusion indications   Acute Bleeding          Has consent been obtained?   Yes          Are you aware of this patient having previously identified antibodies?   NO          Does this Patient have Sickle Cell Disease/Sickle Cell Anemia?   NO        PATHOLOGY TISSUE EXAM Pancreas Pre-op diagnosis:  Duodenal perforation (CMS/HCC) [K63.1] Collected By: Anastacio Colon MD 8/15/2021  8:56 PM     Release to patient   Immediate              Drains:   NG/OG Tube 08/15/21 (Active)       Urethral Catheter Latex 16 Fr (Active)       Implants: * No implants in log *           Complications:  None; patient tolerated the procedure well.           Disposition: ICU - intubated and critically ill.           Condition: unstable    Kentrell Valles MD  General Surgery Resident PGY 3  Pg [phone]      Anastacio Colon MD  Phone Number: [phone]

## 2021-08-16 NOTE — PROGRESS NOTES
General Surgery Post-Operative Note    Adalberto Galaviz is a 80 y.o. male  s/p exploratory laparotomy, abdominal washout, pancreatic necrosectomy, EGD, middle colic vein repair, abthera placement today.     S  Patient was left intubate and sedated for further resuscitation and for likely additional surgical procedures.     O  Vitals:  Blood pressure (!) 120/59, pulse 71, temperature 36.9 °C (98.4 °F), resp. rate (!) 22, height 1.829 m (6'), weight 87 kg (191 lb 12.8 oz), SpO2 97 %.    Physical Exam   Physical Exam    General appearance: appears stated age and sedated , no acute distress  Lungs: equal chest rise b/l, intubated  Heart: regular rate and rhythm  Abdomen: soft, nondistended, abthera in place draining serosang fluid   Extremities: extremities normal, warm and well-perfused; no cyanosis, clubbing, or edema   Neurologic: sedated    A/P  Adalberto Galaviz is a 80 y.o. male  s/p exploratory laparotomy, abdominal washout, pancreatic necrosectomy, EGD, middle colic vein repair, abthera placement today, undergoing close observation in the critical care setting.     Neuro: fentanyl gtt, versed prn  Resp: PRVC 22/500/10/70  CV levophed, vasopressin gtt, CVC, Vigileo,   GI: Protonix bid, NG to LIS  Renal: Nephrology consult. Bicarb gtt, BMP, lactate, ABG  : Maintain palmer. Appreciate Urology recommendations re: palmer  ID: Meropenem, vanco x1, ID consult. MRSA screen  Endo: ISS   Hem: SQH, CBC, PT, INR, PTT    Jessica Hyman MD  Please page #[phone] with questions or concerns.  8/16/2021

## 2021-08-16 NOTE — PROCEDURES
Procedure: Cystoscopy and complex palmer catheter insertion.    Indications: Urology consult intraoperatively due to difficult Palmer catheter attempt.  Numerous attempts were made in the emergency department by RN and ED attending.  The patient was rushed emergently to the operating room for perforated duodenum.  Urology consult to place Palmer catheter.       Surgeon: Shoaib Eason,  PGY5, Angel Alegria PGY3    Procedure Details   The patient was appropriately identified and a bedside timeout was performed.  Fortunately he was already on therapeutic antibiotics with Zosyn and fluconazole.  The patient was then prepped and draped in the usual sterile fashion in the supine dorsal lithotomy position.  The urethra was intubated with a 22 [place] rigid cystoscope with a 30 degree lens which initially revealed a normal fossa navicularis and penile urethra.  However once the bulbar urethra was encountered a large gaping false passage in the 6 o'clock position was identified.  The scope was angled upward the true lumen of the urethra was identified at the 12 o'clock position and the bladder neck was noted to be high. Once the bladder was entered a 0.038 Amplatz wire and 0.035 sensor wire was advanced through the cystoscope into the bladder.  The cystoscope was then backloaded off of both wires and a 16 [place] Palmer catheter was advanced over the Amplatz wire into the bladder with return of clear irrigation.  The balloon was then filled with 10 cc of sterile water and the catheter was secured to the thigh with a stat lock.    Findings:  -Large false passage in the 6 o'clock position of the bulbar urethra from previous Palmer catheter times.  -True lumen located at the 12 o'clock position  -High bladder neck  -Changes within the bladder consistent with previous radiation  -Small 1 mm papillary lesion at the trigone.    Plan:  -Maintain Palmer catheter while critically ill and for a minimum of 2 weeks given large  false passage  -He should follow-up with his urologist as an outpatient for formal cystoscopy and evaluation of a small papillary lesion of the trigone.  Due to the patient's critical condition, hypotension, and pressor requirement extra time was not utilized to perform a formal cystoscopy.  -Care per primary surgical team.    Drains: 16fr [place] tip palmer catheter           [facility]  Pager: [phone]  Shoaib Eason DO, PGY 4

## 2021-08-16 NOTE — OP NOTE
OPERATIVE REPORT    Preoperative Diagnosis: Suspected perforated duodenum    Postoperative Diagnosis: Pancreatic necrosis    Procedure:   1.  Exploratory laparotomy  2.  Abdominal washout  3.  Right colon mobilization  4.  Pancreatic necrosectomy  5.  Esophagogastroduodenoscopy  6.  Repair of middle colic vein injury  7.  Placement of 2 laparotomy pads in the abdomen (one in the right lower quadrant and one in the pancreatic bed)  8.  Placement of ABThera VAC    Surgeon: Anastacio Colon MD (present for the entirety of the case)    Assistant:   1. Carmelo Sullivan MD (second attending needed due to the complexity of the operation)  2. Kentrell Cowan MD    Anesthesia: General    EBL: 200 cc    Specimen:   1.  Peritoneal fluid to microbiology  2.  Necrotic pancreas to pathology  3.  Necrotic pancreas to microbiology    Complications: Middle colic vein injury requiring repair    Disposition: SICU in critical condition    Indications for the Procedure: The patient is a 80 y.o. male who presented in septic shock with CT scan findings concerning for a perforated duodenum.  He was recently admitted to [place] between July 21 and August 7 with severe pancreatitis.  Informed consent was obtained for surgical intervention.  I discussed in great detail with both the wife and the patient's son the potential for intraoperative and postoperative clinical deterioration and death.     Details of the Procedure: The patient was identified and brought to the OR.  General anesthesia was administered by the anesthesia team.  Given his history of prostatectomy and numerous attempts to place a Bonner catheter at bedside in the ER, urology was consulted. A cystoscopy was performed to place the Bonner catheter.  Please refer to their operative note for further details. The patient was then placed supine on the operating room table and all bony elements were adequately padded.  The patient was on standing antibiotics.  The abdomen was prepped  and draped in the usual sterile fashion. A time out was performed and all present agreed to the planned operation.    We began by making a vertical midline incision with a scalpel.  Electrocautery was used to dissected the soft tissue.  The fascia was elevated and incised in its entirety.  The peritoneum was elevated and also incised.  We gained entry into the abdomen.  There was immediate egress of foul-smelling purulent appearing fluid.  Cultures were taken and sent to microbiology.  The gallbladder was found to be plastered in the right upper quadrant along with large amounts of omentum and colon.  We tediously dissected the gallbladder off of the surrounding inflammatory rind.  Once this was accomplished, we noted what appeared to be a hole in the first portion of the duodenum with large amounts of purulent fluid.  The colon was very plastered and it was difficult to assess the full extent of the presumed perforation.  The hepatic flexure and right colon were mobilized along the white line of [place].     We continued bluntly dissecting around this area of suspected duodenal perforation and we then encountered a large abscess cavity.  I was unable to determine what the actual anatomy was given the extent of inflammation present at the site.  I asked for an EGD to be brought to the operating room and called Dr. Sullivan to provide assistance.    The greater omentum was divided off of the transverse colon in order to enter the lesser sac.  We identified extensive pancreatic necrosis located mostly in the pancreatic head and uncinate process.  The duodenum was kocherized bluntly with finger dissection.  A bowel clamp was applied to a segment of small bowel just distal to the ligament of Treitz.  The abdomen was filled with normal saline.  An EGD was performed and we were able to enter the third portion of the duodenum.  The stomach and duodenum were insufflated and we found no evidence of duodenal perforation.  The fluid  that we had encountered earlier was in fact necrotic pancreas.    We then commenced a pancreatic necrosectomy using a ringed forcep.  Samples were sent to pathology and microbiology.  During the process of performing a pancreatic necrosectomy, we inadvertently injured the middle colic vein in 2 locations in close proximity.  Each of these lacerations were repaired with 4-0 Prolene in a figure-of-eight fashion.  This area was then hemostatic.  We continued performing our necrosectomy and identified several pancreatic ducts.  These were controlled using a combination of clips and 3-0 Prolene sutures.    Once we were satisfied with our extent of the necrosectomy, the abdomen was copiously irrigated with normal saline.  A 19 [place] UNIQUE drain was placed in the pancreatic bed and brought out through the right abdomen. It was secured to the skin using 2-0 nylon suture.  Given raw surface oozing, a laparotomy pad was placed in the pancreatic bed. A second laparotomy pad was placed in the right lower quadrant at the site of our right colon mobilization.  Of note, the cecum and distal ileum appeared increasingly ischemic by the end of the operation.  The patient was on Levophed at 18 mcg/min and vasopressin at 0.04 units/min towards the end of the operation. I elected to not resect anything at this time and will reassess these areas during the take back in 1-2 days.     An ABThera VAC was then placed.  Laparotomy pad counts were incorrect as we had two retained laparotomy pads.  Patient was taken to the surgical intensive care unit in critical condition.  He was on multiple pressors.    I was present for and participated in all parts of the operation.  I called and spoke to the patient's wife at the conclusion of the operation and  informed her of how critically ill her  was.  The patient will be brought back to the operating room in 1 to 2 days for further debridement and abdominal washout.    Anastacio Colon,  MD

## 2021-08-16 NOTE — PROGRESS NOTES
Patient: Adalberto Galaviz  Location: [facility] SICU SICU05  MRN: [id number]  Today's date: 8/16/2021    Attempted to see patient for therapy. Unable due to medical hold (+ETT and sedated).     OT will follow as able and appropriate.

## 2021-08-16 NOTE — CONSULTS
Urology Consult    Subjective     Adalberto Galaviz is a 80 y.o. male who was admitted for Duodenal perforation (CMS/HCC) [K63.1]  Elevated troponin [R77.8]  Bandemia [D72.825]  High serum lactate [R79.89]  Acute kidney injury superimposed on chronic kidney disease (CMS/HCC) [N17.9, N18.9]  Leukocytosis, unspecified type [D72.829]. Patient was seen in consultation at the request of referring physician for management recommendations. Patient is an 80-year-old male with a past medical history of hypertension, chronic kidney disease, chronic pancreatitis, prostate cancer status post open retropubic prostatectomy (1999) plus adjuvant radiation therapy complicated by biochemical recurrence treated with androgen deprivation therapy who presented to the emergency department with abdominal pain.  He was found to have a perforated viscus on CT scan.  In the ED, he was hemodynamically unstable and a Bonner catheter was indicated for strict ins and outs.  In emergency department nurse placed a 16 Mac[place] Bonner catheter into the urethra and incidentally inflated the balloon distal to the bladder.  Reji blood returned into the catheter tubing.  The balloon was deflated and the Bonner catheter removed.  The emergency department attending also attempted multiple catheterizations unsuccessfully.  Urology was consulted for recommendations.    Medical History:   Past Medical History:   Diagnosis Date   • CKD (chronic kidney disease) stage 4, GFR 15-29 ml/min (CMS/HCC)    • DJD (degenerative joint disease)    • Hypertension    • Nocturia    • Prostate CA (CMS/HCC)    • Prostate CA (CMS/HCC)        Surgical History:   Past Surgical History:   Procedure Laterality Date   • PROSTATECTOMY         Social History:   Social History     Social History Narrative   • Not on file       Family History:   No family history on file.    Allergies:   Aspirin, Losartan, and Morphine    Home Medications:  •  LUPRON DEPOT (3 MONTH), Inject 22.5 mg into the  shoulder, thigh, or buttocks every 3 (three) months.  •  amLODIPine, Take 10 mg by mouth daily.  •  APPLE CIDER VINEGAR ORAL, Take 1 tablet by mouth daily.    •  cholecalciferol (vitamin D3), Take 1 tablet (1,000 Units total) by mouth daily.  •  cod liver oil, Take 1 tablet by mouth daily.    •  coenzyme Q10, Take 100 mg by mouth daily.  •  docosahexaenoic acid/epa (FISH OIL ORAL), Take 1 capsule by mouth daily.    •  glucosamine-chondroitin, Take 1 tablet by mouth daily.    •  lidocaine, Apply 1 patch topically daily.  •  lutein, Take 10 mg by mouth daily.  •  ondansetron, Take 4 mg by mouth every 6 (six) hours as needed. for nausea  •  OREGANO OIL ORAL, Take 1 capsule by mouth daily.  •  oxyCODONE,   •  pancrelipase (Lip-Prot-Amyl), Take 2 capsules (12,000 units of lipase total) by mouth 3 (three) times a day with meals.  •  pantoprazole, Take 1 tablet (40 mg total) by mouth daily.  •  sertraline, Take 25 mg by mouth every evening.    •  sodium bicarbonate, Take 1 tablet (650 mg total) by mouth daily.    Current Medications:  •  fluconazole, 200 mg, intravenous, q24h INT  •  morphine, 4 mg, intravenous, q30 min PRN  •  norepinephrine, 0.5-30 mcg/min, intravenous, Titrated  •  pantoprazole, 40 mg, intravenous, q24h  •  piperacillin-tazobactam, 3.375 g, intravenous, q6h INT **AND** Inpatient consult to Infectious Diseases, , , Once  •  vasopressin (PITRESSIN) continuous infusion, 0.03 Units/min, intravenous, Continuous  •  electrolyte-A, , intravenous, Continuous PRN  •  etomidate, , intravenous, PRN  •  lidocaine, , infiltration, PRN  •  phenylephrine, , intravenous, PRN  •  phenylephrine HCl in 0.9% NaCl, , intravenous, Continuous PRN  •  succinylcholine, , intravenous, PRN  •  vasopressin (PITRESSIN) continuous infusion, , intravenous, Continuous PRN  •  vasopressin, , intravenous, PRN    Review of Systems:  Nausea, vomiting and abdominal pain      Objective     Physicial Exam  Visit Vitals  BP (!) 92/58   Pulse  (!) 104   Temp 36.9 °C (98.4 °F)   Resp (!) 24   SpO2 97%     General appearance: Toxic appearing  Head: normocephalic, atraumatic  Lungs: Increased work of breathing  Heart: Sinus tachycardia on telemetry  Abdomen: Significantly distended abdomen, significantly diffusely tender to palpation  Genitalia: Circumcised penis, bilaterally descended testicles  Extremities: extremities normal, warm and well-perfused; no cyanosis, clubbing, or edema and no redness or tenderness in the calves bilaterally  Skin: no rashes or ulcers  Lymph nodes: no inguinal adenopathy  Neurologic: Alert and awake    Labs  BMP Results       08/15/21 08/07/21 08/06/21                    1150 [id number]          139 141         K 3.7 4.0 4.1         Cl 100 112 112         CO2 19 19 18         Glucose 145 133 123         BUN 68 38 37         Creatinine 5.1 3.0 3.2         Calcium 9.9 8.7 8.8         Anion Gap 17 8 11         EGFR 10.9 20.1 18.7         Comment for K at 1150 on 08/15/21: Results obtained on plasma. Plasma Potassium values may be up to 0.4 mEQ/L less than serum values. The differences may be greater for patients with high platelet or white cell counts.    Comment for Creatinine at 1150 on 08/15/21: Consistent with previous results          CBC Results       08/15/21 08/07/21 08/06/21                    1150 0608 0658         WBC 11.95 18.68 18.78         RBC 4.15 3.02 3.04         HGB 11.9 9.0 8.9         HCT 37.7 27.1 27.7         MCV 90.8 89.7 91.1         MCH 28.7 29.8 29.3         MCHC 31.6 33.2 32.1          517 501                     UA Results       07/26/21                          1513           Color Yellow           Clarity Cloudy           Glucose Negative           Bilirubin Negative           Ketones Negative           Sp Grav 1.016           Blood Negative           Ph 6.0           Protein +1           Urobilinogen 0.2           Nitrite Negative           Leuk Est +2           WBC 4 TO 10           RBC  0 TO 4           Bacteria None Seen           Comment for Blood at 1513 on 07/26/21: The sensitivity of the occult blood test is equivalent to approximately 4 intact RBC/HPF.        Microbiology Results     Procedure Component Value Units Date/Time    SARS-CoV-2 (COVID-19), PCR Nasopharynx [[id number]] Collected: 08/15/21 1350    Specimen: Nasopharyngeal Swab from Nasopharynx Updated: 08/15/21 1613    Narrative:      The following orders were created for panel order SARS-CoV-2 (COVID-19), PCR Nasopharynx.  Procedure                               Abnormality         Status                     ---------                               -----------         ------                     SARS-COV-2 (COVID-19)/ F...[[id number]]                      Final result                 Please view results for these tests on the individual orders.    SARS-COV-2 (COVID-19)/ FLU A/B, AND RSV, PCR Nasopharynx [[id number]] Collected: 08/15/21 1350    Specimen: Nasopharyngeal Swab from Nasopharynx Updated: 08/15/21 1613     SARS-CoV-2 (COVID-19) Negative     Influenza A Negative     Influenza B Negative     Respiratory Syncytial Virus --    Clostridium difficile tox screen Stool [[id number]]  (Normal) Collected: 07/28/21 1107    Specimen: Stool Updated: 07/28/21 1855     C difficile Toxin Screen Negative    Urine culture Urine, Clean Catch [[id number]] Collected: 07/26/21 1513    Specimen: Urine, Clean Catch Updated: 07/28/21 1244     Urine Culture Probable contaminants, suggest recollection      5 x 10^4 CFU/mL Mixed Gram Positive Organisms    SARS-CoV-2 (COVID-19), PCR Nasopharynx [[id number]]  (Normal) Collected: 07/21/21 1211    Specimen: Nasopharyngeal Swab from Nasopharynx Updated: 07/21/21 1337    Narrative:      The following orders were created for panel order SARS-CoV-2 (COVID-19), PCR Nasopharynx.  Procedure                               Abnormality         Status                     ---------                               -----------          ------                     SARS-CoV-2 (COVID-19), P...[[id number]]  Normal              Final result                 Please view results for these tests on the individual orders.    SARS-CoV-2 (COVID-19), PCR Nasopharynx [[id number]]  (Normal) Collected: 07/21/21 1211    Specimen: Nasopharyngeal Swab from Nasopharynx Updated: 07/21/21 1337     SARS-CoV-2 (COVID-19) Negative    Narrative:      Nursing instructions: Obtain nasopharyngeal swab ONLY. Send swab in viral transport media.            Imaging  CT of the abdomen and pelvis with IV and oral contrast reviewed: Surgically absent prostate, normal-appearing bladder, symmetrically enhancing kidneys, left parapelvic cyst, no hydro-, masses or calculi      Assessment   80-year-old male with a history of prostate cancer status post prostatectomy and adjuvant radiation now with biochemical recurrence managed with androgen deprivation therapy presenting with perforated viscus secondary to acute pancreatitis.  Bonner catheter inserted for strict ins and outs in the setting of hemodynamic instability.  Nursing incidentally inflated balloon in the patient's proximal urethra.  Multiple attempts were made to place a Bonner catheter blindly at the bedside, unsuccessfully.        Plan   -Patient was taken to the operating room for cystoscopy, complex Bonner catheter insertion (see procedure note).  -Bonner catheter will stay at least 2 weeks and until the patient is out of the intensive care unit and returned to his clinical and functional baseline  -Urinalysis and urine culture.  Follow-up results.  -Follow-up with urologist at [facility] after discharge for prostate cancer management.      [facility]  Jaime Alegria DO PGY-3  Daniel Torres Pager #[phone]  Alejo Pager #[phone]

## 2021-08-17 NOTE — ANESTHESIOLOGIST PRE-PROCEDURE ATTESTATION
Pre-Procedure Patient Identification:  I am the Primary Anesthesiologist and have identified the patient on 08/17/21 at 7:44 AM.   I have confirmed the procedure(s) will be performed by the following surgeon/proceduralist Pedro Ramirez MD.

## 2021-08-17 NOTE — PROGRESS NOTES
Patient: Adalberto Galaviz  Location: [facility] SICU SICU05  MRN: [id number]  Today's date: 8/17/2021    Attempted to see patient for therapy. Unable due to medical hold. Pt. intubated and sedated. Not appropriate for PT eval at this time. Will cont. to follow for medical appropriateness.

## 2021-08-17 NOTE — PROCEDURES
Central Line Insertion    Date/Time: 8/17/2021 1:41 PM  Performed by: Travis Jacob MD  Authorized by: Travis Jacob MD     Consent:     Consent obtained:  Emergent situation  Pre-procedure details:     Hand hygiene: Hand hygiene performed prior to insertion      Sterile barrier technique: All elements of maximal sterile technique followed      Skin preparation:  2% chlorhexidine  Sedation:     Sedation type:  Deep  Anesthesia (see MAR for exact dosages):     Anesthesia method:  None  Procedure details:     Location:  R internal jugular and L internal jugular    Patient position:  Trendelenburg    Procedural supplies:  Double lumen and triple lumen    Catheter size:  8.5 Fr    Landmarks identified: yes      Ultrasound guidance: yes      Sterile ultrasound techniques: Sterile gel and sterile probe covers were used      Number of attempts:  2    Successful placement: yes    Post-procedure details:     Post-procedure:  Dressing applied and line sutured    Assessment:  Blood return through all ports  Comments:      LIJ TLC placed successfully. RIJ TLC was then exchanged over a wire for a double lumen 15 cm medcomp. Post procedural CXR with good positioning of both and no pneumothorax.

## 2021-08-17 NOTE — PROGRESS NOTES
General Surgery Pre-operative Note    Pre-op diagnosis: Pre-op Diagnosis     * Duodenal perforation (CMS/HCC) [K63.1]   Procedure: WOUND CLOSURE SECONDARY ABDOMINAL WOUND/WASHOUT   Schedule: 8/17/2021 Add-on (Pedro Ramirez MD)   Labs: CBC, BMP, LFTs, PT/INR, and PTT ordered for 08/17/21 at 06:00AM    Lab Results   Component Value Date    WBC 47.69 (HH) 08/16/2021    HGB 9.6 (L) 08/16/2021    HCT 28.1 (L) 08/16/2021    MCV 86.5 08/16/2021     (L) 08/16/2021      Lab Results   Component Value Date    GLUCOSE 340 (H) 08/16/2021    CALCIUM 7.8 (L) 08/16/2021     08/16/2021    K 4.1 08/16/2021    CO2 22 08/16/2021     08/16/2021    BUN 65 (H) 08/16/2021    CREATININE 5.2 (HH) 08/16/2021     Glucose: currently on insulin gtt for elevated glucose.    Hemoglobin A1c: No results found for: HGBA1C   COVID-19: Lab Results   Component Value Date    COVID19 Negative 08/15/2021      CXR/Imaging: X-RAY CHEST 1 VIEW 08/16/2021    Narrative  CLINICAL HISTORY:  Advanced ETT, confirm placement    TECHNIQUE: Frontal chest radiograph.    COMPARISON: Chest radiograph earlier same date, 15:57    FINDINGS:  Tip of the endotracheal tube is approximately 5 cm from the frank.  Enteric  tube below the diaphragm.  No change in position of the bilateral central lines.  Opacities in the lower lungs, not significantly changed.  No pneumothorax.        --    Impression  Tip of the endotracheal tube is approximately 5 cm from the frank.  Additional findings not significantly changed.     EKG: NSR from today   Blood: T&S from 8/16 reviewed   Orders: NPO after midnight (including tube feeds if applicable).   Consent: To be obtained by team in AM.     Please page [phone] with any questions or concerns.

## 2021-08-17 NOTE — PLAN OF CARE
TPN Day 1 ordered, minimal volume      Problem: Adult Inpatient Plan of Care  Goal: Plan of Care Review  Outcome: Progressing     Problem: Parenteral Nutrition  Goal: Effective Intravenous Nutrition Therapy Delivery  Outcome: Progressing

## 2021-08-17 NOTE — PROGRESS NOTES
"ELECTROPHYSIOLOGY PROGRESS NOTE    SUBJECTIVE  S/p DCCV yesterday. He remains in sinus rhythm. Has L hand ischemia and was started on heparin drip. Remains sedated and intubated.    Review of Systems  Unable to obtain as patient intubated and sedated.    OBJECTIVE:   Visit Vitals  BP (!) 114/58   Pulse 67   Temp (!) 35.9 °C (96.6 °F) (Rectal)   Resp (!) 24   Ht 1.854 m (6' 1\")   Wt 87 kg (191 lb 12.8 oz)   SpO2 97%   BMI 25.30 kg/m²     Temp (72hrs), Av.2 °C (97.2 °F), Min:35.6 °C (96 °F), Max:36.9 °C (98.4 °F)    Temp:  [35.6 °C (96 °F)-35.9 °C (96.6 °F)] 35.9 °C (96.6 °F)  Heart Rate:  [] 67  Resp:  [24] 24  BP: ()/(57-85) 114/58  FiO2 (%) (Set):  [60 %-100 %] 100 %    Physical Exam  Constitutional: Sedated and intubated.   HENT: Dry mucous membranes.  Eyes: EOM are normal.   Neck: No JVD.   Cardiovascular: Irregular rhythm. No murmur.  Pulmonary/Chest: Normal effort and air entry. No crackles.  Abdominal: S/p Exlap with wound vac.  Musculoskeletal: No lower extremity edema.  Neurological: Sedated and intubated.   Skin: Skin is warm and dry.    Recent Labs   Lab 21  0306 21  2253 21  1909 21  1609 21  1609    140 140   < > 142   K 3.5* 3.6 4.1   < > 4.3    101 101   < > 103   CO2 24 23 22   < > 19*   BUN 61* 68* 65*   < > 67*   CREATININE 4.3* 4.9* 5.2*   < > 5.1*   CALCIUM 7.9* 7.7* 7.8*   < > 7.8*   ALBUMIN 1.3* 1.3*  --   --  1.4*   BILITOT 1.2 1.2  --   --  1.6*   ALKPHOS 107 96  --   --  83   * 555*  --   --  645*   AST 1,122* 1,421*  --   --  2,027*   GLUCOSE 217* 307* 340*   < > 356*    < > = values in this interval not displayed.     Recent Labs   Lab 21  0306 21  0048 21  1609 21  1449 21  0916   TROPONINI 0.21* 0.17* 0.22*   < >  --    BNP  --   --   --   --  253*    < > = values in this interval not displayed.     Recent Labs   Lab 21  0306 21  2253 21  1909   WBC 50.64* 47.69* 47.39*   HGB " 9.5* 9.6* 9.6*   PLT 90* 143* 154        Intake/Output Summary (Last 24 hours) at 8/17/2021 0732  Last data filed at 8/17/2021 0600  Gross per 24 hour   Intake 4794.69 ml   Output 1190 ml   Net 3604.69 ml       Telemetry : Afib s/p DCCV at 3.30 pm 8/16/21. Remains in sinus rhythm 60-70 bpm.    Imaging: reviewed.    Inpatient medications:   alteplase  2 mg intra-catheter Once    alteplase  2 mg intra-catheter Once    chlorhexidine  15 mL Mouth/Throat 2 times per day    fluconazole  200 mg intravenous q24h INT    nitroglycerin  0.5 inch Topical TID (6a, 12n, 6p)    pantoprazole  40 mg intravenous q12h ROBERT    piperacillin-tazobactam  2.25 g intravenous q6h INT    potassium chloride in water  40 mEq intravenous Once       ASSESSMENT AND PLAN  80 y.o. male who is admitted for Duodenal perforation (CMS/[facility]) [K63.1]  Elevated troponin [R77.8]  Bandemia [D72.825]  High serum lactate [R79.89]  Acute kidney injury superimposed on chronic kidney disease (CMS/[facility]) [N17.9, N18.9]  Leukocytosis, unspecified type [D72.829].    Paroxysmal atrial fibrillation (CMS/[facility])  Assessment & Plan  New onset Afib with RVR on 8/16/21 in the setting of pancreatic necrosis s/p debridement.  S/p successful DC cardioversion on 8/16/21.  He remains in sinus rhythm.  Continue amiodarone 0.5 mg/min. Monitor for sinus bradycardia.  Started on therapeutic heparin for L hand ischemia. Continue heparin drip.      Oni Carrillo MD  8/17/2021  7:32 AM    I saw and examined the patient and reviewed the above note by the Cardiology Fellow and discussed the management. I agree with recommendations and plan as outlined and edited in the fellows note.    Aaron Kelley

## 2021-08-17 NOTE — PROGRESS NOTES
"Infectious Disease Progress Note    Patient Name: Adalberto Galaviz  MR#: [id number]  : 1940  Admission Date: 8/15/2021  Date: 21   Time: 8:10 AM   Author: Chris Cervantes (Medical Student) with Dr. Allen      Antibiotics:    Anti-infectives (From admission, onward)    Start     Dose/Rate Route Frequency Ordered Stop    21 1530  piperacillin-tazobactam (ZOSYN) 2.25 g in 100 mL NSS vial in bag      2.25 g  200 mL/hr over 30 Minutes intravenous Every 6 hours interval 21 1443      21 1530  fluconazole in NaCl (iso-osm) (DIFLUCAN) IVPB 200 mg      200 mg  100 mL/hr over 60 Minutes intravenous Every 24 hours interval 21 1443            Subjective   Overnight, pt was in afib and had successful DC cardioversion by EP, now in NSR. Pt L hand digits were increasingly mottled and cool to touch, tips of fingers beginning turning black. Vascular surg was consulted for L hand ischemia, doppler showed absent L radial and ulnar pulses, positive L brachial pulse. Started heparin gtt for suspected embolic event resulting in ischemia. This morning, surgery is preparing to take patient back for another debridement of necrotizing pancreatitis.     Currently on fentanyl 50 mcg/ml) for sedation, vent settings (FiO2 100, PEEP10, RR24, ), requiring high dose pressors (NE 16mg, vasopressin 0.2U/ml)      Objective     Vital Signs:    Visit Vitals  BP (!) 114/58   Pulse 67   Temp (!) 35.9 °C (96.6 °F) (Rectal)   Resp (!) 24   Ht 1.854 m (6' 1\")   Wt 87 kg (191 lb 12.8 oz)   SpO2 97%   BMI 25.30 kg/m²       Temp (72hrs), Av.2 °C (97.2 °F), Min:35.6 °C (96 °F), Max:36.9 °C (98.4 °F)  T - hypothermic to 96  O/w wnl    Physical exam:   General: intubated and stuporous on sedation, nonresponsive, nonverbal  HEENT: NC/AT/ anicteric sclera, ETT and NG tube  Neck: supple, no JVD  Cardiovascular: regular rhythm, regular S1/S2, no murmurs  Respiratory: clear to auscultation b/l (anterior), ventilator-assisted " breath sounds, no crackles  GI/Abdomen: tense, distended, pt awakes on palpation of stomach ( unable to fully gauge tenderness or rebound due to patient stupor), open anterior abdominal wall w/ sealed wound vac and 1x lower abdomen drain draining serosanguinous fluid   Extremities: LE - warm, well perfused, LUE - ischemic 1st, 2nd, 3rd digits w/ discoloration, absent radial pulses, RUE - normal, no peripheral edema  MSK/JOINTS:  No swelling, erythema, or pain  Lymphatics: no peripheral lymphadenopathy  Neurology and psych: unable to assess  Skin: no rashes, lines clean dry intact  G.U.: Bonner in place draining minimal yellow urine    .  Lines, Drains, Airways, Wounds:  CVC Triple Lumen 08/16/21 Left Internal jugular (Active)   Number of days: 1       Peripheral IV (Adult) 08/15/21 Left Forearm (Active)   Number of days: 2       Peripheral IV (Adult) 08/15/21 Right Hand (Active)   Number of days: 2       Drain Right;Lower Abdomen (Active)   Number of days: 1       NG/OG Tube 08/15/21 (Active)   Number of days: 2       Urethral Catheter Latex 16 Fr (Active)   Number of days: 2       ETT  (Active)   Number of days: 2       Arterial Line 08/16/21 Right Femoral (Active)   Number of days: 1       Negative Pressure Wound Therapy Midline Abdomen (Active)   Number of days: 1       Surgical Incision Abdomen Bilateral (Active)   Number of days: 2       Labs:    CBC Results       08/17/21 08/16/21 08/16/21                    0306 2253 1909         WBC 50.64 47.69 47.39         RBC 3.25 3.25 3.29         HGB 9.5 9.6 9.6         HCT 28.1 28.1 28.9         MCV 86.5 86.5 87.8         MCH 29.2 29.5 29.2         MCHC 33.8 34.2 33.2         PLT 90 143 154         Comment for WBC at 0306 on 08/17/21: ALL RESULTS HAVE BEEN CHECKED. This result has been called to JULIO SCHULER by Dawna Aguero on 08 17 21 at 04:08, and has been read back.     Comment for WBC at 2253 on 08/16/21: This result has been called to DARY RODRIGUEZ by Andreina Farrell  on 08 16 21 at 23:22, and has been read back.     Comment for WBC at 1909 on 08/16/21: ALL RESULTS HAVE BEEN CHECKED. This result has been called to JULIO SCHULER by Ml Keller on 08 16 21 at 19:49, and has been read back.     Comment for PLT at 0306 on 08/17/21: RESULTS OBTAINED AFTER VORTEXING TO ELIMINATE PLT CLUMPS      WBC 50 (48)  Hb 9.5 stable  Plt 90 (143)    CMP Results       08/17/21 08/16/21 08/16/21                    0306 2253 1909          140 140         K 3.5 3.6 4.1         Cl 101 101 101         CO2 24 23 22         Glucose 217 307 340         BUN 61 68 65         Creatinine 4.3 4.9 5.2         Calcium 7.9 7.7 7.8         Anion Gap 16 16 17         AST 1,122 1,421 --          555 --         Albumin 1.3 1.3 --         EGFR 13.3 11.4 10.7         Comment for Creatinine at 1909 on 08/16/21: Consistent with previous results        Cr 4.3 (4.9)  AST 1.1K (1.4K)   (550)    Borderline metabolic acidosis  PH 7.35  Bicarb 26  AG 16    MICRO: Reviewed  Bcx - YG50-25l ([id number])     Pancreas   Gram Stain: no WBC, no organisms  Cx: pending    Pancreatic Fluid:  Gram Stain: No WBC, no organisms  Cx: pending     Peritoneal fluid  Gram Stain: 3+ WBC, no organisms  Cx: pending       Influenza/RSV/COVID - negative  MRSA Nares - negative    Imaging: REVIEWED  No new imaging      Assessment   Adalberto Galaviz is a 80 y.o. male w/ history of acute pancreatitis 3w ago, now presenting with septic shock 2/2 pancreatitis and perforated duodenum, found to have necrotizing pancreatitis via exploratory laparotomy yesterday, now s/p pancreatic necrosectomy, being consulted for severe sepsis 2/2 necrotizing pancreatitis and antibiotic coverage     - Perforated duodenum likely secondary to recent acute on chronic pancreatitis w/ peripancreatic inflammation  - Pt has increased likelihood of infection of pancreatic fluid: Imaging demonstrating presence of gas within the necrosis  - Epidemiology: ~1/3 of patients  with pancreatic necrosis develop infected necrosis  - Would give empiric antibiotics after surgery, given rising WBC count, nonresolved lactic acidosis, and possibility of morbidity and mortality in acute necrotizing pancreatitis - antibiotics with good penetration include carbapenem, quinolone/ceftaz/cefepime + metronidazole (anaerobe)  - Micro to consider: often monomicrobial w/ gut-derived organisms (eg, Escherichia coli, Pseudomonas, Klebsiella, and Enterococcus)             Plan   #Necrotizing Pancreatitis s/p Pancreatic Necrosectomy  #Perforated Viscus, Duodenum s/p OR  Pres w/ septic shock, CT showed necrotizing pancreatitis w/ duodenal perforation, perforated duodenum likely secondary to recent pancreatitis, concerning for superimposed infection in pancreas, given meropenem, fluconazole, Zosyn, Vancomycin  Has no hx of resistant organisms, did not receive abx while at [place]  - Continue Zosyn 2.25 gm IV q6h (renally adjusted) - improving GABY  - Continue fluconazole 200mg IV q24h  - F/up peritoneal fluid and pancreatic tissue culture - tailor or stop antibiotics accordingly  - Rest per primary surgery team        Plan Discussed with: Dr. Tiffany Cervantes, MS4

## 2021-08-17 NOTE — PROGRESS NOTES
Nephrology Progress Note:    Subjective   Patient intubated and on vent.  Review of system could not be obtained.  All other ROS negative.    Interval History: CRRT cardiac this morning.  Patient plan to go to the OR in next few hours. Noted to have left hand discoloration and left upper extremity Doppler revealed occlusion of the mid to distal left radial artery.    Objective   Vital signs in last 24 hours:  Temp:  [35.6 °C (96.1 °F)-35.9 °C (96.6 °F)] 35.9 °C (96.6 °F)  Heart Rate:  [] 77  Resp:  [24] 24  BP: ()/(57-85) 114/58  FiO2 (%) (Set):  [60 %-100 %] 80 %     Intake & Output:    Intake/Output Summary (Last 24 hours) at 8/17/2021 1028  Last data filed at 8/17/2021 0700  Gross per 24 hour   Intake 5595.34 ml   Output 965 ml   Net 4630.34 ml     Physical Exam:  General Appearance:  Intubated and sedated on vent   Head:  Normocephalic, without obvious abnormality, atraumatic   Eyes:  Conjunctiva clear, anicteric slera   Ears:  No external abnormalities   Throat: Moist mucus membrane   Neck:  Supple, symmetrical   Heart:: S1 and S2 heard   Lungs:   Bilateral scattered rales   Abdomen:   Soft, non-tender, non-distended, bowel sounds heard.    Genitourinary:   Extremities:  Urinary catheter present.  No edema. Left hand discoloration   Musculoskeletal:  No injury or deformity   Skin: No rashes    Neurologic:  Behavior/  Emotional: Could not assess as sedated.  Could not assess as sedated.       Current Medications:  Current Facility-Administered Medications   Medication Dose Route Frequency   • albuterol HFA  2 puff inhalation q6h PRN   • amiodarone  0.5 mg/min intravenous Continuous   • amiodarone  150 mg intravenous Once   • bicarbonate dialysate mixture with additional potassium for CRRT  5 L CRRT Continuous   • chlorhexidine  15 mL Mouth/Throat 2 times per day   • glucose  16-32 g of dextrose oral PRN    Or   • dextrose  15-30 g of dextrose oral PRN    Or   • glucagon  1 mg intramuscular PRN    Or    • dextrose in water  25 mL intravenous PRN   • insulin  0-15 Units/hr intravenous Titrated    And   • dextrose in water  10-30 mL intravenous PRN   • fentaNYL  0-200 mcg/hr intravenous Titrated    And   • fentaNYL  25 mcg intravenous PRN   • fluconazole  200 mg intravenous q24h INT   • [Provider Managed Hold] heparin infusion - MAR calculator by PTT  500 Units/hr intravenous Titrated   • lactated ringer's   intravenous Continuous   • magnesium sulfate  2 g intravenous PRN   • midazolam  2 mg intravenous q4h PRN   • nitroglycerin  0.5 inch Topical TID (6a, 12n, 6p)   • norepinephrine  0.5-30 mcg/min intravenous Titrated   • pantoprazole  40 mg intravenous q12h ROBERT   • piperacillin-tazobactam  2.25 g intravenous q6h INT   • potassium chloride in water  20 mEq intravenous PRN   • sodium chloride  1,000 mL intravenous Once   • sodium chloride 0.9 %  5 mL/hr intravenous PRN   • sodium chloride 0.9 %  5 mL/hr intravenous PRN   • sodium chloride 0.9 %  5 mL/hr intravenous PRN   • sodium chloride 0.9 %  5 mL/hr intravenous PRN   • vasopressin (PITRESSIN) continuous infusion  0.03 Units/min intravenous Continuous       Labs:  BUN   Date Value Ref Range Status   08/17/2021 61 (H) 8 - 20 mg/dL Final     Creatinine   Date Value Ref Range Status   08/17/2021 4.3 (H) 0.8 - 1.3 mg/dL Final     Sodium   Date Value Ref Range Status   08/17/2021 141 136 - 144 mEQ/L Final     Potassium   Date Value Ref Range Status   08/17/2021 3.5 (L) 3.6 - 5.1 mEQ/L Final     CO2   Date Value Ref Range Status   08/17/2021 24 22 - 32 mEQ/L Final     Magnesium   Date Value Ref Range Status   08/17/2021 2.2 1.8 - 2.5 mg/dL Final     Calcium   Date Value Ref Range Status   08/17/2021 7.9 (L) 8.9 - 10.3 mg/dL Final     Phosphorus   Date Value Ref Range Status   08/17/2021 6.2 (H) 2.4 - 4.7 mg/dL Final     Albumin   Date Value Ref Range Status   08/17/2021 1.3 (L) 3.4 - 5.0 g/dL Final     WBC   Date Value Ref Range Status   08/17/2021 50.64 (HH) 3.80 -  10.50 K/uL Final     Comment:     ALL RESULTS HAVE BEEN CHECKED. This result has been called to JULIO SCHULER by Dawna Aguero on 08 17 21 at 04:08, and has been read back.      Hemoglobin   Date Value Ref Range Status   08/17/2021 9.5 (L) 13.7 - 17.5 g/dL Final     Platelets   Date Value Ref Range Status   08/17/2021 90 (L) 150 - 350 K/uL Final     Comment:     RESULTS OBTAINED AFTER VORTEXING TO ELIMINATE PLT CLUMPS     Lactate   Date Value Ref Range Status   08/17/2021 3.1 (H) 0.4 - 2.0 mmol/L Final     Lab Results   Component Value Date    CREATININE 4.3 (H) 08/17/2021    CREATININE 4.9 (H) 08/16/2021    CREATININE 5.2 (HH) 08/16/2021    CREATININE 5.1 (HH) 08/16/2021    CREATININE 5.2 (HH) 08/16/2021    CREATININE 4.9 (H) 08/16/2021    CREATININE 4.9 (H) 08/15/2021    CREATININE 5.1 (HH) 08/15/2021    CREATININE 3.0 (H) 08/07/2021    CREATININE 3.2 (H) 08/06/2021     I have reviewed the pertinent patient's labs.    Imaging:  I have reviewed the pertinent patient's imaging results.     Assessment/Plan      Adalberto Galaviz is a 80 y.o. male with PMHx of  with PMHx of CKD IV, HTN, prostate cance s/p prostectomy, recent pancreatitis (07/2021), DJD presented to [facility] on [place]/15/2021 with a complaint of diffuse abdominal pain and noted to have perforated duodenal perforation.      1. CKD IV  -Baseline creatinine in the range of 2.4-3.0 mg/dl..  Had a creatinine of 2.9 mg/dL in December 2020 prior to recent hospitalization at [facility] with acute pancreatitis.  Suffered from GABY with peak creatinine of 3.9 mg/dL on 7/29/2021 and was discharged with a creatinine of 3.0 mg/dL on 8/7/2021 with EGFR of 20.1 ml/min  -Presumably secondary to hypertensive nephrosclerosis.  -Renal ultrasound from 7/26/2021 revealed atrophic kidneys (R 9.7 & L 9.6 cm) and but increased parenchymal echogenicity consistent with medical renal disease.  -Outpatient patient of Dr. Terrazas.      Recommend:  -Outpatient follow up with his  nephrology if he gets discharge without needing dialysis     2.  GABY  -Likely ATN, in the setting of hemodynamic instability, shock   -Presented with creatinine of 5.1 mg/dl on 8/15/2021.   -Urinalysis on 8/15/2021 without any proteinuria/pyuria but with hematuria - ? related to Bonner catheter/prostate cancer with difficult catheter placement  -CT abdomen/pelvis without contrast - no mention of hydronephrosis     -Remains an anuric.  Urine output of 15 cc.  Net positive of 3.7 L from yesterday and 5.4 L since admission.  -Acidosis improving.  -Remains on D5W with 3 amps of bicarb at 40 cc/h     Recommend:  -Will plan to resume back on CRRT once patient is back from the OR.  Depending upon stability, can slowly initiate UF as patient tolerates.  -Can stop IVF with bicarb drip once patient back on CRRT.  -Avoid hypotension and maintain MAP > 65 mmHg. Use pressors soon.  -Continue to avoid nephrotoxins as much as possible, like NSAIDs, IV contrast dye, fleet enema etc.  -Continue to follow serial renal chemistries as per CRRT protocol - BMP every 12 hours and daily CBC, magnesium, phosphorus, ionized calcium      3. Metabolic acidosis  -Anion gap metolic acidosis in the setting of lactic acidosis.   -Lactic acid trending down and was down to 3.1 from this morning.  -Resumtion CRRT once patient returns from OR.     4. Hypertension  -Currently in shock and pressors      5. Anemia  -Anemia of CKD at baseline and now worsen surgery  -Transfuse prn as per primary team     6.  History of prostate cancer  -history of prostectomy  -Difficult Bonner catheter placement by urology  -As per urology     7.  Perforated duodenal ulcer  -Likely in the setting of recent pancreatitis.   -s/p exploratory laparotomy, pancreatic necrosectomy, repair of middle colic vein, abdominal washout and placement of wound VAC on 8/15/2021.   -Plan for OR again today.  -As per primary team     8.  Shock   -In the setting of perforated viscus.   -On  empiric broad spectrum Abx  -As per primary team    9.  Atrial fibrillation.  -New onset atrial fibrillation with rapid ventricular rate.  -Seen by EP.  Started on amiodarone drip.  -Now with likely acute ischemia of left hand, initiated on heparin drip.  -As per electrophysiologist.    10.  Acute ischemia of left hand.  -Presumed to be secondary to thromboembolism with A. fib.  -Plan for OR today.  -As per vascular surgery.

## 2021-08-17 NOTE — PROGRESS NOTES
"Surgical Critical Care Progress Note    Adalberto Galaviz is a 80 y.o. male  s/p exploratory laparotomy, abdominal washout, pancreatic necrosectomy, EGD, middle colic vein repair, abthera placement on 8/15    S  Interval events:  -PRBC x1, FFP x1, 1L crystalloid bolus  -Levo weaned to 4 from 26, remains on vaso at 0.03  -Started on CRRT, lactate down to 3 from 9. Acidosis corrected with pH of 7.35 this morning  -Oliguric with only 15 ml UOP past 24 hrs  -FiO2 increased to 100% overnight    O  Vitals:  Blood pressure (!) 114/58, pulse 77, temperature (!) 35.9 °C (96.6 °F), temperature source Rectal, resp. rate (!) 24, height 1.854 m (6' 1\"), weight 87 kg (191 lb 12.8 oz), SpO2 96 %.    Physical Exam   Physical Exam    General appearance: appears stated age and sedated , no acute distress  Lungs: equal chest rise b/l, intubated  Heart: regular rate and rhythm  Abdomen: soft, nondistended, abthera in place draining serosang fluid   Extremities: extremities normal, warm and well-perfused; no cyanosis, clubbing, or edema   Neurologic: sedated    A/P  Adalberto Galaviz is a 80 y.o. male  s/p exploratory laparotomy, abdominal washout, pancreatic necrosectomy, EGD, middle colic vein repair, abthera placement on 8/15    Neuro: fentanyl gtt, versed prn  Resp: Mild ARDS. On PRVC with PEEP of 10 and FiO2 of 100%, TV of 450. PO2 was 215 on last ABG, wean FiO2 and repeat ABG.   CV levophed at 4, vasopressin gtt at 0.03. Troponin elevated at 0.21, continue to trend Q6H, likely demand ischemia. Vigileo.   GI: Protonix bid, NGT to LIS. Open abdomen with ABthera on. OR today for washout and possible closure.   Renal: CRRT. Decrease IVFs to 40 ml/hr, remove bicarb.   : iatrogenic urethral injury - maintain palmer x2 weeks. Urology following.  ID: Zosyn and fluconazole, ID following  Endo: ISS   Hem: SQH, Hb stable. Plts down to 90. INR 1.8, transfuse 2 units FFP. Cross match for 4 units PRBCs.    Travis Jacob MD  Please page #[phone] with " questions or concerns.  8/17/2021

## 2021-08-17 NOTE — SIGNIFICANT EVENT
Called to assess patient with absent pulses/signals in LUE with ischemic digits.  Patient recently had radial arterial line in place with reported multiple unsuccessful rewiring attempts previously. Continues on 2 vasopressors, weaning down.    Patient seen and examined.  L thumb and index fingers are cool and pale with evolving purplish hue to tips consistent with ischemia. L middle finger, ring and pinky fingers are cool and pale without distal purplish discoloration.  He has a harsh-sounding but brisk signal at the elbow, absent radial signal at the forearm and wrist, but there is a faint ulnar signal at the wrist. Unable to doppler palmar arch.    Stat duplex vascular ultrasound and vascular surgery consult requested.  Plan to start heparin gtt, although at risk of bleeding from surgical sites and non-surgical sites as he is already coagulopathic.   I have requested forced warm air blanket to arm and nitropaste to site as well.   Will continue to wean pressors as tolerated.    Corina Barrera MD, PhD, FACS

## 2021-08-17 NOTE — PROCEDURES
Central Venous Catheter Insertion Procedure Note    Indications:  HD vascular access    Procedure Details   Consent: Emergent requirement  Maximum sterile technique was used including antiseptics, cap, gloves, gown, hand hygiene, mask and sheet.    Under sterile conditions the left groin was prepped with chlorhexidine and covered with a sterile drape. Under US guidance a 18-gauge needle was used to access the left femoral vein, non pulsatile blood return was seen. A guide wire was then passed easily through the needle, and placement in vein was confirmed via US. Small incision was made over wire. Serial dilation was performed over .035 wire. The 30cm 12Fr HD catheter was then inserted into the vessel over the guide wire. Wire removed, ports aspirated and flushed easily. The catheter was sutured into place, and dressing applied.     Recommendations:  CVC is immediately available to use for CRRT.     Jessica Hyman MD  General Surgery    For any questions, please page [phone].

## 2021-08-17 NOTE — PROGRESS NOTES
Patient: Adalberto Galaviz  Location: [facility] SICU SICU05  MRN: [id number]  Today's date: 8/17/2021    Attempted to see patient for therapy. Unable due to medical hold (+ ETT and sedated).        addendum 15:05 to OR, will d/c orders and request new orders as appropriate.

## 2021-08-17 NOTE — PROGRESS NOTES
General Surgery Daily Progress Note    Subjective   79 yo M transferred from [place] 8/15 for necrotizing pancreatitis, OR 8/15, left open in the SICU w/ high pressors, vascular surgery c/s for ischemic left hand  No palpable radial or ulnar pulse appreciated over night   STAT ultrasound ordered, placed on low dose hep gtt goal PTT 50-70 given open abdomen and need to go back to OR  At bedside this morning, L proximal radial artery dopplerable, finger tips mottled   Patient is intubated and sedated     US LUE venous: Left basilic and left radial vein deep vein thrombosis    US LUE arterial: Occlusion of the mid to distal left radial artery    Objective     Vital signs in last 24 hours:  Temp:  [35.6 °C (96 °F)-35.9 °C (96.6 °F)] 35.9 °C (96.6 °F)  Heart Rate:  [] 67  Resp:  [24] 24  BP: ()/(57-85) 114/58  FiO2 (%) (Set):  [60 %-100 %] 100 %      Intake/Output Summary (Last 24 hours) at 8/17/2021 0735  Last data filed at 8/17/2021 0600  Gross per 24 hour   Intake 4794.69 ml   Output 1190 ml   Net 3604.69 ml     Intake/Output this shift:  No intake/output data recorded.    Physical Exam    General appearance: intubated and sedated  Lungs: on ventilator support  Heart: RRR  Abdomen: abthera vac in place   Extremities: L hand and forearm cold, finger tips mottled  Pulses: no palpable L radial or ulnar, proximal radial dopplerable,   Skin: Skin color, texture, turgor normal. No rashes or lesions    VTE Assessment: I have reassessed and the patient's VTE risk and treatment plan is appropriate.    Labs  No new labs.    Imaging  I have reviewed the Imaging from the last 24 hrs.      Assessment/Plan   79 yo M transferred from [place] 8/15 for necrotizing pancreatitis, OR 8/15, left open in the SICU w/ high pressors, vascular surgery c/s for ischemic left hand    -no acute surgical intervention at this time, ulnar remains patent/radial is out/unclear if palmar arch still patent, Left basilic and left radial  vein deep vein thrombosis.  -may use hep gtt as it will not change medical course, however hold as needed for operative intervention    Please page [phone] with any questions or concerns     Chase Torres MD  General Surgery PGY-1

## 2021-08-17 NOTE — ANESTHESIA PREPROCEDURE EVALUATION
Relevant Problems   CARDIOVASCULAR   (+) Hypertension   (+) Paroxysmal atrial fibrillation (CMS/HCC)      GASTROINTESTINAL   (+) Ascites      HEMATOLOGY   (+) Anemia      URINARY SYSTEM   (+) Acute kidney injury superimposed on chronic kidney disease (CMS/HCC)      Other   (+) Prostate CA (CMS/HCC)       Anesthesia ROS/MED HX      Pulmonary    Continuous oxygen (intubated)  Cardiovascular  Dysrhythmias (sinus bradycardia) and atrial fibrillation  Comments: intubated  Hematological    anemia  Renal Disease   chronic renal insufficiency  Endo/Other  History of cancer and prostate cancer  ROS/MED HX Comments:    Neurology/Psychology: sedated   Cardiology: Elevated troponins on admit. Believed to be demand ischemia     8/16/21 TTE:  1. Small left ventricular cavity size. Moderate concentric left ventricular hypertrophy. Preserved systolic function. EF 65% by visual estimate. Endocardium poorly visualized. No obvious wall motion abnormalities. Diastolic function not assessed in the setting of atrial fibrillation.   2. Tricuspid aortic valve. Aortic valve and root sclerosis. No aortic regurgitation.  3. Normal sized aortic root.   4. Mild mitral annular calcification. No mitral regurgitation.   5. Left atrium is normal in size.  6. Normal right ventricular size with reduced systolic function though only off axis view were obtained.  7. Right atrium is normal in size.   8. Insufficient tricuspid regurgitation to estimate RVSP.   9. Pulmonic valve not well visualized.   10. Normal IVC size with <50% respiratory variation. Patient is intubated.  11. Small anterior pericardial effusion. No echocardiographic evidence of cardiac tamponade.  12. Compared to previous TTE of 7/27/2021, pericardial effusion is slightly largely and right ventricular function is reduced.   GI/Hepatic/Renal: Hx of pancreatitis recently in 7/2021. Duodenal perforation; open abdomen   Musculoskeletal: gout   ROS/Hx: Per cardiac consultation on 7/21/21:  "\"Baseline HR 45-55 bpm. No history of syncope. Questionable exertional lightheadedness when taking stairs but not on ground level. HR went down to the 30s while in the ED associated with mild lightheadedness. BP has been stable. HR now in the 40-50s. Suspect underlying sinus node dysfunction aggravated by increased vagal tone from abdominal pain. No indication for pacemaker at this time. Continue to monitor on tele. He might need pacemaker in the future if he develops symptoms from bradycardia.\"       Past Surgical History:   Procedure Laterality Date   • PROSTATECTOMY         Physical Exam    Other Findings   Intubated        Anesthesia Plan    Plan: general    Technique: general endotracheal     Lines and Monitors: PIV and additional IV   ASA 4  Anesthetic plan and risks discussed with: spouse  Induction:    inhalational   Postop Plan:   Patient Disposition: ICU planned admission   Pain Management: IV analgesics    "

## 2021-08-17 NOTE — OR SURGEON
Pre-Procedure patient identification:  I am the primary operating surgeon/proceduralist and I have identified the patient on 08/17/21 at 1:42 PM Pedro Ramirez MD  Phone Number: [phone]

## 2021-08-17 NOTE — CONSULTS
General Surgery Consult    Subjective     Adalberto Galaviz is a 80 y.o. male who was admitted for Duodenal perforation (CMS/HCC) [K63.1]  Elevated troponin [R77.8]  Bandemia [D72.825]  High serum lactate [R79.89]  Acute kidney injury superimposed on chronic kidney disease (CMS/HCC) [N17.9, N18.9]  Leukocytosis, unspecified type [D72.829]. Patient was seen in consultation at the request of referring physician for management recommendations.     Patient is 80M with history of pAfib, CKD, HTN, prostate CA, with recent admission at [facility] for acute on chronic pancreatitis presumed to be secondary to alcohol use. [facility] [place] [facility] [place] with worsening abdominal pain, found to have increasing foci of free intraperitoneal air concerning for duodenal perforation in the setting of pancreatitis. He was emergently taken to the OR where necrotizing pancreatitis was encountered and a necrosectomy was performed, after washout patient was packed and abdomen was temporarily closed with abthera vac. He remains in septic shock required high dose of vasopressors as high as levo in the 20s and vaso 0.03. initially postop, he had profound acidosis eventually became anuric and CRRT was initiated on POD 1. He started to improved but it was noted during the day that his left hand began to become more dusky and cool however still had radial and ulnar signals. Of note, patient did have L radial jimena during the case which was removed shortly postop due to malfunction. Overnight, patient had worsening of the duskiness of the left hand and lost signals and pulses. LUE arterial duplex is ordered, patient is started on heparin gtt. Vascular surgery is consulted for management recommendations.    Medical History:   Past Medical History:   Diagnosis Date   • CKD (chronic kidney disease) stage 4, GFR 15-29 ml/min (CMS/HCC)    • DJD (degenerative joint disease)    • Hypertension    • Nocturia    • Prostate CA (CMS/HCC)    • Prostate CA (CMS/HCC)         Surgical History:   Past Surgical History:   Procedure Laterality Date   • PROSTATECTOMY         Social History:   Social History     Social History Narrative   • Not on file       Family History: No family history on file.    Allergies: Aspirin, Losartan, and Morphine    Home Medications:  •  LUPRON DEPOT (3 MONTH), Inject 22.5 mg into the shoulder, thigh, or buttocks every 3 (three) months.  •  amLODIPine, Take 10 mg by mouth daily.  •  APPLE CIDER VINEGAR ORAL, Take 1 tablet by mouth daily.    •  cholecalciferol (vitamin D3), Take 1 tablet (1,000 Units total) by mouth daily.  •  cod liver oil, Take 1 tablet by mouth daily.    •  coenzyme Q10, Take 100 mg by mouth daily.  •  docosahexaenoic acid/epa (FISH OIL ORAL), Take 1 capsule by mouth daily.    •  glucosamine-chondroitin, Take 1 tablet by mouth daily.    •  lidocaine, Apply 1 patch topically daily.  •  lutein, Take 10 mg by mouth daily.  •  ondansetron, Take 4 mg by mouth every 6 (six) hours as needed. for nausea  •  OREGANO OIL ORAL, Take 1 capsule by mouth daily.  •  oxyCODONE,   •  pancrelipase (Lip-Prot-Amyl), Take 2 capsules (12,000 units of lipase total) by mouth 3 (three) times a day with meals.  •  pantoprazole, Take 1 tablet (40 mg total) by mouth daily.  •  sertraline, Take 25 mg by mouth every evening.    •  sodium bicarbonate, Take 1 tablet (650 mg total) by mouth daily.    Current Medications:  •  albuterol HFA, 2 puff, inhalation, q6h PRN  •  amiodarone, 0.5 mg/min, intravenous, Continuous  •  bicarbonate dialysate mixture with additional potassium for CRRT, 5 L, CRRT, Continuous  •  chlorhexidine, 15 mL, Mouth/Throat, 2 times per day  •  glucose, 16-32 g of dextrose, oral, PRN **OR** dextrose, 15-30 g of dextrose, oral, PRN **OR** glucagon, 1 mg, intramuscular, PRN **OR** dextrose in water, 25 mL, intravenous, PRN  •  insulin, 0-15 Units/hr, intravenous, Titrated **AND** dextrose in water, 10-30 mL, intravenous, PRN  •  fentaNYL, 0-200  "mcg/hr, intravenous, Titrated **AND** fentaNYL, 25 mcg, intravenous, PRN  •  fluconazole, 200 mg, intravenous, q24h INT  •  heparin (porcine), 5,000 Units, subcutaneous, q8h ROBERT  •  heparin infusion - MAR calculator by PTT, 100-4,000 Units/hr, intravenous, Titrated  •  magnesium sulfate, 2 g, intravenous, PRN  •  midazolam, 2 mg, intravenous, q4h PRN  •  norepinephrine, 0.5-30 mcg/min, intravenous, Titrated  •  [DISCONTINUED] pantoprazole, 40 mg, oral, Daily **OR** pantoprazole, 40 mg, intravenous, q12h ROBERT  •  piperacillin-tazobactam, 2.25 g, intravenous, q6h INT  •  [DISCONTINUED] potassium chloride in water, 20 mEq, intravenous, PRN **AND** potassium chloride in water, 20 mEq, intravenous, PRN  •  sodium bicarbonate in D5W, , intravenous, Continuous  •  sodium chloride 0.9 %, 5 mL/hr, intravenous, PRN  •  sodium chloride 0.9 %, 5 mL/hr, intravenous, PRN  •  vasopressin (PITRESSIN) continuous infusion, 0.03 Units/min, intravenous, Continuous    Review of Systems  Pertinent items are noted in HPI.    Objective     Physicial Exam  Visit Vitals  BP (!) 114/58   Pulse 62   Temp (!) 35.9 °C (96.6 °F) (Rectal)   Resp (!) 24   Ht 1.854 m (6' 1\")   Wt 87 kg (191 lb 12.8 oz)   SpO2 97%   BMI 25.30 kg/m²       General appearance: intubated and sedated  Head: normocephalic, without obvious abnormality, atraumatic  Throat: lips, mucosa, and tongue normal; teeth and gums normal  Neck: no adenopathy, no carotid bruit, no JVD, supple, symmetrical, trachea midline and thyroid not enlarged, symmetric, no tenderness/mass/nodules  Back: symmetric, no curvature. ROM normal. No CVA tenderness.  Lungs: on ventilator support  Chest wall: no tenderness  Heart: RRR  Abdomen: soft, nondistended, abthera vac in place  Extremities: L hand dusky and cool  Pulses: no radial or ulnar pulses or signals in the left wrist; multiphasic brachial artery signal near the bifurcation  Skin: Skin color, texture, turgor normal. No rashes or " lesions      Labs  No new labs.    Imaging  I have reviewed the Imaging from the last 24 hrs.    Assessment     80M with history of pAfib, CKD4, HTN, acute on chronic pancreatitis presents with necrotizing pancreatitis s/p exploratory laparotomy, necrosectomy, washout, abthera placement on 8/15. Now with dusky left hand without pulses or signals        Plan     - patient was in afib yesterday during the day, now NSR  - signal in the brachial artery, without distal signals of radial and ulnar, suspect embolic event resulting in ischemia of the left hand  - heparin gtt initiated  - will need stat arterial duplex of the LUE   - discussed with Dr. Singer    Please page [phone] with questions or concerns.    Saroj Bustamante MD

## 2021-08-17 NOTE — ANESTHESIOLOGIST PRE-PROCEDURE ATTESTATION
Pre-Procedure Patient Identification:  I am the Primary Anesthesiologist and have identified the patient on 08/17/21 at 1:52 PM.   I have confirmed the procedure(s) will be performed by the following surgeon/proceduralist Pedro Ramirez MD.

## 2021-08-17 NOTE — OP NOTE
WOUND CLOSURE SECONDARY ABDOMINAL . right colectomy, gallbradder . Procedure Note    Procedure:    WOUND CLOSURE SECONDARY ABDOMINAL . right colectomy, gallbradder .  CPT(R) Code:  12359 - PA SECD CLOS SURG WND EXTEN/COMPLIC      Pre-op Diagnosis     * Duodenal perforation (CMS/HCC) [K63.1]       Post-op Diagnosis     * Duodenal perforation (CMS/HCC) [K63.1]    Surgeon(s) and Role:     * Pedro Ramirez MD - Primary     * Travis Jacob MD - Resident - Assisting    Anesthesia: General    Staff:   Circulator: Kapil Mansfield RN; Osiris Flores RN; Tonya Ledesma RN; Kylah Castillo RN  Scrub Person: Monique Do    Procedure Details   This was an 80-year-old male who was admitted with a necrotizing pancreatitis.  The patient underwent debridement of the pancreas 2 days ago.  At that time there was patchy ischemia of the right colon.  Patient was brought back and resuscitated.  He has been septic.  Patient has been in renal failure.  He has been in respiratory failure.  He is brought back to the operating room now for exploratory laparotomy and washout possible cholecystectomy possible bowel resection.  Consent was obtained from the patient's family because of his critical illness.  Patient was brought to the operating room and after induction of general anesthesia the wound VAC was removed and the abdomen was prepped and draped in the usual manner a timeout was taken to identify the patient the procedure and the fact the patient had received antibiotics.    The abdomen was explored.  On running the bowel there was some areas of patchy ischemia of the small bowel.  We elected not to resect this at this time.  We then explored ran the colon.  The right colon had areas of marlys necrosis from the cecum to areas near the middle colic artery.  We also examined the lesser sac and the pancreas. there was some necrosis on the distal part of the pancreas however portions of the distal pancreas were clearly  viable.  We removed 2 packs from the abdomen , 1 from the lesser sac and one from the right lower quadrant, these had been left in the abdomen from the previous exploration. At this point we elected to resect the right colon.  The right colon had been partially mobilized previously. we completed this mobilization including the distal ileum.  We placed a ALL stapler with a blue load over across the distal ileum approximately 15 cm from the ileocecal valve this was fired and transected the distal ileum.  The colon was then examined.  There was patchy necrosis of the colon up to the right colon we selected an area that appeared to be viable.  We placed a ALL 75 with a green load across this area and fired the ALL.  We then took a LigaSure and came across the mesentery near the intestine.  Part of the mesentery of the large bowel was necrotic as it was at the base of it was involved with the pancreatic process.  This area was debrided as possible.  This point the right colon was removed.  We left the patient in discontinuity.  At some point the patient will require an ileostomy.  We then turned our attention to the gallbladder had been some concern that patient had had gallstone pancreatitis although this was not clear.  As the head of the pancreas pancreas had been resected we elected to remove the gallbladder.  The peritoneum on top of the gallbladder was incised and the gallbladder was taken from the top down.Dissection was carried out between the gallbladder and the visceral peritoneum.  This was carried down to the cystic duct laterally.  We identified the cystic artery on the anterior portion of the gallbladder.  This was taken between clamps and ligated with 3-0 silk.  The gallbladder been dissected down to the cystic duct gallbladder junction.  A Carley clamp was placed to close this area of the junction.  Gallbladder was transected a 2-0 silk tie was used to ligate the cystic duct.  The clamp was then moved  proximally and a 2-0 suture ligature was also used to secure the duct above the previous tie.  We then turned our attention to the pancreas there were some areas of necrosis this appeared to be on the inferior portion of the pancreas and relatively superficial.  We debrided this area with the LigaSure.  There was minimal bleeding in the area.  Abdomen was then irrigated we examined the gallbladder bed and there was some bleeding in this area we placed Surgicel in this area.  There was continued oozing so we placed a packing the area.  A temporary abdominal closure was then used.  An ABThera was placed on the abdomen.  1 pack was left in the abdomen.  We plan on returning to the operating room in 2 days to reexplore and washout the abdomen and remove the packs.  The needle and sponge counts were correct x3 .  An x-ray taken at the end of the case confirmed the single pack in the abdomen.    Estimated Blood Loss: No blood loss documented.    Specimens:                Order Name Source Comment Collection Info Order Time   BLOOD GAS, ARTERIAL COMPREHENSIVE Blood, Arterial Pre-op diagnosis:  Duodenal perforation (CMS/HCC) [K63.1] Collected By: Pedro Ramirez MD 8/17/2021  2:26 PM     Release to patient   Immediate        BLOOD GAS, ARTERIAL COMPREHENSIVE Blood, Arterial Pre-op diagnosis:  Duodenal perforation (CMS/HCC) [K63.1] Collected By: Pedro Ramirez MD 8/17/2021  3:56 PM     Release to patient   Immediate        PATHOLOGY TISSUE EXAM Small bowel (specify site) Pre-op diagnosis:  Duodenal perforation (CMS/HCC) [K63.1] Collected By: Pedro Ramirez MD 8/17/2021  3:23 PM     Release to patient   Immediate              Drains:   Drain Right;Lower Abdomen (Active)   Site Description Clean & Dry 08/16/21 2000   Dressing Status clean & dry 08/16/21 2000   Drainage Characteristics/Odor sanguineous 08/16/21 2000   Status Compressed 08/16/21 2000   Output (mL) 50 mL 08/17/21 1000       NG/OG Tube 08/15/21 (Active)    Adjusted Tube Position (cm) 60 cm 08/16/21 2000   Placement Check Methods external tube length measured 08/16/21 2000   Tolerance no adverse signs/symptoms 08/16/21 2000   Securement taped to nostril center 08/16/21 2000   Insertion Site Appearance no redness;no warmth;no tenderness;no skin breakdown 08/16/21 2000   Suction Setting/Drainage Method low suction;continuous setting 08/16/21 2000   Drainage Color greenish-brown 08/16/21 2000   Drainage Consistency thick 08/16/21 2000   Enteral Flush Intake (mL) 50 mL 08/17/21 0600   Output (mL) 100 mL 08/17/21 0600       Urethral Catheter Latex 16 Fr (Active)   Reason for Continuing Urinary Catheterization Continued need for accurate measurement of urinary output in critically ill patient (critical care only) 08/16/21 2000   Urine Characteristics concentrated 08/16/21 0800   Securement Method Securing Device 08/16/21 2000   Tolerance no signs/symptoms of discomfort 08/16/21 2000   Output (mL) 10 mL 08/17/21 0600   Net Urine Output (mL) 10 mL 08/17/21 0600       Implants: * No implants in log *           Complications:  None; patient tolerated the procedure well.           Disposition: ICU - intubated and critically ill.           Condition: stable    Pedro Ramirez MD  Phone Number: [phone]

## 2021-08-17 NOTE — NURSING NOTE
Left hand appearing increasingly mottled and cool to touch. Tips of fingers beginning to turn black. L radial pulse no longer present by doppler. L brachial pulse is positive by doppler. All other extremities with positive doppler pulses and no discoloration. Dr. Bustamante made aware. Surgery to bedside. Vasopressors adjusted.      Dr. Barrera to bedside, vascular surgery to bedside. STAT US done. Surgical resident paged about radiology report. Heparin gtt started at 0300

## 2021-08-17 NOTE — PROGRESS NOTES
"Brief Nutrition Note    Recommendations   1. Concur with Minimal volume TPN   * insulin gtt   2. 8/18 - Day 2 TPN- Minimal volume: 150 AA, 200 dextrose, 68 SMOF lipid      3. Check BMP, Mg, Phos daily, Weekly LFT, Io ca, TG      4.  BG goals 140-180mg/dl        Clinical Course: Patient is a 80 y.o. male who was admitted on 8/15/2021 with a diagnosis of Duodenal perforation (CMS/HCC) [K63.1]  Elevated troponin [R77.8]  Bandemia [D72.825]  High serum lactate [R79.89]  Acute kidney injury superimposed on chronic kidney disease (CMS/HCC) [N17.9, N18.9]  Leukocytosis, unspecified type [D72.829].     Past Medical History:   Diagnosis Date   • CKD (chronic kidney disease) stage 4, GFR 15-29 ml/min (CMS/HCC)    • DJD (degenerative joint disease)    • Hypertension    • Nocturia    • Prostate CA (CMS/HCC)    • Prostate CA (CMS/HCC)      Past Surgical History:   Procedure Laterality Date   • PROSTATECTOMY         Reason for Assessment  Reason For Assessment: identified at risk by screening criteria ([facility] )  Patient Already Seen On: 08/16/21  Diagnosis: infection/sepsis     [facility] Nutrition Screen Tool  Has patient lost weight without trying?: 2-->Unsure  If yes,how much weight has been lost?: 2-->Unsure  Has patient been eating poorly due to decreased appetite?: 0-->No (nikky, sedated and intubated)  [facility] Nutrition Screen Score: 4          Physical Findings  Overall Physical Appearance: on ventilator support  Last Bowel Movement: 08/16/21  Tubes: Nasogastric tube  Skin: surgical incision (open abdomen with VAC )     RETS18 Physical Appearance  Overall Physical Appearance: on ventilator support  Last Bowel Movement: 08/16/21  Tubes: Nasogastric tube  Skin: surgical incision (open abdomen with VAC )     Nutrition Order  Nutrition Order: does not meet nutritional requirements  Nutrition Order Comments: NPO, Day 1 TPN      Anthropometrics  Height: 185.4 cm (6' 1\")      Current Weight  Weight Method: Bed scale  Weight: 87 kg (191 lb 12.8 " oz)     Ideal Body Weight (IBW)  Ideal Body Weight (IBW) (kg): 84.86  % Ideal Body Weight: 102.52        Body Mass Index (BMI)  BMI (Calculated): 25.3     RETS18 Lab Results  Lab Results Reviewed: reviewed   BMP Results       08/17/21 08/17/21 08/16/21                    1046 0306 2253          141 140         K 4.4 3.5 3.6         Cl 104 101 101         CO2 26 24 23         Glucose 156 217 307         BUN 60 61 68         Creatinine 4.3 4.3 4.9         Calcium 8.1 7.9 7.7         Anion Gap 13 16 16         EGFR 13.3 13.3 11.4                     Mg 2.0, Phos 6.4, H   Io ca 1.22,   Lab Results   Component Value Date     (H) 08/17/2021     (H) 08/17/2021    ALKPHOS 111 08/17/2021    BILITOT 1.0 08/17/2021           Medications  Pertinent Medications Reviewed: reviewed   Amio, fentanyl, insulin gtt, levo, vaso     • chlorhexidine  15 mL Mouth/Throat 2 times per day   • fluconazole  200 mg intravenous q24h INT   • nitroglycerin  0.5 inch Topical TID (6a, 12n, 6p)   • pantoprazole  40 mg intravenous q12h ROBERT   • piperacillin-tazobactam  2.25 g intravenous q6h INT         Clinical comments: Pt in OR, abd washout, possible closure   Plan for CRRT - will resume post op   24H UO- 15ml  NGT 100ml   drain 325ml  BMx1   ml     Day 1 TPN: 70 AA, 150 dextrose, 0 lipid , minimal volume   To provide 790 kcals, .8 grams protein/kg, (40% protein needs)      Day 2 TPN: 150 AA, 200 dextrose, 68 SMOF lipid , minimal volume   Will provide: 1960 total kcals, (22 kcals/kg ABW), 1.7 grams protein/kg, 35% lipid. GIR 1.6 mg/kg/min      rec insulin gtt with TPN      Follow labs, and volume status      Goals: Meet pts est nutrition needs   Monitor:  Labs, skin, TPN zaki    Recommendations: See above       Date: 08/17/21  Signature: Yulisa Lam RD

## 2021-08-18 NOTE — PLAN OF CARE
Advance to partial goal TPN today - see THERESA recs  Problem: Adult Inpatient Plan of Care  Goal: Plan of Care Review  Outcome: Progressing     Problem: Parenteral Nutrition  Goal: Effective Intravenous Nutrition Therapy Delivery  Outcome: Progressing

## 2021-08-18 NOTE — PROGRESS NOTES
Patient: Adalberto Galaviz  [id number]  MRN: [id number]  Today's date: 8/18/2021    Attempted to see patient for therapy. Unable due to medical hold (patient remains intubated/ sedated ; will discontinue PT ).     Orders at this time. Please re-refer when extubated and cleared to begin PT services

## 2021-08-18 NOTE — PROGRESS NOTES
The patient went back into the atrial fibrillation around 10 AM of yesterday. Despite continuing intravenous amiodarone drip, he remains in atrial fibrillation with rapid ventricular response at a rate of around 120 bpm. Given that the patient is not a candidate for anticoagulation, DC cardioversion for restoring his sinus rhythm within 24-hour was attempted at the bedside. The patient was then sedated with IV Versed. However, DC cardioversionx3 converted to sinus rhythm only for a few beats, and the rhythm quickly went back to atrial fibrillation. At this point, continue amiodarone IV for today. If the patient does not convert to sinus rhythm spontaneously today, I recommend a rate control approach tomorrow.

## 2021-08-18 NOTE — PROGRESS NOTES
Brief Nutrition Note    Recommendations   1. 8/18 - Day 2 TPN advance to partial goal post-op week 1: Minimal volume,  150 AA, 200 dextrose, 45g SMOF lipid.    -continue to omit K+, Phos; decrease Ca to 5mEq.     2. Daily BMP, Mag, Phos; weekly LFT (due 8/25), IoCa (due 8/25) and TG (due now)    3. BG goal 140-180mg/dl - continue insulin gtt       Clinical Course: Patient is a 80 y.o. male who was admitted on 8/15/2021 with a diagnosis of Duodenal perforation (CMS/HCC) [K63.1]  Elevated troponin [R77.8]  Bandemia [D72.825]  High serum lactate [R79.89]  Acute kidney injury superimposed on chronic kidney disease (CMS/HCC) [N17.9, N18.9]  Leukocytosis, unspecified type [D72.829].     Past Medical History:   Diagnosis Date   • CKD (chronic kidney disease) stage 4, GFR 15-29 ml/min (CMS/HCC)    • DJD (degenerative joint disease)    • Hypertension    • Nocturia    • Prostate CA (CMS/HCC)    • Prostate CA (CMS/HCC)      Past Surgical History:   Procedure Laterality Date   • PROSTATECTOMY         Reason for Assessment  Reason For Assessment: identified at risk by screening criteria ([facility] )  Patient Already Seen On: 08/17/21  Diagnosis: infection/sepsis     [facility] Nutrition Screen Tool  Has patient lost weight without trying?: 2-->Unsure  If yes,how much weight has been lost?: 2-->Unsure  Has patient been eating poorly due to decreased appetite?: 0-->No (nikky, sedated and intubated)  [facility] Nutrition Screen Score: 4          Physical Findings  Overall Physical Appearance: on ventilator support  Last Bowel Movement: 08/16/21  Tubes: Nasogastric tube (150ml)  Skin: edema, surgical incision (open abdomen with vac; 2-3+ edema)     RETS18 Physical Appearance  Overall Physical Appearance: on ventilator support  Last Bowel Movement: 08/16/21  Tubes: Nasogastric tube (150ml)  Skin: edema, surgical incision (open abdomen with vac; 2-3+ edema)     Nutrition Order  Nutrition Order: does not meet nutritional requirements  Nutrition Order  "Comments: NPO, Day 1 TPN   Current TF/TPN Regimen: yes     Anthropometrics  Height: 185.4 cm (6' 1\")           Current Weight  Weight Method: Bed scale  Weight: 87 kg (191 lb 12.8 oz)     Ideal Body Weight (IBW)  Ideal Body Weight (IBW) (kg): 84.86  % Ideal Body Weight: 102.52            Body Mass Index (BMI)  BMI (Calculated): 25.3       Labs/Procedures/Meds  Lab Results Reviewed: reviewed   BMP Results       08/18/21 08/18/21 08/17/21                    0532 0017 1752          141 143         K 4.6 4.7 4.8         Cl 104 105 102         CO2 27 25 24         Glucose 202 151 165         BUN 41 45 59         Creatinine 2.8 3.2 4.2         Calcium 8.7 8.2 7.9         Anion Gap 10 11 17         EGFR 21.8 18.7 13.7                   TBili 1.5 (H) Phos 4.9 (H) Mag 1.8 IoCa 1.29 (H) , 122, 190    Medications  Pertinent Medications Reviewed: reviewed   • chlorhexidine  15 mL Mouth/Throat 2 times per day   • fluconazole  200 mg intravenous q24h INT   • nitroglycerin  0.5 inch Topical TID (6a, 12n, 6p)   • pantoprazole  40 mg intravenous q12h ROBERT   • piperacillin-tazobactam  2.25 g intravenous q6h INT   insulin gtt, LR at 40, levo 12, fentanyl    TPN Assessment  Active TPN Order: Yes  Recommended TPN Dosing Weight: Actual Body Weight  Recommended TPN Infusion Duration: 24 hours  Line Type (TPN): Internal Jugular  TPN Total Volume (ml): 735 (ml)  TPN Protein (Grams): 70 g  TPN Dextrose (Grams): 150  TPN Lipids (Grams): 0 g  TPN Total kCals: 790 kcals  TPN Rate (ml/hr): 30.6  TPN Hours/Day: 24  TPN Time Frame: continuous  TPN Dextrose Infusion Rate (mg/kg/min): 1.2      Dosing wt: ABW 87kg  Clinical comments: Day 2 TPN, remains on starter day 1 TPN. On insulin gtt for BG control. No K+ or phos in TPN. Remains intubated/sedated - Fio2 40, PEEP 10, MAP 77. On CRRT.     24h I/O - UoP 0ml, NGT 150ml    Day 2 TPN: min volume, 150 AA, 200 dextrose, 45g SMOF lipid = 1730 total kcals, (20 kcal/kg ABW or 80% needs first " week postop), 1.7 grams protein/kg, 26% lipid. GIR 1.6 mg/kg/min     Goals: TPN = 80% needs  Monitor: TPN, labs, BG, gut function.     Recommendations: See above       Date: 08/18/21  Signature: ROSE Jordan

## 2021-08-18 NOTE — PROGRESS NOTES
"Surgical Critical Care Progress Note    Adalberto Galaviz is a 80 y.o. male  s/p exploratory laparotomy, abdominal washout, pancreatic necrosectomy, EGD, middle colic vein repair, abthera placement on 8/15    S  Interval events:  -taken back to OR for re-exploration, right colectomy for necrotic cecum, cholecystectomy, and pancreatic debridement. Abdomen temporarily closed.  -Remains in Afib and on low dose pressors  -FiO2 weaned to 40%  -Started on TPN    O  Vitals:  Blood pressure (!) 100/55, pulse 98, temperature 36.5 °C (97.7 °F), temperature source Oral, resp. rate (!) 24, height 1.854 m (6' 1\"), weight 87 kg (191 lb 12.8 oz), SpO2 100 %.    Physical Exam   Physical Exam    General appearance: appears stated age and sedated , no acute distress  Lungs: equal chest rise b/l, intubated  Heart: regular rate and rhythm  Abdomen: soft, nondistended, abthera in place draining serosang fluid   Extremities: extremities normal, warm and well-perfused; no cyanosis, clubbing, or edema   Neurologic: sedated    A/P  Adalberto Galaviz is a 80 y.o. male  s/p exploratory laparotomy, abdominal washout, pancreatic necrosectomy, EGD, middle colic vein repair, abthera placement on 8/15    Neuro: fentanyl gtt, versed prn  Resp: Mild ARDS. On PRVC with PEEP of 10 and FiO2 of 40%, TV of 450.   CV: levophed at 12, vasopressin gtt at 0.03. Remains in atrial fibrillation. Failed attempts at electrical cardioversion by EP this AM. Continue amio gtt at 0.5.  GI: Protonix bid, NGT to LIS. TPN. Open abdomen with ABthera on. OR tomorrow for re-exploration and permacath.   Renal: Renal failure. CRRT. Start UF at 50/hr. Minimal UOP. Permacath tomorrow. Iatrogenic urethral injury - maintain palmer x2 weeks. Urology following.  ID: Zosyn and fluconazole, ID following  Endo: s/p pancreatic debridement, on insulin gtt. Endocrinology following - discuss adding insulin to TPN.   Heme: Holding off on heparin gtt and DVT ppx, transfuse PRBC x2. Labs Q6H. " Thrombocytopenia - hematology consult.   MSK: Left fingers with ischemia and some dry gangrene. Dopplerable radial and ulnar signals. Hold hepatin gtt.     Travis Jacob MD  Please page #[phone] with questions or concerns.  8/18/2021

## 2021-08-18 NOTE — PROGRESS NOTES
Met with patient's wife and son at the bedside to discuss yesterday's findings in the operating room, future operative plans, and prognosis. I informed them that we resected his right colon, gallbladder, and debrided more of his pancreas. We discussed possible outcomes; they understand that he has a very high risk of mortality during this admission given severe sepsis, ARDS, and multi-organ failure. He will need an ileostomy and most likely long term or permanent dialysis. They do not believe that he would have wanted the stoma or to be on dialysis based on conversations with him prior to his illness. We discussed code status, plans for comfort care, and terminal extubation.    -Change code status to DNR  -Palliative care consultation  -Comfort cares   -Terminal extubation once the rest of his family is available at bedside.

## 2021-08-18 NOTE — PROGRESS NOTES
"Infectious Disease Progress Note    Patient Name: Adalberto Galaviz  MR#: [id number]  : 1940  Admission Date: 8/15/2021  Date: 21   Time: 8:07 AM   Author: Hema Allen MD        Antibiotics:    Anti-infectives (From admission, onward)    Start     Dose/Rate Route Frequency Ordered Stop    21 1530  piperacillin-tazobactam (ZOSYN) 2.25 g in 100 mL NSS vial in bag      2.25 g  200 mL/hr over 30 Minutes intravenous Every 6 hours interval 21 1443      21 1530  fluconazole in NaCl (iso-osm) (DIFLUCAN) IVPB 200 mg      200 mg  100 mL/hr over 60 Minutes intravenous Every 24 hours interval 21 1443            Subjective   To OR yesterday for colectomy, cholecystectomy and wound closure    Objective     Vital Signs:    Visit Vitals  BP 94/66   Pulse (!) 110   Temp 36.5 °C (97.7 °F) (Oral)   Resp 20   Ht 1.854 m (6' 1\")   Wt 87 kg (191 lb 12.8 oz)   SpO2 100%   BMI 25.30 kg/m²       Temp (24hrs), Av °C (96.8 °F), Min:34.4 °C (94 °F), Max:36.8 °C (98.2 °F)      Physical Exam:  General: on vent and on pressors  Skin: no rashes  HEENT: NC/AT/ anicteric sclera, no conj lesions,oral ETT  Neck:    Cardiovascular: regular S1/S2  No murmur, rub , or gallop  Respiratory: clear to auscultation  GI/Abdomen: soft, NT/ND,  no peritoneal signs  Extremities: warm,  Edema  Neurology:  sedate  Psych:  sedate  : Bonner    Lines, Drains, Airways, Wounds:  CVC Triple Lumen 21 Left Internal jugular (Active)   Number of days: 2       Peripheral IV (Adult) 21 Left Hand (Active)   Number of days: 1       Drain Right;Lower Abdomen (Active)   Number of days: 2       NG/OG Tube 08/15/21 (Active)   Number of days: 3       Urethral Catheter Latex 16 Fr (Active)   Number of days: 3       ETT  (Active)   Number of days: 3       Arterial Line 21 Right Femoral (Active)   Number of days: 2       HD Cath Double (Active)   Number of days: 2       HD Cath Double (Active)   Number of days: 1     "   Negative Pressure Wound Therapy Midline Abdomen (Active)   Number of days: 2       Surgical Incision Abdomen Bilateral (Active)   Number of days: 3           Labs:    CBC Results       08/18/21 08/18/21 08/17/21                    0532 0017 1753         WBC 34.33 38.78 38.66         RBC 2.56 2.90 2.14         HGB 7.5 8.4 6.2         HCT 22.0 24.9 18.7         MCV 85.9 85.9 87.4         MCH 29.3 29.0 29.0         MCHC 34.1 33.7 33.2         PLT 47 49 57         Comment for WBC at 0532 on 08/18/21: This result has been called to ZENAIDA JOHANSEN by Dawna Aguero on 08 18 21 at 06:23, and has been read back.     Comment for WBC at 0017 on 08/18/21: ALL RESULTS HAVE BEEN CHECKED. This result has been called to ELENO OROSCO by Dawna Aguero on 08 18 21 at 00:56, and has been read back.     Comment for WBC at 1753 on 08/17/21: This result has been called to ADALI COOK by Yi Sawyer on 08 17 21 at 18:13, and has been read back.     Comment for HGB at 1753 on 08/17/21: This result has been called to ADALI COOK by Yi Sawyer on 08 17 21 at 18:13, and has been read back.     Comment for PLT at 0532 on 08/18/21: CONSISTENT WITH PREVIOUS RESULTS. SPECIMEN QUALITY CHECKED. This result has been called to ZENAIDA JOHANSEN by Dawna Aguero on 08 18 21 at 06:23, and has been read back.     Comment for PLT at 0017 on 08/18/21: This result has been called to ELENO OROSCO by Dawna Aguero on 08 18 21 at 00:56, and has been read back.         CMP Results       08/18/21 08/18/21 08/17/21                    0532 0017 1752          141 143         K 4.6 4.7 4.8         Cl 104 105 102         CO2 27 25 24         Glucose 202 151 165         BUN 41 45 59         Creatinine 2.8 3.2 4.2         Calcium 8.7 8.2 7.9         Anion Gap 10 11 17          339 400          220 227         Albumin 2.0 1.9 2.0         EGFR 21.8 18.7 13.7                       Estimated Creatinine Clearance: 23 mL/min (A) (by C-G  formula based on SCr of 2.8 mg/dL (H)).      Assessment     1. Narcotizing pancreatitis    S/p necrosectomy 8/15   OR Strep anginosis on initial cultures  2. Colectomy and cholecystectomy 8/17  3. Pressor dependent septic shock  4. GABY   For CRRT  5. VDRF    Remains critically ill with multiple medical problems and maximal supportive care in ICU     Plan     Continue pip/tazo and fluconazole pending further cultures        Hema Allen MD

## 2021-08-18 NOTE — PROGRESS NOTES
Nephrology CRRT Note    Machine type: NxStage     Indication: GABY on CKD IV, anuria, metabolic acidosis    Dialysis catheter: Left femoral medcomp placed on 8/17/2021.     Initiation date: 8/16/2021     Dialysate bath: 4 k    Qb (mL/min): 200    Qd (mL/hr): 2000    UF rate (mL/hr): 0    Labs  BUN   Date Value Ref Range Status   08/18/2021 41 (H) 8 - 20 mg/dL Final     Creatinine   Date Value Ref Range Status   08/18/2021 2.8 (H) 0.8 - 1.3 mg/dL Final     Sodium   Date Value Ref Range Status   08/18/2021 141 136 - 144 mEQ/L Final     Potassium   Date Value Ref Range Status   08/18/2021 4.6 3.6 - 5.1 mEQ/L Final     CO2   Date Value Ref Range Status   08/18/2021 27 22 - 32 mEQ/L Final     Magnesium   Date Value Ref Range Status   08/18/2021 1.8 1.8 - 2.5 mg/dL Final     Calcium   Date Value Ref Range Status   08/18/2021 8.7 (L) 8.9 - 10.3 mg/dL Final     Phosphorus   Date Value Ref Range Status   08/18/2021 4.9 (H) 2.4 - 4.7 mg/dL Final     Albumin   Date Value Ref Range Status   08/18/2021 2.0 (L) 3.4 - 5.0 g/dL Final     WBC   Date Value Ref Range Status   08/18/2021 34.33 (HH) 3.80 - 10.50 K/uL Final     Comment:     This result has been called to ZENAIDA JOHANSEN by Dawna Aguero on 08 18 21 at 06:23, and has been read back.      Hemoglobin   Date Value Ref Range Status   08/18/2021 7.5 (L) 13.7 - 17.5 g/dL Final     Platelets   Date Value Ref Range Status   08/18/2021 47 (LL) 150 - 350 K/uL Final     Comment:     CONSISTENT WITH PREVIOUS RESULTS. SPECIMEN QUALITY CHECKED. This result has been called to ZENAIDA JOHANSEN by Dawna Aguero on 08 18 21 at 06:23, and has been read back.      Lactate   Date Value Ref Range Status   08/18/2021 2.0 0.4 - 2.0 mmol/L Final     I have reviewed the pertinent patient's labs.    Intake/Output Summary (Last 24 hours) at 8/18/2021 0809  Last data filed at 8/18/2021 0608  Gross per 24 hour   Intake 4843.21 ml   Output 725 ml   Net 4118.21 ml     Patient seen and examined on CRRT.   Tolerating well.  Labs reviewed.  Pressor recommend trending down.  Off vasopressin and on Levophed at 12 mcg/min  Remains anuric.  No UF yet.  Positive fluid balance of 5.1 L from yesterday and net positive of 10.6 L since admission.  Currently on FiO2 of 40% and PEEP of 10.    Recommend:  Continue on current setting of CRRT  Check serial labs as per CRRT protocol - BMP, ionized Ca q 12 hourly and daily CBC, Mg and Phos. Replace electrolyte as needed.  Can start on UF at 50 cc/h and titrate up as tolerated    -Discussed with RN at bedside.

## 2021-08-18 NOTE — PROGRESS NOTES
"General Surgery Post-Operative Note    Adalberto Galaviz is a 80 y.o. male  s/p right colectomy, cholecystectomy resection today.     S  Patient is intubated and sedated.     O  Vitals:  Blood pressure 105/72, pulse 92, temperature (!) 34.4 °C (94 °F), temperature source Core, resp. rate (!) 24, height 1.854 m (6' 1\"), weight 87 kg (191 lb 12.8 oz), SpO2 100 %.    General appearance: appears stated age, no distress and sedated   Lungs: intubated, even chest rise b/l  Heart: regular rate and rhythm  Abdomen: soft, nondistended, abthera in place draining serosang fluid, under appropriate suction   Extremities: L>R hand duskiness, cold b/l, mottled, no palpable L radial or ulnar  Neurologic: sedated    A/P  Adalberto Galaviz is a 80 y.o. male  s/p right colectomy, cholecystectomy resection today, critically ill.     Neuro: fentanyl gtt 150, versed prn  Resp: PRVC 24/450/14/60  CV: Afib. F/u Cards. Levophed 16, vasopressin 0.03  GI: Protonix bid, NGT, NPO  Renal: CRRT. keep palmer in place until at least 8/29, uro following.   ID: zosyn and fluconazole. ID consult. MRSA screen negative  Endo: insulin gtt  Hem: SQH, CBC, PT, INR, PTT Q6H  LDA: RIJ MC, [facility] TLC, fem MC, R fem MC    Jessica Hyman MD  Please page #[phone] with questions or concerns.  8/17/2021    "

## 2021-08-18 NOTE — PROGRESS NOTES
I visited pt and his family at bedside to see if they would like support from spiritual care. One family member engaged in conversation a bit. They preferred to have privacy in this moment. I let them know I remain available should needs arise.    Romelia Wilkinson,   [phone]

## 2021-08-18 NOTE — CONSULTS
"Wound Ostomy Continence Note    Subjective    HPI Patient is a 80 y.o. male who was admitted on 8/15/2021 with a diagnosis of Duodenal perforation (CMS/HCC) [K63.1]  Elevated troponin [R77.8]  Bandemia [D72.825]  High serum lactate [R79.89]  Acute kidney injury superimposed on chronic kidney disease (CMS/HCC) [N17.9, N18.9]  Leukocytosis, unspecified type [D72.829].    Problem list Problem List:   Patient Active Problem List   Diagnosis   • Hypertension   • Stage 4 chronic kidney disease (CMS/HCC)   • Prostate CA (CMS/HCC)   • Acute pancreatitis   • Anemia   • Elevated troponin   • Leukocytosis   • Ascites   • Gout   • Acute kidney injury superimposed on chronic kidney disease (CMS/HCC)   • Duodenal perforation (CMS/HCC)   • Paroxysmal atrial fibrillation (CMS/HCC)      PMH/PSH Medical History:   Past Medical History:   Diagnosis Date   • CKD (chronic kidney disease) stage 4, GFR 15-29 ml/min (CMS/HCC)    • DJD (degenerative joint disease)    • Hypertension    • Nocturia    • Prostate CA (CMS/HCC)    • Prostate CA (CMS/HCC)      Surgical History:   Past Surgical History:   Procedure Laterality Date   • PROSTATECTOMY        Assessment and Recommendation Wound Prevention Bundle  Wound Care consult received for low Geronimo score.  Patient is at risk for pressure injuries.                                                           Positioning- If clinically able:  Reposition at a minimum of 2 hours.  Adjust to avoid lying on medical devices. Use positioning devices to offload bony prominences. Use transfer aids to reduce friction and shear. Use \"turn assist.\"                  Risk Assessment:   Utilize risk assessment scale per hospital guidelines.  communicate risk to interdisciplinary healthcare team.                  Evaluate Surface/Pressure Redistribution:   Utilize \"less is best\" with linen usage.                Vigilant Skin Inspection:   Inspect skin from head to toe, including areas under removable medical " devices and dressings. Communicate skin issues to healthcare team and consult resources as needed.                   Evaluate incontinence/moisture/temperature:   Keep skin clean and dry (microclimate). Use moisture barrier products for incontinence care. Use absorbent under pads that wick away and hold moisture. Intervene for fecal and/or urinary incontinence (consider external containment devices. Use skin emollients (dry skin).                  Nutrition:   Consult dietician for at risk patients.           Teaching Patients and Families:   Encourage patients and families to partner with staff in preventing pressure injuries. Give patients and families pressure injury education, assess their understanding of education given, and document education.    Low air loss mattress, static air cushion, waffle boots, mepilex to high risk areas and bony prominences.     Re-consult Wound Care as needed.     Bola Rivera RN  8/18/2021  7:07 AM                                                                                      Date: 08/18/21  Signature: Bola Rivera RN

## 2021-08-18 NOTE — PROGRESS NOTES
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    Objective     Vital signs in last 24 hours:  Temp:  [34.4 °C (94 °F)-36.5 °C (97.7 °F)] 36.5 °C (97.7 °F)  Heart Rate:  [] 93  Resp:  [20-24] 24  BP: ()/(50-72) 115/64  FiO2 (%) (Set):  [40 %-60 %] 40 %      Intake/Output Summary (Last 24 hours) at 8/18/2021 1322  Last data filed at 8/18/2021 1100  Gross per 24 hour   Intake 4934.86 ml   Output 675 ml   Net 4259.86 ml     Intake/Output this shift:  I/O this shift:  In: 826.4 [I.V.:581.6]  Out: -     Physical Exam    General appearance: intubated and sedated  Lungs: on ventilator support  Heart: RRR  Abdomen: abthera vac in place   Extremities: L hand and forearm cold, finger tips mottled  Pulses: no palpable L radial or ulnar, proximal radial dopplerable,   Skin: Skin color, texture, turgor normal. No rashes or lesions    VTE Assessment: I have reassessed and the patient's VTE risk and treatment plan is appropriate.    Labs  No new labs.    Imaging  I have reviewed the Imaging from the last 24 hrs.      Assessment/Plan   81 yo M transferred from [facility] 8/15 for necrotizing pancreatitis, OR 8/15, left open in the SICU w/ high pressors, vascular surgery c/s for ischemic left hand    -OR for permacath when patient taken to close abdomen  - rest of care per primary team     Please page [phone] with any questions or concerns     Chase Torres MD  General Surgery PGY-1

## 2021-08-19 LAB
CASE RPRT: NORMAL
CLINICAL INFO: NORMAL
CROSSMATCH: NORMAL
ISBT CODE: 6200
PATH REPORT.FINAL DX SPEC: NORMAL
PATH REPORT.GROSS SPEC: NORMAL
PRODUCT CODE: NORMAL
PRODUCT STATUS: NORMAL
SPECIMEN EXP DATE BLD: NORMAL
UNIT ABO: NORMAL
UNIT ID: NORMAL
UNIT RH: POSITIVE

## 2021-08-19 NOTE — DISCHARGE SUMMARY
Inpatient Discharge Summary    BRIEF OVERVIEW  Admitting Provider:  H&P Notes 7/20/2021 to 8/19/2021         Date of Service   Author Author Type Status Note Type File Time    08/15/21 1839  Anastacio Colon MD Physician Signed H&P 08/15/21 1904    08/15/21 1513  Jeffrey Zarate MD Resident Attested H&P 08/16/21 0706    07/21/21 1404  Charlene Mo DO Physician Signed H&P 07/21/21 1435          Attending Provider: No att. providers found Attending phys phone: N/A  Primary Care Physician at Discharge: Myles Longoria [phone]    Admission Date: 8/15/2021     Discharge Date: 8/19/2021    Primary Discharge Diagnosis  Acute kidney injury superimposed on chronic kidney disease (CMS/HCC)    Secondary Discharge Diagnosis  Active Hospital Problems    Diagnosis Date Noted   • Paroxysmal atrial fibrillation (CMS/[facility]) 08/16/2021     Priority: High   • Acute kidney injury superimposed on chronic kidney disease (CMS/HCC) 08/15/2021   • Duodenal perforation (CMS/[facility]) 08/15/2021      Resolved Hospital Problems   No resolved problems to display.       DETAILS OF HOSPITAL STAY    Operative Procedures Performed  Procedure(s):  WOUND CLOSURE SECONDARY ABDOMINAL . right colectomy, gallbradder .    Consults:   Consult Notes 7/20/2021 to 8/19/2021         Date of Service   Author Author Type Status Note Type File Time    08/18/21 0706  Bola Rivera RN Wound Ostomy Continence Signed Consults 08/18/21 0718    08/17/21 0305  Saroj Bustamante MD Resident Signed Consults 08/17/21 0332    08/16/21 1426  Yulisa Lam RD Registered Dietitian Signed Consults 08/16/21 1450    08/16/21 1332  Julius Alfaro, DEQUAN  Signed Consults 08/16/21 1335    08/16/21 1220  Aaron Kelley MD Physician Signed Consults 08/16/21 1605    08/16/21 1054  Chris Cervantes Medical Student Attested Consults 08/16/21 1455    08/16/21 0750  Flako Durán MD Physician Signed Consults 08/16/21 1018    08/16/21 0304  Dianna  MD Saroj Resident Attested Consults 08/16/21 1616    08/15/21 2004  Angel Alegria DO Resident Attested Consults 08/18/21 0814    07/27/21 1418  Brunilda Rico MD Physician Addendum Consults 07/27/21 1432    07/21/21 1452  Oni Carrillo MD Fellow Attested Consults 07/21/21 1715    07/21/21 1427  Cyril Chicas MD Physician Signed Consults 07/21/21 1525          Consult Orders During Admission:  IP CONSULT TO UROLOGY  IP CONSULT TO SPIRITUAL HEALTH AND EDUCATION  IP CONSULT TO INFECTIOUS DISEASE  IP CONSULT TO NEPHROLOGY  IP CONSULT TO ELECTROPHYSIOLOGY  IP CONSULT TO NUTRITION SERVICES  IP CONSULT TO WOUND OSTOMY CONTINENCE       Imaging  CT ABDOMEN PELVIS WITHOUT IV CONTRAST    Result Date: 8/15/2021  IMPRESSION: 1.  Findings again consistent with acute pancreatitis with interval increase in gas within the known peripancreatic fluid collections, findings which are suspicious for communication and perforation of the adjacent duodenum.  Although there is no oral contrast extravasation on this study there is a large amount of contrast retained in the stomach, likely secondary to inflammatory changes and findings are likely felt to represent duodenal perforation.    CT ABDOMEN PELVIS WITHOUT IV CONTRAST    Result Date: 8/15/2021  IMPRESSION: Findings again consistent with acute pancreatitis with interval increase in gas within the known peripancreatic fluid collections, findings which are suspicious for communication and perforation of the adjacent duodenum. There is small amount of free air noted in the upper abdomen in keeping with perforated viscus, likely duodenal in origin.  Small amount of free fluid in the pelvis, similar to prior study. Finding:    New or increased pneumoperitoneum or other evidence of perforated viscus   Acuity: Critical  Status:  CLOSED Critical read back was performed and results were read back by Dr. Baez,  on 8/15/2021 at at 2:20 PM COMMENT: Technique: CT examination of the  abdomen and pelvis was performed utilizing contiguous 2.5 mm transaxial sections. Images were acquired without intravenous contrast.. Oral contrast was not given CT DOSE:  One or more dose reduction techniques (e.g. automated exposure control, adjustment of the mA and/or kV according to patient size, use of iterative reconstruction technique) utilized for this examination. Comparison studies: CT abdomen pelvis dated 8/5/2021 Lung bases: Trace left pleural effusion.  Bibasilar atelectasis.. Lower chest soft tissue: Normal. Liver: Normal. Spleen: Normal. Adrenals: Normal. Pancreas: Again noted is extensive inflammation about the pancreas with poorly defined pancreatic ductal dilatation and cystic lesion within the distal body. The distribution of the collection/extensive information surrounding the pancreas is stable when compared prior study however there are multiple new foci of gas within the collection raising concern for perforation noting that the duodenum is inseparable from the collection in this area.  There is a small amount of free fluid in the pelvis and free air noted predominantly in the upper abdomen. Kidneys, ureters and bladder: Stable left parapelvic cysts.. Gallbladder:  Stable mild prominence.. Retroperitoneal structures: Normal. Bowel and mesentery: Scattered diverticuli noted.  There is mild bowel wall thickening of the transverse colon in the right upper quadrant, findings are likely reactive to adjacent collection.  The appendix is normal. Lymph nodes: Prominent mesenteric lymph nodes adjacent to the pancreas are likely reactive. Pelvic structures:  Normal. Bones: Thoracolumbar degenerative change.. Vessels:Aortoiliac vascular calcifications.     CT ABDOMEN PELVIS WITHOUT IV CONTRAST    Result Date: 8/5/2021  IMPRESSION: Acute pancreatitis with a slight increase in peripancreatic fluid. Evaluation for walled off necrosis is difficult without contrast. COMMENT: Technique: CT examination of the  abdomen and pelvis was performed utilizing contiguous 2.5 mm transaxial sections. Images were acquired without intravenous contrast.. Oral contrast was not given CT DOSE:  One or more dose reduction techniques (e.g. automated exposure control, adjustment of the mA and/or kV according to patient size, use of iterative reconstruction technique) utilized for this examination. Comparison studies: CT abdomen pelvis dated 7/31/2021. Lung bases: There are hypoventilatory changes and small stable effusions.. Lower chest soft tissue: Normal. Liver: Normal. Spleen: Normal. Adrenals: Normal. Pancreas: Again noted is extensive inflammation about the pancreas with poorly defined pancreatic ductal dilatation and cystic lesion within the distal body. Since the previous examination there has been a slight increase in peripancreatic fluid.  There is no new air within the pancreas. Kidneys, ureters and bladder: Stable left parapelvic cysts.. Gallbladder:  Stable mild prominence.. Retroperitoneal structures: Normal. Bowel and mesentery: Scattered diverticuli noted.  The appendix is normal. There is stable ascites.. Lymph nodes: None enlarged. Pelvic structures:  Normal. Bones: Thoracolumbar degenerative change.. Vessels:Aortoiliac vascular calcifications.     CT ABDOMEN PELVIS WITHOUT IV CONTRAST    Result Date: 7/31/2021  IMPRESSION: Limited evaluation due to lack of IV contrast.  There is again redemonstration of marked abnormality of the pancreas which is overall diffusely enlarged with significant peripancreatic inflammatory changes, and I suspect the inflammatory changes and possibly phlegmonous changes around the pancreatic head may be worsened.  Cannot exclude focal or drainable collection.  Probable secondary inflammatory changes of the duodenum.  New bilateral pleural effusions, right greater than left.  There are several small bowel loops which are fluid-filled and mildly prominent but no evidence for obstruction or  transition point.  This may be due to enteritis or ileus.  There is also interval development of ascites. STUDY: Axial CT images of the abdomen and pelvis were obtained from the lung bases to the pubic symphysis.  No IV or oral contrast demonstrate for this examination.  Images were reviewed in soft tissue, lung and bone windows. CT DOSE:  One or more dose reduction techniques (e.g. automated exposure control, adjustment of the mA and/or kV according to the patient size, use of iterative reconstruction technique) utilized for this examination. COMMENT: GI System: Liver:  Normal in size and appearance. Gallbladder and bile ducts:  Gallbladder is distended but otherwise unremarkable.  No ductal dilatation. Pancreas:  Markedly abnormal appearance of the pancreas which is enlarged and edematous with marked surrounding inflammatory changes particularly the head of the pancreas probably worsened from the previous study.  A more focal lesion in the mid body of the pancreas measuring 2.8 x 3.1 cm although might be similar to the prior study. Stomach:  Normal in size and appearance. Small bowel:  Cannot exclude secondary inflammatory changes of the duodenum. Multiple fluid-filled and mildly prominent but not significantly dilated small bowel loops diffusely, likely related to enteritis or ileus.  No discrete transition point appreciated. Large bowel:  Normal  in caliber and appearance.  Normal appendix.  System: Adrenals:  Normal. Kidneys:  The kidneys are small measuring 8 cm on the right 8.2 cm on the left, no hydronephrosis.  Large left parapelvic renal cyst measuring 4.1 x 3.7 cm.. Urinary bladder:  Normal. Prostate gland and seminal vesicles: Prostate is surgically absent. RES System: Spleen:  Normal in size and appearance. Lymph nodes:  Although difficult to evaluate, there does appear to be multiple enlarged peripancreatic lymph nodes. Other: Peritoneum:  Moderate ascites, the largest component in the pelvis.  No  free air.  No contained fluid collection definitely identified although limited evaluation due to lack of IV contrast. Aorta and iliac arteries:  Atherosclerotic calcifications of the abdominal aorta and iliac arteries. Abdominal wall: Normal. Lower lungs and pleura:  Small bilateral pleural effusions, right greater than left.  Bibasilar airspace disease, likely related to atelectasis. Visualized axial skeleton:  Multilevel degenerative spondylosis of the lumbar spine.    X-RAY CHEST 2 VIEWS    Result Date: 7/25/2021  IMPRESSION: Stable.    X-RAY ABDOMEN 1 VIEW    Result Date: 8/17/2021  IMPRESSION: Lines and tubes as above. Serpiginous packing material seen in the right upper quadrant of the abdomen. Expected per operative note.    CT ANGIOGRAPHY CHEST WITH AND WITHOUT IV CONTRAST    Result Date: 7/21/2021  IMPRESSION: 1.  There is fluid and stranding throughout the peripancreatic fat predominantly the region of the pancreatic head and neck.  Clinical correlation for acute pancreatitis is advised.  There is dilatation of pancreatic duct throughout the body and tail.  No obvious pancreatic mass is demonstrated.  An underlying intraductal lesion is not excluded on the basis of this exam. 2.  Normal caliber thoracoabdominal aorta without evidence of dissection. Repeat.  Additional incidental findings as described above.     CT ANGIOGRAPHY ABDOMEN PELVIS WITH AND WITHOUT IV CONTRAST    Result Date: 7/21/2021  IMPRESSION: 1.  There is fluid and stranding throughout the peripancreatic fat predominantly the region of the pancreatic head and neck.  Clinical correlation for acute pancreatitis is advised.  There is dilatation of pancreatic duct throughout the body and tail.  No obvious pancreatic mass is demonstrated.  An underlying intraductal lesion is not excluded on the basis of this exam. 2.  Normal caliber thoracoabdominal aorta without evidence of dissection. Repeat.  Additional incidental findings as described above.      ULTRASOUND KIDNEYS    Result Date: 7/26/2021  IMPRESSION: 1.  No hydronephrosis. 2.  Atrophic kidneys. Increased parenchymal echogenicity in keeping with medical renal disease. 3.  Bilateral renal cysts. I certify that I have reviewed this examination and agree with this report. Trini Mas D.O.    X-RAY CHEST 1 VIEW    Result Date: 8/18/2021  IMPRESSION: Stable examination    X-RAY CHEST 1 VIEW    Result Date: 8/17/2021  IMPRESSION: No significant change    X-RAY CHEST 1 VIEW    Result Date: 8/16/2021  IMPRESSION: Tip of the endotracheal tube is approximately 5 cm from the frank. Additional findings not significantly changed.     X-RAY CHEST 1 VIEW    Result Date: 8/16/2021  IMPRESSION: A new right internal jugular central venous line noted with tip overlying the distal SVC region.  A new left internal jugular central venous line noted tip overlying the distal superior vena cava region.     X-RAY CHEST 1 VIEW    Result Date: 8/16/2021  IMPRESSION: 1.  Left IJ line terminating over the superior cavoatrial junction. 2.  High position of the endotracheal tube now terminating approximately 8.5 cm above the frank.  This can be advanced approximately 4-5 cm.  This was conveyed over the phone to Marylin Fitch at 8/16/2021 4:15 PM with critical read back.     X-RAY CHEST 1 VIEW    Result Date: 8/16/2021  IMPRESSION: No significant change.    X-RAY CHEST 1 VIEW    Result Date: 8/16/2021  IMPRESSION: 1.  NG tube terminating over the gastric body. 2.  Surgical sponge and adjacent curvilinear radiopaque materials noted in the abdomen.  Correlate clinically. 3.  Ongoing bibasilar atelectasis.    X-RAY CHEST 1 VIEW    Result Date: 8/16/2021  IMPRESSION: Endotracheal tube projects below the thoracic inlet and above the rfank. COMMENT:Frontal portable supine view of the chest was performed and compared with examinations dating back to 8/15/2021.  Scattered areas of linear subsegmental volume loss again seen.  Right  internal jugular central venous catheter tip projects over SVC.  And endogastric tube tip is excluded from the field-of-view. Endotracheal tube projects below the thoracic inlet and above the frank.    X-RAY CHEST 1 VIEW    Result Date: 8/15/2021  IMPRESSION: Bibasilar areas of atelectasis and/or scarring are noted.  Volume loss is present in both lower lobes right greater than left are no large effusions are present     X-RAY CHEST 1 VIEW    Result Date: 7/26/2021  IMPRESSION: Stable appearance of the chest as described above.    X-RAY CHEST 1 VIEW    Result Date: 7/23/2021  IMPRESSION: Minimal lower lobe airspace disease which may represent atelectasis or pneumonia.    X-RAY CHEST 1 VIEW    Result Date: 7/21/2021  IMPRESSION: No definite active disease.    MRI ABDOMEN WITHOUT CONTRAST MRCP    Result Date: 7/24/2021  IMPRESSION: 1.  Acute pancreatitis with intrinsic T1 hyperintense material tracking along the pancreatic head, raising concern for hemorrhagic pancreatitis. 2.  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 3.  Mild extrahepatic and intrahepatic hepatic biliary ductal dilatation.     Ultrasound venous arm left    Addendum Date: 8/17/2021    Anamika HIGGINBOTHAM gave the impression results to Tata ibrahim on 8/17/21 at 8:48am.    Result Date: 8/17/2021  IMPRESSION: Left basilic and left radial vein deep vein thrombosis. A preliminary report was conveyed by Dr. Salgado at the time of the study.     Ultrasound venous leg left    Result Date: 7/27/2021  IMPRESSION:   No evidence for deep venous thrombosis in the left lower extremity. COMMENT: There is normal response to compression within the left common femoral, superficial femoral, and popliteal veins. Normal color-flow, venous waveforms, and response to augmentation is observed. Similar findings are observed in the posterior tibial, peroneal and gastrocnemius veins of the left calf.      Ultrasound arterial limited upper extremity    Result Date: 8/17/2021  IMPRESSION: Probable occlusion of the mid to distal left radial artery. A preliminary report was conveyed by Dr. Salgado at the time of the study. Finding:    Other   Acuity: Significant  Status:  CLOSED Critical read back was performed and results were read back by nurse Krissy Blackwood 8/17/2021 611 AM.           Presenting Problem/History of Present Illness  Duodenal perforation (CMS/HCC) [K63.1]  Elevated troponin [R77.8]  Bandemia [D72.825]  High serum lactate [R79.89]  Acute kidney injury superimposed on chronic kidney disease (CMS/HCC) [N17.9, N18.9]  Leukocytosis, unspecified type [D72.829]         Exam on Day of Discharge  Patient seen and examined on day of discharge.       Hospital Course  Patient had recent hospitalization for acute on chronic pancreatitis at [facility]. CTAP and MRI showed acute hemorrhagic pancreatitis with moderate ascites. He was treated with supportive care and DC'd from [facility] with instructions for outpatient endoscopic evaluation. He presented to [facility] ED for pan and nausea, found to be in septic shock. CT showed acute complicated pancreatitis with peripancreatic fluid collections and foci of free air, with duodenal perforation. He was started on zosyn, fluconazole, PPI, and an NGT was placed. He was expeditiously taken to the OR where he had an exploratory laparotomy, pancreatic necrosectomy, repair of middle colic vein, abdominal washout,  and difficult palmer placement by urology. His abdomen was left open and abthera placed in OR. A central line was placed and patient underwent CRRT. At this time patient required high levels of pressors for hemodynamic instability. Electrophysiology was consulted and paroxysmal a-fib was managed with EP recommendations. Patients left hand became mottled and a stat ultrasound was ordered with findings of occlusion of the mid to distal left radial artery. Patient was  started on heparin gtt. Patient was brought back to the OR 8/17 for another exploratory laparotomy, washout, resection of right colon, gallbladder and further debridement of pancreas. The following day, a goals of care discussion with family led to code status to DNR, no further intervention wanted, palliative care consult, comfort care. Patient underwent terminal extubation in presence of family at their request and passed away.    Discharge Orders     Medication List      ASK your doctor about these medications    amLODIPine 10 mg tablet  Commonly known as: NORVASC  Take 10 mg by mouth daily.  Dose: 10 mg     APPLE CIDER VINEGAR ORAL  Take 1 tablet by mouth daily.  Dose: 1 tablet     cholecalciferol (vitamin D3) 1,000 unit (25 mcg) tablet  Take 1 tablet (1,000 Units total) by mouth daily.  Dose: 1,000 Units     cod liver oil oil  Take 1 tablet by mouth daily.  Dose: 1 tablet     coenzyme Q10 100 mg capsule  Commonly known as: COQ10  Take 100 mg by mouth daily.  Dose: 100 mg     FISH OIL ORAL  Take 1 capsule by mouth daily.  Dose: 1 capsule     glucosamine-chondroitin 500-400 mg tablet  Take 1 tablet by mouth daily.  Dose: 1 tablet     lidocaine 4 % adhesive patch,medicated topical patch  Commonly known as: ASPERCREME  Apply 1 patch topically daily.  Dose: 1 patch     LUPRON DEPOT (3 MONTH) 22.5 mg intramuscular injection  Inject 22.5 mg into the shoulder, thigh, or buttocks every 3 (three) months.  Dose: 22.5 mg  Generic drug: leuprolide (3 month)     lutein 10 mg tablet  Take 10 mg by mouth daily.  Dose: 10 mg     ondansetron 4 mg tablet  Commonly known as: ZOFRAN  Take 4 mg by mouth every 6 (six) hours as needed. for nausea  Dose: 4 mg     OREGANO OIL ORAL  Take 1 capsule by mouth daily.  Dose: 1 capsule     oxyCODONE 5 mg immediate release tablet  Commonly known as: ROXICODONE     pancrelipase (Lip-Prot-Amyl) 6,000-19,000 -30,000 unit capsule  Commonly known as: CREON  Take 2 capsules (12,000 units of lipase  total) by mouth 3 (three) times a day with meals.  Dose: 12,000 units of lipase     pantoprazole 40 mg EC tablet  Commonly known as: PROTONIX  Take 1 tablet (40 mg total) by mouth daily.  Dose: 40 mg     sertraline 50 mg tablet  Commonly known as: ZOLOFT  Take 25 mg by mouth every evening.  Dose: 25 mg     sodium bicarbonate 650 mg tablet  Take 1 tablet (650 mg total) by mouth daily.  Dose: 650 mg                Outpatient Follow-Ups  Encounter Information    This patient does not currently have any appointments scheduled.       Referrals:  No orders of the defined types were placed in this encounter.            Test Results Pending at Discharge  Unresulted Labs (From admission, onward)     Start     Ordered    21 1726  Lactic Acid (Repeat)  PROCEDURE ONCE     Comments: Pre-op diagnosis:  Duodenal perforation (CMS/HCC) [K63.1]     Question:  Release to patient  Answer:  Immediate    21 1450    21 1532  Blood Gas, arterial Comprehensive  Now then every 8 hours,   Status:  Canceled     Question:  Release to patient  Answer:  Immediate   Start Status     21 1532 Collected (21 1908) Details       21 1531    21 0042  MRSA Screen, Nares Only Nose  Once     Question:  Release to patient  Answer:  Immediate    21 0043    08/15/21 2152  Blood Gas, arterial Comprehensive  RELEASE UPON ORDERING     Comments: Specimen E: Pre-op diagnosis:  Duodenal perforation (CMS/HCC) [K63.1]     Question:  Release to patient  Answer:  Immediate    08/15/21 215                    Discharge Disposition    Code Status at Discharge: Prior  Physician Order for Life-Sustaining Treatment Document Status      No documents found

## 2021-08-19 NOTE — NURSING NOTE
@2335: Patient arterial line with no blood pressure, no pulses, PEA on monitor. Dr. Hyman called to bedside. Time of Death called at 2335 on 8/18/21.  Family made aware per Dr. Hyman.  Gift of Life (spoke with Milli Santiago, pt is not candidate for donation) and Central Death Reporting line called.  Post-mortem care performed. LDA's removed.  Belongings bag ([id number]) picked up from security from this RN and sent with patient to [facility].

## 2021-08-19 NOTE — PLAN OF CARE
Problem: Adult Inpatient Plan of Care  Goal: Plan of Care Review  Outcome: Not met, adequate for care transition  Goal: Patient-Specific Goal (Individualized)  Outcome: Not met, adequate for care transition  Goal: Absence of Hospital-Acquired Illness or Injury  Outcome: Not met, adequate for care transition  Goal: Optimal Comfort and Wellbeing  Outcome: Not met, adequate for care transition  Goal: Readiness for Transition of Care  Outcome: Not met, adequate for care transition     Problem: Restraint, Nonbehavioral (Nonviolent)  Goal: Discontinuation Criteria Achieved  Outcome: Not met, adequate for care transition     Problem: Skin Injury Risk Increased  Goal: Skin Health and Integrity  Outcome: Not met, adequate for care transition     Problem: Parenteral Nutrition  Goal: Effective Intravenous Nutrition Therapy Delivery  Outcome: Not met, adequate for care transition

## 2021-08-19 NOTE — PROGRESS NOTES
Patient was extubated, blood pressure support medications were weaned and turned off. On exam, patient had no corneal reflex, unresponsive to sternal rub, no breath sounds b/l, no palpable pulse, and asystolic on cardiac telemetry. Time of death: 11:35PM 8/18/2021.     Jessica Hyman MD  General Surgery

## 2021-08-20 LAB
BACTERIA BLD CULT: NORMAL
BACTERIA BLD CULT: NORMAL

## 2021-08-21 LAB
GRAM STN SPEC: ABNORMAL
MICROORGANISM SPEC CULT: ABNORMAL

## 2021-08-23 NOTE — PROGRESS NOTES
Middle colic vein is a complication of surgery given its proximity to the necrotic pancreas that was being debrided

## 2025-05-25 NOTE — ANESTHESIA POSTPROCEDURE EVALUATION
Patient: Adalberto Galaviz    Procedure Summary     Date: 08/17/21 Room / [facility]: [facility] OR [facility] / [facility]    Anesthesia Start: 1400 Anesthesia Stop: 1704    Procedure: WOUND CLOSURE SECONDARY ABDOMINAL . right colectomy, gallbradder . (N/A Abdomen) Diagnosis:       Duodenal perforation (CMS/HCC)      (Duodenal perforation (CMS/HCC) [K63.1])    Surgeons: Pedro Ramirez MD Responsible Provider: Dustin Razo MD    Anesthesia Type: general ASA Status: 4          Anesthesia Type: general  PACU Vitals  8/17/2021 1651 - 8/17/2021 1751      8/17/2021  1712             Pulse:  (!) 112    Resp:  (!) 24    SpO2:  100 %            Anesthesia Post Evaluation   Sinus pressure and congestion for 2 1/2 weeks, no fever

## (undated) DEVICE — ***USE 136502*** APPLIER CLIP DISP MED/LRG MCA

## (undated) DEVICE — APPLICATOR CHLORAPREP 26ML ORANGE TINT

## (undated) DEVICE — PACK RFID MAJOR BREAST

## (undated) DEVICE — SPONGE LAP 18X18 SAFE-T RFID ENHANCED XRAY

## (undated) DEVICE — SUTURE VICRYL 3-0 J416H SH UNDYED

## (undated) DEVICE — KIT NEEDLE PAD SM LIGHT GLOVES

## (undated) DEVICE — BLADE SCALPEL #11

## (undated) DEVICE — Device

## (undated) DEVICE — ***USE 138514*** SUTURE SILK 3-0 A184H 18IN TIES

## (undated) DEVICE — ***USE 33418*** SUTURE SILK 3-0 K832H 30IN SH

## (undated) DEVICE — APPLIER CLIP DISP LARGE MCA

## (undated) DEVICE — ***USE 138615*** SUTURE PROLENE  3-0  8762H

## (undated) DEVICE — GOWN SURGICAL REINFORCED X-LAR

## (undated) DEVICE — LIGASURE IMPACT INSTRUMENT

## (undated) DEVICE — PACK RFID MAJOR PROCEDURE PACK

## (undated) DEVICE — COVER LIGHTHANDLE

## (undated) DEVICE — GLOVE SURG PROTEXIS PF 8.5

## (undated) DEVICE — SPONGE DISSECTION

## (undated) DEVICE — SWABSTICK BETADINE

## (undated) DEVICE — ***USE 56855*** CUTTER LINEAR GIA TLC55

## (undated) DEVICE — SANI-SERVE SLUSH DRAPE SUBSTIT

## (undated) DEVICE — ***USE 57698*** SLEEVE FLOWTRON DVT CALF SINGLE USE

## (undated) DEVICE — TIP BOVIE BLADE COATED 3IN E1450G

## (undated) DEVICE — TUBING SMOKE EVAC PENCIL COATED

## (undated) DEVICE — BLADE SCALPEL #10

## (undated) DEVICE — MANIFOLD SINGLE PORT NEPTUNE

## (undated) DEVICE — SPONGE LAP DISPOSABLE 18X18

## (undated) DEVICE — SOLN IRRIG .9%SOD 1000ML

## (undated) DEVICE — STERISTRIP 1/2INX4IN

## (undated) DEVICE — ***USE 56982*** SUTURE SILK 2-0  K833H

## (undated) DEVICE — GLOVE SZ 8.5 PROTEXIS CLASSIC LATEX

## (undated) DEVICE — ***USE 138525*** SUTURE VICRYL 0 J106T TIES 18IN VIOLET

## (undated) DEVICE — ***USE 56856*** CUTTER LINEAR 75MM    TLC75

## (undated) DEVICE — GLOVE SZ 7.5 LINER PROTEXIS PI BL

## (undated) DEVICE — ***USE 57033*** CUTTER RELOADS LINEAR 55MM  TCR55

## (undated) DEVICE — DRESSING OPEN ABDOMEN ABTHERA SENSATRAC

## (undated) DEVICE — ***USE 138520*** SUTURE SILK  3-0   C013D

## (undated) DEVICE — CUTTER LINEAR RELOAD 75MM GREEN THICK TISSUE

## (undated) DEVICE — HEMOSTAT SURGICEL ABSORBABLE 4 X 8

## (undated) DEVICE — PAD GROUND ELECTROSURGICAL W/CORD

## (undated) DEVICE — SPONGE RAYTEC 8X4 12 PLY

## (undated) DEVICE — STAPLER SKIN

## (undated) DEVICE — ***USE 57029*** CUTTER RELOADS LINEAR 75MM  TCR75

## (undated) DEVICE — GLOVE SURG PROTEXIS PF 7.5